# Patient Record
Sex: MALE | Race: WHITE | URBAN - METROPOLITAN AREA
[De-identification: names, ages, dates, MRNs, and addresses within clinical notes are randomized per-mention and may not be internally consistent; named-entity substitution may affect disease eponyms.]

---

## 2017-01-01 ENCOUNTER — TRANSFERRED RECORDS (OUTPATIENT)
Dept: HEALTH INFORMATION MANAGEMENT | Facility: CLINIC | Age: 47
End: 2017-01-01

## 2017-01-01 LAB
AST SERPL W P-5'-P-CCNC: 28 U/L (ref 0–45)
BASOPHILS # BLD AUTO: 0 10E9/L (ref 0–0.2)
BASOPHILS NFR BLD AUTO: 0.9 %
CK SERPL-CCNC: 55 U/L (ref 30–300)
CREAT SERPL-MCNC: 0.74 MG/DL (ref 0.66–1.25)
CRP SERPL-MCNC: 5.8 MG/L (ref 0–8)
DIFFERENTIAL METHOD BLD: ABNORMAL
EOSINOPHIL # BLD AUTO: 0.1 10E9/L (ref 0–0.7)
EOSINOPHIL NFR BLD AUTO: 4.1 %
ERYTHROCYTE [DISTWIDTH] IN BLOOD BY AUTOMATED COUNT: 15.1 % (ref 10–15)
GFR SERPL CREATININE-BSD FRML MDRD: >90 ML/MIN/1.7M2
HCT VFR BLD AUTO: 32.6 % (ref 40–53)
HGB BLD-MCNC: 10.9 G/DL (ref 13.3–17.7)
IMM GRANULOCYTES # BLD: 0 10E9/L (ref 0–0.4)
IMM GRANULOCYTES NFR BLD: 0 %
LYMPHOCYTES # BLD AUTO: 1 10E9/L (ref 0.8–5.3)
LYMPHOCYTES NFR BLD AUTO: 29.4 %
MCH RBC QN AUTO: 25.7 PG (ref 26.5–33)
MCHC RBC AUTO-ENTMCNC: 33.4 G/DL (ref 31.5–36.5)
MCV RBC AUTO: 77 FL (ref 78–100)
MONOCYTES # BLD AUTO: 0.3 10E9/L (ref 0–1.3)
MONOCYTES NFR BLD AUTO: 8.2 %
NEUTROPHILS # BLD AUTO: 2 10E9/L (ref 1.6–8.3)
NEUTROPHILS NFR BLD AUTO: 57.4 %
NRBC # BLD AUTO: 0 10*3/UL
NRBC BLD AUTO-RTO: 0 /100
PLATELET # BLD AUTO: 155 10E9/L (ref 150–450)
RBC # BLD AUTO: 4.24 10E12/L (ref 4.4–5.9)
WBC # BLD AUTO: 3.4 10E9/L (ref 4–11)

## 2017-01-01 PROCEDURE — 86140 C-REACTIVE PROTEIN: CPT | Performed by: INTERNAL MEDICINE

## 2017-01-01 PROCEDURE — 85025 COMPLETE CBC W/AUTO DIFF WBC: CPT | Performed by: INTERNAL MEDICINE

## 2017-01-01 PROCEDURE — 82565 ASSAY OF CREATININE: CPT | Performed by: INTERNAL MEDICINE

## 2017-01-01 PROCEDURE — 84450 TRANSFERASE (AST) (SGOT): CPT | Performed by: INTERNAL MEDICINE

## 2017-01-01 PROCEDURE — 82550 ASSAY OF CK (CPK): CPT | Performed by: INTERNAL MEDICINE

## 2018-01-01 ENCOUNTER — TRANSFERRED RECORDS (OUTPATIENT)
Dept: HEALTH INFORMATION MANAGEMENT | Facility: CLINIC | Age: 48
End: 2018-01-01

## 2018-01-01 ENCOUNTER — APPOINTMENT (OUTPATIENT)
Dept: INTERVENTIONAL RADIOLOGY/VASCULAR | Facility: CLINIC | Age: 48
DRG: 441 | End: 2018-01-01
Attending: NURSE PRACTITIONER
Payer: COMMERCIAL

## 2018-01-01 ENCOUNTER — APPOINTMENT (OUTPATIENT)
Dept: INTERVENTIONAL RADIOLOGY/VASCULAR | Facility: CLINIC | Age: 48
End: 2018-01-01
Attending: NURSE PRACTITIONER
Payer: COMMERCIAL

## 2018-01-01 ENCOUNTER — APPOINTMENT (OUTPATIENT)
Dept: GENERAL RADIOLOGY | Facility: CLINIC | Age: 48
DRG: 441 | End: 2018-01-01
Attending: EMERGENCY MEDICINE
Payer: COMMERCIAL

## 2018-01-01 ENCOUNTER — APPOINTMENT (OUTPATIENT)
Dept: GENERAL RADIOLOGY | Facility: CLINIC | Age: 48
DRG: 441 | End: 2018-01-01
Attending: STUDENT IN AN ORGANIZED HEALTH CARE EDUCATION/TRAINING PROGRAM
Payer: COMMERCIAL

## 2018-01-01 ENCOUNTER — APPOINTMENT (OUTPATIENT)
Dept: GENERAL RADIOLOGY | Facility: CLINIC | Age: 48
DRG: 441 | End: 2018-01-01
Payer: COMMERCIAL

## 2018-01-01 ENCOUNTER — HOME INFUSION (PRE-WILLOW HOME INFUSION) (OUTPATIENT)
Dept: PHARMACY | Facility: CLINIC | Age: 48
End: 2018-01-01

## 2018-01-01 ENCOUNTER — MEDICAL CORRESPONDENCE (OUTPATIENT)
Dept: HEALTH INFORMATION MANAGEMENT | Facility: CLINIC | Age: 48
End: 2018-01-01

## 2018-01-01 ENCOUNTER — APPOINTMENT (OUTPATIENT)
Dept: MEDSURG UNIT | Facility: CLINIC | Age: 48
End: 2018-01-01
Attending: INTERNAL MEDICINE
Payer: COMMERCIAL

## 2018-01-01 ENCOUNTER — OFFICE VISIT (OUTPATIENT)
Dept: GASTROENTEROLOGY | Facility: CLINIC | Age: 48
End: 2018-01-01
Attending: INTERNAL MEDICINE
Payer: COMMERCIAL

## 2018-01-01 ENCOUNTER — HOSPITAL ENCOUNTER (INPATIENT)
Facility: CLINIC | Age: 48
LOS: 19 days | Discharge: HOME HEALTH | DRG: 441 | End: 2018-04-02
Attending: ANESTHESIOLOGY
Payer: COMMERCIAL

## 2018-01-01 ENCOUNTER — ANESTHESIA (OUTPATIENT)
Dept: SURGERY | Facility: CLINIC | Age: 48
DRG: 441 | End: 2018-01-01
Payer: COMMERCIAL

## 2018-01-01 ENCOUNTER — APPOINTMENT (OUTPATIENT)
Dept: OCCUPATIONAL THERAPY | Facility: CLINIC | Age: 48
DRG: 441 | End: 2018-01-01
Payer: COMMERCIAL

## 2018-01-01 ENCOUNTER — ANESTHESIA EVENT (OUTPATIENT)
Dept: SURGERY | Facility: CLINIC | Age: 48
End: 2018-01-01

## 2018-01-01 ENCOUNTER — APPOINTMENT (OUTPATIENT)
Dept: ULTRASOUND IMAGING | Facility: CLINIC | Age: 48
DRG: 441 | End: 2018-01-01
Attending: ANESTHESIOLOGY
Payer: COMMERCIAL

## 2018-01-01 ENCOUNTER — ONCOLOGY VISIT (OUTPATIENT)
Dept: ONCOLOGY | Facility: CLINIC | Age: 48
End: 2018-01-01
Attending: INTERNAL MEDICINE
Payer: COMMERCIAL

## 2018-01-01 ENCOUNTER — APPOINTMENT (OUTPATIENT)
Dept: INTERVENTIONAL RADIOLOGY/VASCULAR | Facility: CLINIC | Age: 48
DRG: 441 | End: 2018-01-01
Attending: PHYSICIAN ASSISTANT
Payer: COMMERCIAL

## 2018-01-01 ENCOUNTER — ANESTHESIA (OUTPATIENT)
Dept: GASTROENTEROLOGY | Facility: CLINIC | Age: 48
DRG: 441 | End: 2018-01-01
Payer: COMMERCIAL

## 2018-01-01 ENCOUNTER — HOSPITAL ENCOUNTER (OUTPATIENT)
Facility: CLINIC | Age: 48
Discharge: HOME | End: 2018-02-27
Attending: INTERNAL MEDICINE | Admitting: INTERNAL MEDICINE
Payer: COMMERCIAL

## 2018-01-01 ENCOUNTER — APPOINTMENT (OUTPATIENT)
Dept: GENERAL RADIOLOGY | Facility: CLINIC | Age: 48
DRG: 441 | End: 2018-01-01
Attending: INTERNAL MEDICINE
Payer: COMMERCIAL

## 2018-01-01 ENCOUNTER — ANESTHESIA EVENT (OUTPATIENT)
Dept: GASTROENTEROLOGY | Facility: CLINIC | Age: 48
DRG: 441 | End: 2018-01-01
Payer: COMMERCIAL

## 2018-01-01 ENCOUNTER — APPOINTMENT (OUTPATIENT)
Dept: PHYSICAL THERAPY | Facility: CLINIC | Age: 48
DRG: 441 | End: 2018-01-01
Attending: ANESTHESIOLOGY
Payer: COMMERCIAL

## 2018-01-01 ENCOUNTER — HOSPITAL ENCOUNTER (OUTPATIENT)
Facility: CLINIC | Age: 48
End: 2018-01-01
Admitting: INTERNAL MEDICINE

## 2018-01-01 ENCOUNTER — APPOINTMENT (OUTPATIENT)
Dept: OCCUPATIONAL THERAPY | Facility: CLINIC | Age: 48
DRG: 441 | End: 2018-01-01
Attending: ANESTHESIOLOGY
Payer: COMMERCIAL

## 2018-01-01 ENCOUNTER — TELEPHONE (OUTPATIENT)
Dept: GASTROENTEROLOGY | Facility: CLINIC | Age: 48
End: 2018-01-01

## 2018-01-01 ENCOUNTER — CARE COORDINATION (OUTPATIENT)
Dept: CARE COORDINATION | Facility: CLINIC | Age: 48
End: 2018-01-01

## 2018-01-01 ENCOUNTER — HOSPITAL ENCOUNTER (INPATIENT)
Dept: GENERAL RADIOLOGY | Facility: CLINIC | Age: 48
End: 2018-03-02
Attending: INTERNAL MEDICINE

## 2018-01-01 ENCOUNTER — ANESTHESIA (OUTPATIENT)
Dept: SURGERY | Facility: CLINIC | Age: 48
End: 2018-01-01

## 2018-01-01 ENCOUNTER — APPOINTMENT (OUTPATIENT)
Dept: CT IMAGING | Facility: CLINIC | Age: 48
DRG: 441 | End: 2018-01-01
Payer: COMMERCIAL

## 2018-01-01 ENCOUNTER — APPOINTMENT (OUTPATIENT)
Dept: GENERAL RADIOLOGY | Facility: CLINIC | Age: 48
DRG: 441 | End: 2018-01-01
Attending: ANESTHESIOLOGY
Payer: COMMERCIAL

## 2018-01-01 ENCOUNTER — TELEPHONE (OUTPATIENT)
Dept: INTERVENTIONAL RADIOLOGY/VASCULAR | Facility: CLINIC | Age: 48
End: 2018-01-01

## 2018-01-01 ENCOUNTER — APPOINTMENT (OUTPATIENT)
Dept: MRI IMAGING | Facility: CLINIC | Age: 48
DRG: 441 | End: 2018-01-01
Attending: ANESTHESIOLOGY
Payer: COMMERCIAL

## 2018-01-01 ENCOUNTER — ANESTHESIA EVENT (OUTPATIENT)
Dept: SURGERY | Facility: CLINIC | Age: 48
DRG: 441 | End: 2018-01-01
Payer: COMMERCIAL

## 2018-01-01 ENCOUNTER — APPOINTMENT (OUTPATIENT)
Dept: NUCLEAR MEDICINE | Facility: CLINIC | Age: 48
DRG: 441 | End: 2018-01-01
Attending: INTERNAL MEDICINE
Payer: COMMERCIAL

## 2018-01-01 ENCOUNTER — APPOINTMENT (OUTPATIENT)
Dept: OCCUPATIONAL THERAPY | Facility: CLINIC | Age: 48
DRG: 441 | End: 2018-01-01
Attending: HOSPITALIST
Payer: COMMERCIAL

## 2018-01-01 ENCOUNTER — APPOINTMENT (OUTPATIENT)
Dept: CT IMAGING | Facility: CLINIC | Age: 48
DRG: 441 | End: 2018-01-01
Attending: ANESTHESIOLOGY
Payer: COMMERCIAL

## 2018-01-01 ENCOUNTER — HOSPITAL ENCOUNTER (INPATIENT)
Facility: CLINIC | Age: 48
LOS: 12 days | DRG: 441 | End: 2018-04-22
Attending: EMERGENCY MEDICINE | Admitting: INTERNAL MEDICINE
Payer: COMMERCIAL

## 2018-01-01 VITALS
RESPIRATION RATE: 16 BRPM | DIASTOLIC BLOOD PRESSURE: 87 MMHG | SYSTOLIC BLOOD PRESSURE: 123 MMHG | HEART RATE: 93 BPM | TEMPERATURE: 99.1 F | OXYGEN SATURATION: 99 %

## 2018-01-01 VITALS
DIASTOLIC BLOOD PRESSURE: 86 MMHG | WEIGHT: 151.19 LBS | HEIGHT: 72 IN | HEART RATE: 74 BPM | TEMPERATURE: 96.9 F | OXYGEN SATURATION: 93 % | SYSTOLIC BLOOD PRESSURE: 131 MMHG | RESPIRATION RATE: 16 BRPM | BODY MASS INDEX: 20.48 KG/M2

## 2018-01-01 VITALS
BODY MASS INDEX: 22.78 KG/M2 | HEART RATE: 92 BPM | HEIGHT: 72 IN | DIASTOLIC BLOOD PRESSURE: 78 MMHG | TEMPERATURE: 97.4 F | RESPIRATION RATE: 14 BRPM | WEIGHT: 168.21 LBS | SYSTOLIC BLOOD PRESSURE: 95 MMHG | OXYGEN SATURATION: 94 %

## 2018-01-01 VITALS
DIASTOLIC BLOOD PRESSURE: 92 MMHG | HEIGHT: 72 IN | SYSTOLIC BLOOD PRESSURE: 135 MMHG | RESPIRATION RATE: 16 BRPM | BODY MASS INDEX: 20.07 KG/M2 | TEMPERATURE: 96.8 F | OXYGEN SATURATION: 97 % | HEART RATE: 85 BPM | WEIGHT: 148.2 LBS

## 2018-01-01 VITALS
RESPIRATION RATE: 16 BRPM | HEIGHT: 72 IN | OXYGEN SATURATION: 100 % | SYSTOLIC BLOOD PRESSURE: 112 MMHG | HEART RATE: 65 BPM | TEMPERATURE: 98.1 F | WEIGHT: 144 LBS | DIASTOLIC BLOOD PRESSURE: 73 MMHG | BODY MASS INDEX: 19.5 KG/M2

## 2018-01-01 DIAGNOSIS — R78.81 BACTEREMIA: ICD-10-CM

## 2018-01-01 DIAGNOSIS — R19.7 DIARRHEA, UNSPECIFIED TYPE: ICD-10-CM

## 2018-01-01 DIAGNOSIS — R18.8 OTHER ASCITES: Primary | ICD-10-CM

## 2018-01-01 DIAGNOSIS — R18.8 CIRRHOSIS OF LIVER WITH ASCITES, UNSPECIFIED HEPATIC CIRRHOSIS TYPE (H): ICD-10-CM

## 2018-01-01 DIAGNOSIS — E87.6 HYPOKALEMIA: ICD-10-CM

## 2018-01-01 DIAGNOSIS — R18.8 CIRRHOSIS OF LIVER WITH ASCITES, UNSPECIFIED HEPATIC CIRRHOSIS TYPE (H): Primary | Chronic | ICD-10-CM

## 2018-01-01 DIAGNOSIS — E55.9 VITAMIN D DEFICIENCY: ICD-10-CM

## 2018-01-01 DIAGNOSIS — E08.8 DIABETES MELLITUS DUE TO UNDERLYING CONDITION WITH COMPLICATION, UNSPECIFIED LONG TERM INSULIN USE STATUS: ICD-10-CM

## 2018-01-01 DIAGNOSIS — K74.60 CIRRHOSIS OF LIVER WITH ASCITES, UNSPECIFIED HEPATIC CIRRHOSIS TYPE (H): ICD-10-CM

## 2018-01-01 DIAGNOSIS — R53.1 WEAKNESS: Primary | ICD-10-CM

## 2018-01-01 DIAGNOSIS — R74.01 TRANSAMINITIS: ICD-10-CM

## 2018-01-01 DIAGNOSIS — C25.9 MALIGNANT NEOPLASM OF PANCREAS, UNSPECIFIED LOCATION OF MALIGNANCY (H): Primary | ICD-10-CM

## 2018-01-01 DIAGNOSIS — B37.2 CANDIDIASIS OF SKIN: ICD-10-CM

## 2018-01-01 DIAGNOSIS — Z46.82 ENCOUNTER FOR BILIARY DRAINAGE TUBE PLACEMENT: ICD-10-CM

## 2018-01-01 DIAGNOSIS — C25.9 MALIGNANT NEOPLASM OF PANCREAS, UNSPECIFIED LOCATION OF MALIGNANCY (H): ICD-10-CM

## 2018-01-01 DIAGNOSIS — K86.89 PANCREATIC INSUFFICIENCY: ICD-10-CM

## 2018-01-01 DIAGNOSIS — B37.7 CANDIDEMIA (H): ICD-10-CM

## 2018-01-01 DIAGNOSIS — K21.9 GASTROESOPHAGEAL REFLUX DISEASE, ESOPHAGITIS PRESENCE NOT SPECIFIED: ICD-10-CM

## 2018-01-01 DIAGNOSIS — I10 BENIGN ESSENTIAL HYPERTENSION: ICD-10-CM

## 2018-01-01 DIAGNOSIS — R53.83 FATIGUE, UNSPECIFIED TYPE: ICD-10-CM

## 2018-01-01 DIAGNOSIS — J90 PLEURAL EFFUSION: ICD-10-CM

## 2018-01-01 DIAGNOSIS — E56.1 VITAMIN K DEFICIENCY: ICD-10-CM

## 2018-01-01 DIAGNOSIS — R18.8 OTHER ASCITES: ICD-10-CM

## 2018-01-01 DIAGNOSIS — C25.0 MALIGNANT NEOPLASM OF HEAD OF PANCREAS (H): ICD-10-CM

## 2018-01-01 DIAGNOSIS — D64.9 ANEMIA, UNSPECIFIED TYPE: ICD-10-CM

## 2018-01-01 DIAGNOSIS — K74.60 CIRRHOSIS OF LIVER WITH ASCITES, UNSPECIFIED HEPATIC CIRRHOSIS TYPE (H): Primary | Chronic | ICD-10-CM

## 2018-01-01 LAB
1,3 BETA GLUCAN SER-MCNC: 47 PG/ML
A1AT STL-MCNT: 0.04 MG/G (ref 0–0.5)
ABO + RH BLD: NORMAL
ALBUMIN FLD-MCNC: 0.2 G/DL
ALBUMIN FLD-MCNC: 0.4 G/DL
ALBUMIN SERPL-MCNC: 1.7 G/DL (ref 3.4–5)
ALBUMIN SERPL-MCNC: 1.7 G/DL (ref 3.4–5)
ALBUMIN SERPL-MCNC: 1.8 G/DL (ref 3.4–5)
ALBUMIN SERPL-MCNC: 1.9 G/DL (ref 3.4–5)
ALBUMIN SERPL-MCNC: 1.9 G/DL (ref 3.4–5)
ALBUMIN SERPL-MCNC: 2 G/DL (ref 3.4–5)
ALBUMIN SERPL-MCNC: 2.1 G/DL (ref 3.4–5)
ALBUMIN SERPL-MCNC: 2.2 G/DL (ref 3.4–5)
ALBUMIN SERPL-MCNC: 2.3 G/DL (ref 3.4–5)
ALBUMIN SERPL-MCNC: 2.3 G/DL (ref 3.4–5)
ALBUMIN SERPL-MCNC: 2.4 G/DL (ref 3.4–5)
ALBUMIN SERPL-MCNC: 2.6 G/DL (ref 3.4–5)
ALBUMIN SERPL-MCNC: 2.7 G/DL (ref 3.4–5)
ALBUMIN SERPL-MCNC: 2.8 G/DL (ref 3.4–5)
ALBUMIN SERPL-MCNC: 3 G/DL (ref 3.4–5)
ALBUMIN SERPL-MCNC: 3.1 G/DL (ref 3.4–5)
ALBUMIN SERPL-MCNC: 3.2 G/DL (ref 3.4–5)
ALBUMIN SERPL-MCNC: 3.3 G/DL (ref 3.4–5)
ALBUMIN SERPL-MCNC: 3.4 G/DL (ref 3.4–5)
ALBUMIN SERPL-MCNC: 3.4 G/DL (ref 3.4–5)
ALBUMIN SERPL-MCNC: 3.5 G/DL (ref 3.4–5)
ALBUMIN SERPL-MCNC: 3.6 G/DL (ref 3.4–5)
ALBUMIN UR-MCNC: 30 MG/DL
ALBUMIN UR-MCNC: NEGATIVE MG/DL
ALBUMIN UR-MCNC: NEGATIVE MG/DL
ALP BONE CFR SERPL: 120 U/L (ref 0–55)
ALP LIVER SERPL-CCNC: 511 U/L (ref 0–94)
ALP OTHER CFR SERPL: 0 U/L
ALP SERPL-CCNC: 1053 U/L (ref 40–150)
ALP SERPL-CCNC: 1351 U/L (ref 40–150)
ALP SERPL-CCNC: 1459 U/L (ref 40–150)
ALP SERPL-CCNC: 1628 U/L (ref 40–150)
ALP SERPL-CCNC: 1749 U/L (ref 40–150)
ALP SERPL-CCNC: 2011 U/L (ref 40–150)
ALP SERPL-CCNC: 2031 U/L (ref 40–150)
ALP SERPL-CCNC: 274 U/L (ref 40–150)
ALP SERPL-CCNC: 278 U/L (ref 40–150)
ALP SERPL-CCNC: 329 U/L (ref 40–150)
ALP SERPL-CCNC: 334 U/L (ref 40–150)
ALP SERPL-CCNC: 349 U/L (ref 40–150)
ALP SERPL-CCNC: 358 U/L (ref 40–150)
ALP SERPL-CCNC: 370 U/L (ref 40–150)
ALP SERPL-CCNC: 397 U/L (ref 40–150)
ALP SERPL-CCNC: 442 U/L (ref 40–150)
ALP SERPL-CCNC: 454 U/L (ref 40–150)
ALP SERPL-CCNC: 543 U/L (ref 40–150)
ALP SERPL-CCNC: 549 U/L (ref 40–150)
ALP SERPL-CCNC: 569 U/L (ref 40–150)
ALP SERPL-CCNC: 577 U/L (ref 40–150)
ALP SERPL-CCNC: 579 U/L (ref 40–150)
ALP SERPL-CCNC: 631 U/L (ref 40–120)
ALP SERPL-CCNC: 656 U/L (ref 40–150)
ALP SERPL-CCNC: 671 U/L (ref 40–150)
ALP SERPL-CCNC: 680 U/L (ref 40–150)
ALP SERPL-CCNC: 696 U/L (ref 40–150)
ALP SERPL-CCNC: 743 U/L (ref 40–150)
ALP SERPL-CCNC: 802 U/L (ref 40–150)
ALP SERPL-CCNC: >2330 U/L (ref 40–150)
ALT SERPL W P-5'-P-CCNC: 106 U/L (ref 0–70)
ALT SERPL W P-5'-P-CCNC: 120 U/L (ref 0–70)
ALT SERPL W P-5'-P-CCNC: 42 U/L (ref 0–70)
ALT SERPL W P-5'-P-CCNC: 43 U/L (ref 0–70)
ALT SERPL W P-5'-P-CCNC: 43 U/L (ref 0–70)
ALT SERPL W P-5'-P-CCNC: 44 U/L (ref 0–70)
ALT SERPL W P-5'-P-CCNC: 48 U/L (ref 0–70)
ALT SERPL W P-5'-P-CCNC: 51 U/L (ref 0–70)
ALT SERPL W P-5'-P-CCNC: 54 U/L (ref 0–70)
ALT SERPL W P-5'-P-CCNC: 64 U/L (ref 0–70)
ALT SERPL W P-5'-P-CCNC: 68 U/L (ref 0–70)
ALT SERPL W P-5'-P-CCNC: 69 U/L (ref 0–70)
ALT SERPL W P-5'-P-CCNC: 70 U/L (ref 0–70)
ALT SERPL W P-5'-P-CCNC: 70 U/L (ref 0–70)
ALT SERPL W P-5'-P-CCNC: 71 U/L (ref 0–70)
ALT SERPL W P-5'-P-CCNC: 74 U/L (ref 0–70)
ALT SERPL W P-5'-P-CCNC: 76 U/L (ref 0–70)
ALT SERPL W P-5'-P-CCNC: 79 U/L (ref 0–70)
ALT SERPL W P-5'-P-CCNC: 81 U/L (ref 0–70)
ALT SERPL W P-5'-P-CCNC: 83 U/L (ref 0–70)
ALT SERPL W P-5'-P-CCNC: 84 U/L (ref 0–70)
ALT SERPL W P-5'-P-CCNC: 91 U/L (ref 0–70)
ALT SERPL W P-5'-P-CCNC: 92 U/L (ref 0–70)
ALT SERPL W P-5'-P-CCNC: 94 U/L (ref 0–70)
AMORPH CRY #/AREA URNS HPF: ABNORMAL /HPF
ANGLE RATE OF CLOT STRENGTH: 67.5 DEGREES (ref 53–72)
ANION GAP SERPL CALCULATED.3IONS-SCNC: 10 MMOL/L (ref 3–14)
ANION GAP SERPL CALCULATED.3IONS-SCNC: 11 MMOL/L (ref 3–14)
ANION GAP SERPL CALCULATED.3IONS-SCNC: 12 MMOL/L (ref 3–14)
ANION GAP SERPL CALCULATED.3IONS-SCNC: 13 MMOL/L (ref 3–14)
ANION GAP SERPL CALCULATED.3IONS-SCNC: 5 MMOL/L (ref 3–14)
ANION GAP SERPL CALCULATED.3IONS-SCNC: 6 MMOL/L (ref 3–14)
ANION GAP SERPL CALCULATED.3IONS-SCNC: 7 MMOL/L (ref 3–14)
ANION GAP SERPL CALCULATED.3IONS-SCNC: 8 MMOL/L (ref 3–14)
ANION GAP SERPL CALCULATED.3IONS-SCNC: 9 MMOL/L (ref 3–14)
APPEARANCE FLD: CLEAR
APPEARANCE FLD: NORMAL
APPEARANCE UR: ABNORMAL
APPEARANCE UR: CLEAR
APPEARANCE UR: CLEAR
APTT PPP: 31 SEC (ref 22–37)
APTT PPP: 34 SEC (ref 22–37)
APTT PPP: 35 SEC (ref 22–37)
APTT PPP: 35 SEC (ref 22–37)
APTT PPP: NORMAL SEC (ref 22–37)
AST SERPL W P-5'-P-CCNC: 108 U/L (ref 0–45)
AST SERPL W P-5'-P-CCNC: 148 U/L (ref 0–45)
AST SERPL W P-5'-P-CCNC: 162 U/L (ref 0–45)
AST SERPL W P-5'-P-CCNC: 164 U/L (ref 0–45)
AST SERPL W P-5'-P-CCNC: 19 U/L (ref 0–45)
AST SERPL W P-5'-P-CCNC: 20 U/L (ref 0–45)
AST SERPL W P-5'-P-CCNC: 22 U/L (ref 0–45)
AST SERPL W P-5'-P-CCNC: 24 U/L (ref 0–45)
AST SERPL W P-5'-P-CCNC: 25 U/L (ref 0–45)
AST SERPL W P-5'-P-CCNC: 26 U/L (ref 0–45)
AST SERPL W P-5'-P-CCNC: 32 U/L (ref 0–45)
AST SERPL W P-5'-P-CCNC: 32 U/L (ref 0–45)
AST SERPL W P-5'-P-CCNC: 34 U/L (ref 0–45)
AST SERPL W P-5'-P-CCNC: 35 U/L (ref 0–45)
AST SERPL W P-5'-P-CCNC: 38 U/L (ref 0–45)
AST SERPL W P-5'-P-CCNC: 42 U/L (ref 0–45)
AST SERPL W P-5'-P-CCNC: 49 U/L (ref 0–45)
AST SERPL W P-5'-P-CCNC: 51 U/L (ref 0–45)
AST SERPL W P-5'-P-CCNC: 52 U/L (ref 0–45)
AST SERPL W P-5'-P-CCNC: 53 U/L (ref 0–45)
AST SERPL W P-5'-P-CCNC: 54 U/L (ref 0–45)
AST SERPL W P-5'-P-CCNC: 54 U/L (ref 0–45)
AST SERPL W P-5'-P-CCNC: 56 U/L (ref 0–45)
AST SERPL W P-5'-P-CCNC: 57 U/L (ref 0–45)
AST SERPL W P-5'-P-CCNC: 63 U/L (ref 0–45)
AST SERPL W P-5'-P-CCNC: 63 U/L (ref 0–45)
AST SERPL W P-5'-P-CCNC: 64 U/L (ref 0–45)
AST SERPL W P-5'-P-CCNC: 64 U/L (ref 0–45)
AST SERPL W P-5'-P-CCNC: 74 U/L (ref 0–45)
AST SERPL W P-5'-P-CCNC: 79 U/L (ref 0–45)
AST SERPL W P-5'-P-CCNC: 81 U/L (ref 0–45)
B-D GLUCAN INTERPRETATION (1,3): NEGATIVE
BACTERIA SPEC CULT: ABNORMAL
BACTERIA SPEC CULT: NO GROWTH
BACTERIA SPEC CULT: NORMAL
BASE EXCESS BLDA CALC-SCNC: 1.4 MMOL/L
BASE EXCESS BLDA CALC-SCNC: 1.7 MMOL/L
BASE EXCESS BLDA CALC-SCNC: 3.2 MMOL/L
BASE EXCESS BLDA CALC-SCNC: 3.4 MMOL/L
BASE EXCESS BLDA CALC-SCNC: 5.4 MMOL/L
BASOPHILS # BLD AUTO: 0 10E9/L (ref 0–0.2)
BASOPHILS NFR BLD AUTO: 0 %
BASOPHILS NFR BLD AUTO: 0.1 %
BILIRUB DIRECT SERPL-MCNC: 0.8 MG/DL (ref 0–0.2)
BILIRUB DIRECT SERPL-MCNC: 1.1 MG/DL (ref 0–0.2)
BILIRUB DIRECT SERPL-MCNC: 1.4 MG/DL (ref 0–0.2)
BILIRUB DIRECT SERPL-MCNC: 4.8 MG/DL (ref 0–0.2)
BILIRUB SERPL-MCNC: 2.2 MG/DL (ref 0.2–1.3)
BILIRUB SERPL-MCNC: 2.3 MG/DL (ref 0.2–1.3)
BILIRUB SERPL-MCNC: 2.3 MG/DL (ref 0.2–1.3)
BILIRUB SERPL-MCNC: 2.4 MG/DL (ref 0.2–1.3)
BILIRUB SERPL-MCNC: 2.5 MG/DL (ref 0.2–1.3)
BILIRUB SERPL-MCNC: 2.8 MG/DL (ref 0.2–1.3)
BILIRUB SERPL-MCNC: 2.8 MG/DL (ref 0.2–1.3)
BILIRUB SERPL-MCNC: 2.9 MG/DL (ref 0.2–1.3)
BILIRUB SERPL-MCNC: 3 MG/DL (ref 0.2–1.3)
BILIRUB SERPL-MCNC: 3 MG/DL (ref 0.2–1.3)
BILIRUB SERPL-MCNC: 3.1 MG/DL (ref 0.2–1.3)
BILIRUB SERPL-MCNC: 3.1 MG/DL (ref 0.2–1.3)
BILIRUB SERPL-MCNC: 3.2 MG/DL (ref 0.2–1.3)
BILIRUB SERPL-MCNC: 3.2 MG/DL (ref 0.2–1.3)
BILIRUB SERPL-MCNC: 3.3 MG/DL (ref 0.2–1.3)
BILIRUB SERPL-MCNC: 3.3 MG/DL (ref 0.2–1.3)
BILIRUB SERPL-MCNC: 3.4 MG/DL (ref 0.2–1.3)
BILIRUB SERPL-MCNC: 3.5 MG/DL (ref 0.2–1.3)
BILIRUB SERPL-MCNC: 3.5 MG/DL (ref 0.2–1.3)
BILIRUB SERPL-MCNC: 3.6 MG/DL (ref 0.2–1.3)
BILIRUB SERPL-MCNC: 4 MG/DL (ref 0.2–1.3)
BILIRUB SERPL-MCNC: 4.8 MG/DL (ref 0.2–1.3)
BILIRUB SERPL-MCNC: 5.1 MG/DL (ref 0.2–1.3)
BILIRUB SERPL-MCNC: 5.4 MG/DL (ref 0.2–1.3)
BILIRUB SERPL-MCNC: 6 MG/DL (ref 0.2–1.3)
BILIRUB SERPL-MCNC: 6.9 MG/DL (ref 0.2–1.3)
BILIRUB SERPL-MCNC: 8.2 MG/DL (ref 0.2–1.3)
BILIRUB SERPL-MCNC: 8.9 MG/DL (ref 0.2–1.3)
BILIRUB SERPL-MCNC: 9 MG/DL (ref 0.2–1.3)
BILIRUB UR QL STRIP: ABNORMAL
BILIRUB UR QL STRIP: NEGATIVE
BILIRUB UR QL STRIP: NEGATIVE
BLD GP AB SCN SERPL QL: NORMAL
BLD PROD TYP BPU: NORMAL
BLD UNIT ID BPU: 0
BLOOD BANK CMNT PATIENT-IMP: NORMAL
BLOOD PRODUCT CODE: NORMAL
BPU ID: NORMAL
BUN SERPL-MCNC: 26 MG/DL (ref 7–30)
BUN SERPL-MCNC: 26 MG/DL (ref 7–30)
BUN SERPL-MCNC: 27 MG/DL (ref 7–30)
BUN SERPL-MCNC: 28 MG/DL (ref 7–30)
BUN SERPL-MCNC: 29 MG/DL (ref 7–30)
BUN SERPL-MCNC: 30 MG/DL (ref 7–30)
BUN SERPL-MCNC: 31 MG/DL (ref 7–30)
BUN SERPL-MCNC: 31 MG/DL (ref 7–30)
BUN SERPL-MCNC: 33 MG/DL (ref 7–30)
BUN SERPL-MCNC: 33 MG/DL (ref 7–30)
BUN SERPL-MCNC: 34 MG/DL (ref 7–30)
BUN SERPL-MCNC: 34 MG/DL (ref 7–30)
BUN SERPL-MCNC: 36 MG/DL (ref 7–30)
BUN SERPL-MCNC: 36 MG/DL (ref 7–30)
BUN SERPL-MCNC: 37 MG/DL (ref 7–30)
BUN SERPL-MCNC: 38 MG/DL (ref 7–30)
BUN SERPL-MCNC: 39 MG/DL (ref 7–30)
BUN SERPL-MCNC: 40 MG/DL (ref 7–30)
BUN SERPL-MCNC: 40 MG/DL (ref 7–30)
BUN SERPL-MCNC: 41 MG/DL (ref 7–30)
BUN SERPL-MCNC: 42 MG/DL (ref 7–30)
BUN SERPL-MCNC: 42 MG/DL (ref 7–30)
BUN SERPL-MCNC: 43 MG/DL (ref 7–30)
BUN SERPL-MCNC: 44 MG/DL (ref 7–30)
BUN SERPL-MCNC: 44 MG/DL (ref 7–30)
BUN SERPL-MCNC: 45 MG/DL (ref 7–30)
BUN SERPL-MCNC: 48 MG/DL (ref 7–30)
BUN SERPL-MCNC: 50 MG/DL (ref 7–30)
BUN SERPL-MCNC: 55 MG/DL (ref 7–30)
BUN SERPL-MCNC: 56 MG/DL (ref 7–30)
BUN SERPL-MCNC: 58 MG/DL (ref 7–30)
BUN SERPL-MCNC: 66 MG/DL (ref 7–30)
BUN SERPL-MCNC: 75 MG/DL (ref 7–30)
BUN SERPL-MCNC: 84 MG/DL (ref 7–30)
BUN SERPL-MCNC: 88 MG/DL (ref 7–30)
C COLI+JEJUNI+LARI FUSA STL QL NAA+PROBE: NOT DETECTED
C COLI+JEJUNI+LARI FUSA STL QL NAA+PROBE: NOT DETECTED
C DIFF TOX B STL QL: NEGATIVE
C DIFF TOX B STL QL: NEGATIVE
CA-I BLD-MCNC: 3.7 MG/DL (ref 4.4–5.2)
CA-I BLD-MCNC: 3.8 MG/DL (ref 4.4–5.2)
CA-I BLD-MCNC: 3.8 MG/DL (ref 4.4–5.2)
CA-I BLD-MCNC: 3.9 MG/DL (ref 4.4–5.2)
CA-I BLD-MCNC: 4 MG/DL (ref 4.4–5.2)
CA-I BLD-MCNC: 4 MG/DL (ref 4.4–5.2)
CA-I BLD-MCNC: 4.1 MG/DL (ref 4.4–5.2)
CA-I BLD-MCNC: 4.2 MG/DL (ref 4.4–5.2)
CA-I BLD-MCNC: 4.3 MG/DL (ref 4.4–5.2)
CA-I BLD-MCNC: 4.3 MG/DL (ref 4.4–5.2)
CA-I BLD-MCNC: 4.7 MG/DL (ref 4.4–5.2)
CA-I BLD-MCNC: 4.7 MG/DL (ref 4.4–5.2)
CA-I SERPL ISE-MCNC: 3.6 MG/DL (ref 4.4–5.2)
CA-I SERPL ISE-MCNC: 3.9 MG/DL (ref 4.4–5.2)
CALCIUM SERPL-MCNC: 6.2 MG/DL (ref 8.5–10.1)
CALCIUM SERPL-MCNC: 6.4 MG/DL (ref 8.5–10.1)
CALCIUM SERPL-MCNC: 6.4 MG/DL (ref 8.5–10.1)
CALCIUM SERPL-MCNC: 6.5 MG/DL (ref 8.5–10.1)
CALCIUM SERPL-MCNC: 6.8 MG/DL (ref 8.5–10.1)
CALCIUM SERPL-MCNC: 7.1 MG/DL (ref 8.5–10.1)
CALCIUM SERPL-MCNC: 7.1 MG/DL (ref 8.5–10.1)
CALCIUM SERPL-MCNC: 7.2 MG/DL (ref 8.5–10.1)
CALCIUM SERPL-MCNC: 7.3 MG/DL (ref 8.5–10.1)
CALCIUM SERPL-MCNC: 7.4 MG/DL (ref 8.5–10.1)
CALCIUM SERPL-MCNC: 7.5 MG/DL (ref 8.5–10.1)
CALCIUM SERPL-MCNC: 7.6 MG/DL (ref 8.5–10.1)
CALCIUM SERPL-MCNC: 7.7 MG/DL (ref 8.5–10.1)
CALCIUM SERPL-MCNC: 7.8 MG/DL (ref 8.5–10.1)
CALCIUM SERPL-MCNC: 7.8 MG/DL (ref 8.5–10.1)
CALCIUM SERPL-MCNC: 7.9 MG/DL (ref 8.5–10.1)
CALCIUM SERPL-MCNC: 8 MG/DL (ref 8.5–10.1)
CALCIUM SERPL-MCNC: 8.1 MG/DL (ref 8.5–10.1)
CALCIUM SERPL-MCNC: 8.2 MG/DL (ref 8.5–10.1)
CALPROTECTIN STL-MCNT: 277.1 MG/KG (ref 0–49.9)
CANCER AG19-9 SERPL-ACNC: 1402 U/ML (ref 0–37)
CANCER AG19-9 SERPL-ACNC: 604 U/ML (ref 0–37)
CANCER AG19-9 SERPL-ACNC: 897 U/ML (ref 0–37)
CHLORIDE SERPL-SCNC: 101 MMOL/L (ref 94–109)
CHLORIDE SERPL-SCNC: 102 MMOL/L (ref 94–109)
CHLORIDE SERPL-SCNC: 103 MMOL/L (ref 94–109)
CHLORIDE SERPL-SCNC: 104 MMOL/L (ref 94–109)
CHLORIDE SERPL-SCNC: 104 MMOL/L (ref 94–109)
CHLORIDE SERPL-SCNC: 105 MMOL/L (ref 94–109)
CHLORIDE SERPL-SCNC: 106 MMOL/L (ref 94–109)
CHLORIDE SERPL-SCNC: 107 MMOL/L (ref 94–109)
CHLORIDE SERPL-SCNC: 108 MMOL/L (ref 94–109)
CHLORIDE SERPL-SCNC: 109 MMOL/L (ref 94–109)
CHLORIDE SERPL-SCNC: 110 MMOL/L (ref 94–109)
CHLORIDE SERPL-SCNC: 110 MMOL/L (ref 94–109)
CHLORIDE SERPL-SCNC: 97 MMOL/L (ref 94–109)
CHLORIDE SERPL-SCNC: 98 MMOL/L (ref 94–109)
CI HYPERCOAGULATION INDEX: 0.6 RATIO (ref 0–3)
CK SERPL-CCNC: 312 U/L (ref 30–300)
CK SERPL-CCNC: 359 U/L (ref 30–300)
CK SERPL-CCNC: 389 U/L (ref 30–300)
CO2 SERPL-SCNC: 18 MMOL/L (ref 20–32)
CO2 SERPL-SCNC: 20 MMOL/L (ref 20–32)
CO2 SERPL-SCNC: 20 MMOL/L (ref 20–32)
CO2 SERPL-SCNC: 21 MMOL/L (ref 20–32)
CO2 SERPL-SCNC: 22 MMOL/L (ref 20–32)
CO2 SERPL-SCNC: 23 MMOL/L (ref 20–32)
CO2 SERPL-SCNC: 24 MMOL/L (ref 20–32)
CO2 SERPL-SCNC: 24 MMOL/L (ref 20–32)
CO2 SERPL-SCNC: 25 MMOL/L (ref 20–32)
CO2 SERPL-SCNC: 26 MMOL/L (ref 20–32)
CO2 SERPL-SCNC: 27 MMOL/L (ref 20–32)
CO2 SERPL-SCNC: 28 MMOL/L (ref 20–32)
CO2 SERPL-SCNC: 29 MMOL/L (ref 20–32)
CO2 SERPL-SCNC: 30 MMOL/L (ref 20–32)
CO2 SERPL-SCNC: 32 MMOL/L (ref 20–32)
COLOR FLD: YELLOW
COLOR UR AUTO: YELLOW
COPATH REPORT: NORMAL
CREAT SERPL-MCNC: 0.54 MG/DL (ref 0.66–1.25)
CREAT SERPL-MCNC: 0.54 MG/DL (ref 0.66–1.25)
CREAT SERPL-MCNC: 0.56 MG/DL (ref 0.66–1.25)
CREAT SERPL-MCNC: 0.56 MG/DL (ref 0.66–1.25)
CREAT SERPL-MCNC: 0.57 MG/DL (ref 0.66–1.25)
CREAT SERPL-MCNC: 0.58 MG/DL (ref 0.66–1.25)
CREAT SERPL-MCNC: 0.58 MG/DL (ref 0.66–1.25)
CREAT SERPL-MCNC: 0.6 MG/DL (ref 0.66–1.25)
CREAT SERPL-MCNC: 0.61 MG/DL (ref 0.66–1.25)
CREAT SERPL-MCNC: 0.61 MG/DL (ref 0.66–1.25)
CREAT SERPL-MCNC: 0.62 MG/DL (ref 0.66–1.25)
CREAT SERPL-MCNC: 0.63 MG/DL (ref 0.66–1.25)
CREAT SERPL-MCNC: 0.63 MG/DL (ref 0.66–1.25)
CREAT SERPL-MCNC: 0.65 MG/DL (ref 0.66–1.25)
CREAT SERPL-MCNC: 0.66 MG/DL (ref 0.66–1.25)
CREAT SERPL-MCNC: 0.68 MG/DL (ref 0.66–1.25)
CREAT SERPL-MCNC: 0.7 MG/DL (ref 0.66–1.25)
CREAT SERPL-MCNC: 0.74 MG/DL (ref 0.66–1.25)
CREAT SERPL-MCNC: 0.75 MG/DL (ref 0.66–1.25)
CREAT SERPL-MCNC: 0.77 MG/DL (ref 0.66–1.25)
CREAT SERPL-MCNC: 0.77 MG/DL (ref 0.66–1.25)
CREAT SERPL-MCNC: 0.82 MG/DL (ref 0.66–1.25)
CREAT SERPL-MCNC: 1.03 MG/DL (ref 0.66–1.25)
CREAT SERPL-MCNC: 1.06 MG/DL (ref 0.66–1.25)
CREAT SERPL-MCNC: 1.07 MG/DL (ref 0.66–1.25)
CREAT SERPL-MCNC: 1.14 MG/DL (ref 0.66–1.25)
CREAT SERPL-MCNC: 1.18 MG/DL (ref 0.66–1.25)
CREAT SERPL-MCNC: 1.21 MG/DL (ref 0.66–1.25)
CREAT SERPL-MCNC: 1.21 MG/DL (ref 0.66–1.25)
CREAT SERPL-MCNC: 1.22 MG/DL (ref 0.66–1.25)
CREAT SERPL-MCNC: 1.22 MG/DL (ref 0.66–1.25)
CREAT SERPL-MCNC: 1.24 MG/DL (ref 0.66–1.25)
CREAT SERPL-MCNC: 1.26 MG/DL (ref 0.66–1.25)
CREAT SERPL-MCNC: 1.34 MG/DL (ref 0.66–1.25)
CREAT SERPL-MCNC: 1.35 MG/DL (ref 0.66–1.25)
CREAT SERPL-MCNC: 1.38 MG/DL (ref 0.66–1.25)
CREAT SERPL-MCNC: 1.39 MG/DL (ref 0.66–1.25)
CREAT SERPL-MCNC: 1.44 MG/DL (ref 0.66–1.25)
CREAT SERPL-MCNC: 1.45 MG/DL (ref 0.66–1.25)
CREAT UR-MCNC: 32 MG/DL
CREAT UR-MCNC: 32 MG/DL
CRP SERPL-MCNC: 160 MG/L (ref 0–8)
CRP SERPL-MCNC: 85.9 MG/L (ref 0–8)
DEPRECATED CALCIDIOL+CALCIFEROL SERPL-MC: 6 UG/L (ref 20–75)
DIFFERENTIAL METHOD BLD: ABNORMAL
EC STX1 GENE STL QL NAA+PROBE: NOT DETECTED
EC STX1 GENE STL QL NAA+PROBE: NOT DETECTED
EC STX2 GENE STL QL NAA+PROBE: NOT DETECTED
EC STX2 GENE STL QL NAA+PROBE: NOT DETECTED
ELASTASE PANC STL-MCNT: <15 UG/G
ENTERIC PATHOGEN COMMENT: NORMAL
ENTERIC PATHOGEN COMMENT: NORMAL
EOSINOPHIL # BLD AUTO: 0 10E9/L (ref 0–0.7)
EOSINOPHIL NFR BLD AUTO: 0 %
EOSINOPHIL NFR BLD AUTO: 0 %
EOSINOPHIL NFR BLD AUTO: 0.1 %
ERCP: NORMAL
ERCP: NORMAL
ERYTHROCYTE [DISTWIDTH] IN BLOOD BY AUTOMATED COUNT: 14.9 % (ref 10–15)
ERYTHROCYTE [DISTWIDTH] IN BLOOD BY AUTOMATED COUNT: 15 % (ref 10–15)
ERYTHROCYTE [DISTWIDTH] IN BLOOD BY AUTOMATED COUNT: 15.1 % (ref 10–15)
ERYTHROCYTE [DISTWIDTH] IN BLOOD BY AUTOMATED COUNT: 15.2 % (ref 10–15)
ERYTHROCYTE [DISTWIDTH] IN BLOOD BY AUTOMATED COUNT: 15.2 % (ref 10–15)
ERYTHROCYTE [DISTWIDTH] IN BLOOD BY AUTOMATED COUNT: 15.3 % (ref 10–15)
ERYTHROCYTE [DISTWIDTH] IN BLOOD BY AUTOMATED COUNT: 15.3 % (ref 10–15)
ERYTHROCYTE [DISTWIDTH] IN BLOOD BY AUTOMATED COUNT: 15.6 % (ref 10–15)
ERYTHROCYTE [DISTWIDTH] IN BLOOD BY AUTOMATED COUNT: 15.7 % (ref 10–15)
ERYTHROCYTE [DISTWIDTH] IN BLOOD BY AUTOMATED COUNT: 15.7 % (ref 10–15)
ERYTHROCYTE [DISTWIDTH] IN BLOOD BY AUTOMATED COUNT: 15.8 % (ref 10–15)
ERYTHROCYTE [DISTWIDTH] IN BLOOD BY AUTOMATED COUNT: 15.8 % (ref 10–15)
ERYTHROCYTE [DISTWIDTH] IN BLOOD BY AUTOMATED COUNT: 15.9 % (ref 10–15)
ERYTHROCYTE [DISTWIDTH] IN BLOOD BY AUTOMATED COUNT: 15.9 % (ref 10–15)
ERYTHROCYTE [DISTWIDTH] IN BLOOD BY AUTOMATED COUNT: 16 % (ref 10–15)
ERYTHROCYTE [DISTWIDTH] IN BLOOD BY AUTOMATED COUNT: 16 % (ref 10–15)
ERYTHROCYTE [DISTWIDTH] IN BLOOD BY AUTOMATED COUNT: 16.3 % (ref 10–15)
ERYTHROCYTE [DISTWIDTH] IN BLOOD BY AUTOMATED COUNT: 16.4 % (ref 10–15)
ERYTHROCYTE [DISTWIDTH] IN BLOOD BY AUTOMATED COUNT: 16.4 % (ref 10–15)
ERYTHROCYTE [DISTWIDTH] IN BLOOD BY AUTOMATED COUNT: 16.5 % (ref 10–15)
ERYTHROCYTE [DISTWIDTH] IN BLOOD BY AUTOMATED COUNT: 16.5 % (ref 10–15)
ERYTHROCYTE [DISTWIDTH] IN BLOOD BY AUTOMATED COUNT: 16.8 % (ref 10–15)
ERYTHROCYTE [DISTWIDTH] IN BLOOD BY AUTOMATED COUNT: 17.1 % (ref 10–15)
ERYTHROCYTE [DISTWIDTH] IN BLOOD BY AUTOMATED COUNT: 17.3 % (ref 10–15)
ERYTHROCYTE [DISTWIDTH] IN BLOOD BY AUTOMATED COUNT: 17.4 % (ref 10–15)
ERYTHROCYTE [DISTWIDTH] IN BLOOD BY AUTOMATED COUNT: 17.7 % (ref 10–15)
ERYTHROCYTE [DISTWIDTH] IN BLOOD BY AUTOMATED COUNT: 17.8 % (ref 10–15)
ERYTHROCYTE [DISTWIDTH] IN BLOOD BY AUTOMATED COUNT: 18 % (ref 10–15)
ERYTHROCYTE [DISTWIDTH] IN BLOOD BY AUTOMATED COUNT: 18.1 % (ref 10–15)
ERYTHROCYTE [DISTWIDTH] IN BLOOD BY AUTOMATED COUNT: 18.2 % (ref 10–15)
ERYTHROCYTE [DISTWIDTH] IN BLOOD BY AUTOMATED COUNT: 18.3 % (ref 10–15)
ERYTHROCYTE [DISTWIDTH] IN BLOOD BY AUTOMATED COUNT: 18.3 % (ref 10–15)
ERYTHROCYTE [DISTWIDTH] IN BLOOD BY AUTOMATED COUNT: 18.4 % (ref 10–15)
ERYTHROCYTE [DISTWIDTH] IN BLOOD BY AUTOMATED COUNT: 18.4 % (ref 10–15)
ERYTHROCYTE [DISTWIDTH] IN BLOOD BY AUTOMATED COUNT: 18.5 % (ref 10–15)
ERYTHROCYTE [DISTWIDTH] IN BLOOD BY AUTOMATED COUNT: 18.6 % (ref 10–15)
ERYTHROCYTE [DISTWIDTH] IN BLOOD BY AUTOMATED COUNT: 18.7 % (ref 10–15)
ERYTHROCYTE [DISTWIDTH] IN BLOOD BY AUTOMATED COUNT: 18.7 % (ref 10–15)
ERYTHROCYTE [DISTWIDTH] IN BLOOD BY AUTOMATED COUNT: 19.1 % (ref 10–15)
ERYTHROCYTE [DISTWIDTH] IN BLOOD BY AUTOMATED COUNT: NORMAL % (ref 10–15)
ERYTHROCYTE [SEDIMENTATION RATE] IN BLOOD BY WESTERGREN METHOD: 6 MM/H (ref 0–15)
FACT V ACT/NOR PPP: 72 % (ref 60–140)
FACT VII ACT/NOR PPP: 15 % (ref 50–129)
FACT VIII ACT/NOR PPP: 251 % (ref 55–200)
FACT X ACT/NOR PPP: 50 % (ref 60–140)
FAT STL QL: ABNORMAL
FERRITIN SERPL-MCNC: 323 NG/ML (ref 26–388)
FIBRINOGEN PPP-MCNC: 122 MG/DL (ref 200–420)
FIBRINOGEN PPP-MCNC: 135 MG/DL (ref 200–420)
FIBRINOGEN PPP-MCNC: 144 MG/DL (ref 200–420)
FIBRINOGEN PPP-MCNC: 155 MG/DL (ref 200–420)
FIBRINOGEN PPP-MCNC: 159 MG/DL (ref 200–420)
FIBRINOGEN PPP-MCNC: 161 MG/DL (ref 200–420)
FIBRINOGEN PPP-MCNC: 166 MG/DL (ref 200–420)
FIBRINOGEN PPP-MCNC: 169 MG/DL (ref 200–420)
FIBRINOGEN PPP-MCNC: 180 MG/DL (ref 200–420)
FIBRINOGEN PPP-MCNC: 183 MG/DL (ref 200–420)
FIBRINOGEN PPP-MCNC: 184 MG/DL (ref 200–420)
FIBRINOGEN PPP-MCNC: 186 MG/DL (ref 200–420)
FIBRINOGEN PPP-MCNC: 188 MG/DL (ref 200–420)
FIBRINOGEN PPP-MCNC: 190 MG/DL (ref 200–420)
FIBRINOGEN PPP-MCNC: 191 MG/DL (ref 200–420)
FIBRINOGEN PPP-MCNC: 196 MG/DL (ref 200–420)
FIBRINOGEN PPP-MCNC: 215 MG/DL (ref 200–420)
FIBRINOGEN PPP-MCNC: 511 MG/DL (ref 200–420)
FIBRINOGEN PPP-MCNC: NORMAL MG/DL (ref 200–420)
FOLATE SERPL-MCNC: 17.5 NG/ML
FOLATE SERPL-MCNC: 5.5 NG/ML
FRACT EXCRET NA UR+SERPL-RTO: 1.3 %
G ACTUAL CLOT STRENGTH: 5.6 KD/SC (ref 4.5–11)
G LAMBLIA AG STL QL IA: NORMAL
GFR SERPL CREATININE-BSD FRML MDRD: 52 ML/MIN/1.7M2
GFR SERPL CREATININE-BSD FRML MDRD: 52 ML/MIN/1.7M2
GFR SERPL CREATININE-BSD FRML MDRD: 54 ML/MIN/1.7M2
GFR SERPL CREATININE-BSD FRML MDRD: 55 ML/MIN/1.7M2
GFR SERPL CREATININE-BSD FRML MDRD: 56 ML/MIN/1.7M2
GFR SERPL CREATININE-BSD FRML MDRD: 57 ML/MIN/1.7M2
GFR SERPL CREATININE-BSD FRML MDRD: 61 ML/MIN/1.7M2
GFR SERPL CREATININE-BSD FRML MDRD: 62 ML/MIN/1.7M2
GFR SERPL CREATININE-BSD FRML MDRD: 63 ML/MIN/1.7M2
GFR SERPL CREATININE-BSD FRML MDRD: 63 ML/MIN/1.7M2
GFR SERPL CREATININE-BSD FRML MDRD: 64 ML/MIN/1.7M2
GFR SERPL CREATININE-BSD FRML MDRD: 64 ML/MIN/1.7M2
GFR SERPL CREATININE-BSD FRML MDRD: 66 ML/MIN/1.7M2
GFR SERPL CREATININE-BSD FRML MDRD: 68 ML/MIN/1.7M2
GFR SERPL CREATININE-BSD FRML MDRD: 74 ML/MIN/1.7M2
GFR SERPL CREATININE-BSD FRML MDRD: 74 ML/MIN/1.7M2
GFR SERPL CREATININE-BSD FRML MDRD: 77 ML/MIN/1.7M2
GFR SERPL CREATININE-BSD FRML MDRD: >90 ML/MIN/1.7M2
GGT SERPL-CCNC: 366 U/L (ref 0–75)
GLUCOSE BLD-MCNC: 154 MG/DL (ref 70–99)
GLUCOSE BLDC GLUCOMTR-MCNC: 100 MG/DL (ref 70–99)
GLUCOSE BLDC GLUCOMTR-MCNC: 100 MG/DL (ref 70–99)
GLUCOSE BLDC GLUCOMTR-MCNC: 101 MG/DL (ref 70–99)
GLUCOSE BLDC GLUCOMTR-MCNC: 107 MG/DL (ref 70–99)
GLUCOSE BLDC GLUCOMTR-MCNC: 108 MG/DL (ref 70–99)
GLUCOSE BLDC GLUCOMTR-MCNC: 110 MG/DL (ref 70–99)
GLUCOSE BLDC GLUCOMTR-MCNC: 113 MG/DL (ref 70–99)
GLUCOSE BLDC GLUCOMTR-MCNC: 113 MG/DL (ref 70–99)
GLUCOSE BLDC GLUCOMTR-MCNC: 114 MG/DL (ref 70–99)
GLUCOSE BLDC GLUCOMTR-MCNC: 114 MG/DL (ref 70–99)
GLUCOSE BLDC GLUCOMTR-MCNC: 116 MG/DL (ref 70–99)
GLUCOSE BLDC GLUCOMTR-MCNC: 118 MG/DL (ref 70–99)
GLUCOSE BLDC GLUCOMTR-MCNC: 119 MG/DL (ref 70–99)
GLUCOSE BLDC GLUCOMTR-MCNC: 120 MG/DL (ref 70–99)
GLUCOSE BLDC GLUCOMTR-MCNC: 120 MG/DL (ref 70–99)
GLUCOSE BLDC GLUCOMTR-MCNC: 122 MG/DL (ref 70–99)
GLUCOSE BLDC GLUCOMTR-MCNC: 123 MG/DL (ref 70–99)
GLUCOSE BLDC GLUCOMTR-MCNC: 123 MG/DL (ref 70–99)
GLUCOSE BLDC GLUCOMTR-MCNC: 125 MG/DL (ref 70–99)
GLUCOSE BLDC GLUCOMTR-MCNC: 126 MG/DL (ref 70–99)
GLUCOSE BLDC GLUCOMTR-MCNC: 126 MG/DL (ref 70–99)
GLUCOSE BLDC GLUCOMTR-MCNC: 128 MG/DL (ref 70–99)
GLUCOSE BLDC GLUCOMTR-MCNC: 128 MG/DL (ref 70–99)
GLUCOSE BLDC GLUCOMTR-MCNC: 129 MG/DL (ref 70–99)
GLUCOSE BLDC GLUCOMTR-MCNC: 130 MG/DL (ref 70–99)
GLUCOSE BLDC GLUCOMTR-MCNC: 130 MG/DL (ref 70–99)
GLUCOSE BLDC GLUCOMTR-MCNC: 131 MG/DL (ref 70–99)
GLUCOSE BLDC GLUCOMTR-MCNC: 132 MG/DL (ref 70–99)
GLUCOSE BLDC GLUCOMTR-MCNC: 133 MG/DL (ref 70–99)
GLUCOSE BLDC GLUCOMTR-MCNC: 135 MG/DL (ref 70–99)
GLUCOSE BLDC GLUCOMTR-MCNC: 136 MG/DL (ref 70–99)
GLUCOSE BLDC GLUCOMTR-MCNC: 137 MG/DL (ref 70–99)
GLUCOSE BLDC GLUCOMTR-MCNC: 137 MG/DL (ref 70–99)
GLUCOSE BLDC GLUCOMTR-MCNC: 139 MG/DL (ref 70–99)
GLUCOSE BLDC GLUCOMTR-MCNC: 140 MG/DL (ref 70–99)
GLUCOSE BLDC GLUCOMTR-MCNC: 140 MG/DL (ref 70–99)
GLUCOSE BLDC GLUCOMTR-MCNC: 141 MG/DL (ref 70–99)
GLUCOSE BLDC GLUCOMTR-MCNC: 144 MG/DL (ref 70–99)
GLUCOSE BLDC GLUCOMTR-MCNC: 144 MG/DL (ref 70–99)
GLUCOSE BLDC GLUCOMTR-MCNC: 145 MG/DL (ref 70–99)
GLUCOSE BLDC GLUCOMTR-MCNC: 145 MG/DL (ref 70–99)
GLUCOSE BLDC GLUCOMTR-MCNC: 146 MG/DL (ref 70–99)
GLUCOSE BLDC GLUCOMTR-MCNC: 147 MG/DL (ref 70–99)
GLUCOSE BLDC GLUCOMTR-MCNC: 149 MG/DL (ref 70–99)
GLUCOSE BLDC GLUCOMTR-MCNC: 150 MG/DL (ref 70–99)
GLUCOSE BLDC GLUCOMTR-MCNC: 151 MG/DL (ref 70–99)
GLUCOSE BLDC GLUCOMTR-MCNC: 152 MG/DL (ref 70–99)
GLUCOSE BLDC GLUCOMTR-MCNC: 153 MG/DL (ref 70–99)
GLUCOSE BLDC GLUCOMTR-MCNC: 156 MG/DL (ref 70–99)
GLUCOSE BLDC GLUCOMTR-MCNC: 156 MG/DL (ref 70–99)
GLUCOSE BLDC GLUCOMTR-MCNC: 157 MG/DL (ref 70–99)
GLUCOSE BLDC GLUCOMTR-MCNC: 158 MG/DL (ref 70–99)
GLUCOSE BLDC GLUCOMTR-MCNC: 158 MG/DL (ref 70–99)
GLUCOSE BLDC GLUCOMTR-MCNC: 160 MG/DL (ref 70–99)
GLUCOSE BLDC GLUCOMTR-MCNC: 161 MG/DL (ref 70–99)
GLUCOSE BLDC GLUCOMTR-MCNC: 161 MG/DL (ref 70–99)
GLUCOSE BLDC GLUCOMTR-MCNC: 162 MG/DL (ref 70–99)
GLUCOSE BLDC GLUCOMTR-MCNC: 163 MG/DL (ref 70–99)
GLUCOSE BLDC GLUCOMTR-MCNC: 163 MG/DL (ref 70–99)
GLUCOSE BLDC GLUCOMTR-MCNC: 164 MG/DL (ref 70–99)
GLUCOSE BLDC GLUCOMTR-MCNC: 164 MG/DL (ref 70–99)
GLUCOSE BLDC GLUCOMTR-MCNC: 165 MG/DL (ref 70–99)
GLUCOSE BLDC GLUCOMTR-MCNC: 166 MG/DL (ref 70–99)
GLUCOSE BLDC GLUCOMTR-MCNC: 166 MG/DL (ref 70–99)
GLUCOSE BLDC GLUCOMTR-MCNC: 167 MG/DL (ref 70–99)
GLUCOSE BLDC GLUCOMTR-MCNC: 167 MG/DL (ref 70–99)
GLUCOSE BLDC GLUCOMTR-MCNC: 168 MG/DL (ref 70–99)
GLUCOSE BLDC GLUCOMTR-MCNC: 168 MG/DL (ref 70–99)
GLUCOSE BLDC GLUCOMTR-MCNC: 170 MG/DL (ref 70–99)
GLUCOSE BLDC GLUCOMTR-MCNC: 170 MG/DL (ref 70–99)
GLUCOSE BLDC GLUCOMTR-MCNC: 172 MG/DL (ref 70–99)
GLUCOSE BLDC GLUCOMTR-MCNC: 172 MG/DL (ref 70–99)
GLUCOSE BLDC GLUCOMTR-MCNC: 173 MG/DL (ref 70–99)
GLUCOSE BLDC GLUCOMTR-MCNC: 173 MG/DL (ref 70–99)
GLUCOSE BLDC GLUCOMTR-MCNC: 174 MG/DL (ref 70–99)
GLUCOSE BLDC GLUCOMTR-MCNC: 176 MG/DL (ref 70–99)
GLUCOSE BLDC GLUCOMTR-MCNC: 177 MG/DL (ref 70–99)
GLUCOSE BLDC GLUCOMTR-MCNC: 178 MG/DL (ref 70–99)
GLUCOSE BLDC GLUCOMTR-MCNC: 179 MG/DL (ref 70–99)
GLUCOSE BLDC GLUCOMTR-MCNC: 180 MG/DL (ref 70–99)
GLUCOSE BLDC GLUCOMTR-MCNC: 180 MG/DL (ref 70–99)
GLUCOSE BLDC GLUCOMTR-MCNC: 187 MG/DL (ref 70–99)
GLUCOSE BLDC GLUCOMTR-MCNC: 188 MG/DL (ref 70–99)
GLUCOSE BLDC GLUCOMTR-MCNC: 189 MG/DL (ref 70–99)
GLUCOSE BLDC GLUCOMTR-MCNC: 190 MG/DL (ref 70–99)
GLUCOSE BLDC GLUCOMTR-MCNC: 191 MG/DL (ref 70–99)
GLUCOSE BLDC GLUCOMTR-MCNC: 193 MG/DL (ref 70–99)
GLUCOSE BLDC GLUCOMTR-MCNC: 196 MG/DL (ref 70–99)
GLUCOSE BLDC GLUCOMTR-MCNC: 199 MG/DL (ref 70–99)
GLUCOSE BLDC GLUCOMTR-MCNC: 200 MG/DL (ref 70–99)
GLUCOSE BLDC GLUCOMTR-MCNC: 200 MG/DL (ref 70–99)
GLUCOSE BLDC GLUCOMTR-MCNC: 204 MG/DL (ref 70–99)
GLUCOSE BLDC GLUCOMTR-MCNC: 205 MG/DL (ref 70–99)
GLUCOSE BLDC GLUCOMTR-MCNC: 207 MG/DL (ref 70–99)
GLUCOSE BLDC GLUCOMTR-MCNC: 208 MG/DL (ref 70–99)
GLUCOSE BLDC GLUCOMTR-MCNC: 209 MG/DL (ref 70–99)
GLUCOSE BLDC GLUCOMTR-MCNC: 211 MG/DL (ref 70–99)
GLUCOSE BLDC GLUCOMTR-MCNC: 218 MG/DL (ref 70–99)
GLUCOSE BLDC GLUCOMTR-MCNC: 220 MG/DL (ref 70–99)
GLUCOSE BLDC GLUCOMTR-MCNC: 223 MG/DL (ref 70–99)
GLUCOSE BLDC GLUCOMTR-MCNC: 224 MG/DL (ref 70–99)
GLUCOSE BLDC GLUCOMTR-MCNC: 226 MG/DL (ref 70–99)
GLUCOSE BLDC GLUCOMTR-MCNC: 227 MG/DL (ref 70–99)
GLUCOSE BLDC GLUCOMTR-MCNC: 232 MG/DL (ref 70–99)
GLUCOSE BLDC GLUCOMTR-MCNC: 233 MG/DL (ref 70–99)
GLUCOSE BLDC GLUCOMTR-MCNC: 234 MG/DL (ref 70–99)
GLUCOSE BLDC GLUCOMTR-MCNC: 237 MG/DL (ref 70–99)
GLUCOSE BLDC GLUCOMTR-MCNC: 246 MG/DL (ref 70–99)
GLUCOSE BLDC GLUCOMTR-MCNC: 248 MG/DL (ref 70–99)
GLUCOSE BLDC GLUCOMTR-MCNC: 249 MG/DL (ref 70–99)
GLUCOSE BLDC GLUCOMTR-MCNC: 259 MG/DL (ref 70–99)
GLUCOSE BLDC GLUCOMTR-MCNC: 264 MG/DL (ref 70–99)
GLUCOSE BLDC GLUCOMTR-MCNC: 271 MG/DL (ref 70–99)
GLUCOSE BLDC GLUCOMTR-MCNC: 282 MG/DL (ref 70–99)
GLUCOSE BLDC GLUCOMTR-MCNC: 290 MG/DL (ref 70–99)
GLUCOSE BLDC GLUCOMTR-MCNC: 294 MG/DL (ref 70–99)
GLUCOSE BLDC GLUCOMTR-MCNC: 296 MG/DL (ref 70–99)
GLUCOSE BLDC GLUCOMTR-MCNC: 299 MG/DL (ref 70–99)
GLUCOSE BLDC GLUCOMTR-MCNC: 303 MG/DL (ref 70–99)
GLUCOSE BLDC GLUCOMTR-MCNC: 307 MG/DL (ref 70–99)
GLUCOSE BLDC GLUCOMTR-MCNC: 311 MG/DL (ref 70–99)
GLUCOSE BLDC GLUCOMTR-MCNC: 340 MG/DL (ref 70–99)
GLUCOSE BLDC GLUCOMTR-MCNC: 41 MG/DL (ref 70–99)
GLUCOSE BLDC GLUCOMTR-MCNC: 54 MG/DL (ref 70–99)
GLUCOSE BLDC GLUCOMTR-MCNC: 61 MG/DL (ref 70–99)
GLUCOSE BLDC GLUCOMTR-MCNC: 63 MG/DL (ref 70–99)
GLUCOSE BLDC GLUCOMTR-MCNC: 67 MG/DL (ref 70–99)
GLUCOSE BLDC GLUCOMTR-MCNC: 71 MG/DL (ref 70–99)
GLUCOSE BLDC GLUCOMTR-MCNC: 75 MG/DL (ref 70–99)
GLUCOSE BLDC GLUCOMTR-MCNC: 77 MG/DL (ref 70–99)
GLUCOSE BLDC GLUCOMTR-MCNC: 79 MG/DL (ref 70–99)
GLUCOSE BLDC GLUCOMTR-MCNC: 82 MG/DL (ref 70–99)
GLUCOSE BLDC GLUCOMTR-MCNC: 82 MG/DL (ref 70–99)
GLUCOSE BLDC GLUCOMTR-MCNC: 85 MG/DL (ref 70–99)
GLUCOSE BLDC GLUCOMTR-MCNC: 87 MG/DL (ref 70–99)
GLUCOSE BLDC GLUCOMTR-MCNC: 88 MG/DL (ref 70–99)
GLUCOSE BLDC GLUCOMTR-MCNC: 92 MG/DL (ref 70–99)
GLUCOSE BLDC GLUCOMTR-MCNC: 94 MG/DL (ref 70–99)
GLUCOSE BLDC GLUCOMTR-MCNC: 98 MG/DL (ref 70–99)
GLUCOSE BLDC GLUCOMTR-MCNC: 99 MG/DL (ref 70–99)
GLUCOSE BLDC GLUCOMTR-MCNC: >600 MG/DL (ref 70–99)
GLUCOSE FLD-MCNC: 121 MG/DL
GLUCOSE SERPL-MCNC: 102 MG/DL (ref 70–99)
GLUCOSE SERPL-MCNC: 109 MG/DL (ref 70–99)
GLUCOSE SERPL-MCNC: 111 MG/DL (ref 70–99)
GLUCOSE SERPL-MCNC: 111 MG/DL (ref 70–99)
GLUCOSE SERPL-MCNC: 113 MG/DL (ref 70–99)
GLUCOSE SERPL-MCNC: 114 MG/DL (ref 70–99)
GLUCOSE SERPL-MCNC: 121 MG/DL (ref 70–99)
GLUCOSE SERPL-MCNC: 126 MG/DL (ref 70–99)
GLUCOSE SERPL-MCNC: 127 MG/DL (ref 70–99)
GLUCOSE SERPL-MCNC: 129 MG/DL (ref 70–99)
GLUCOSE SERPL-MCNC: 129 MG/DL (ref 70–99)
GLUCOSE SERPL-MCNC: 133 MG/DL (ref 70–99)
GLUCOSE SERPL-MCNC: 134 MG/DL (ref 70–99)
GLUCOSE SERPL-MCNC: 137 MG/DL (ref 70–99)
GLUCOSE SERPL-MCNC: 139 MG/DL (ref 70–99)
GLUCOSE SERPL-MCNC: 141 MG/DL (ref 70–99)
GLUCOSE SERPL-MCNC: 156 MG/DL (ref 70–99)
GLUCOSE SERPL-MCNC: 158 MG/DL (ref 70–99)
GLUCOSE SERPL-MCNC: 164 MG/DL (ref 70–99)
GLUCOSE SERPL-MCNC: 169 MG/DL (ref 70–99)
GLUCOSE SERPL-MCNC: 170 MG/DL (ref 70–99)
GLUCOSE SERPL-MCNC: 172 MG/DL (ref 70–99)
GLUCOSE SERPL-MCNC: 177 MG/DL (ref 70–99)
GLUCOSE SERPL-MCNC: 179 MG/DL (ref 70–99)
GLUCOSE SERPL-MCNC: 183 MG/DL (ref 70–99)
GLUCOSE SERPL-MCNC: 197 MG/DL (ref 70–99)
GLUCOSE SERPL-MCNC: 224 MG/DL (ref 70–99)
GLUCOSE SERPL-MCNC: 233 MG/DL (ref 70–99)
GLUCOSE SERPL-MCNC: 241 MG/DL (ref 70–99)
GLUCOSE SERPL-MCNC: 278 MG/DL (ref 70–99)
GLUCOSE SERPL-MCNC: 44 MG/DL (ref 70–99)
GLUCOSE SERPL-MCNC: 64 MG/DL (ref 70–99)
GLUCOSE SERPL-MCNC: 694 MG/DL (ref 70–99)
GLUCOSE SERPL-MCNC: 83 MG/DL (ref 70–99)
GLUCOSE SERPL-MCNC: 91 MG/DL (ref 70–99)
GLUCOSE SERPL-MCNC: 96 MG/DL (ref 70–99)
GLUCOSE UR STRIP-MCNC: NEGATIVE MG/DL
GRAM STN SPEC: NORMAL
HADV AG STL QL IA: NEGATIVE
HAPTOGLOB SERPL-MCNC: 13 MG/DL (ref 15–200)
HAPTOGLOB SERPL-MCNC: 52 MG/DL (ref 15–200)
HAPTOGLOB SERPL-MCNC: 9 MG/DL (ref 15–200)
HBA1C MFR BLD: 5.2 % (ref 0–6.4)
HBA1C MFR BLD: 9.7 % (ref 4.3–6)
HCO3 BLD-SCNC: 26 MMOL/L (ref 21–28)
HCO3 BLD-SCNC: 27 MMOL/L (ref 21–28)
HCO3 BLD-SCNC: 29 MMOL/L (ref 21–28)
HCT VFR BLD AUTO: 18.2 % (ref 40–53)
HCT VFR BLD AUTO: 18.6 % (ref 40–53)
HCT VFR BLD AUTO: 19.8 % (ref 40–53)
HCT VFR BLD AUTO: 19.9 % (ref 40–53)
HCT VFR BLD AUTO: 21.1 % (ref 40–53)
HCT VFR BLD AUTO: 21.3 % (ref 40–53)
HCT VFR BLD AUTO: 22 % (ref 40–53)
HCT VFR BLD AUTO: 22 % (ref 40–53)
HCT VFR BLD AUTO: 22.1 % (ref 40–53)
HCT VFR BLD AUTO: 22.2 % (ref 40–53)
HCT VFR BLD AUTO: 22.3 % (ref 40–53)
HCT VFR BLD AUTO: 22.6 % (ref 40–53)
HCT VFR BLD AUTO: 22.7 % (ref 40–53)
HCT VFR BLD AUTO: 22.8 % (ref 40–53)
HCT VFR BLD AUTO: 22.9 % (ref 40–53)
HCT VFR BLD AUTO: 22.9 % (ref 40–53)
HCT VFR BLD AUTO: 23 % (ref 40–53)
HCT VFR BLD AUTO: 23.4 % (ref 40–53)
HCT VFR BLD AUTO: 23.4 % (ref 40–53)
HCT VFR BLD AUTO: 23.6 % (ref 40–53)
HCT VFR BLD AUTO: 23.9 % (ref 40–53)
HCT VFR BLD AUTO: 24 % (ref 40–53)
HCT VFR BLD AUTO: 24.1 % (ref 40–53)
HCT VFR BLD AUTO: 24.2 % (ref 40–53)
HCT VFR BLD AUTO: 24.5 % (ref 40–53)
HCT VFR BLD AUTO: 24.8 % (ref 40–53)
HCT VFR BLD AUTO: 25.2 % (ref 40–53)
HCT VFR BLD AUTO: 25.2 % (ref 40–53)
HCT VFR BLD AUTO: 25.7 % (ref 40–53)
HCT VFR BLD AUTO: 25.7 % (ref 40–53)
HCT VFR BLD AUTO: 25.9 % (ref 40–53)
HCT VFR BLD AUTO: 25.9 % (ref 40–53)
HCT VFR BLD AUTO: 26.3 % (ref 40–53)
HCT VFR BLD AUTO: 26.4 % (ref 40–53)
HCT VFR BLD AUTO: 26.4 % (ref 40–53)
HCT VFR BLD AUTO: 26.6 % (ref 40–53)
HCT VFR BLD AUTO: 26.8 % (ref 40–53)
HCT VFR BLD AUTO: 27 % (ref 40–53)
HCT VFR BLD AUTO: 27.4 % (ref 40–53)
HCT VFR BLD AUTO: 27.5 % (ref 40–53)
HCT VFR BLD AUTO: 27.5 % (ref 40–53)
HCT VFR BLD AUTO: 28.2 % (ref 40–53)
HCT VFR BLD AUTO: 28.3 % (ref 40–53)
HCT VFR BLD AUTO: 29 % (ref 40–53)
HCT VFR BLD AUTO: 30.1 % (ref 40–53)
HCT VFR BLD AUTO: NORMAL % (ref 40–53)
HGB BLD-MCNC: 10 G/DL (ref 13.3–17.7)
HGB BLD-MCNC: 6.2 G/DL (ref 13.3–17.7)
HGB BLD-MCNC: 6.6 G/DL (ref 13.3–17.7)
HGB BLD-MCNC: 6.7 G/DL (ref 13.3–17.7)
HGB BLD-MCNC: 6.8 G/DL (ref 13.3–17.7)
HGB BLD-MCNC: 6.8 G/DL (ref 13.3–17.7)
HGB BLD-MCNC: 6.9 G/DL (ref 13.3–17.7)
HGB BLD-MCNC: 7 G/DL (ref 13.3–17.7)
HGB BLD-MCNC: 7.1 G/DL (ref 13.3–17.7)
HGB BLD-MCNC: 7.2 G/DL (ref 13.3–17.7)
HGB BLD-MCNC: 7.2 G/DL (ref 13.3–17.7)
HGB BLD-MCNC: 7.3 G/DL (ref 13.3–17.7)
HGB BLD-MCNC: 7.4 G/DL (ref 13.3–17.7)
HGB BLD-MCNC: 7.4 G/DL (ref 13.3–17.7)
HGB BLD-MCNC: 7.5 G/DL (ref 13.3–17.7)
HGB BLD-MCNC: 7.6 G/DL (ref 13.3–17.7)
HGB BLD-MCNC: 7.7 G/DL (ref 13.3–17.7)
HGB BLD-MCNC: 7.8 G/DL (ref 13.3–17.7)
HGB BLD-MCNC: 7.9 G/DL (ref 13.3–17.7)
HGB BLD-MCNC: 8 G/DL (ref 13.3–17.7)
HGB BLD-MCNC: 8 G/DL (ref 13.3–17.7)
HGB BLD-MCNC: 8.1 G/DL (ref 13.3–17.7)
HGB BLD-MCNC: 8.2 G/DL (ref 13.3–17.7)
HGB BLD-MCNC: 8.3 G/DL (ref 13.3–17.7)
HGB BLD-MCNC: 8.4 G/DL (ref 13.3–17.7)
HGB BLD-MCNC: 8.4 G/DL (ref 13.3–17.7)
HGB BLD-MCNC: 8.6 G/DL (ref 13.3–17.7)
HGB BLD-MCNC: 8.7 G/DL (ref 13.3–17.7)
HGB BLD-MCNC: 8.8 G/DL (ref 13.3–17.7)
HGB BLD-MCNC: 8.9 G/DL (ref 13.3–17.7)
HGB BLD-MCNC: 9 G/DL (ref 13.3–17.7)
HGB BLD-MCNC: 9.1 G/DL (ref 13.3–17.7)
HGB BLD-MCNC: 9.1 G/DL (ref 13.3–17.7)
HGB BLD-MCNC: 9.5 G/DL (ref 13.3–17.7)
HGB BLD-MCNC: 9.6 G/DL (ref 13.3–17.7)
HGB BLD-MCNC: 9.6 G/DL (ref 13.3–17.7)
HGB BLD-MCNC: NORMAL G/DL (ref 13.3–17.7)
HGB BLD-MCNC: NORMAL G/DL (ref 13.3–17.7)
HGB UR QL STRIP: ABNORMAL
HGB UR QL STRIP: NEGATIVE
HGB UR QL STRIP: NEGATIVE
HYALINE CASTS #/AREA URNS LPF: 1 /LPF (ref 0–2)
IMM GRANULOCYTES # BLD: 0 10E9/L (ref 0–0.4)
IMM GRANULOCYTES # BLD: 0.1 10E9/L (ref 0–0.4)
IMM GRANULOCYTES # BLD: 0.1 10E9/L (ref 0–0.4)
IMM GRANULOCYTES NFR BLD: 0.3 %
IMM GRANULOCYTES NFR BLD: 0.3 %
IMM GRANULOCYTES NFR BLD: 0.4 %
IMM GRANULOCYTES NFR BLD: 0.6 %
INR PPP: 1.44 (ref 0.86–1.14)
INR PPP: 1.47 (ref 0.86–1.14)
INR PPP: 1.48 (ref 0.86–1.14)
INR PPP: 1.51 (ref 0.86–1.14)
INR PPP: 1.53 (ref 0.86–1.14)
INR PPP: 1.53 (ref 0.86–1.14)
INR PPP: 1.55 (ref 0.86–1.14)
INR PPP: 1.58 (ref 0.86–1.14)
INR PPP: 1.59 (ref 0.86–1.14)
INR PPP: 1.6 (ref 0.86–1.14)
INR PPP: 1.64 (ref 0.86–1.14)
INR PPP: 1.87 (ref 0.86–1.14)
INR PPP: 1.88 (ref 0.86–1.14)
INR PPP: 1.9 (ref 0.86–1.14)
INR PPP: 1.91 (ref 0.86–1.14)
INR PPP: 1.94 (ref 0.86–1.14)
INR PPP: 1.95 (ref 0.86–1.14)
INR PPP: 1.98 (ref 0.86–1.14)
INR PPP: 2.02 (ref 0.86–1.14)
INR PPP: 2.05 (ref 0.86–1.14)
INR PPP: 2.06 (ref 0.86–1.14)
INR PPP: 2.09 (ref 0.86–1.14)
INR PPP: 2.1 (ref 0.86–1.14)
INR PPP: 2.1 (ref 0.86–1.14)
INR PPP: 2.15 (ref 0.86–1.14)
INR PPP: 2.15 (ref 0.86–1.14)
INR PPP: 2.16 (ref 0.86–1.14)
INR PPP: 2.22 (ref 0.86–1.14)
INR PPP: 2.34 (ref 0.86–1.14)
INR PPP: 2.39 (ref 0.86–1.14)
INR PPP: 2.43 (ref 0.86–1.14)
INR PPP: NORMAL (ref 0.86–1.14)
INTERPRETATION ECG - MUSE: NORMAL
INTERPRETATION ECG - MUSE: NORMAL
IRON SATN MFR SERPL: 8 % (ref 15–46)
IRON SERPL-MCNC: 16 UG/DL (ref 35–180)
K TIME TO SPEC CLOT STRENGTH: 1.8 MINUTE (ref 1–3)
KETONES UR STRIP-MCNC: NEGATIVE MG/DL
LACTATE BLD-SCNC: 1.1 MMOL/L (ref 0.4–1.9)
LACTATE BLD-SCNC: 1.1 MMOL/L (ref 0.4–1.9)
LDH FLD L TO P-CCNC: 122 U/L
LDH SERPL L TO P-CCNC: 124 U/L (ref 85–227)
LDH SERPL L TO P-CCNC: 149 U/L (ref 85–227)
LDH SERPL L TO P-CCNC: 305 U/L (ref 85–227)
LEUKOCYTE ESTERASE UR QL STRIP: NEGATIVE
LIPASE SERPL-CCNC: 20 U/L (ref 73–393)
LIPASE SERPL-CCNC: 28 U/L (ref 73–393)
LY30 LYSIS AT 30 MINUTES: 0 % (ref 0–8)
LY60 LYSIS AT 60 MINUTES: 0.5 % (ref 0–15)
LYMPHOCYTES # BLD AUTO: 0.5 10E9/L (ref 0.8–5.3)
LYMPHOCYTES # BLD AUTO: 0.6 10E9/L (ref 0.8–5.3)
LYMPHOCYTES # BLD AUTO: 0.7 10E9/L (ref 0.8–5.3)
LYMPHOCYTES # BLD AUTO: 0.8 10E9/L (ref 0.8–5.3)
LYMPHOCYTES # BLD AUTO: 0.9 10E9/L (ref 0.8–5.3)
LYMPHOCYTES # BLD AUTO: 1 10E9/L (ref 0.8–5.3)
LYMPHOCYTES NFR BLD AUTO: 5.9 %
LYMPHOCYTES NFR BLD AUTO: 6 %
LYMPHOCYTES NFR BLD AUTO: 6 %
LYMPHOCYTES NFR BLD AUTO: 7 %
LYMPHOCYTES NFR BLD AUTO: 8.5 %
LYMPHOCYTES NFR BLD AUTO: 9.9 %
LYMPHOCYTES NFR FLD MANUAL: 10 %
LYMPHOCYTES NFR FLD MANUAL: 19 %
LYMPHOCYTES NFR FLD MANUAL: 37 %
LYMPHOCYTES NFR FLD MANUAL: 5 %
LYMPHOCYTES NFR FLD MANUAL: 8 %
Lab: ABNORMAL
Lab: NORMAL
MA MAXIMUM CLOT STRENGTH: 52.8 MM (ref 50–70)
MAGNESIUM SERPL-MCNC: 1.6 MG/DL (ref 1.6–2.3)
MAGNESIUM SERPL-MCNC: 1.7 MG/DL (ref 1.6–2.3)
MAGNESIUM SERPL-MCNC: 1.8 MG/DL (ref 1.6–2.3)
MAGNESIUM SERPL-MCNC: 1.9 MG/DL (ref 1.6–2.3)
MAGNESIUM SERPL-MCNC: 2 MG/DL (ref 1.6–2.3)
MAGNESIUM SERPL-MCNC: 2.1 MG/DL (ref 1.6–2.3)
MAGNESIUM SERPL-MCNC: 2.2 MG/DL (ref 1.6–2.3)
MAGNESIUM SERPL-MCNC: 2.3 MG/DL (ref 1.6–2.3)
MAGNESIUM SERPL-MCNC: 2.4 MG/DL (ref 1.6–2.3)
MAGNESIUM SERPL-MCNC: 2.5 MG/DL (ref 1.6–2.3)
MCH RBC QN AUTO: 25.3 PG (ref 26.5–33)
MCH RBC QN AUTO: 25.5 PG (ref 26.5–33)
MCH RBC QN AUTO: 25.5 PG (ref 26.5–33)
MCH RBC QN AUTO: 25.6 PG (ref 26.5–33)
MCH RBC QN AUTO: 25.7 PG (ref 26.5–33)
MCH RBC QN AUTO: 25.7 PG (ref 26.5–33)
MCH RBC QN AUTO: 25.8 PG (ref 26.5–33)
MCH RBC QN AUTO: 25.9 PG (ref 26.5–33)
MCH RBC QN AUTO: 26 PG (ref 26.5–33)
MCH RBC QN AUTO: 26 PG (ref 26.5–33)
MCH RBC QN AUTO: 26.3 PG (ref 26.5–33)
MCH RBC QN AUTO: 26.3 PG (ref 26.5–33)
MCH RBC QN AUTO: 26.5 PG (ref 26.5–33)
MCH RBC QN AUTO: 26.5 PG (ref 26.5–33)
MCH RBC QN AUTO: 26.7 PG (ref 26.5–33)
MCH RBC QN AUTO: 26.9 PG (ref 26.5–33)
MCH RBC QN AUTO: 27.1 PG (ref 26.5–33)
MCH RBC QN AUTO: 27.2 PG (ref 26.5–33)
MCH RBC QN AUTO: 27.3 PG (ref 26.5–33)
MCH RBC QN AUTO: 27.4 PG (ref 26.5–33)
MCH RBC QN AUTO: 27.5 PG (ref 26.5–33)
MCH RBC QN AUTO: 27.5 PG (ref 26.5–33)
MCH RBC QN AUTO: 27.6 PG (ref 26.5–33)
MCH RBC QN AUTO: 27.9 PG (ref 26.5–33)
MCH RBC QN AUTO: 27.9 PG (ref 26.5–33)
MCH RBC QN AUTO: 28 PG (ref 26.5–33)
MCH RBC QN AUTO: 28 PG (ref 26.5–33)
MCH RBC QN AUTO: 28.1 PG (ref 26.5–33)
MCH RBC QN AUTO: 28.3 PG (ref 26.5–33)
MCH RBC QN AUTO: 29 PG (ref 26.5–33)
MCH RBC QN AUTO: 29.1 PG (ref 26.5–33)
MCH RBC QN AUTO: 29.1 PG (ref 26.5–33)
MCH RBC QN AUTO: 29.2 PG (ref 26.5–33)
MCH RBC QN AUTO: 29.2 PG (ref 26.5–33)
MCH RBC QN AUTO: 29.3 PG (ref 26.5–33)
MCH RBC QN AUTO: 29.3 PG (ref 26.5–33)
MCH RBC QN AUTO: 29.4 PG (ref 26.5–33)
MCH RBC QN AUTO: 29.4 PG (ref 26.5–33)
MCH RBC QN AUTO: 29.5 PG (ref 26.5–33)
MCH RBC QN AUTO: 29.6 PG (ref 26.5–33)
MCH RBC QN AUTO: 29.7 PG (ref 26.5–33)
MCH RBC QN AUTO: 29.7 PG (ref 26.5–33)
MCH RBC QN AUTO: 30 PG (ref 26.5–33)
MCH RBC QN AUTO: 30.1 PG (ref 26.5–33)
MCH RBC QN AUTO: NORMAL PG (ref 26.5–33)
MCHC RBC AUTO-ENTMCNC: 31.1 G/DL (ref 31.5–36.5)
MCHC RBC AUTO-ENTMCNC: 31.4 G/DL (ref 31.5–36.5)
MCHC RBC AUTO-ENTMCNC: 31.4 G/DL (ref 31.5–36.5)
MCHC RBC AUTO-ENTMCNC: 31.5 G/DL (ref 31.5–36.5)
MCHC RBC AUTO-ENTMCNC: 31.6 G/DL (ref 31.5–36.5)
MCHC RBC AUTO-ENTMCNC: 31.7 G/DL (ref 31.5–36.5)
MCHC RBC AUTO-ENTMCNC: 31.8 G/DL (ref 31.5–36.5)
MCHC RBC AUTO-ENTMCNC: 31.9 G/DL (ref 31.5–36.5)
MCHC RBC AUTO-ENTMCNC: 32.1 G/DL (ref 31.5–36.5)
MCHC RBC AUTO-ENTMCNC: 32.1 G/DL (ref 31.5–36.5)
MCHC RBC AUTO-ENTMCNC: 32.4 G/DL (ref 31.5–36.5)
MCHC RBC AUTO-ENTMCNC: 32.5 G/DL (ref 31.5–36.5)
MCHC RBC AUTO-ENTMCNC: 32.7 G/DL (ref 31.5–36.5)
MCHC RBC AUTO-ENTMCNC: 32.7 G/DL (ref 31.5–36.5)
MCHC RBC AUTO-ENTMCNC: 32.8 G/DL (ref 31.5–36.5)
MCHC RBC AUTO-ENTMCNC: 32.8 G/DL (ref 31.5–36.5)
MCHC RBC AUTO-ENTMCNC: 33.1 G/DL (ref 31.5–36.5)
MCHC RBC AUTO-ENTMCNC: 33.2 G/DL (ref 31.5–36.5)
MCHC RBC AUTO-ENTMCNC: 33.2 G/DL (ref 31.5–36.5)
MCHC RBC AUTO-ENTMCNC: 33.3 G/DL (ref 31.5–36.5)
MCHC RBC AUTO-ENTMCNC: 33.5 G/DL (ref 31.5–36.5)
MCHC RBC AUTO-ENTMCNC: 33.6 G/DL (ref 31.5–36.5)
MCHC RBC AUTO-ENTMCNC: 33.7 G/DL (ref 31.5–36.5)
MCHC RBC AUTO-ENTMCNC: 33.8 G/DL (ref 31.5–36.5)
MCHC RBC AUTO-ENTMCNC: 33.8 G/DL (ref 31.5–36.5)
MCHC RBC AUTO-ENTMCNC: 33.9 G/DL (ref 31.5–36.5)
MCHC RBC AUTO-ENTMCNC: 34 G/DL (ref 31.5–36.5)
MCHC RBC AUTO-ENTMCNC: 34.1 G/DL (ref 31.5–36.5)
MCHC RBC AUTO-ENTMCNC: 34.2 G/DL (ref 31.5–36.5)
MCHC RBC AUTO-ENTMCNC: 34.3 G/DL (ref 31.5–36.5)
MCHC RBC AUTO-ENTMCNC: 34.5 G/DL (ref 31.5–36.5)
MCHC RBC AUTO-ENTMCNC: 34.6 G/DL (ref 31.5–36.5)
MCHC RBC AUTO-ENTMCNC: 34.9 G/DL (ref 31.5–36.5)
MCHC RBC AUTO-ENTMCNC: NORMAL G/DL (ref 31.5–36.5)
MCV RBC AUTO: 79 FL (ref 78–100)
MCV RBC AUTO: 79 FL (ref 78–100)
MCV RBC AUTO: 80 FL (ref 78–100)
MCV RBC AUTO: 81 FL (ref 78–100)
MCV RBC AUTO: 82 FL (ref 78–100)
MCV RBC AUTO: 83 FL (ref 78–100)
MCV RBC AUTO: 84 FL (ref 78–100)
MCV RBC AUTO: 85 FL (ref 78–100)
MCV RBC AUTO: 86 FL (ref 78–100)
MCV RBC AUTO: 87 FL (ref 78–100)
MCV RBC AUTO: 88 FL (ref 78–100)
MCV RBC AUTO: 89 FL (ref 78–100)
MCV RBC AUTO: 89 FL (ref 78–100)
MCV RBC AUTO: NORMAL FL (ref 78–100)
MONOCYTES # BLD AUTO: 0.3 10E9/L (ref 0–1.3)
MONOCYTES # BLD AUTO: 0.4 10E9/L (ref 0–1.3)
MONOCYTES # BLD AUTO: 0.5 10E9/L (ref 0–1.3)
MONOCYTES NFR BLD AUTO: 3.1 %
MONOCYTES NFR BLD AUTO: 3.7 %
MONOCYTES NFR BLD AUTO: 3.8 %
MONOCYTES NFR BLD AUTO: 3.8 %
MONOCYTES NFR BLD AUTO: 4.2 %
MONOCYTES NFR BLD AUTO: 4.3 %
MONOS+MACROS NFR FLD MANUAL: 11 %
MONOS+MACROS NFR FLD MANUAL: 17 %
MONOS+MACROS NFR FLD MANUAL: 20 %
MONOS+MACROS NFR FLD MANUAL: 3 %
MONOS+MACROS NFR FLD MANUAL: 3 %
MRSA DNA SPEC QL NAA+PROBE: NEGATIVE
MUCOUS THREADS #/AREA URNS LPF: PRESENT /LPF
NEUTRAL FAT STL QL: ABNORMAL
NEUTROPHILS # BLD AUTO: 12.4 10E9/L (ref 1.6–8.3)
NEUTROPHILS # BLD AUTO: 7.8 10E9/L (ref 1.6–8.3)
NEUTROPHILS # BLD AUTO: 8.9 10E9/L (ref 1.6–8.3)
NEUTROPHILS # BLD AUTO: 9.2 10E9/L (ref 1.6–8.3)
NEUTROPHILS # BLD AUTO: 9.2 10E9/L (ref 1.6–8.3)
NEUTROPHILS # BLD AUTO: 9.6 10E9/L (ref 1.6–8.3)
NEUTROPHILS NFR BLD AUTO: 85.8 %
NEUTROPHILS NFR BLD AUTO: 87.6 %
NEUTROPHILS NFR BLD AUTO: 88.7 %
NEUTROPHILS NFR BLD AUTO: 89.5 %
NEUTROPHILS NFR BLD AUTO: 89.5 %
NEUTROPHILS NFR BLD AUTO: 89.6 %
NEUTS BAND NFR FLD MANUAL: 46 %
NEUTS BAND NFR FLD MANUAL: 72 %
NEUTS BAND NFR FLD MANUAL: 78 %
NEUTS BAND NFR FLD MANUAL: 79 %
NEUTS BAND NFR FLD MANUAL: 92 %
NITRATE UR QL: NEGATIVE
NOROV GI+II ORF1-ORF2 JNC STL QL NAA+PR: NOT DETECTED
NOROV GI+II ORF1-ORF2 JNC STL QL NAA+PR: NOT DETECTED
NRBC # BLD AUTO: 0 10*3/UL
NRBC BLD AUTO-RTO: 0 /100
NUM BPU REQUESTED: 1
NUM BPU REQUESTED: 2
NUM BPU REQUESTED: 2
NUM BPU REQUESTED: 3
NUM BPU REQUESTED: 5
NUM BPU REQUESTED: 6
NUM BPU REQUESTED: 7
NUM BPU REQUESTED: 9
O+P STL CONC: NORMAL
O+P STL MICRO: NORMAL
O2/TOTAL GAS SETTING VFR VENT: 100 %
O2/TOTAL GAS SETTING VFR VENT: 80 %
O2/TOTAL GAS SETTING VFR VENT: ABNORMAL %
PCO2 BLD: 36 MM HG (ref 35–45)
PCO2 BLD: 39 MM HG (ref 35–45)
PCO2 BLD: 47 MM HG (ref 35–45)
PCO2 BLD: 50 MM HG (ref 35–45)
PCO2 BLD: 52 MM HG (ref 35–45)
PF4 HEPARIN CMPLX AB SER QL: NEGATIVE
PH BLD: 7.36 PH (ref 7.35–7.45)
PH BLD: 7.37 PH (ref 7.35–7.45)
PH BLD: 7.37 PH (ref 7.35–7.45)
PH BLD: 7.43 PH (ref 7.35–7.45)
PH BLD: 7.51 PH (ref 7.35–7.45)
PH UR STRIP: 5 PH (ref 5–7)
PH UR STRIP: 5 PH (ref 5–7)
PH UR STRIP: 6 PH (ref 5–7)
PHOSPHATE SERPL-MCNC: 2.1 MG/DL (ref 2.5–4.5)
PHOSPHATE SERPL-MCNC: 2.2 MG/DL (ref 2.5–4.5)
PHOSPHATE SERPL-MCNC: 2.3 MG/DL (ref 2.5–4.5)
PHOSPHATE SERPL-MCNC: 2.5 MG/DL (ref 2.5–4.5)
PHOSPHATE SERPL-MCNC: 2.7 MG/DL (ref 2.5–4.5)
PHOSPHATE SERPL-MCNC: 2.7 MG/DL (ref 2.5–4.5)
PHOSPHATE SERPL-MCNC: 3 MG/DL (ref 2.5–4.5)
PHOSPHATE SERPL-MCNC: 3 MG/DL (ref 2.5–4.5)
PHOSPHATE SERPL-MCNC: 3.1 MG/DL (ref 2.5–4.5)
PHOSPHATE SERPL-MCNC: 3.3 MG/DL (ref 2.5–4.5)
PHOSPHATE SERPL-MCNC: 3.3 MG/DL (ref 2.5–4.5)
PHOSPHATE SERPL-MCNC: 4.7 MG/DL (ref 2.5–4.5)
PLATELET # BLD AUTO: 127 10E9/L (ref 150–450)
PLATELET # BLD AUTO: 142 10E9/L (ref 150–450)
PLATELET # BLD AUTO: 157 10E9/L (ref 150–450)
PLATELET # BLD AUTO: 166 10E9/L (ref 150–450)
PLATELET # BLD AUTO: 17 10E9/L (ref 150–450)
PLATELET # BLD AUTO: 180 10E9/L (ref 150–450)
PLATELET # BLD AUTO: 23 10E9/L (ref 150–450)
PLATELET # BLD AUTO: 260 10E9/L (ref 150–450)
PLATELET # BLD AUTO: 263 10E9/L (ref 150–450)
PLATELET # BLD AUTO: 287 10E9/L (ref 150–450)
PLATELET # BLD AUTO: 289 10E9/L (ref 150–450)
PLATELET # BLD AUTO: 32 10E9/L (ref 150–450)
PLATELET # BLD AUTO: 34 10E9/L (ref 150–450)
PLATELET # BLD AUTO: 38 10E9/L (ref 150–450)
PLATELET # BLD AUTO: 39 10E9/L (ref 150–450)
PLATELET # BLD AUTO: 40 10E9/L (ref 150–450)
PLATELET # BLD AUTO: 43 10E9/L (ref 150–450)
PLATELET # BLD AUTO: 44 10E9/L (ref 150–450)
PLATELET # BLD AUTO: 46 10E9/L (ref 150–450)
PLATELET # BLD AUTO: 47 10E9/L (ref 150–450)
PLATELET # BLD AUTO: 48 10E9/L (ref 150–450)
PLATELET # BLD AUTO: 49 10E9/L (ref 150–450)
PLATELET # BLD AUTO: 50 10E9/L (ref 150–450)
PLATELET # BLD AUTO: 50 10E9/L (ref 150–450)
PLATELET # BLD AUTO: 51 10E9/L (ref 150–450)
PLATELET # BLD AUTO: 51 10E9/L (ref 150–450)
PLATELET # BLD AUTO: 52 10E9/L (ref 150–450)
PLATELET # BLD AUTO: 53 10E9/L (ref 150–450)
PLATELET # BLD AUTO: 54 10E9/L (ref 150–450)
PLATELET # BLD AUTO: 55 10E9/L (ref 150–450)
PLATELET # BLD AUTO: 55 10E9/L (ref 150–450)
PLATELET # BLD AUTO: 57 10E9/L (ref 150–450)
PLATELET # BLD AUTO: 59 10E9/L (ref 150–450)
PLATELET # BLD AUTO: 60 10E9/L (ref 150–450)
PLATELET # BLD AUTO: 61 10E9/L (ref 150–450)
PLATELET # BLD AUTO: 61 10E9/L (ref 150–450)
PLATELET # BLD AUTO: 62 10E9/L (ref 150–450)
PLATELET # BLD AUTO: 62 10E9/L (ref 150–450)
PLATELET # BLD AUTO: 63 10E9/L (ref 150–450)
PLATELET # BLD AUTO: 63 10E9/L (ref 150–450)
PLATELET # BLD AUTO: 66 10E9/L (ref 150–450)
PLATELET # BLD AUTO: 69 10E9/L (ref 150–450)
PLATELET # BLD AUTO: 70 10E9/L (ref 150–450)
PLATELET # BLD AUTO: 70 10E9/L (ref 150–450)
PLATELET # BLD AUTO: 71 10E9/L (ref 150–450)
PLATELET # BLD AUTO: 81 10E9/L (ref 150–450)
PLATELET # BLD AUTO: 82 10E9/L (ref 150–450)
PLATELET # BLD AUTO: 88 10E9/L (ref 150–450)
PLATELET # BLD AUTO: 92 10E9/L (ref 150–450)
PLATELET # BLD AUTO: NORMAL 10E9/L (ref 150–450)
PLATELET # BLD AUTO: NORMAL 10E9/L (ref 150–450)
PLATELET # BLD EST: ABNORMAL 10*3/UL
PO2 BLD: 126 MM HG (ref 80–105)
PO2 BLD: 140 MM HG (ref 80–105)
PO2 BLD: 56 MM HG (ref 80–105)
PO2 BLD: 66 MM HG (ref 80–105)
PO2 BLD: 74 MM HG (ref 80–105)
POTASSIUM BLD-SCNC: 3.9 MMOL/L (ref 3.4–5.3)
POTASSIUM SERPL-SCNC: 3 MMOL/L (ref 3.4–5.3)
POTASSIUM SERPL-SCNC: 3.1 MMOL/L (ref 3.4–5.3)
POTASSIUM SERPL-SCNC: 3.1 MMOL/L (ref 3.4–5.3)
POTASSIUM SERPL-SCNC: 3.3 MMOL/L (ref 3.4–5.3)
POTASSIUM SERPL-SCNC: 3.4 MMOL/L (ref 3.4–5.3)
POTASSIUM SERPL-SCNC: 3.5 MMOL/L (ref 3.4–5.3)
POTASSIUM SERPL-SCNC: 3.5 MMOL/L (ref 3.4–5.3)
POTASSIUM SERPL-SCNC: 3.6 MMOL/L (ref 3.4–5.3)
POTASSIUM SERPL-SCNC: 3.8 MMOL/L (ref 3.4–5.3)
POTASSIUM SERPL-SCNC: 3.9 MMOL/L (ref 3.4–5.3)
POTASSIUM SERPL-SCNC: 4 MMOL/L (ref 3.4–5.3)
POTASSIUM SERPL-SCNC: 4.1 MMOL/L (ref 3.4–5.3)
POTASSIUM SERPL-SCNC: 4.1 MMOL/L (ref 3.4–5.3)
POTASSIUM SERPL-SCNC: 4.2 MMOL/L (ref 3.4–5.3)
POTASSIUM SERPL-SCNC: 4.2 MMOL/L (ref 3.4–5.3)
POTASSIUM SERPL-SCNC: 4.3 MMOL/L (ref 3.4–5.3)
POTASSIUM SERPL-SCNC: 4.4 MMOL/L (ref 3.4–5.3)
POTASSIUM SERPL-SCNC: 4.5 MMOL/L (ref 3.4–5.3)
POTASSIUM SERPL-SCNC: 4.7 MMOL/L (ref 3.4–5.3)
POTASSIUM SERPL-SCNC: 5 MMOL/L (ref 3.4–5.3)
POTASSIUM SERPL-SCNC: 5 MMOL/L (ref 3.4–5.3)
POTASSIUM SERPL-SCNC: 5.1 MMOL/L (ref 3.4–5.3)
PREALB SERPL IA-MCNC: 4 MG/DL (ref 15–45)
PREALB SERPL IA-MCNC: 5 MG/DL (ref 15–45)
PROT FLD-MCNC: 0.6 G/DL
PROT FLD-MCNC: 1 G/DL
PROT SERPL-MCNC: 4.1 G/DL (ref 6.8–8.8)
PROT SERPL-MCNC: 4.2 G/DL (ref 6.8–8.8)
PROT SERPL-MCNC: 4.5 G/DL (ref 6.8–8.8)
PROT SERPL-MCNC: 4.8 G/DL (ref 6.8–8.8)
PROT SERPL-MCNC: 4.8 G/DL (ref 6.8–8.8)
PROT SERPL-MCNC: 4.9 G/DL (ref 6.8–8.8)
PROT SERPL-MCNC: 5.2 G/DL (ref 6.8–8.8)
PROT SERPL-MCNC: 5.4 G/DL (ref 6.8–8.8)
PROT SERPL-MCNC: 5.5 G/DL (ref 6.8–8.8)
PROT SERPL-MCNC: 5.5 G/DL (ref 6.8–8.8)
PROT SERPL-MCNC: 5.7 G/DL (ref 6.8–8.8)
PROT SERPL-MCNC: 5.7 G/DL (ref 6.8–8.8)
PROT SERPL-MCNC: 5.8 G/DL (ref 6.8–8.8)
PROT SERPL-MCNC: 5.9 G/DL (ref 6.8–8.8)
PROT SERPL-MCNC: 6 G/DL (ref 6.8–8.8)
PROT SERPL-MCNC: 6.1 G/DL (ref 6.8–8.8)
PROT SERPL-MCNC: 6.1 G/DL (ref 6.8–8.8)
PROT SERPL-MCNC: 6.2 G/DL (ref 6.8–8.8)
PROT SERPL-MCNC: 6.2 G/DL (ref 6.8–8.8)
PROT SERPL-MCNC: 6.3 G/DL (ref 6.8–8.8)
PROT SERPL-MCNC: 6.4 G/DL (ref 6.8–8.8)
PROT SERPL-MCNC: 6.6 G/DL (ref 6.8–8.8)
PROT SERPL-MCNC: 6.7 G/DL (ref 6.8–8.8)
PROT SERPL-MCNC: 6.8 G/DL (ref 6.8–8.8)
PROTHROM ACT/NOR PPP: 38 % (ref 60–140)
R TIME UNTIL CLOT FORMS: 3.9 MINUTE (ref 5–10)
RADIOLOGIST FLAGS: ABNORMAL
RBC # BLD AUTO: 2.12 10E12/L (ref 4.4–5.9)
RBC # BLD AUTO: 2.22 10E12/L (ref 4.4–5.9)
RBC # BLD AUTO: 2.31 10E12/L (ref 4.4–5.9)
RBC # BLD AUTO: 2.37 10E12/L (ref 4.4–5.9)
RBC # BLD AUTO: 2.52 10E12/L (ref 4.4–5.9)
RBC # BLD AUTO: 2.53 10E12/L (ref 4.4–5.9)
RBC # BLD AUTO: 2.62 10E12/L (ref 4.4–5.9)
RBC # BLD AUTO: 2.64 10E12/L (ref 4.4–5.9)
RBC # BLD AUTO: 2.65 10E12/L (ref 4.4–5.9)
RBC # BLD AUTO: 2.66 10E12/L (ref 4.4–5.9)
RBC # BLD AUTO: 2.66 10E12/L (ref 4.4–5.9)
RBC # BLD AUTO: 2.68 10E12/L (ref 4.4–5.9)
RBC # BLD AUTO: 2.68 10E12/L (ref 4.4–5.9)
RBC # BLD AUTO: 2.7 10E12/L (ref 4.4–5.9)
RBC # BLD AUTO: 2.72 10E12/L (ref 4.4–5.9)
RBC # BLD AUTO: 2.73 10E12/L (ref 4.4–5.9)
RBC # BLD AUTO: 2.75 10E12/L (ref 4.4–5.9)
RBC # BLD AUTO: 2.76 10E12/L (ref 4.4–5.9)
RBC # BLD AUTO: 2.8 10E12/L (ref 4.4–5.9)
RBC # BLD AUTO: 2.85 10E12/L (ref 4.4–5.9)
RBC # BLD AUTO: 2.86 10E12/L (ref 4.4–5.9)
RBC # BLD AUTO: 2.89 10E12/L (ref 4.4–5.9)
RBC # BLD AUTO: 2.89 10E12/L (ref 4.4–5.9)
RBC # BLD AUTO: 2.92 10E12/L (ref 4.4–5.9)
RBC # BLD AUTO: 2.95 10E12/L (ref 4.4–5.9)
RBC # BLD AUTO: 2.96 10E12/L (ref 4.4–5.9)
RBC # BLD AUTO: 2.96 10E12/L (ref 4.4–5.9)
RBC # BLD AUTO: 2.97 10E12/L (ref 4.4–5.9)
RBC # BLD AUTO: 3.03 10E12/L (ref 4.4–5.9)
RBC # BLD AUTO: 3.03 10E12/L (ref 4.4–5.9)
RBC # BLD AUTO: 3.04 10E12/L (ref 4.4–5.9)
RBC # BLD AUTO: 3.05 10E12/L (ref 4.4–5.9)
RBC # BLD AUTO: 3.11 10E12/L (ref 4.4–5.9)
RBC # BLD AUTO: 3.12 10E12/L (ref 4.4–5.9)
RBC # BLD AUTO: 3.2 10E12/L (ref 4.4–5.9)
RBC # BLD AUTO: 3.21 10E12/L (ref 4.4–5.9)
RBC # BLD AUTO: 3.23 10E12/L (ref 4.4–5.9)
RBC # BLD AUTO: 3.24 10E12/L (ref 4.4–5.9)
RBC # BLD AUTO: 3.25 10E12/L (ref 4.4–5.9)
RBC # BLD AUTO: 3.29 10E12/L (ref 4.4–5.9)
RBC # BLD AUTO: 3.36 10E12/L (ref 4.4–5.9)
RBC # BLD AUTO: 3.37 10E12/L (ref 4.4–5.9)
RBC # BLD AUTO: 3.38 10E12/L (ref 4.4–5.9)
RBC # BLD AUTO: 3.42 10E12/L (ref 4.4–5.9)
RBC # BLD AUTO: 3.46 10E12/L (ref 4.4–5.9)
RBC # BLD AUTO: 3.48 10E12/L (ref 4.4–5.9)
RBC # BLD AUTO: 3.48 10E12/L (ref 4.4–5.9)
RBC # BLD AUTO: 3.69 10E12/L (ref 4.4–5.9)
RBC # BLD AUTO: NORMAL 10E12/L (ref 4.4–5.9)
RBC #/AREA URNS AUTO: 0 /HPF (ref 0–2)
RBC #/AREA URNS AUTO: 2 /HPF (ref 0–2)
RBC #/AREA URNS AUTO: <1 /HPF (ref 0–2)
RETICS # AUTO: 20.1 10E9/L (ref 25–95)
RETICS # AUTO: 22.9 10E9/L (ref 25–95)
RETICS # AUTO: 34.1 10E9/L (ref 25–95)
RETICS # AUTO: 44.6 10E9/L (ref 25–95)
RETICS/RBC NFR AUTO: 0.7 % (ref 0.5–2)
RETICS/RBC NFR AUTO: 0.9 % (ref 0.5–2)
RETICS/RBC NFR AUTO: 1 % (ref 0.5–2)
RETICS/RBC NFR AUTO: 1.7 % (ref 0.5–2)
RVA NSP5 STL QL NAA+PROBE: NOT DETECTED
RVA NSP5 STL QL NAA+PROBE: NOT DETECTED
SALMONELLA SP RPOD STL QL NAA+PROBE: NOT DETECTED
SALMONELLA SP RPOD STL QL NAA+PROBE: NOT DETECTED
SHIGELLA SP+EIEC IPAH STL QL NAA+PROBE: NOT DETECTED
SHIGELLA SP+EIEC IPAH STL QL NAA+PROBE: NOT DETECTED
SODIUM BLD-SCNC: 134 MMOL/L (ref 133–144)
SODIUM SERPL-SCNC: 132 MMOL/L (ref 133–144)
SODIUM SERPL-SCNC: 134 MMOL/L (ref 133–144)
SODIUM SERPL-SCNC: 135 MMOL/L (ref 133–144)
SODIUM SERPL-SCNC: 135 MMOL/L (ref 133–144)
SODIUM SERPL-SCNC: 136 MMOL/L (ref 133–144)
SODIUM SERPL-SCNC: 137 MMOL/L (ref 133–144)
SODIUM SERPL-SCNC: 138 MMOL/L (ref 133–144)
SODIUM SERPL-SCNC: 139 MMOL/L (ref 133–144)
SODIUM SERPL-SCNC: 140 MMOL/L (ref 133–144)
SODIUM SERPL-SCNC: 140 MMOL/L (ref 133–144)
SODIUM SERPL-SCNC: 141 MMOL/L (ref 133–144)
SODIUM SERPL-SCNC: 141 MMOL/L (ref 133–144)
SODIUM SERPL-SCNC: 142 MMOL/L (ref 133–144)
SODIUM SERPL-SCNC: 142 MMOL/L (ref 133–144)
SODIUM SERPL-SCNC: 143 MMOL/L (ref 133–144)
SODIUM SERPL-SCNC: 144 MMOL/L (ref 133–144)
SODIUM SERPL-SCNC: 145 MMOL/L (ref 133–144)
SODIUM UR-SCNC: 38 MMOL/L
SOURCE: ABNORMAL
SP GR UR STRIP: 1.01 (ref 1–1.03)
SPECIMEN EXP DATE BLD: NORMAL
SPECIMEN SOURCE FLD: NORMAL
SPECIMEN SOURCE: ABNORMAL
SPECIMEN SOURCE: NORMAL
SQUAMOUS #/AREA URNS AUTO: <1 /HPF (ref 0–1)
TIBC SERPL-MCNC: 190 UG/DL (ref 240–430)
TRANS CELLS #/AREA URNS HPF: <1 /HPF (ref 0–1)
TRANSFUSION STATUS PATIENT QL: NORMAL
TSH SERPL DL<=0.005 MIU/L-ACNC: 1.33 MU/L (ref 0.4–4)
UPPER GI ENDOSCOPY: NORMAL
UROBILINOGEN UR STRIP-MCNC: NORMAL MG/DL (ref 0–2)
UUN UR-MCNC: 532 MG/DL
UUN/CREAT 24H UR: 17 G/G CR
V CHOL+PARA RFBL+TRKH+TNAA STL QL NAA+PR: NOT DETECTED
V CHOL+PARA RFBL+TRKH+TNAA STL QL NAA+PR: NOT DETECTED
VANCOMYCIN SERPL-MCNC: 11.8 MG/L
VANCOMYCIN SERPL-MCNC: 14.8 MG/L
VANCOMYCIN SERPL-MCNC: 15 MG/L
VANCOMYCIN SERPL-MCNC: 16.2 MG/L
VANCOMYCIN SERPL-MCNC: 21.8 MG/L
VANCOMYCIN SERPL-MCNC: 23.4 MG/L
VANCOMYCIN SERPL-MCNC: 31.9 MG/L
VIT B12 SERPL-MCNC: 2756 PG/ML (ref 193–986)
VIT B12 SERPL-MCNC: 3793 PG/ML (ref 193–986)
WBC # BLD AUTO: 10 10E9/L (ref 4–11)
WBC # BLD AUTO: 10.2 10E9/L (ref 4–11)
WBC # BLD AUTO: 10.3 10E9/L (ref 4–11)
WBC # BLD AUTO: 10.3 10E9/L (ref 4–11)
WBC # BLD AUTO: 10.4 10E9/L (ref 4–11)
WBC # BLD AUTO: 10.5 10E9/L (ref 4–11)
WBC # BLD AUTO: 10.5 10E9/L (ref 4–11)
WBC # BLD AUTO: 10.7 10E9/L (ref 4–11)
WBC # BLD AUTO: 10.7 10E9/L (ref 4–11)
WBC # BLD AUTO: 10.9 10E9/L (ref 4–11)
WBC # BLD AUTO: 11 10E9/L (ref 4–11)
WBC # BLD AUTO: 11.1 10E9/L (ref 4–11)
WBC # BLD AUTO: 11.2 10E9/L (ref 4–11)
WBC # BLD AUTO: 11.2 10E9/L (ref 4–11)
WBC # BLD AUTO: 11.3 10E9/L (ref 4–11)
WBC # BLD AUTO: 11.4 10E9/L (ref 4–11)
WBC # BLD AUTO: 11.4 10E9/L (ref 4–11)
WBC # BLD AUTO: 11.9 10E9/L (ref 4–11)
WBC # BLD AUTO: 12 10E9/L (ref 4–11)
WBC # BLD AUTO: 12.1 10E9/L (ref 4–11)
WBC # BLD AUTO: 12.2 10E9/L (ref 4–11)
WBC # BLD AUTO: 12.3 10E9/L (ref 4–11)
WBC # BLD AUTO: 12.4 10E9/L (ref 4–11)
WBC # BLD AUTO: 12.5 10E9/L (ref 4–11)
WBC # BLD AUTO: 12.5 10E9/L (ref 4–11)
WBC # BLD AUTO: 12.6 10E9/L (ref 4–11)
WBC # BLD AUTO: 12.7 10E9/L (ref 4–11)
WBC # BLD AUTO: 12.8 10E9/L (ref 4–11)
WBC # BLD AUTO: 12.9 10E9/L (ref 4–11)
WBC # BLD AUTO: 13 10E9/L (ref 4–11)
WBC # BLD AUTO: 13.3 10E9/L (ref 4–11)
WBC # BLD AUTO: 13.6 10E9/L (ref 4–11)
WBC # BLD AUTO: 13.9 10E9/L (ref 4–11)
WBC # BLD AUTO: 14.1 10E9/L (ref 4–11)
WBC # BLD AUTO: 14.1 10E9/L (ref 4–11)
WBC # BLD AUTO: 14.5 10E9/L (ref 4–11)
WBC # BLD AUTO: 15.3 10E9/L (ref 4–11)
WBC # BLD AUTO: 16 10E9/L (ref 4–11)
WBC # BLD AUTO: 5 10E9/L (ref 4–11)
WBC # BLD AUTO: 5.2 10E9/L (ref 4–11)
WBC # BLD AUTO: 6.6 10E9/L (ref 4–11)
WBC # BLD AUTO: 7.6 10E9/L (ref 4–11)
WBC # BLD AUTO: 7.7 10E9/L (ref 4–11)
WBC # BLD AUTO: 7.7 10E9/L (ref 4–11)
WBC # BLD AUTO: 7.8 10E9/L (ref 4–11)
WBC # BLD AUTO: 8.6 10E9/L (ref 4–11)
WBC # BLD AUTO: 8.6 10E9/L (ref 4–11)
WBC # BLD AUTO: 8.7 10E9/L (ref 4–11)
WBC # BLD AUTO: 8.9 10E9/L (ref 4–11)
WBC # BLD AUTO: 8.9 10E9/L (ref 4–11)
WBC # BLD AUTO: 9.4 10E9/L (ref 4–11)
WBC # BLD AUTO: 9.8 10E9/L (ref 4–11)
WBC # BLD AUTO: NORMAL 10E9/L (ref 4–11)
WBC # FLD AUTO: 1093 /UL
WBC # FLD AUTO: 1228 /UL
WBC # FLD AUTO: 158 /UL
WBC # FLD AUTO: 269 /UL
WBC # FLD AUTO: 478 /UL
WBC #/AREA URNS AUTO: 0 /HPF (ref 0–5)
WBC #/AREA URNS AUTO: 1 /HPF (ref 0–5)
WBC #/AREA URNS AUTO: 1 /HPF (ref 0–5)
Y ENTERO RECN STL QL NAA+PROBE: NOT DETECTED
Y ENTERO RECN STL QL NAA+PROBE: NOT DETECTED
YEAST SPEC QL CULT: NORMAL

## 2018-01-01 PROCEDURE — 85610 PROTHROMBIN TIME: CPT | Performed by: INTERNAL MEDICINE

## 2018-01-01 PROCEDURE — 83735 ASSAY OF MAGNESIUM: CPT | Performed by: INTERNAL MEDICINE

## 2018-01-01 PROCEDURE — 36415 COLL VENOUS BLD VENIPUNCTURE: CPT | Performed by: INTERNAL MEDICINE

## 2018-01-01 PROCEDURE — 25000125 ZZHC RX 250: Performed by: MARRIAGE & FAMILY THERAPIST

## 2018-01-01 PROCEDURE — 0W9G3ZZ DRAINAGE OF PERITONEAL CAVITY, PERCUTANEOUS APPROACH: ICD-10-PCS | Performed by: INTERNAL MEDICINE

## 2018-01-01 PROCEDURE — 85610 PROTHROMBIN TIME: CPT | Performed by: STUDENT IN AN ORGANIZED HEALTH CARE EDUCATION/TRAINING PROGRAM

## 2018-01-01 PROCEDURE — 40000275 ZZH STATISTIC RCP TIME EA 10 MIN

## 2018-01-01 PROCEDURE — 25000125 ZZHC RX 250

## 2018-01-01 PROCEDURE — 85018 HEMOGLOBIN: CPT | Performed by: INTERNAL MEDICINE

## 2018-01-01 PROCEDURE — 87077 CULTURE AEROBIC IDENTIFY: CPT | Performed by: INTERNAL MEDICINE

## 2018-01-01 PROCEDURE — P9059 PLASMA, FRZ BETWEEN 8-24HOUR: HCPCS | Performed by: STUDENT IN AN ORGANIZED HEALTH CARE EDUCATION/TRAINING PROGRAM

## 2018-01-01 PROCEDURE — 25000128 H RX IP 250 OP 636: Performed by: INTERNAL MEDICINE

## 2018-01-01 PROCEDURE — 83615 LACTATE (LD) (LDH) ENZYME: CPT | Performed by: INTERNAL MEDICINE

## 2018-01-01 PROCEDURE — 87040 BLOOD CULTURE FOR BACTERIA: CPT | Performed by: INTERNAL MEDICINE

## 2018-01-01 PROCEDURE — 87070 CULTURE OTHR SPECIMN AEROBIC: CPT | Performed by: HOSPITALIST

## 2018-01-01 PROCEDURE — 87449 NOS EACH ORGANISM AG IA: CPT | Performed by: HOSPITALIST

## 2018-01-01 PROCEDURE — 82945 GLUCOSE OTHER FLUID: CPT | Performed by: STUDENT IN AN ORGANIZED HEALTH CARE EDUCATION/TRAINING PROGRAM

## 2018-01-01 PROCEDURE — 37000008 ZZH ANESTHESIA TECHNICAL FEE, 1ST 30 MIN

## 2018-01-01 PROCEDURE — 20000004 ZZH R&B ICU UMMC

## 2018-01-01 PROCEDURE — 85730 THROMBOPLASTIN TIME PARTIAL: CPT | Performed by: STUDENT IN AN ORGANIZED HEALTH CARE EDUCATION/TRAINING PROGRAM

## 2018-01-01 PROCEDURE — 84100 ASSAY OF PHOSPHORUS: CPT | Performed by: STUDENT IN AN ORGANIZED HEALTH CARE EDUCATION/TRAINING PROGRAM

## 2018-01-01 PROCEDURE — 85049 AUTOMATED PLATELET COUNT: CPT | Performed by: STUDENT IN AN ORGANIZED HEALTH CARE EDUCATION/TRAINING PROGRAM

## 2018-01-01 PROCEDURE — 25000128 H RX IP 250 OP 636: Performed by: STUDENT IN AN ORGANIZED HEALTH CARE EDUCATION/TRAINING PROGRAM

## 2018-01-01 PROCEDURE — 25000132 ZZH RX MED GY IP 250 OP 250 PS 637: Performed by: STUDENT IN AN ORGANIZED HEALTH CARE EDUCATION/TRAINING PROGRAM

## 2018-01-01 PROCEDURE — 99233 SBSQ HOSP IP/OBS HIGH 50: CPT | Mod: GC | Performed by: INTERNAL MEDICINE

## 2018-01-01 PROCEDURE — 97110 THERAPEUTIC EXERCISES: CPT | Mod: GP

## 2018-01-01 PROCEDURE — 85027 COMPLETE CBC AUTOMATED: CPT | Performed by: STUDENT IN AN ORGANIZED HEALTH CARE EDUCATION/TRAINING PROGRAM

## 2018-01-01 PROCEDURE — 82248 BILIRUBIN DIRECT: CPT | Performed by: INTERNAL MEDICINE

## 2018-01-01 PROCEDURE — 87186 SC STD MICRODIL/AGAR DIL: CPT | Performed by: INTERNAL MEDICINE

## 2018-01-01 PROCEDURE — 25000125 ZZHC RX 250: Performed by: INTERNAL MEDICINE

## 2018-01-01 PROCEDURE — 87102 FUNGUS ISOLATION CULTURE: CPT | Performed by: INTERNAL MEDICINE

## 2018-01-01 PROCEDURE — 36415 COLL VENOUS BLD VENIPUNCTURE: CPT | Performed by: STUDENT IN AN ORGANIZED HEALTH CARE EDUCATION/TRAINING PROGRAM

## 2018-01-01 PROCEDURE — 25000128 H RX IP 250 OP 636

## 2018-01-01 PROCEDURE — 40000556 ZZH STATISTIC PERIPHERAL IV START W US GUIDANCE

## 2018-01-01 PROCEDURE — A9560 TC99M LABELED RBC: HCPCS | Performed by: INTERNAL MEDICINE

## 2018-01-01 PROCEDURE — 87040 BLOOD CULTURE FOR BACTERIA: CPT | Performed by: STUDENT IN AN ORGANIZED HEALTH CARE EDUCATION/TRAINING PROGRAM

## 2018-01-01 PROCEDURE — 82746 ASSAY OF FOLIC ACID SERUM: CPT | Performed by: STUDENT IN AN ORGANIZED HEALTH CARE EDUCATION/TRAINING PROGRAM

## 2018-01-01 PROCEDURE — 85027 COMPLETE CBC AUTOMATED: CPT | Performed by: INTERNAL MEDICINE

## 2018-01-01 PROCEDURE — 82330 ASSAY OF CALCIUM: CPT | Performed by: INTERNAL MEDICINE

## 2018-01-01 PROCEDURE — 99233 SBSQ HOSP IP/OBS HIGH 50: CPT | Mod: GC | Performed by: HOSPITALIST

## 2018-01-01 PROCEDURE — 00000146 ZZHCL STATISTIC GLUCOSE BY METER IP

## 2018-01-01 PROCEDURE — 94660 CPAP INITIATION&MGMT: CPT

## 2018-01-01 PROCEDURE — 40000344 ZZHCL STATISTIC THAWING COMPONENT: Performed by: INTERNAL MEDICINE

## 2018-01-01 PROCEDURE — 25000132 ZZH RX MED GY IP 250 OP 250 PS 637: Performed by: INTERNAL MEDICINE

## 2018-01-01 PROCEDURE — 36600 WITHDRAWAL OF ARTERIAL BLOOD: CPT

## 2018-01-01 PROCEDURE — 0F9930Z DRAINAGE OF COMMON BILE DUCT WITH DRAINAGE DEVICE, PERCUTANEOUS APPROACH: ICD-10-PCS | Performed by: RADIOLOGY

## 2018-01-01 PROCEDURE — 47536 EXCHANGE BILIARY DRG CATH: CPT

## 2018-01-01 PROCEDURE — 0FJB8ZZ INSPECTION OF HEPATOBILIARY DUCT, VIA NATURAL OR ARTIFICIAL OPENING ENDOSCOPIC: ICD-10-PCS | Performed by: INTERNAL MEDICINE

## 2018-01-01 PROCEDURE — 27210845 ZZH DEVICE INFLATION CR5

## 2018-01-01 PROCEDURE — 25000125 ZZHC RX 250: Performed by: STUDENT IN AN ORGANIZED HEALTH CARE EDUCATION/TRAINING PROGRAM

## 2018-01-01 PROCEDURE — 00000346 ZZHCL STATISTIC REVIEW OUTSIDE SLIDES TC 88321: Performed by: INTERNAL MEDICINE

## 2018-01-01 PROCEDURE — 71000017 ZZH RECOVERY PHASE 1 LEVEL 3 EA ADDTL HR: Performed by: INTERNAL MEDICINE

## 2018-01-01 PROCEDURE — 85396 CLOTTING ASSAY WHOLE BLOOD: CPT | Performed by: ANESTHESIOLOGY

## 2018-01-01 PROCEDURE — C1769 GUIDE WIRE: HCPCS

## 2018-01-01 PROCEDURE — 87075 CULTR BACTERIA EXCEPT BLOOD: CPT | Performed by: HOSPITALIST

## 2018-01-01 PROCEDURE — 25000125 ZZHC RX 250: Performed by: RADIOLOGY

## 2018-01-01 PROCEDURE — 82550 ASSAY OF CK (CPK): CPT | Performed by: INTERNAL MEDICINE

## 2018-01-01 PROCEDURE — 40000611 ZZHCL STATISTIC MORPHOLOGY W/INTERP HEMEPATH TC 85060: Performed by: STUDENT IN AN ORGANIZED HEALTH CARE EDUCATION/TRAINING PROGRAM

## 2018-01-01 PROCEDURE — 85652 RBC SED RATE AUTOMATED: CPT | Performed by: STUDENT IN AN ORGANIZED HEALTH CARE EDUCATION/TRAINING PROGRAM

## 2018-01-01 PROCEDURE — 12000008 ZZH R&B INTERMEDIATE UMMC

## 2018-01-01 PROCEDURE — 12000001 ZZH R&B MED SURG/OB UMMC

## 2018-01-01 PROCEDURE — 82306 VITAMIN D 25 HYDROXY: CPT | Performed by: STUDENT IN AN ORGANIZED HEALTH CARE EDUCATION/TRAINING PROGRAM

## 2018-01-01 PROCEDURE — 25000128 H RX IP 250 OP 636: Performed by: PHYSICIAN ASSISTANT

## 2018-01-01 PROCEDURE — 87070 CULTURE OTHR SPECIMN AEROBIC: CPT | Performed by: INTERNAL MEDICINE

## 2018-01-01 PROCEDURE — 80053 COMPREHEN METABOLIC PANEL: CPT | Performed by: STUDENT IN AN ORGANIZED HEALTH CARE EDUCATION/TRAINING PROGRAM

## 2018-01-01 PROCEDURE — 25000128 H RX IP 250 OP 636: Performed by: NURSE ANESTHETIST, CERTIFIED REGISTERED

## 2018-01-01 PROCEDURE — P9012 CRYOPRECIPITATE EACH UNIT: HCPCS | Performed by: INTERNAL MEDICINE

## 2018-01-01 PROCEDURE — 47538 PERQ PLMT BILE DUCT STENT: CPT

## 2018-01-01 PROCEDURE — 85384 FIBRINOGEN ACTIVITY: CPT | Performed by: STUDENT IN AN ORGANIZED HEALTH CARE EDUCATION/TRAINING PROGRAM

## 2018-01-01 PROCEDURE — 40000133 ZZH STATISTIC OT WARD VISIT

## 2018-01-01 PROCEDURE — BF131ZZ FLUOROSCOPY OF GALLBLADDER AND BILE DUCTS USING LOW OSMOLAR CONTRAST: ICD-10-PCS | Performed by: RADIOLOGY

## 2018-01-01 PROCEDURE — 27210905 ZZH KIT CR7

## 2018-01-01 PROCEDURE — 84100 ASSAY OF PHOSPHORUS: CPT | Performed by: INTERNAL MEDICINE

## 2018-01-01 PROCEDURE — 82746 ASSAY OF FOLIC ACID SERUM: CPT | Performed by: INTERNAL MEDICINE

## 2018-01-01 PROCEDURE — C1887 CATHETER, GUIDING: HCPCS

## 2018-01-01 PROCEDURE — 40000802 ZZH SITE CHECK

## 2018-01-01 PROCEDURE — 36592 COLLECT BLOOD FROM PICC: CPT | Performed by: INTERNAL MEDICINE

## 2018-01-01 PROCEDURE — 97140 MANUAL THERAPY 1/> REGIONS: CPT | Mod: GO

## 2018-01-01 PROCEDURE — 25000132 ZZH RX MED GY IP 250 OP 250 PS 637: Performed by: HOSPITALIST

## 2018-01-01 PROCEDURE — 12000006 ZZH R&B IMCU INTERMEDIATE UMMC

## 2018-01-01 PROCEDURE — P9016 RBC LEUKOCYTES REDUCED: HCPCS | Performed by: INTERNAL MEDICINE

## 2018-01-01 PROCEDURE — 86022 PLATELET ANTIBODIES: CPT | Performed by: INTERNAL MEDICINE

## 2018-01-01 PROCEDURE — 87040 BLOOD CULTURE FOR BACTERIA: CPT | Performed by: MARRIAGE & FAMILY THERAPIST

## 2018-01-01 PROCEDURE — 36415 COLL VENOUS BLD VENIPUNCTURE: CPT

## 2018-01-01 PROCEDURE — 88305 TISSUE EXAM BY PATHOLOGIST: CPT | Performed by: INTERNAL MEDICINE

## 2018-01-01 PROCEDURE — C9399 UNCLASSIFIED DRUGS OR BIOLOG: HCPCS | Performed by: NURSE ANESTHETIST, CERTIFIED REGISTERED

## 2018-01-01 PROCEDURE — 47543 ENDOLUMINAL BX BILIARY TREE: CPT

## 2018-01-01 PROCEDURE — 25000128 H RX IP 250 OP 636: Performed by: MARRIAGE & FAMILY THERAPIST

## 2018-01-01 PROCEDURE — 87205 SMEAR GRAM STAIN: CPT | Performed by: INTERNAL MEDICINE

## 2018-01-01 PROCEDURE — 87181 SC STD AGAR DILUTION PER AGT: CPT | Performed by: INTERNAL MEDICINE

## 2018-01-01 PROCEDURE — 27210436 ZZH NUTRITION PRODUCT SEMIELEM INTERMED CAN

## 2018-01-01 PROCEDURE — 40000133 ZZH STATISTIC OT WARD VISIT: Performed by: OCCUPATIONAL THERAPIST

## 2018-01-01 PROCEDURE — 87086 URINE CULTURE/COLONY COUNT: CPT | Performed by: STUDENT IN AN ORGANIZED HEALTH CARE EDUCATION/TRAINING PROGRAM

## 2018-01-01 PROCEDURE — P9037 PLATE PHERES LEUKOREDU IRRAD: HCPCS | Performed by: STUDENT IN AN ORGANIZED HEALTH CARE EDUCATION/TRAINING PROGRAM

## 2018-01-01 PROCEDURE — 93975 VASCULAR STUDY: CPT | Mod: TC

## 2018-01-01 PROCEDURE — P9016 RBC LEUKOCYTES REDUCED: HCPCS | Performed by: ANESTHESIOLOGY

## 2018-01-01 PROCEDURE — 27211039 ZZH NEEDLE CR2

## 2018-01-01 PROCEDURE — 40000141 ZZH STATISTIC PERIPHERAL IV START W/O US GUIDANCE

## 2018-01-01 PROCEDURE — 84157 ASSAY OF PROTEIN OTHER: CPT | Performed by: HOSPITALIST

## 2018-01-01 PROCEDURE — P9047 ALBUMIN (HUMAN), 25%, 50ML: HCPCS | Performed by: STUDENT IN AN ORGANIZED HEALTH CARE EDUCATION/TRAINING PROGRAM

## 2018-01-01 PROCEDURE — 87800 DETECT AGNT MULT DNA DIREC: CPT | Performed by: STUDENT IN AN ORGANIZED HEALTH CARE EDUCATION/TRAINING PROGRAM

## 2018-01-01 PROCEDURE — 85018 HEMOGLOBIN: CPT | Performed by: STUDENT IN AN ORGANIZED HEALTH CARE EDUCATION/TRAINING PROGRAM

## 2018-01-01 PROCEDURE — 86850 RBC ANTIBODY SCREEN: CPT | Performed by: INTERNAL MEDICINE

## 2018-01-01 PROCEDURE — 82607 VITAMIN B-12: CPT | Performed by: INTERNAL MEDICINE

## 2018-01-01 PROCEDURE — 00000102 ZZHCL STATISTIC CYTO WRIGHT STAIN TC: Performed by: INTERNAL MEDICINE

## 2018-01-01 PROCEDURE — 87040 BLOOD CULTURE FOR BACTERIA: CPT | Performed by: ORTHOPAEDIC SURGERY

## 2018-01-01 PROCEDURE — 82977 ASSAY OF GGT: CPT | Performed by: STUDENT IN AN ORGANIZED HEALTH CARE EDUCATION/TRAINING PROGRAM

## 2018-01-01 PROCEDURE — 97530 THERAPEUTIC ACTIVITIES: CPT | Mod: GP

## 2018-01-01 PROCEDURE — 99153 MOD SED SAME PHYS/QHP EA: CPT

## 2018-01-01 PROCEDURE — 83615 LACTATE (LD) (LDH) ENZYME: CPT | Performed by: STUDENT IN AN ORGANIZED HEALTH CARE EDUCATION/TRAINING PROGRAM

## 2018-01-01 PROCEDURE — 40000279 XR SURGERY CARM FLUORO GREATER THAN 5 MIN W STILLS: Mod: TC

## 2018-01-01 PROCEDURE — 88112 CYTOPATH CELL ENHANCE TECH: CPT | Performed by: INTERNAL MEDICINE

## 2018-01-01 PROCEDURE — 82728 ASSAY OF FERRITIN: CPT | Performed by: STUDENT IN AN ORGANIZED HEALTH CARE EDUCATION/TRAINING PROGRAM

## 2018-01-01 PROCEDURE — 25000132 ZZH RX MED GY IP 250 OP 250 PS 637

## 2018-01-01 PROCEDURE — 37000008 ZZH ANESTHESIA TECHNICAL FEE, 1ST 30 MIN: Performed by: INTERNAL MEDICINE

## 2018-01-01 PROCEDURE — 99223 1ST HOSP IP/OBS HIGH 75: CPT | Performed by: NURSE PRACTITIONER

## 2018-01-01 PROCEDURE — 80053 COMPREHEN METABOLIC PANEL: CPT | Performed by: INTERNAL MEDICINE

## 2018-01-01 PROCEDURE — 25000131 ZZH RX MED GY IP 250 OP 636 PS 637: Performed by: STUDENT IN AN ORGANIZED HEALTH CARE EDUCATION/TRAINING PROGRAM

## 2018-01-01 PROCEDURE — 84295 ASSAY OF SERUM SODIUM: CPT | Performed by: STUDENT IN AN ORGANIZED HEALTH CARE EDUCATION/TRAINING PROGRAM

## 2018-01-01 PROCEDURE — 86900 BLOOD TYPING SEROLOGIC ABO: CPT | Performed by: INTERNAL MEDICINE

## 2018-01-01 PROCEDURE — 86301 IMMUNOASSAY TUMOR CA 19-9: CPT | Performed by: STUDENT IN AN ORGANIZED HEALTH CARE EDUCATION/TRAINING PROGRAM

## 2018-01-01 PROCEDURE — 80053 COMPREHEN METABOLIC PANEL: CPT | Performed by: EMERGENCY MEDICINE

## 2018-01-01 PROCEDURE — 27210903 ZZH KIT CR5

## 2018-01-01 PROCEDURE — 86923 COMPATIBILITY TEST ELECTRIC: CPT | Performed by: INTERNAL MEDICINE

## 2018-01-01 PROCEDURE — 83540 ASSAY OF IRON: CPT | Performed by: STUDENT IN AN ORGANIZED HEALTH CARE EDUCATION/TRAINING PROGRAM

## 2018-01-01 PROCEDURE — 87209 SMEAR COMPLEX STAIN: CPT | Performed by: STUDENT IN AN ORGANIZED HEALTH CARE EDUCATION/TRAINING PROGRAM

## 2018-01-01 PROCEDURE — P9073 PLATELETS PHERESIS PATH REDU: HCPCS | Performed by: INTERNAL MEDICINE

## 2018-01-01 PROCEDURE — 40000193 ZZH STATISTIC PT WARD VISIT

## 2018-01-01 PROCEDURE — P9047 ALBUMIN (HUMAN), 25%, 50ML: HCPCS | Performed by: INTERNAL MEDICINE

## 2018-01-01 PROCEDURE — 87075 CULTR BACTERIA EXCEPT BLOOD: CPT | Performed by: STUDENT IN AN ORGANIZED HEALTH CARE EDUCATION/TRAINING PROGRAM

## 2018-01-01 PROCEDURE — 27210995 ZZH RX 272: Performed by: HOSPITALIST

## 2018-01-01 PROCEDURE — 99223 1ST HOSP IP/OBS HIGH 75: CPT | Mod: AI | Performed by: INTERNAL MEDICINE

## 2018-01-01 PROCEDURE — 87077 CULTURE AEROBIC IDENTIFY: CPT | Performed by: ORTHOPAEDIC SURGERY

## 2018-01-01 PROCEDURE — 25500064 ZZH RX 255 OP 636: Performed by: INTERNAL MEDICINE

## 2018-01-01 PROCEDURE — 82947 ASSAY GLUCOSE BLOOD QUANT: CPT | Performed by: INTERNAL MEDICINE

## 2018-01-01 PROCEDURE — 97535 SELF CARE MNGMENT TRAINING: CPT | Mod: GO | Performed by: OCCUPATIONAL THERAPIST

## 2018-01-01 PROCEDURE — 25000131 ZZH RX MED GY IP 250 OP 636 PS 637: Performed by: INTERNAL MEDICINE

## 2018-01-01 PROCEDURE — 36415 COLL VENOUS BLD VENIPUNCTURE: CPT | Performed by: MARRIAGE & FAMILY THERAPIST

## 2018-01-01 PROCEDURE — 83993 ASSAY FOR CALPROTECTIN FECAL: CPT | Performed by: STUDENT IN AN ORGANIZED HEALTH CARE EDUCATION/TRAINING PROGRAM

## 2018-01-01 PROCEDURE — 83036 HEMOGLOBIN GLYCOSYLATED A1C: CPT | Performed by: STUDENT IN AN ORGANIZED HEALTH CARE EDUCATION/TRAINING PROGRAM

## 2018-01-01 PROCEDURE — 00000155 ZZHCL STATISTIC H-CELL BLOCK W/STAIN: Performed by: INTERNAL MEDICINE

## 2018-01-01 PROCEDURE — 83010 ASSAY OF HAPTOGLOBIN QUANT: CPT | Performed by: HOSPITALIST

## 2018-01-01 PROCEDURE — A9585 GADOBUTROL INJECTION: HCPCS

## 2018-01-01 PROCEDURE — C1729 CATH, DRAINAGE: HCPCS

## 2018-01-01 PROCEDURE — 40000809 ZZH STATISTIC NO DOCUMENTATION TO SUPPORT CHARGE

## 2018-01-01 PROCEDURE — 25000128 H RX IP 250 OP 636: Performed by: RADIOLOGY

## 2018-01-01 PROCEDURE — 32555 ASPIRATE PLEURA W/ IMAGING: CPT

## 2018-01-01 PROCEDURE — 85230 CLOT FACTOR VII PROCONVERTIN: CPT | Performed by: STUDENT IN AN ORGANIZED HEALTH CARE EDUCATION/TRAINING PROGRAM

## 2018-01-01 PROCEDURE — 85730 THROMBOPLASTIN TIME PARTIAL: CPT | Performed by: INTERNAL MEDICINE

## 2018-01-01 PROCEDURE — 71000014 ZZH RECOVERY PHASE 1 LEVEL 2 FIRST HR

## 2018-01-01 PROCEDURE — 85210 CLOT FACTOR II PROTHROM SPEC: CPT | Performed by: STUDENT IN AN ORGANIZED HEALTH CARE EDUCATION/TRAINING PROGRAM

## 2018-01-01 PROCEDURE — 25000128 H RX IP 250 OP 636: Performed by: HOSPITALIST

## 2018-01-01 PROCEDURE — 37000009 ZZH ANESTHESIA TECHNICAL FEE, EACH ADDTL 15 MIN: Performed by: INTERNAL MEDICINE

## 2018-01-01 PROCEDURE — 84134 ASSAY OF PREALBUMIN: CPT | Performed by: INTERNAL MEDICINE

## 2018-01-01 PROCEDURE — 84300 ASSAY OF URINE SODIUM: CPT | Performed by: HOSPITALIST

## 2018-01-01 PROCEDURE — 49083 ABD PARACENTESIS W/IMAGING: CPT | Performed by: HOSPITALIST

## 2018-01-01 PROCEDURE — 85025 COMPLETE CBC W/AUTO DIFF WBC: CPT | Performed by: STUDENT IN AN ORGANIZED HEALTH CARE EDUCATION/TRAINING PROGRAM

## 2018-01-01 PROCEDURE — 27210794 ZZH OR GENERAL SUPPLY STERILE: Performed by: INTERNAL MEDICINE

## 2018-01-01 PROCEDURE — 81001 URINALYSIS AUTO W/SCOPE: CPT | Performed by: STUDENT IN AN ORGANIZED HEALTH CARE EDUCATION/TRAINING PROGRAM

## 2018-01-01 PROCEDURE — 99205 OFFICE O/P NEW HI 60 MIN: CPT | Mod: GC | Performed by: INTERNAL MEDICINE

## 2018-01-01 PROCEDURE — 87075 CULTR BACTERIA EXCEPT BLOOD: CPT | Performed by: INTERNAL MEDICINE

## 2018-01-01 PROCEDURE — C1725 CATH, TRANSLUMIN NON-LASER: HCPCS

## 2018-01-01 PROCEDURE — 99223 1ST HOSP IP/OBS HIGH 75: CPT | Performed by: INTERNAL MEDICINE

## 2018-01-01 PROCEDURE — 25000128 H RX IP 250 OP 636: Performed by: ORTHOPAEDIC SURGERY

## 2018-01-01 PROCEDURE — 97116 GAIT TRAINING THERAPY: CPT | Mod: GP

## 2018-01-01 PROCEDURE — 25800025 ZZH RX 258: Performed by: STUDENT IN AN ORGANIZED HEALTH CARE EDUCATION/TRAINING PROGRAM

## 2018-01-01 PROCEDURE — 0W9G3ZX DRAINAGE OF PERITONEAL CAVITY, PERCUTANEOUS APPROACH, DIAGNOSTIC: ICD-10-PCS | Performed by: INTERNAL MEDICINE

## 2018-01-01 PROCEDURE — 25000132 ZZH RX MED GY IP 250 OP 250 PS 637: Performed by: MARRIAGE & FAMILY THERAPIST

## 2018-01-01 PROCEDURE — C1751 CATH, INF, PER/CENT/MIDLINE: HCPCS

## 2018-01-01 PROCEDURE — 36590 REMOVAL TUNNELED CV CATH: CPT

## 2018-01-01 PROCEDURE — 82803 BLOOD GASES ANY COMBINATION: CPT | Performed by: INTERNAL MEDICINE

## 2018-01-01 PROCEDURE — 84295 ASSAY OF SERUM SODIUM: CPT | Performed by: INTERNAL MEDICINE

## 2018-01-01 PROCEDURE — C1726 CATH, BAL DIL, NON-VASCULAR: HCPCS | Performed by: INTERNAL MEDICINE

## 2018-01-01 PROCEDURE — 40000611 ZZHCL STATISTIC MORPHOLOGY W/INTERP HEMEPATH TC 85060: Performed by: INTERNAL MEDICINE

## 2018-01-01 PROCEDURE — P9041 ALBUMIN (HUMAN),5%, 50ML: HCPCS | Performed by: STUDENT IN AN ORGANIZED HEALTH CARE EDUCATION/TRAINING PROGRAM

## 2018-01-01 PROCEDURE — 85384 FIBRINOGEN ACTIVITY: CPT | Performed by: INTERNAL MEDICINE

## 2018-01-01 PROCEDURE — 93005 ELECTROCARDIOGRAM TRACING: CPT

## 2018-01-01 PROCEDURE — 99207 ZZC APP CREDIT; MD BILLING SHARED VISIT: CPT | Performed by: ANESTHESIOLOGY

## 2018-01-01 PROCEDURE — 0DHA7UZ INSERTION OF FEEDING DEVICE INTO JEJUNUM, VIA NATURAL OR ARTIFICIAL OPENING: ICD-10-PCS | Performed by: INTERNAL MEDICINE

## 2018-01-01 PROCEDURE — 87106 FUNGI IDENTIFICATION YEAST: CPT | Performed by: INTERNAL MEDICINE

## 2018-01-01 PROCEDURE — 80048 BASIC METABOLIC PNL TOTAL CA: CPT | Performed by: INTERNAL MEDICINE

## 2018-01-01 PROCEDURE — 87077 CULTURE AEROBIC IDENTIFY: CPT | Performed by: STUDENT IN AN ORGANIZED HEALTH CARE EDUCATION/TRAINING PROGRAM

## 2018-01-01 PROCEDURE — 99233 SBSQ HOSP IP/OBS HIGH 50: CPT | Mod: GC | Performed by: ORTHOPAEDIC SURGERY

## 2018-01-01 PROCEDURE — 88305 TISSUE EXAM BY PATHOLOGIST: CPT | Performed by: HOSPITALIST

## 2018-01-01 PROCEDURE — 36569 INSJ PICC 5 YR+ W/O IMAGING: CPT

## 2018-01-01 PROCEDURE — 0W993ZZ DRAINAGE OF RIGHT PLEURAL CAVITY, PERCUTANEOUS APPROACH: ICD-10-PCS | Performed by: PHYSICIAN ASSISTANT

## 2018-01-01 PROCEDURE — 86900 BLOOD TYPING SEROLOGIC ABO: CPT | Performed by: ANESTHESIOLOGY

## 2018-01-01 PROCEDURE — 83735 ASSAY OF MAGNESIUM: CPT | Performed by: STUDENT IN AN ORGANIZED HEALTH CARE EDUCATION/TRAINING PROGRAM

## 2018-01-01 PROCEDURE — 71000016 ZZH RECOVERY PHASE 1 LEVEL 3 FIRST HR: Performed by: INTERNAL MEDICINE

## 2018-01-01 PROCEDURE — 99233 SBSQ HOSP IP/OBS HIGH 50: CPT | Mod: 25 | Performed by: INTERNAL MEDICINE

## 2018-01-01 PROCEDURE — P9041 ALBUMIN (HUMAN),5%, 50ML: HCPCS | Performed by: NURSE ANESTHETIST, CERTIFIED REGISTERED

## 2018-01-01 PROCEDURE — 25800025 ZZH RX 258: Performed by: ORTHOPAEDIC SURGERY

## 2018-01-01 PROCEDURE — P9041 ALBUMIN (HUMAN),5%, 50ML: HCPCS | Performed by: ORTHOPAEDIC SURGERY

## 2018-01-01 PROCEDURE — 87206 SMEAR FLUORESCENT/ACID STAI: CPT | Performed by: STUDENT IN AN ORGANIZED HEALTH CARE EDUCATION/TRAINING PROGRAM

## 2018-01-01 PROCEDURE — 84443 ASSAY THYROID STIM HORMONE: CPT | Performed by: STUDENT IN AN ORGANIZED HEALTH CARE EDUCATION/TRAINING PROGRAM

## 2018-01-01 PROCEDURE — 80202 ASSAY OF VANCOMYCIN: CPT | Performed by: INTERNAL MEDICINE

## 2018-01-01 PROCEDURE — 87070 CULTURE OTHR SPECIMN AEROBIC: CPT | Performed by: PHYSICIAN ASSISTANT

## 2018-01-01 PROCEDURE — 78299 UNLISTED GI PX DX NUC MED: CPT

## 2018-01-01 PROCEDURE — 76705 ECHO EXAM OF ABDOMEN: CPT | Mod: 26 | Performed by: HOSPITALIST

## 2018-01-01 PROCEDURE — 97530 THERAPEUTIC ACTIVITIES: CPT | Mod: GO | Performed by: OCCUPATIONAL THERAPIST

## 2018-01-01 PROCEDURE — 82042 OTHER SOURCE ALBUMIN QUAN EA: CPT | Performed by: STUDENT IN AN ORGANIZED HEALTH CARE EDUCATION/TRAINING PROGRAM

## 2018-01-01 PROCEDURE — 0DB88ZX EXCISION OF SMALL INTESTINE, VIA NATURAL OR ARTIFICIAL OPENING ENDOSCOPIC, DIAGNOSTIC: ICD-10-PCS | Performed by: INTERNAL MEDICINE

## 2018-01-01 PROCEDURE — 40000170 ZZH STATISTIC PRE-PROCEDURE ASSESSMENT II: Performed by: INTERNAL MEDICINE

## 2018-01-01 PROCEDURE — 82565 ASSAY OF CREATININE: CPT | Performed by: STUDENT IN AN ORGANIZED HEALTH CARE EDUCATION/TRAINING PROGRAM

## 2018-01-01 PROCEDURE — 94799 UNLISTED PULMONARY SVC/PX: CPT

## 2018-01-01 PROCEDURE — C1874 STENT, COATED/COV W/DEL SYS: HCPCS

## 2018-01-01 PROCEDURE — 0W9G3ZX DRAINAGE OF PERITONEAL CAVITY, PERCUTANEOUS APPROACH, DIAGNOSTIC: ICD-10-PCS | Performed by: HOSPITALIST

## 2018-01-01 PROCEDURE — 99152 MOD SED SAME PHYS/QHP 5/>YRS: CPT

## 2018-01-01 PROCEDURE — 25000132 ZZH RX MED GY IP 250 OP 250 PS 637: Performed by: ORTHOPAEDIC SURGERY

## 2018-01-01 PROCEDURE — 83690 ASSAY OF LIPASE: CPT | Performed by: STUDENT IN AN ORGANIZED HEALTH CARE EDUCATION/TRAINING PROGRAM

## 2018-01-01 PROCEDURE — 76705 ECHO EXAM OF ABDOMEN: CPT

## 2018-01-01 PROCEDURE — 27210197 ZZH KIT POWER PICC TRIPLE LUMEN

## 2018-01-01 PROCEDURE — 27210912 ZZH NEEDLE CR8

## 2018-01-01 PROCEDURE — 87506 IADNA-DNA/RNA PROBE TQ 6-11: CPT | Performed by: STUDENT IN AN ORGANIZED HEALTH CARE EDUCATION/TRAINING PROGRAM

## 2018-01-01 PROCEDURE — 80202 ASSAY OF VANCOMYCIN: CPT | Performed by: HOSPITALIST

## 2018-01-01 PROCEDURE — 81001 URINALYSIS AUTO W/SCOPE: CPT | Performed by: EMERGENCY MEDICINE

## 2018-01-01 PROCEDURE — 84157 ASSAY OF PROTEIN OTHER: CPT | Performed by: STUDENT IN AN ORGANIZED HEALTH CARE EDUCATION/TRAINING PROGRAM

## 2018-01-01 PROCEDURE — 27210732 ZZH ACCESSORY CR1

## 2018-01-01 PROCEDURE — 36000061 ZZH SURGERY LEVEL 3 W FLUORO 1ST 30 MIN - UMMC: Performed by: INTERNAL MEDICINE

## 2018-01-01 PROCEDURE — 82962 GLUCOSE BLOOD TEST: CPT

## 2018-01-01 PROCEDURE — P9037 PLATE PHERES LEUKOREDU IRRAD: HCPCS | Performed by: INTERNAL MEDICINE

## 2018-01-01 PROCEDURE — 71101 X-RAY EXAM UNILAT RIBS/CHEST: CPT | Mod: RT

## 2018-01-01 PROCEDURE — 97140 MANUAL THERAPY 1/> REGIONS: CPT | Mod: GO | Performed by: OCCUPATIONAL THERAPIST

## 2018-01-01 PROCEDURE — 82330 ASSAY OF CALCIUM: CPT | Performed by: STUDENT IN AN ORGANIZED HEALTH CARE EDUCATION/TRAINING PROGRAM

## 2018-01-01 PROCEDURE — 86140 C-REACTIVE PROTEIN: CPT | Performed by: STUDENT IN AN ORGANIZED HEALTH CARE EDUCATION/TRAINING PROGRAM

## 2018-01-01 PROCEDURE — 85004 AUTOMATED DIFF WBC COUNT: CPT | Performed by: INTERNAL MEDICINE

## 2018-01-01 PROCEDURE — 0W9G3ZZ DRAINAGE OF PERITONEAL CAVITY, PERCUTANEOUS APPROACH: ICD-10-PCS | Performed by: STUDENT IN AN ORGANIZED HEALTH CARE EDUCATION/TRAINING PROGRAM

## 2018-01-01 PROCEDURE — 82565 ASSAY OF CREATININE: CPT | Performed by: INTERNAL MEDICINE

## 2018-01-01 PROCEDURE — 83010 ASSAY OF HAPTOGLOBIN QUANT: CPT | Performed by: INTERNAL MEDICINE

## 2018-01-01 PROCEDURE — 84132 ASSAY OF SERUM POTASSIUM: CPT | Performed by: INTERNAL MEDICINE

## 2018-01-01 PROCEDURE — 83605 ASSAY OF LACTIC ACID: CPT

## 2018-01-01 PROCEDURE — 87181 SC STD AGAR DILUTION PER AGT: CPT | Performed by: STUDENT IN AN ORGANIZED HEALTH CARE EDUCATION/TRAINING PROGRAM

## 2018-01-01 PROCEDURE — 85240 CLOT FACTOR VIII AHG 1 STAGE: CPT | Performed by: STUDENT IN AN ORGANIZED HEALTH CARE EDUCATION/TRAINING PROGRAM

## 2018-01-01 PROCEDURE — 40000344 ZZHCL STATISTIC THAWING COMPONENT: Performed by: STUDENT IN AN ORGANIZED HEALTH CARE EDUCATION/TRAINING PROGRAM

## 2018-01-01 PROCEDURE — 0F793DZ DILATION OF COMMON BILE DUCT WITH INTRALUMINAL DEVICE, PERCUTANEOUS APPROACH: ICD-10-PCS | Performed by: RADIOLOGY

## 2018-01-01 PROCEDURE — 86901 BLOOD TYPING SEROLOGIC RH(D): CPT | Performed by: ANESTHESIOLOGY

## 2018-01-01 PROCEDURE — 00000328 ZZHCL STATISTIC PTT NC: Performed by: STUDENT IN AN ORGANIZED HEALTH CARE EDUCATION/TRAINING PROGRAM

## 2018-01-01 PROCEDURE — 85045 AUTOMATED RETICULOCYTE COUNT: CPT | Performed by: STUDENT IN AN ORGANIZED HEALTH CARE EDUCATION/TRAINING PROGRAM

## 2018-01-01 PROCEDURE — 80202 ASSAY OF VANCOMYCIN: CPT | Performed by: STUDENT IN AN ORGANIZED HEALTH CARE EDUCATION/TRAINING PROGRAM

## 2018-01-01 PROCEDURE — P9059 PLASMA, FRZ BETWEEN 8-24HOUR: HCPCS | Performed by: INTERNAL MEDICINE

## 2018-01-01 PROCEDURE — 99356 ZZC PROLONGED SERV,INPATIENT,1ST HR: CPT | Performed by: NURSE PRACTITIONER

## 2018-01-01 PROCEDURE — 84520 ASSAY OF UREA NITROGEN: CPT | Performed by: STUDENT IN AN ORGANIZED HEALTH CARE EDUCATION/TRAINING PROGRAM

## 2018-01-01 PROCEDURE — 00000401 ZZHCL STATISTIC THROMBIN TIME NC: Performed by: STUDENT IN AN ORGANIZED HEALTH CARE EDUCATION/TRAINING PROGRAM

## 2018-01-01 PROCEDURE — 27210805 ZZH SHEATH CR4

## 2018-01-01 PROCEDURE — 87800 DETECT AGNT MULT DNA DIREC: CPT | Performed by: INTERNAL MEDICINE

## 2018-01-01 PROCEDURE — 40000893 ZZH STATISTIC PT IP EVAL DEFER

## 2018-01-01 PROCEDURE — 86850 RBC ANTIBODY SCREEN: CPT | Performed by: ANESTHESIOLOGY

## 2018-01-01 PROCEDURE — 86901 BLOOD TYPING SEROLOGIC RH(D): CPT | Performed by: INTERNAL MEDICINE

## 2018-01-01 PROCEDURE — 71045 X-RAY EXAM CHEST 1 VIEW: CPT

## 2018-01-01 PROCEDURE — 0JPT3WZ REMOVAL OF TOTALLY IMPLANTABLE VASCULAR ACCESS DEVICE FROM TRUNK SUBCUTANEOUS TISSUE AND FASCIA, PERCUTANEOUS APPROACH: ICD-10-PCS | Performed by: PHYSICIAN ASSISTANT

## 2018-01-01 PROCEDURE — 40000047 ZZH STATISTIC CTO2 CONT OXYGEN TECH TIME EA 90 MIN

## 2018-01-01 PROCEDURE — 25000566 ZZH SEVOFLURANE, EA 15 MIN

## 2018-01-01 PROCEDURE — G0463 HOSPITAL OUTPT CLINIC VISIT: HCPCS | Mod: ZF

## 2018-01-01 PROCEDURE — 37000009 ZZH ANESTHESIA TECHNICAL FEE, EACH ADDTL 15 MIN

## 2018-01-01 PROCEDURE — 99222 1ST HOSP IP/OBS MODERATE 55: CPT | Mod: GC | Performed by: INTERNAL MEDICINE

## 2018-01-01 PROCEDURE — 85045 AUTOMATED RETICULOCYTE COUNT: CPT | Performed by: INTERNAL MEDICINE

## 2018-01-01 PROCEDURE — 25000125 ZZHC RX 250: Performed by: PHYSICIAN ASSISTANT

## 2018-01-01 PROCEDURE — 27210907 ZZH KIT CR9

## 2018-01-01 PROCEDURE — 40000166 ZZH STATISTIC PP CARE STAGE 1

## 2018-01-01 PROCEDURE — 25000125 ZZHC RX 250: Performed by: ORTHOPAEDIC SURGERY

## 2018-01-01 PROCEDURE — 93010 ELECTROCARDIOGRAM REPORT: CPT | Performed by: INTERNAL MEDICINE

## 2018-01-01 PROCEDURE — 88305 TISSUE EXAM BY PATHOLOGIST: CPT | Performed by: STUDENT IN AN ORGANIZED HEALTH CARE EDUCATION/TRAINING PROGRAM

## 2018-01-01 PROCEDURE — 49083 ABD PARACENTESIS W/IMAGING: CPT | Performed by: INTERNAL MEDICINE

## 2018-01-01 PROCEDURE — 87077 CULTURE AEROBIC IDENTIFY: CPT | Performed by: MARRIAGE & FAMILY THERAPIST

## 2018-01-01 PROCEDURE — 36415 COLL VENOUS BLD VENIPUNCTURE: CPT | Performed by: ANESTHESIOLOGY

## 2018-01-01 PROCEDURE — 27210904 ZZH KIT CR6

## 2018-01-01 PROCEDURE — 74183 MRI ABD W/O CNTR FLWD CNTR: CPT

## 2018-01-01 PROCEDURE — 36415 COLL VENOUS BLD VENIPUNCTURE: CPT | Performed by: HOSPITALIST

## 2018-01-01 PROCEDURE — 83520 IMMUNOASSAY QUANT NOS NONAB: CPT | Performed by: SURGERY

## 2018-01-01 PROCEDURE — 88307 TISSUE EXAM BY PATHOLOGIST: CPT | Performed by: INTERNAL MEDICINE

## 2018-01-01 PROCEDURE — 85027 COMPLETE CBC AUTOMATED: CPT | Performed by: ANESTHESIOLOGY

## 2018-01-01 PROCEDURE — 82103 ALPHA-1-ANTITRYPSIN TOTAL: CPT | Performed by: STUDENT IN AN ORGANIZED HEALTH CARE EDUCATION/TRAINING PROGRAM

## 2018-01-01 PROCEDURE — 0F9530Z DRAINAGE OF RIGHT HEPATIC DUCT WITH DRAINAGE DEVICE, PERCUTANEOUS APPROACH: ICD-10-PCS | Performed by: RADIOLOGY

## 2018-01-01 PROCEDURE — 36000053 ZZH SURGERY LEVEL 2 EA 15 ADDTL MIN - UMMC: Performed by: INTERNAL MEDICINE

## 2018-01-01 PROCEDURE — 86900 BLOOD TYPING SEROLOGIC ABO: CPT | Performed by: STUDENT IN AN ORGANIZED HEALTH CARE EDUCATION/TRAINING PROGRAM

## 2018-01-01 PROCEDURE — 85049 AUTOMATED PLATELET COUNT: CPT | Performed by: INTERNAL MEDICINE

## 2018-01-01 PROCEDURE — 25000566 ZZH SEVOFLURANE, EA 15 MIN: Performed by: INTERNAL MEDICINE

## 2018-01-01 PROCEDURE — C1769 GUIDE WIRE: HCPCS | Performed by: INTERNAL MEDICINE

## 2018-01-01 PROCEDURE — 82803 BLOOD GASES ANY COMBINATION: CPT | Performed by: STUDENT IN AN ORGANIZED HEALTH CARE EDUCATION/TRAINING PROGRAM

## 2018-01-01 PROCEDURE — 97602 WOUND(S) CARE NON-SELECTIVE: CPT

## 2018-01-01 PROCEDURE — 80076 HEPATIC FUNCTION PANEL: CPT | Performed by: STUDENT IN AN ORGANIZED HEALTH CARE EDUCATION/TRAINING PROGRAM

## 2018-01-01 PROCEDURE — 89051 BODY FLUID CELL COUNT: CPT | Performed by: STUDENT IN AN ORGANIZED HEALTH CARE EDUCATION/TRAINING PROGRAM

## 2018-01-01 PROCEDURE — 82570 ASSAY OF URINE CREATININE: CPT | Performed by: HOSPITALIST

## 2018-01-01 PROCEDURE — 87177 OVA AND PARASITES SMEARS: CPT | Performed by: STUDENT IN AN ORGANIZED HEALTH CARE EDUCATION/TRAINING PROGRAM

## 2018-01-01 PROCEDURE — 49083 ABD PARACENTESIS W/IMAGING: CPT | Mod: GC | Performed by: STUDENT IN AN ORGANIZED HEALTH CARE EDUCATION/TRAINING PROGRAM

## 2018-01-01 PROCEDURE — P9012 CRYOPRECIPITATE EACH UNIT: HCPCS | Performed by: STUDENT IN AN ORGANIZED HEALTH CARE EDUCATION/TRAINING PROGRAM

## 2018-01-01 PROCEDURE — 82042 OTHER SOURCE ALBUMIN QUAN EA: CPT | Performed by: HOSPITALIST

## 2018-01-01 PROCEDURE — 82607 VITAMIN B-12: CPT | Performed by: STUDENT IN AN ORGANIZED HEALTH CARE EDUCATION/TRAINING PROGRAM

## 2018-01-01 PROCEDURE — 36592 COLLECT BLOOD FROM PICC: CPT | Performed by: STUDENT IN AN ORGANIZED HEALTH CARE EDUCATION/TRAINING PROGRAM

## 2018-01-01 PROCEDURE — 83605 ASSAY OF LACTIC ACID: CPT | Performed by: INTERNAL MEDICINE

## 2018-01-01 PROCEDURE — 86301 IMMUNOASSAY TUMOR CA 19-9: CPT | Performed by: SURGERY

## 2018-01-01 PROCEDURE — 0FB93ZX EXCISION OF COMMON BILE DUCT, PERCUTANEOUS APPROACH, DIAGNOSTIC: ICD-10-PCS | Performed by: RADIOLOGY

## 2018-01-01 PROCEDURE — 87329 GIARDIA AG IA: CPT | Performed by: STUDENT IN AN ORGANIZED HEALTH CARE EDUCATION/TRAINING PROGRAM

## 2018-01-01 PROCEDURE — 71045 X-RAY EXAM CHEST 1 VIEW: CPT | Mod: 77

## 2018-01-01 PROCEDURE — 00000102 ZZHCL STATISTIC CYTO WRIGHT STAIN TC: Performed by: STUDENT IN AN ORGANIZED HEALTH CARE EDUCATION/TRAINING PROGRAM

## 2018-01-01 PROCEDURE — 27210995 ZZH RX 272: Performed by: INTERNAL MEDICINE

## 2018-01-01 PROCEDURE — 36000055 ZZH SURGERY LEVEL 2 W FLUORO 1ST 30 MIN - UMMC: Performed by: INTERNAL MEDICINE

## 2018-01-01 PROCEDURE — 87641 MR-STAPH DNA AMP PROBE: CPT | Performed by: STUDENT IN AN ORGANIZED HEALTH CARE EDUCATION/TRAINING PROGRAM

## 2018-01-01 PROCEDURE — 25000125 ZZHC RX 250: Performed by: NURSE ANESTHETIST, CERTIFIED REGISTERED

## 2018-01-01 PROCEDURE — 88313 SPECIAL STAINS GROUP 2: CPT | Performed by: INTERNAL MEDICINE

## 2018-01-01 PROCEDURE — 0F2BX0Z CHANGE DRAINAGE DEVICE IN HEPATOBILIARY DUCT, EXTERNAL APPROACH: ICD-10-PCS | Performed by: RADIOLOGY

## 2018-01-01 PROCEDURE — 99285 EMERGENCY DEPT VISIT HI MDM: CPT | Mod: 25 | Performed by: EMERGENCY MEDICINE

## 2018-01-01 PROCEDURE — 36416 COLLJ CAPILLARY BLOOD SPEC: CPT | Performed by: INTERNAL MEDICINE

## 2018-01-01 PROCEDURE — 96361 HYDRATE IV INFUSION ADD-ON: CPT | Performed by: EMERGENCY MEDICINE

## 2018-01-01 PROCEDURE — 36000059 ZZH SURGERY LEVEL 3 EA 15 ADDTL MIN UMMC: Performed by: INTERNAL MEDICINE

## 2018-01-01 PROCEDURE — 84540 ASSAY OF URINE/UREA-N: CPT | Performed by: HOSPITALIST

## 2018-01-01 PROCEDURE — 97165 OT EVAL LOW COMPLEX 30 MIN: CPT | Mod: GO | Performed by: OCCUPATIONAL THERAPIST

## 2018-01-01 PROCEDURE — 27210995 ZZH RX 272: Performed by: STUDENT IN AN ORGANIZED HEALTH CARE EDUCATION/TRAINING PROGRAM

## 2018-01-01 PROCEDURE — 81001 URINALYSIS AUTO W/SCOPE: CPT | Performed by: HOSPITALIST

## 2018-01-01 PROCEDURE — 12000026 ZZH R&B TRANSPLANT

## 2018-01-01 PROCEDURE — 87186 SC STD MICRODIL/AGAR DIL: CPT | Performed by: ORTHOPAEDIC SURGERY

## 2018-01-01 PROCEDURE — 99239 HOSP IP/OBS DSCHRG MGMT >30: CPT | Mod: GC | Performed by: INTERNAL MEDICINE

## 2018-01-01 PROCEDURE — 02PA33Z REMOVAL OF INFUSION DEVICE FROM HEART, PERCUTANEOUS APPROACH: ICD-10-PCS | Performed by: PHYSICIAN ASSISTANT

## 2018-01-01 PROCEDURE — 34300033 ZZH RX 343: Performed by: INTERNAL MEDICINE

## 2018-01-01 PROCEDURE — 82803 BLOOD GASES ANY COMBINATION: CPT | Performed by: ORTHOPAEDIC SURGERY

## 2018-01-01 PROCEDURE — 87493 C DIFF AMPLIFIED PROBE: CPT | Performed by: INTERNAL MEDICINE

## 2018-01-01 PROCEDURE — 87205 SMEAR GRAM STAIN: CPT | Performed by: HOSPITALIST

## 2018-01-01 PROCEDURE — 84134 ASSAY OF PREALBUMIN: CPT | Performed by: STUDENT IN AN ORGANIZED HEALTH CARE EDUCATION/TRAINING PROGRAM

## 2018-01-01 PROCEDURE — 86301 IMMUNOASSAY TUMOR CA 19-9: CPT | Performed by: INTERNAL MEDICINE

## 2018-01-01 PROCEDURE — 83690 ASSAY OF LIPASE: CPT | Performed by: EMERGENCY MEDICINE

## 2018-01-01 PROCEDURE — 87493 C DIFF AMPLIFIED PROBE: CPT | Performed by: STUDENT IN AN ORGANIZED HEALTH CARE EDUCATION/TRAINING PROGRAM

## 2018-01-01 PROCEDURE — 43235 EGD DIAGNOSTIC BRUSH WASH: CPT | Performed by: INTERNAL MEDICINE

## 2018-01-01 PROCEDURE — 74175 CTA ABDOMEN W/CONTRAST: CPT

## 2018-01-01 PROCEDURE — 0F773DZ DILATION OF COMMON HEPATIC DUCT WITH INTRALUMINAL DEVICE, PERCUTANEOUS APPROACH: ICD-10-PCS | Performed by: RADIOLOGY

## 2018-01-01 PROCEDURE — 71000014 ZZH RECOVERY PHASE 1 LEVEL 2 FIRST HR: Performed by: INTERNAL MEDICINE

## 2018-01-01 PROCEDURE — 87070 CULTURE OTHR SPECIMN AEROBIC: CPT | Performed by: STUDENT IN AN ORGANIZED HEALTH CARE EDUCATION/TRAINING PROGRAM

## 2018-01-01 PROCEDURE — 25000132 ZZH RX MED GY IP 250 OP 250 PS 637: Performed by: EMERGENCY MEDICINE

## 2018-01-01 PROCEDURE — 87186 SC STD MICRODIL/AGAR DIL: CPT | Performed by: STUDENT IN AN ORGANIZED HEALTH CARE EDUCATION/TRAINING PROGRAM

## 2018-01-01 PROCEDURE — 71046 X-RAY EXAM CHEST 2 VIEWS: CPT

## 2018-01-01 PROCEDURE — 0DJ08ZZ INSPECTION OF UPPER INTESTINAL TRACT, VIA NATURAL OR ARTIFICIAL OPENING ENDOSCOPIC: ICD-10-PCS | Performed by: INTERNAL MEDICINE

## 2018-01-01 PROCEDURE — 89051 BODY FLUID CELL COUNT: CPT | Performed by: INTERNAL MEDICINE

## 2018-01-01 PROCEDURE — 82705 FATS/LIPIDS FECES QUAL: CPT | Performed by: STUDENT IN AN ORGANIZED HEALTH CARE EDUCATION/TRAINING PROGRAM

## 2018-01-01 PROCEDURE — 27210908 ZZH NEEDLE CR4

## 2018-01-01 PROCEDURE — 40000170 ZZH STATISTIC PRE-PROCEDURE ASSESSMENT II

## 2018-01-01 PROCEDURE — 87102 FUNGUS ISOLATION CULTURE: CPT | Performed by: HOSPITALIST

## 2018-01-01 PROCEDURE — 97161 PT EVAL LOW COMPLEX 20 MIN: CPT | Mod: GP

## 2018-01-01 PROCEDURE — 84132 ASSAY OF SERUM POTASSIUM: CPT | Performed by: STUDENT IN AN ORGANIZED HEALTH CARE EDUCATION/TRAINING PROGRAM

## 2018-01-01 PROCEDURE — 84080 ASSAY ALKALINE PHOSPHATASES: CPT | Performed by: STUDENT IN AN ORGANIZED HEALTH CARE EDUCATION/TRAINING PROGRAM

## 2018-01-01 PROCEDURE — 88112 CYTOPATH CELL ENHANCE TECH: CPT | Performed by: STUDENT IN AN ORGANIZED HEALTH CARE EDUCATION/TRAINING PROGRAM

## 2018-01-01 PROCEDURE — 83036 HEMOGLOBIN GLYCOSYLATED A1C: CPT | Performed by: INTERNAL MEDICINE

## 2018-01-01 PROCEDURE — 36415 COLL VENOUS BLD VENIPUNCTURE: CPT | Performed by: ORTHOPAEDIC SURGERY

## 2018-01-01 PROCEDURE — 25000131 ZZH RX MED GY IP 250 OP 636 PS 637: Performed by: ANESTHESIOLOGY

## 2018-01-01 PROCEDURE — 71000015 ZZH RECOVERY PHASE 1 LEVEL 2 EA ADDTL HR: Performed by: INTERNAL MEDICINE

## 2018-01-01 PROCEDURE — 25800025 ZZH RX 258: Performed by: INTERNAL MEDICINE

## 2018-01-01 PROCEDURE — 83550 IRON BINDING TEST: CPT | Performed by: STUDENT IN AN ORGANIZED HEALTH CARE EDUCATION/TRAINING PROGRAM

## 2018-01-01 PROCEDURE — 25000125 ZZHC RX 250: Performed by: HOSPITALIST

## 2018-01-01 PROCEDURE — 82248 BILIRUBIN DIRECT: CPT | Performed by: STUDENT IN AN ORGANIZED HEALTH CARE EDUCATION/TRAINING PROGRAM

## 2018-01-01 PROCEDURE — 27210738 ZZH ACCESSORY CR2

## 2018-01-01 PROCEDURE — 00000155 ZZHCL STATISTIC H-CELL BLOCK W/STAIN: Performed by: STUDENT IN AN ORGANIZED HEALTH CARE EDUCATION/TRAINING PROGRAM

## 2018-01-01 PROCEDURE — 99233 SBSQ HOSP IP/OBS HIGH 50: CPT | Performed by: ORTHOPAEDIC SURGERY

## 2018-01-01 PROCEDURE — 00000468 ZZHCL STATISTIC CYTO QA-OR TOUCH APT TC 88333: Performed by: HOSPITALIST

## 2018-01-01 PROCEDURE — 74177 CT ABD & PELVIS W/CONTRAST: CPT

## 2018-01-01 PROCEDURE — 85025 COMPLETE CBC W/AUTO DIFF WBC: CPT | Performed by: EMERGENCY MEDICINE

## 2018-01-01 PROCEDURE — 80048 BASIC METABOLIC PNL TOTAL CA: CPT | Performed by: STUDENT IN AN ORGANIZED HEALTH CARE EDUCATION/TRAINING PROGRAM

## 2018-01-01 PROCEDURE — 25800025 ZZH RX 258

## 2018-01-01 PROCEDURE — 85220 BLOOC CLOT FACTOR V TEST: CPT | Performed by: STUDENT IN AN ORGANIZED HEALTH CARE EDUCATION/TRAINING PROGRAM

## 2018-01-01 PROCEDURE — 40000986 XR CHEST 1 VW

## 2018-01-01 PROCEDURE — 89051 BODY FLUID CELL COUNT: CPT | Performed by: HOSPITALIST

## 2018-01-01 PROCEDURE — 87640 STAPH A DNA AMP PROBE: CPT | Performed by: STUDENT IN AN ORGANIZED HEALTH CARE EDUCATION/TRAINING PROGRAM

## 2018-01-01 PROCEDURE — 99233 SBSQ HOSP IP/OBS HIGH 50: CPT | Mod: GC

## 2018-01-01 PROCEDURE — 99285 EMERGENCY DEPT VISIT HI MDM: CPT | Mod: Z6 | Performed by: EMERGENCY MEDICINE

## 2018-01-01 PROCEDURE — 86923 COMPATIBILITY TEST ELECTRIC: CPT | Performed by: ANESTHESIOLOGY

## 2018-01-01 PROCEDURE — 40000983 ZZH STATISTIC HFNC ADULT NON-CPAP

## 2018-01-01 PROCEDURE — 25000128 H RX IP 250 OP 636: Performed by: EMERGENCY MEDICINE

## 2018-01-01 PROCEDURE — 85260 CLOT FACTOR X STUART-POWER: CPT | Performed by: STUDENT IN AN ORGANIZED HEALTH CARE EDUCATION/TRAINING PROGRAM

## 2018-01-01 PROCEDURE — 87449 NOS EACH ORGANISM AG IA: CPT | Performed by: STUDENT IN AN ORGANIZED HEALTH CARE EDUCATION/TRAINING PROGRAM

## 2018-01-01 PROCEDURE — 00000167 ZZHCL STATISTIC INR NC: Performed by: STUDENT IN AN ORGANIZED HEALTH CARE EDUCATION/TRAINING PROGRAM

## 2018-01-01 PROCEDURE — 96360 HYDRATION IV INFUSION INIT: CPT | Performed by: EMERGENCY MEDICINE

## 2018-01-01 PROCEDURE — 88112 CYTOPATH CELL ENHANCE TECH: CPT | Performed by: HOSPITALIST

## 2018-01-01 PROCEDURE — 44500 INTRO GASTROINTESTINAL TUBE: CPT

## 2018-01-01 PROCEDURE — 25000128 H RX IP 250 OP 636: Performed by: ANESTHESIOLOGY

## 2018-01-01 PROCEDURE — 75970 VASCULAR BIOPSY: CPT

## 2018-01-01 PROCEDURE — 0W9G3ZZ DRAINAGE OF PERITONEAL CAVITY, PERCUTANEOUS APPROACH: ICD-10-PCS | Performed by: RADIOLOGY

## 2018-01-01 PROCEDURE — 87205 SMEAR GRAM STAIN: CPT | Performed by: STUDENT IN AN ORGANIZED HEALTH CARE EDUCATION/TRAINING PROGRAM

## 2018-01-01 PROCEDURE — 87800 DETECT AGNT MULT DNA DIREC: CPT | Performed by: MARRIAGE & FAMILY THERAPIST

## 2018-01-01 PROCEDURE — 40000986 XR ABDOMEN PORT 1 VW

## 2018-01-01 RX ORDER — POTASSIUM CHLORIDE 7.45 MG/ML
10 INJECTION INTRAVENOUS
Status: DISCONTINUED | OUTPATIENT
Start: 2018-01-01 | End: 2018-01-01

## 2018-01-01 RX ORDER — SODIUM CHLORIDE, SODIUM LACTATE, POTASSIUM CHLORIDE, CALCIUM CHLORIDE 600; 310; 30; 20 MG/100ML; MG/100ML; MG/100ML; MG/100ML
INJECTION, SOLUTION INTRAVENOUS CONTINUOUS
Status: DISCONTINUED | OUTPATIENT
Start: 2018-01-01 | End: 2018-01-01

## 2018-01-01 RX ORDER — METHADONE HYDROCHLORIDE 5 MG/1
5 TABLET ORAL 3 TIMES DAILY
Status: DISCONTINUED | OUTPATIENT
Start: 2018-01-01 | End: 2018-01-01 | Stop reason: HOSPADM

## 2018-01-01 RX ORDER — SPIRONOLACTONE 50 MG/1
50 TABLET, FILM COATED ORAL ONCE
Status: DISCONTINUED | OUTPATIENT
Start: 2018-01-01 | End: 2018-01-01

## 2018-01-01 RX ORDER — ONDANSETRON 2 MG/ML
4 INJECTION INTRAMUSCULAR; INTRAVENOUS EVERY 30 MIN PRN
Status: DISCONTINUED | OUTPATIENT
Start: 2018-01-01 | End: 2018-01-01 | Stop reason: HOSPADM

## 2018-01-01 RX ORDER — POLYETHYLENE GLYCOL 3350 17 G/17G
17 POWDER, FOR SOLUTION ORAL DAILY
Status: DISCONTINUED | OUTPATIENT
Start: 2018-01-01 | End: 2018-01-01

## 2018-01-01 RX ORDER — NALOXONE HYDROCHLORIDE 0.4 MG/ML
.1-.4 INJECTION, SOLUTION INTRAMUSCULAR; INTRAVENOUS; SUBCUTANEOUS ONCE
Qty: 1 ML | Refills: 0 | Status: SHIPPED | OUTPATIENT
Start: 2018-01-01 | End: 2018-01-01

## 2018-01-01 RX ORDER — PHYTONADIONE 5 MG/1
10 TABLET ORAL ONCE
Status: COMPLETED | OUTPATIENT
Start: 2018-01-01 | End: 2018-01-01

## 2018-01-01 RX ORDER — DEXTROSE MONOHYDRATE 50 MG/ML
INJECTION, SOLUTION INTRAVENOUS
Status: DISPENSED
Start: 2018-01-01 | End: 2018-01-01

## 2018-01-01 RX ORDER — PANTOPRAZOLE SODIUM 40 MG/1
40 TABLET, DELAYED RELEASE ORAL EVERY MORNING
Status: DISCONTINUED | OUTPATIENT
Start: 2018-01-01 | End: 2018-01-01

## 2018-01-01 RX ORDER — INDOMETHACIN 50 MG/1
100 SUPPOSITORY RECTAL
Status: DISCONTINUED | OUTPATIENT
Start: 2018-01-01 | End: 2018-01-01

## 2018-01-01 RX ORDER — POTASSIUM CHLORIDE 20MEQ/15ML
60 LIQUID (ML) ORAL ONCE
Status: COMPLETED | OUTPATIENT
Start: 2018-01-01 | End: 2018-01-01

## 2018-01-01 RX ORDER — SODIUM CHLORIDE, SODIUM LACTATE, POTASSIUM CHLORIDE, CALCIUM CHLORIDE 600; 310; 30; 20 MG/100ML; MG/100ML; MG/100ML; MG/100ML
INJECTION, SOLUTION INTRAVENOUS CONTINUOUS
Status: DISPENSED | OUTPATIENT
Start: 2018-01-01 | End: 2018-01-01

## 2018-01-01 RX ORDER — FLUCONAZOLE 2 MG/ML
400 INJECTION, SOLUTION INTRAVENOUS EVERY 24 HOURS
Status: DISCONTINUED | OUTPATIENT
Start: 2018-01-01 | End: 2018-01-01

## 2018-01-01 RX ORDER — ONDANSETRON 4 MG/1
4 TABLET, ORALLY DISINTEGRATING ORAL EVERY 30 MIN PRN
Status: DISCONTINUED | OUTPATIENT
Start: 2018-01-01 | End: 2018-01-01 | Stop reason: HOSPADM

## 2018-01-01 RX ORDER — FENTANYL CITRATE 50 UG/ML
INJECTION, SOLUTION INTRAMUSCULAR; INTRAVENOUS PRN
Status: DISCONTINUED | OUTPATIENT
Start: 2018-01-01 | End: 2018-01-01

## 2018-01-01 RX ORDER — FUROSEMIDE 10 MG/ML
40 INJECTION INTRAMUSCULAR; INTRAVENOUS ONCE
Status: COMPLETED | OUTPATIENT
Start: 2018-01-01 | End: 2018-01-01

## 2018-01-01 RX ORDER — AMLODIPINE BESYLATE 5 MG/1
5 TABLET ORAL DAILY
Qty: 30 TABLET | Refills: 0 | Status: SHIPPED | OUTPATIENT
Start: 2018-01-01

## 2018-01-01 RX ORDER — HYDROMORPHONE HCL/0.9% NACL/PF 0.2MG/0.2
0.2 SYRINGE (ML) INTRAVENOUS ONCE
Status: COMPLETED | OUTPATIENT
Start: 2018-01-01 | End: 2018-01-01

## 2018-01-01 RX ORDER — FENTANYL CITRATE 50 UG/ML
25-50 INJECTION, SOLUTION INTRAMUSCULAR; INTRAVENOUS EVERY 5 MIN PRN
Status: DISCONTINUED | OUTPATIENT
Start: 2018-01-01 | End: 2018-01-01 | Stop reason: HOSPADM

## 2018-01-01 RX ORDER — ALBUMIN (HUMAN) 12.5 G/50ML
75 SOLUTION INTRAVENOUS ONCE
Status: COMPLETED | OUTPATIENT
Start: 2018-01-01 | End: 2018-01-01

## 2018-01-01 RX ORDER — FUROSEMIDE 20 MG
20 TABLET ORAL DAILY
Status: DISCONTINUED | OUTPATIENT
Start: 2018-01-01 | End: 2018-01-01

## 2018-01-01 RX ORDER — POTASSIUM CHLORIDE 20MEQ/15ML
40 LIQUID (ML) ORAL DAILY
Status: DISCONTINUED | OUTPATIENT
Start: 2018-01-01 | End: 2018-01-01 | Stop reason: HOSPADM

## 2018-01-01 RX ORDER — SPIRONOLACTONE 100 MG/1
100 TABLET, FILM COATED ORAL DAILY
Status: ON HOLD | COMMUNITY
Start: 2018-01-01 | End: 2018-01-01

## 2018-01-01 RX ORDER — BISACODYL 5 MG/1
5 TABLET, DELAYED RELEASE ORAL DAILY PRN
COMMUNITY

## 2018-01-01 RX ORDER — LIDOCAINE 40 MG/G
CREAM TOPICAL
Status: DISCONTINUED | OUTPATIENT
Start: 2018-01-01 | End: 2018-01-01

## 2018-01-01 RX ORDER — DEXTROSE MONOHYDRATE 25 G/50ML
25-50 INJECTION, SOLUTION INTRAVENOUS
Status: DISCONTINUED | OUTPATIENT
Start: 2018-01-01 | End: 2018-01-01

## 2018-01-01 RX ORDER — NALOXONE HYDROCHLORIDE 0.4 MG/ML
.1-.4 INJECTION, SOLUTION INTRAMUSCULAR; INTRAVENOUS; SUBCUTANEOUS
Status: DISCONTINUED | OUTPATIENT
Start: 2018-01-01 | End: 2018-01-01

## 2018-01-01 RX ORDER — DEXTROSE MONOHYDRATE 50 MG/ML
INJECTION, SOLUTION INTRAVENOUS CONTINUOUS
Status: DISCONTINUED | OUTPATIENT
Start: 2018-01-01 | End: 2018-01-01

## 2018-01-01 RX ORDER — SODIUM CHLORIDE 9 MG/ML
INJECTION, SOLUTION INTRAVENOUS CONTINUOUS PRN
Status: DISCONTINUED | OUTPATIENT
Start: 2018-01-01 | End: 2018-01-01

## 2018-01-01 RX ORDER — PANTOPRAZOLE SODIUM 40 MG/1
40 TABLET, DELAYED RELEASE ORAL 2 TIMES DAILY
Qty: 30 TABLET | Refills: 11 | Status: SHIPPED | OUTPATIENT
Start: 2018-01-01

## 2018-01-01 RX ORDER — DEXAMETHASONE SODIUM PHOSPHATE 4 MG/ML
INJECTION, SOLUTION INTRA-ARTICULAR; INTRALESIONAL; INTRAMUSCULAR; INTRAVENOUS; SOFT TISSUE PRN
Status: DISCONTINUED | OUTPATIENT
Start: 2018-01-01 | End: 2018-01-01

## 2018-01-01 RX ORDER — PANTOPRAZOLE SODIUM 40 MG/1
40 TABLET, DELAYED RELEASE ORAL 2 TIMES DAILY
Status: DISCONTINUED | OUTPATIENT
Start: 2018-01-01 | End: 2018-01-01

## 2018-01-01 RX ORDER — CIPROFLOXACIN 2 MG/ML
400 INJECTION, SOLUTION INTRAVENOUS EVERY 12 HOURS
Status: DISCONTINUED | OUTPATIENT
Start: 2018-01-01 | End: 2018-01-01

## 2018-01-01 RX ORDER — ERTAPENEM 1 G/1
1 INJECTION, POWDER, LYOPHILIZED, FOR SOLUTION INTRAMUSCULAR; INTRAVENOUS EVERY 24 HOURS
Qty: 280 ML | Refills: 0 | Status: SHIPPED | OUTPATIENT
Start: 2018-01-01 | End: 2018-04-29

## 2018-01-01 RX ORDER — PROPOFOL 10 MG/ML
INJECTION, EMULSION INTRAVENOUS CONTINUOUS PRN
Status: DISCONTINUED | OUTPATIENT
Start: 2018-01-01 | End: 2018-01-01

## 2018-01-01 RX ORDER — PIPERACILLIN SODIUM, TAZOBACTAM SODIUM 3; .375 G/15ML; G/15ML
3.38 INJECTION, POWDER, LYOPHILIZED, FOR SOLUTION INTRAVENOUS EVERY 6 HOURS
Status: DISCONTINUED | OUTPATIENT
Start: 2018-01-01 | End: 2018-01-01

## 2018-01-01 RX ORDER — NICOTINE POLACRILEX 4 MG
15-30 LOZENGE BUCCAL
Status: DISCONTINUED | OUTPATIENT
Start: 2018-01-01 | End: 2018-01-01

## 2018-01-01 RX ORDER — SODIUM CHLORIDE, SODIUM LACTATE, POTASSIUM CHLORIDE, CALCIUM CHLORIDE 600; 310; 30; 20 MG/100ML; MG/100ML; MG/100ML; MG/100ML
INJECTION, SOLUTION INTRAVENOUS CONTINUOUS PRN
Status: DISCONTINUED | OUTPATIENT
Start: 2018-01-01 | End: 2018-01-01

## 2018-01-01 RX ORDER — BLOOD PRESSURE TEST KIT
4 KIT MISCELLANEOUS 4 TIMES DAILY PRN
Qty: 120 EACH | Refills: 11 | Status: SHIPPED | OUTPATIENT
Start: 2018-01-01

## 2018-01-01 RX ORDER — OLANZAPINE 5 MG/1
2.5-5 TABLET, ORALLY DISINTEGRATING ORAL EVERY 6 HOURS PRN
Status: DISCONTINUED | OUTPATIENT
Start: 2018-01-01 | End: 2018-01-01 | Stop reason: HOSPADM

## 2018-01-01 RX ORDER — HEPARIN SODIUM,PORCINE 10 UNIT/ML
2-5 VIAL (ML) INTRAVENOUS
Status: COMPLETED | OUTPATIENT
Start: 2018-01-01 | End: 2018-01-01

## 2018-01-01 RX ORDER — PHYTONADIONE 5 MG/1
10 TABLET ORAL DAILY
Status: COMPLETED | OUTPATIENT
Start: 2018-01-01 | End: 2018-01-01

## 2018-01-01 RX ORDER — POTASSIUM CHLORIDE 750 MG/1
20-40 TABLET, EXTENDED RELEASE ORAL
Status: DISCONTINUED | OUTPATIENT
Start: 2018-01-01 | End: 2018-01-01

## 2018-01-01 RX ORDER — HEPARIN SODIUM,PORCINE 10 UNIT/ML
5-10 VIAL (ML) INTRAVENOUS EVERY 24 HOURS
Status: DISCONTINUED | OUTPATIENT
Start: 2018-01-01 | End: 2018-01-01

## 2018-01-01 RX ORDER — FLUMAZENIL 0.1 MG/ML
0.2 INJECTION, SOLUTION INTRAVENOUS
Status: DISCONTINUED | OUTPATIENT
Start: 2018-01-01 | End: 2018-01-01 | Stop reason: HOSPADM

## 2018-01-01 RX ORDER — FUROSEMIDE 20 MG
40 TABLET ORAL DAILY
Status: ON HOLD | COMMUNITY
Start: 2017-01-01 | End: 2018-01-01

## 2018-01-01 RX ORDER — ASCORBIC ACID 250 MG
250 TABLET,CHEWABLE ORAL DAILY
Status: DISCONTINUED | OUTPATIENT
Start: 2018-01-01 | End: 2018-01-01

## 2018-01-01 RX ORDER — SPIRONOLACTONE 50 MG/1
100 TABLET, FILM COATED ORAL DAILY
Status: DISCONTINUED | OUTPATIENT
Start: 2018-01-01 | End: 2018-01-01

## 2018-01-01 RX ORDER — FLUCONAZOLE 200 MG/1
400 TABLET ORAL DAILY
Qty: 56 TABLET | Refills: 0 | Status: SHIPPED | OUTPATIENT
Start: 2018-01-01 | End: 2018-04-29

## 2018-01-01 RX ORDER — LIDOCAINE 40 MG/G
CREAM TOPICAL
Status: DISCONTINUED | OUTPATIENT
Start: 2018-01-01 | End: 2018-01-01 | Stop reason: HOSPADM

## 2018-01-01 RX ORDER — POTASSIUM CHLORIDE 750 MG/1
40 TABLET, EXTENDED RELEASE ORAL ONCE
Status: COMPLETED | OUTPATIENT
Start: 2018-01-01 | End: 2018-01-01

## 2018-01-01 RX ORDER — FUROSEMIDE 10 MG/ML
20 INJECTION INTRAMUSCULAR; INTRAVENOUS ONCE
Status: DISCONTINUED | OUTPATIENT
Start: 2018-01-01 | End: 2018-01-01

## 2018-01-01 RX ORDER — HYDROMORPHONE HYDROCHLORIDE 2 MG/1
2 TABLET ORAL EVERY 6 HOURS PRN
Status: DISCONTINUED | OUTPATIENT
Start: 2018-01-01 | End: 2018-01-01

## 2018-01-01 RX ORDER — ALBUMIN (HUMAN) 12.5 G/50ML
12.5 SOLUTION INTRAVENOUS 4 TIMES DAILY PRN
Status: CANCELLED
Start: 2018-01-01

## 2018-01-01 RX ORDER — NALOXONE HYDROCHLORIDE 0.4 MG/ML
.1-.4 INJECTION, SOLUTION INTRAMUSCULAR; INTRAVENOUS; SUBCUTANEOUS
Status: DISCONTINUED | OUTPATIENT
Start: 2018-01-01 | End: 2018-01-01 | Stop reason: HOSPADM

## 2018-01-01 RX ORDER — HYDROMORPHONE HYDROCHLORIDE 2 MG/1
2-4 TABLET ORAL EVERY 6 HOURS PRN
Status: DISCONTINUED | OUTPATIENT
Start: 2018-01-01 | End: 2018-01-01

## 2018-01-01 RX ORDER — FUROSEMIDE 40 MG
40 TABLET ORAL DAILY
Status: DISCONTINUED | OUTPATIENT
Start: 2018-01-01 | End: 2018-01-01

## 2018-01-01 RX ORDER — SODIUM CHLORIDE 9 MG/ML
INJECTION, SOLUTION INTRAVENOUS CONTINUOUS
Status: DISCONTINUED | OUTPATIENT
Start: 2018-01-01 | End: 2018-01-01

## 2018-01-01 RX ORDER — DEXTROSE MONOHYDRATE 25 G/50ML
INJECTION, SOLUTION INTRAVENOUS
Status: COMPLETED
Start: 2018-01-01 | End: 2018-01-01

## 2018-01-01 RX ORDER — LIDOCAINE HYDROCHLORIDE 10 MG/ML
INJECTION, SOLUTION EPIDURAL; INFILTRATION; INTRACAUDAL; PERINEURAL
Status: COMPLETED
Start: 2018-01-01 | End: 2018-01-01

## 2018-01-01 RX ORDER — LEVOFLOXACIN 5 MG/ML
750 INJECTION, SOLUTION INTRAVENOUS EVERY 24 HOURS
Status: DISCONTINUED | OUTPATIENT
Start: 2018-01-01 | End: 2018-01-01

## 2018-01-01 RX ORDER — FUROSEMIDE 10 MG/ML
40 INJECTION INTRAMUSCULAR; INTRAVENOUS ONCE
Status: DISCONTINUED | OUTPATIENT
Start: 2018-01-01 | End: 2018-01-01

## 2018-01-01 RX ORDER — AMOXICILLIN 250 MG
1 CAPSULE ORAL 2 TIMES DAILY PRN
Status: DISCONTINUED | OUTPATIENT
Start: 2018-01-01 | End: 2018-01-01

## 2018-01-01 RX ORDER — SIMETHICONE 80 MG
80 TABLET,CHEWABLE ORAL EVERY 6 HOURS PRN
Status: DISCONTINUED | OUTPATIENT
Start: 2018-01-01 | End: 2018-01-01 | Stop reason: HOSPADM

## 2018-01-01 RX ORDER — IODIXANOL 320 MG/ML
50 INJECTION, SOLUTION INTRAVASCULAR ONCE
Status: COMPLETED | OUTPATIENT
Start: 2018-01-01 | End: 2018-01-01

## 2018-01-01 RX ORDER — SPIRONOLACTONE 50 MG/1
50 TABLET, FILM COATED ORAL ONCE
Status: CANCELLED | OUTPATIENT
Start: 2018-01-01

## 2018-01-01 RX ORDER — FUROSEMIDE 10 MG/ML
40 INJECTION INTRAMUSCULAR; INTRAVENOUS 3 TIMES DAILY
Status: COMPLETED | OUTPATIENT
Start: 2018-01-01 | End: 2018-01-01

## 2018-01-01 RX ORDER — SPIRONOLACTONE 50 MG/1
50 TABLET, FILM COATED ORAL DAILY
Status: DISCONTINUED | OUTPATIENT
Start: 2018-01-01 | End: 2018-01-01

## 2018-01-01 RX ORDER — MAGNESIUM SULFATE HEPTAHYDRATE 40 MG/ML
4 INJECTION, SOLUTION INTRAVENOUS EVERY 4 HOURS PRN
Status: DISCONTINUED | OUTPATIENT
Start: 2018-01-01 | End: 2018-01-01

## 2018-01-01 RX ORDER — IODIXANOL 320 MG/ML
100 INJECTION, SOLUTION INTRAVASCULAR ONCE
Status: COMPLETED | OUTPATIENT
Start: 2018-01-01 | End: 2018-01-01

## 2018-01-01 RX ORDER — FLUMAZENIL 0.1 MG/ML
INJECTION, SOLUTION INTRAVENOUS
Status: COMPLETED
Start: 2018-01-01 | End: 2018-01-01

## 2018-01-01 RX ORDER — POTASSIUM CL/LIDO/0.9 % NACL 10MEQ/0.1L
10 INTRAVENOUS SOLUTION, PIGGYBACK (ML) INTRAVENOUS ONCE
Status: COMPLETED | OUTPATIENT
Start: 2018-01-01 | End: 2018-01-01

## 2018-01-01 RX ORDER — ALBUMIN (HUMAN) 12.5 G/50ML
100 SOLUTION INTRAVENOUS ONCE
Status: COMPLETED | OUTPATIENT
Start: 2018-01-01 | End: 2018-01-01

## 2018-01-01 RX ORDER — SODIUM CHLORIDE 9 MG/ML
INJECTION, SOLUTION INTRAVENOUS
Status: DISCONTINUED
Start: 2018-01-01 | End: 2018-01-01 | Stop reason: HOSPADM

## 2018-01-01 RX ORDER — POTASSIUM CL/LIDO/0.9 % NACL 10MEQ/0.1L
10 INTRAVENOUS SOLUTION, PIGGYBACK (ML) INTRAVENOUS
Status: DISCONTINUED | OUTPATIENT
Start: 2018-01-01 | End: 2018-01-01

## 2018-01-01 RX ORDER — KETAMINE HYDROCHLORIDE 10 MG/ML
INJECTION INTRAMUSCULAR; INTRAVENOUS PRN
Status: DISCONTINUED | OUTPATIENT
Start: 2018-01-01 | End: 2018-01-01

## 2018-01-01 RX ORDER — IOPAMIDOL 755 MG/ML
93 INJECTION, SOLUTION INTRAVASCULAR ONCE
Status: DISCONTINUED | OUTPATIENT
Start: 2018-01-01 | End: 2018-01-01

## 2018-01-01 RX ORDER — ERGOCALCIFEROL 1.25 MG/1
50000 CAPSULE, LIQUID FILLED ORAL
Status: DISCONTINUED | OUTPATIENT
Start: 2018-01-01 | End: 2018-01-01

## 2018-01-01 RX ORDER — IOPAMIDOL 755 MG/ML
93 INJECTION, SOLUTION INTRAVASCULAR ONCE
Status: COMPLETED | OUTPATIENT
Start: 2018-01-01 | End: 2018-01-01

## 2018-01-01 RX ORDER — PROPOFOL 10 MG/ML
INJECTION, EMULSION INTRAVENOUS PRN
Status: DISCONTINUED | OUTPATIENT
Start: 2018-01-01 | End: 2018-01-01

## 2018-01-01 RX ORDER — ONDANSETRON 4 MG/1
4-8 TABLET, ORALLY DISINTEGRATING ORAL EVERY 8 HOURS PRN
Status: ON HOLD | COMMUNITY
Start: 2017-01-01 | End: 2018-01-01

## 2018-01-01 RX ORDER — AMPICILLIN AND SULBACTAM 2; 1 G/1; G/1
3 INJECTION, POWDER, FOR SOLUTION INTRAMUSCULAR; INTRAVENOUS EVERY 6 HOURS
Status: DISCONTINUED | OUTPATIENT
Start: 2018-01-01 | End: 2018-01-01

## 2018-01-01 RX ORDER — HYDROMORPHONE HYDROCHLORIDE 2 MG/1
2 TABLET ORAL 3 TIMES DAILY PRN
Status: ON HOLD | COMMUNITY
Start: 2018-01-01 | End: 2018-01-01

## 2018-01-01 RX ORDER — LIDOCAINE HYDROCHLORIDE 10 MG/ML
INJECTION, SOLUTION EPIDURAL; INFILTRATION; INTRACAUDAL; PERINEURAL
Status: DISCONTINUED
Start: 2018-01-01 | End: 2018-01-01 | Stop reason: HOSPADM

## 2018-01-01 RX ORDER — ONDANSETRON 4 MG/1
4 TABLET, ORALLY DISINTEGRATING ORAL EVERY 6 HOURS PRN
Status: DISCONTINUED | OUTPATIENT
Start: 2018-01-01 | End: 2018-01-01 | Stop reason: HOSPADM

## 2018-01-01 RX ORDER — POTASSIUM CHLORIDE 20MEQ/15ML
10 LIQUID (ML) ORAL DAILY
Qty: 900 ML | Refills: 0 | Status: CANCELLED | OUTPATIENT
Start: 2018-01-01

## 2018-01-01 RX ORDER — IOPAMIDOL 510 MG/ML
INJECTION, SOLUTION INTRAVASCULAR PRN
Status: DISCONTINUED | OUTPATIENT
Start: 2018-01-01 | End: 2018-01-01 | Stop reason: HOSPADM

## 2018-01-01 RX ORDER — EPHEDRINE SULFATE 50 MG/ML
INJECTION, SOLUTION INTRAMUSCULAR; INTRAVENOUS; SUBCUTANEOUS PRN
Status: DISCONTINUED | OUTPATIENT
Start: 2018-01-01 | End: 2018-01-01

## 2018-01-01 RX ORDER — AMLODIPINE BESYLATE 5 MG/1
5 TABLET ORAL DAILY
Status: DISCONTINUED | OUTPATIENT
Start: 2018-01-01 | End: 2018-01-01 | Stop reason: HOSPADM

## 2018-01-01 RX ORDER — SPIRONOLACTONE 50 MG/1
50 TABLET, FILM COATED ORAL DAILY
Status: DISCONTINUED | OUTPATIENT
Start: 2018-01-01 | End: 2018-01-01 | Stop reason: HOSPADM

## 2018-01-01 RX ORDER — LIDOCAINE HYDROCHLORIDE 10 MG/ML
1-30 INJECTION, SOLUTION EPIDURAL; INFILTRATION; INTRACAUDAL; PERINEURAL
Status: COMPLETED | OUTPATIENT
Start: 2018-01-01 | End: 2018-01-01

## 2018-01-01 RX ORDER — AMOXICILLIN 250 MG
2 CAPSULE ORAL 2 TIMES DAILY PRN
Status: DISCONTINUED | OUTPATIENT
Start: 2018-01-01 | End: 2018-01-01

## 2018-01-01 RX ORDER — MAGNESIUM SULFATE 1 G/100ML
1 INJECTION INTRAVENOUS ONCE
Status: COMPLETED | OUTPATIENT
Start: 2018-01-01 | End: 2018-01-01

## 2018-01-01 RX ORDER — HYDROMORPHONE HYDROCHLORIDE 1 MG/ML
0.2 INJECTION, SOLUTION INTRAMUSCULAR; INTRAVENOUS; SUBCUTANEOUS
Status: DISCONTINUED | OUTPATIENT
Start: 2018-01-01 | End: 2018-01-01

## 2018-01-01 RX ORDER — MAGNESIUM HYDROXIDE/ALUMINUM HYDROXICE/SIMETHICONE 120; 1200; 1200 MG/30ML; MG/30ML; MG/30ML
15 SUSPENSION ORAL 2 TIMES DAILY PRN
Status: DISCONTINUED | OUTPATIENT
Start: 2018-01-01 | End: 2018-01-01

## 2018-01-01 RX ORDER — AMLODIPINE BESYLATE 10 MG/1
10 TABLET ORAL DAILY
Status: DISCONTINUED | OUTPATIENT
Start: 2018-01-01 | End: 2018-01-01

## 2018-01-01 RX ORDER — LIDOCAINE HYDROCHLORIDE 10 MG/ML
10 INJECTION, SOLUTION EPIDURAL; INFILTRATION; INTRACAUDAL; PERINEURAL ONCE
Status: COMPLETED | OUTPATIENT
Start: 2018-01-01 | End: 2018-01-01

## 2018-01-01 RX ORDER — POLYETHYLENE GLYCOL 3350 17 G/17G
17 POWDER, FOR SOLUTION ORAL DAILY PRN
Status: DISCONTINUED | OUTPATIENT
Start: 2018-01-01 | End: 2018-01-01

## 2018-01-01 RX ORDER — POTASSIUM CHLORIDE 20MEQ/15ML
10 LIQUID (ML) ORAL DAILY
Qty: 240 ML | Refills: 0 | Status: CANCELLED | OUTPATIENT
Start: 2018-01-01 | End: 2018-05-02

## 2018-01-01 RX ORDER — HEPARIN SODIUM,PORCINE 10 UNIT/ML
5-10 VIAL (ML) INTRAVENOUS
Status: DISCONTINUED | OUTPATIENT
Start: 2018-01-01 | End: 2018-01-01

## 2018-01-01 RX ORDER — FENTANYL CITRATE 50 UG/ML
25-50 INJECTION, SOLUTION INTRAMUSCULAR; INTRAVENOUS
Status: DISCONTINUED | OUTPATIENT
Start: 2018-01-01 | End: 2018-01-01 | Stop reason: HOSPADM

## 2018-01-01 RX ORDER — FLUCONAZOLE 2 MG/ML
400 INJECTION, SOLUTION INTRAVENOUS ONCE
Status: COMPLETED | OUTPATIENT
Start: 2018-01-01 | End: 2018-01-01

## 2018-01-01 RX ORDER — SPIRONOLACTONE 50 MG/1
100 TABLET, FILM COATED ORAL DAILY
Qty: 30 TABLET | Refills: 0 | Status: SHIPPED | OUTPATIENT
Start: 2018-01-01

## 2018-01-01 RX ORDER — METHADONE HYDROCHLORIDE 5 MG/1
5 TABLET ORAL 3 TIMES DAILY
Status: DISCONTINUED | OUTPATIENT
Start: 2018-01-01 | End: 2018-01-01

## 2018-01-01 RX ORDER — POTASSIUM CHLORIDE 29.8 MG/ML
20 INJECTION INTRAVENOUS
Status: DISCONTINUED | OUTPATIENT
Start: 2018-01-01 | End: 2018-01-01

## 2018-01-01 RX ORDER — FLUMAZENIL 0.1 MG/ML
0.5 INJECTION, SOLUTION INTRAVENOUS ONCE
Status: COMPLETED | OUTPATIENT
Start: 2018-01-01 | End: 2018-01-01

## 2018-01-01 RX ORDER — POTASSIUM CHLORIDE 1.5 G/1.58G
20-40 POWDER, FOR SOLUTION ORAL
Status: DISCONTINUED | OUTPATIENT
Start: 2018-01-01 | End: 2018-01-01

## 2018-01-01 RX ORDER — SODIUM BICARBONATE 325 MG/1
325 TABLET ORAL
Status: CANCELLED | OUTPATIENT
Start: 2018-01-01

## 2018-01-01 RX ORDER — ERTAPENEM 1 G/1
1 INJECTION, POWDER, LYOPHILIZED, FOR SOLUTION INTRAMUSCULAR; INTRAVENOUS EVERY 24 HOURS
Status: DISCONTINUED | OUTPATIENT
Start: 2018-01-01 | End: 2018-01-01

## 2018-01-01 RX ORDER — FLUCONAZOLE 200 MG/1
400 TABLET ORAL DAILY
Status: DISCONTINUED | OUTPATIENT
Start: 2018-01-01 | End: 2018-01-01 | Stop reason: HOSPADM

## 2018-01-01 RX ORDER — FUROSEMIDE 20 MG
20 TABLET ORAL DAILY
Qty: 30 TABLET | Refills: 0 | Status: SHIPPED | OUTPATIENT
Start: 2018-01-01 | End: 2018-01-01

## 2018-01-01 RX ORDER — HYDROMORPHONE HYDROCHLORIDE 2 MG/1
4 TABLET ORAL EVERY 4 HOURS PRN
Qty: 28 TABLET | Refills: 0 | Status: CANCELLED | OUTPATIENT
Start: 2018-01-01

## 2018-01-01 RX ORDER — MICONAZOLE NITRATE 20 MG/G
CREAM TOPICAL 2 TIMES DAILY
Status: DISCONTINUED | OUTPATIENT
Start: 2018-01-01 | End: 2018-01-01

## 2018-01-01 RX ORDER — FERROUS SULFATE 325(65) MG
325 TABLET ORAL DAILY
Status: DISCONTINUED | OUTPATIENT
Start: 2018-01-01 | End: 2018-01-01

## 2018-01-01 RX ORDER — INDOMETHACIN 50 MG/1
100 SUPPOSITORY RECTAL
Status: DISCONTINUED | OUTPATIENT
Start: 2018-01-01 | End: 2018-01-01 | Stop reason: HOSPADM

## 2018-01-01 RX ORDER — ASCORBIC ACID 250 MG
250 TABLET,CHEWABLE ORAL DAILY
Status: DISCONTINUED | OUTPATIENT
Start: 2018-01-01 | End: 2018-01-01 | Stop reason: HOSPADM

## 2018-01-01 RX ORDER — ATROPINE SULFATE 0.1 MG/ML
INJECTION INTRAVENOUS
Status: DISCONTINUED
Start: 2018-01-01 | End: 2018-01-01 | Stop reason: HOSPADM

## 2018-01-01 RX ORDER — DEXTROSE MONOHYDRATE 50 MG/ML
INJECTION, SOLUTION INTRAVENOUS CONTINUOUS
Status: DISPENSED | OUTPATIENT
Start: 2018-01-01 | End: 2018-01-01

## 2018-01-01 RX ORDER — NALOXONE HYDROCHLORIDE 0.4 MG/ML
.1-.4 INJECTION, SOLUTION INTRAMUSCULAR; INTRAVENOUS; SUBCUTANEOUS
Status: DISPENSED | OUTPATIENT
Start: 2018-01-01 | End: 2018-01-01

## 2018-01-01 RX ORDER — LIDOCAINE HYDROCHLORIDE 20 MG/ML
INJECTION, SOLUTION INFILTRATION; PERINEURAL PRN
Status: DISCONTINUED | OUTPATIENT
Start: 2018-01-01 | End: 2018-01-01

## 2018-01-01 RX ORDER — SODIUM CHLORIDE 9 MG/ML
INJECTION, SOLUTION INTRAVENOUS CONTINUOUS
Status: DISCONTINUED | OUTPATIENT
Start: 2018-01-01 | End: 2018-01-01 | Stop reason: HOSPADM

## 2018-01-01 RX ORDER — POTASSIUM CHLORIDE 7.45 MG/ML
10 INJECTION INTRAVENOUS ONCE
Status: DISCONTINUED | OUTPATIENT
Start: 2018-01-01 | End: 2018-01-01

## 2018-01-01 RX ORDER — ALBUMIN, HUMAN INJ 5% 5 %
SOLUTION INTRAVENOUS CONTINUOUS PRN
Status: DISCONTINUED | OUTPATIENT
Start: 2018-01-01 | End: 2018-01-01

## 2018-01-01 RX ORDER — PEDI MULTIVIT NO.128/VITAMIN K 500 MCG/ML
1 LIQUID (ML) ORAL DAILY
Qty: 30 CAPSULE | Refills: 11 | Status: SHIPPED | OUTPATIENT
Start: 2018-01-01

## 2018-01-01 RX ORDER — SODIUM CHLORIDE 9 MG/ML
INJECTION, SOLUTION INTRAVENOUS
Status: COMPLETED
Start: 2018-01-01 | End: 2018-01-01

## 2018-01-01 RX ORDER — PANTOPRAZOLE SODIUM 40 MG/1
40 TABLET, DELAYED RELEASE ORAL
Status: DISCONTINUED | OUTPATIENT
Start: 2018-01-01 | End: 2018-01-01

## 2018-01-01 RX ORDER — ONDANSETRON 2 MG/ML
4 INJECTION INTRAMUSCULAR; INTRAVENOUS EVERY 6 HOURS PRN
Status: DISCONTINUED | OUTPATIENT
Start: 2018-01-01 | End: 2018-01-01 | Stop reason: HOSPADM

## 2018-01-01 RX ORDER — SODIUM CHLORIDE, SODIUM LACTATE, POTASSIUM CHLORIDE, CALCIUM CHLORIDE 600; 310; 30; 20 MG/100ML; MG/100ML; MG/100ML; MG/100ML
INJECTION, SOLUTION INTRAVENOUS CONTINUOUS
Status: DISCONTINUED | OUTPATIENT
Start: 2018-01-01 | End: 2018-01-01 | Stop reason: HOSPADM

## 2018-01-01 RX ORDER — HYDROMORPHONE HYDROCHLORIDE 2 MG/1
4 TABLET ORAL EVERY 4 HOURS PRN
Qty: 28 TABLET | Refills: 0 | Status: SHIPPED | OUTPATIENT
Start: 2018-01-01 | End: 2018-01-01

## 2018-01-01 RX ORDER — SODIUM CHLORIDE, SODIUM LACTATE, POTASSIUM CHLORIDE, CALCIUM CHLORIDE 600; 310; 30; 20 MG/100ML; MG/100ML; MG/100ML; MG/100ML
INJECTION, SOLUTION INTRAVENOUS CONTINUOUS
Status: ACTIVE | OUTPATIENT
Start: 2018-01-01 | End: 2018-01-01

## 2018-01-01 RX ORDER — PIPERACILLIN SODIUM, TAZOBACTAM SODIUM 4; .5 G/20ML; G/20ML
4.5 INJECTION, POWDER, LYOPHILIZED, FOR SOLUTION INTRAVENOUS EVERY 6 HOURS
Status: DISCONTINUED | OUTPATIENT
Start: 2018-01-01 | End: 2018-01-01

## 2018-01-01 RX ORDER — DEXTROSE MONOHYDRATE 25 G/50ML
25-50 INJECTION, SOLUTION INTRAVENOUS
Status: DISCONTINUED | OUTPATIENT
Start: 2018-01-01 | End: 2018-01-01 | Stop reason: HOSPADM

## 2018-01-01 RX ORDER — BISACODYL 5 MG
5 TABLET, DELAYED RELEASE (ENTERIC COATED) ORAL DAILY PRN
Status: DISCONTINUED | OUTPATIENT
Start: 2018-01-01 | End: 2018-01-01

## 2018-01-01 RX ORDER — ONDANSETRON 2 MG/ML
INJECTION INTRAMUSCULAR; INTRAVENOUS PRN
Status: DISCONTINUED | OUTPATIENT
Start: 2018-01-01 | End: 2018-01-01

## 2018-01-01 RX ORDER — POTASSIUM CHLORIDE 1.5 G/1.58G
40 POWDER, FOR SOLUTION ORAL ONCE
Status: COMPLETED | OUTPATIENT
Start: 2018-01-01 | End: 2018-01-01

## 2018-01-01 RX ORDER — FUROSEMIDE 20 MG
40 TABLET ORAL DAILY
Qty: 30 TABLET | Refills: 0 | Status: SHIPPED | OUTPATIENT
Start: 2018-01-01

## 2018-01-01 RX ORDER — ERGOCALCIFEROL 1.25 MG/1
50000 CAPSULE, LIQUID FILLED ORAL
Status: DISCONTINUED | OUTPATIENT
Start: 2018-01-01 | End: 2018-01-01 | Stop reason: HOSPADM

## 2018-01-01 RX ORDER — FLUCONAZOLE 200 MG/1
400 TABLET ORAL DAILY
Status: DISCONTINUED | OUTPATIENT
Start: 2018-01-01 | End: 2018-01-01

## 2018-01-01 RX ORDER — POTASSIUM CHLORIDE 20MEQ/15ML
20 LIQUID (ML) ORAL ONCE
Status: COMPLETED | OUTPATIENT
Start: 2018-01-01 | End: 2018-01-01

## 2018-01-01 RX ORDER — GADOBUTROL 604.72 MG/ML
7.5 INJECTION INTRAVENOUS ONCE
Status: COMPLETED | OUTPATIENT
Start: 2018-01-01 | End: 2018-01-01

## 2018-01-01 RX ORDER — HEPARIN SODIUM,PORCINE 10 UNIT/ML
2-5 VIAL (ML) INTRAVENOUS
Status: DISCONTINUED | OUTPATIENT
Start: 2018-01-01 | End: 2018-01-01

## 2018-01-01 RX ORDER — SPIRONOLACTONE 50 MG/1
50 TABLET, FILM COATED ORAL DAILY
Qty: 30 TABLET | Refills: 0 | Status: SHIPPED | OUTPATIENT
Start: 2018-01-01 | End: 2018-01-01

## 2018-01-01 RX ORDER — HEPARIN SODIUM 5000 [USP'U]/.5ML
5000 INJECTION, SOLUTION INTRAVENOUS; SUBCUTANEOUS ONCE
Status: COMPLETED | OUTPATIENT
Start: 2018-01-01 | End: 2018-01-01

## 2018-01-01 RX ORDER — HYDROMORPHONE HYDROCHLORIDE 1 MG/ML
0.5 INJECTION, SOLUTION INTRAMUSCULAR; INTRAVENOUS; SUBCUTANEOUS ONCE
Status: COMPLETED | OUTPATIENT
Start: 2018-01-01 | End: 2018-01-01

## 2018-01-01 RX ORDER — PEDI MULTIVIT NO.128/VITAMIN K 500 MCG/ML
1 LIQUID (ML) ORAL DAILY
Status: DISCONTINUED | OUTPATIENT
Start: 2018-01-01 | End: 2018-01-01 | Stop reason: HOSPADM

## 2018-01-01 RX ORDER — DEXTROSE MONOHYDRATE 50 MG/ML
INJECTION, SOLUTION INTRAVENOUS
Status: COMPLETED
Start: 2018-01-01 | End: 2018-01-01

## 2018-01-01 RX ORDER — HYDROMORPHONE HYDROCHLORIDE 2 MG/1
2 TABLET ORAL ONCE
Status: COMPLETED | OUTPATIENT
Start: 2018-01-01 | End: 2018-01-01

## 2018-01-01 RX ORDER — FUROSEMIDE 20 MG
20 TABLET ORAL DAILY
Status: CANCELLED | OUTPATIENT
Start: 2018-01-01

## 2018-01-01 RX ORDER — LIDOCAINE 40 MG/G
CREAM TOPICAL
Status: CANCELLED | OUTPATIENT
Start: 2018-01-01

## 2018-01-01 RX ORDER — PEDI MULTIVIT NO.128/VITAMIN K 500 MCG/ML
1 LIQUID (ML) ORAL DAILY
Status: DISCONTINUED | OUTPATIENT
Start: 2018-01-01 | End: 2018-01-01

## 2018-01-01 RX ORDER — SODIUM BICARBONATE 325 MG/1
325 TABLET ORAL EVERY 4 HOURS
Status: DISCONTINUED | OUTPATIENT
Start: 2018-01-01 | End: 2018-01-01

## 2018-01-01 RX ORDER — ALBUMIN, HUMAN INJ 5% 5 %
500 SOLUTION INTRAVENOUS ONCE
Status: COMPLETED | OUTPATIENT
Start: 2018-01-01 | End: 2018-01-01

## 2018-01-01 RX ORDER — METHADONE HYDROCHLORIDE 5 MG/1
5 TABLET ORAL 3 TIMES DAILY
COMMUNITY
Start: 2018-01-01

## 2018-01-01 RX ORDER — ATROPINE SULFATE 10 MG/ML
1-2 SOLUTION/ DROPS OPHTHALMIC
Status: DISCONTINUED | OUTPATIENT
Start: 2018-01-01 | End: 2018-01-01 | Stop reason: HOSPADM

## 2018-01-01 RX ORDER — DEXTROSE MONOHYDRATE, SODIUM CHLORIDE, AND POTASSIUM CHLORIDE 50; 1.49; 4.5 G/1000ML; G/1000ML; G/1000ML
INJECTION, SOLUTION INTRAVENOUS CONTINUOUS
Status: DISCONTINUED | OUTPATIENT
Start: 2018-01-01 | End: 2018-01-01

## 2018-01-01 RX ORDER — DEXTROSE MONOHYDRATE 50 MG/ML
INJECTION, SOLUTION INTRAVENOUS
Status: DISCONTINUED
Start: 2018-01-01 | End: 2018-01-01 | Stop reason: HOSPADM

## 2018-01-01 RX ORDER — IOPAMIDOL 755 MG/ML
86 INJECTION, SOLUTION INTRAVASCULAR ONCE
Status: COMPLETED | OUTPATIENT
Start: 2018-01-01 | End: 2018-01-01

## 2018-01-01 RX ORDER — FUROSEMIDE 10 MG/ML
20 INJECTION INTRAMUSCULAR; INTRAVENOUS ONCE
Status: COMPLETED | OUTPATIENT
Start: 2018-01-01 | End: 2018-01-01

## 2018-01-01 RX ORDER — POLYETHYLENE GLYCOL 3350 17 G/17G
17 POWDER, FOR SOLUTION ORAL DAILY PRN
COMMUNITY
Start: 2017-01-01

## 2018-01-01 RX ORDER — HYDROMORPHONE HCL/0.9% NACL/PF 0.2MG/0.2
.2-.4 SYRINGE (ML) INTRAVENOUS
Status: DISCONTINUED | OUTPATIENT
Start: 2018-01-01 | End: 2018-01-01 | Stop reason: HOSPADM

## 2018-01-01 RX ORDER — LIDOCAINE HYDROCHLORIDE 10 MG/ML
4 INJECTION, SOLUTION EPIDURAL; INFILTRATION; INTRACAUDAL; PERINEURAL ONCE
Status: DISCONTINUED | OUTPATIENT
Start: 2018-01-01 | End: 2018-01-01 | Stop reason: HOSPADM

## 2018-01-01 RX ORDER — HYDROMORPHONE HYDROCHLORIDE 2 MG/1
4 TABLET ORAL EVERY 4 HOURS PRN
COMMUNITY

## 2018-01-01 RX ORDER — AMLODIPINE BESYLATE 10 MG/1
10 TABLET ORAL DAILY
Status: ON HOLD | COMMUNITY
Start: 2017-01-12 | End: 2018-01-01

## 2018-01-01 RX ORDER — SODIUM CHLORIDE, SODIUM LACTATE, POTASSIUM CHLORIDE, CALCIUM CHLORIDE 600; 310; 30; 20 MG/100ML; MG/100ML; MG/100ML; MG/100ML
1000 INJECTION, SOLUTION INTRAVENOUS CONTINUOUS
Status: DISCONTINUED | OUTPATIENT
Start: 2018-01-01 | End: 2018-01-01

## 2018-01-01 RX ORDER — HYDROMORPHONE HYDROCHLORIDE 4 MG/1
4 TABLET ORAL EVERY 4 HOURS PRN
Status: DISCONTINUED | OUTPATIENT
Start: 2018-01-01 | End: 2018-01-01

## 2018-01-01 RX ORDER — HYDROMORPHONE HYDROCHLORIDE 2 MG/1
2 TABLET ORAL EVERY 4 HOURS PRN
Status: DISCONTINUED | OUTPATIENT
Start: 2018-01-01 | End: 2018-01-01

## 2018-01-01 RX ORDER — INDOMETHACIN 50 MG/1
100 SUPPOSITORY RECTAL
Status: CANCELLED | OUTPATIENT
Start: 2018-01-01

## 2018-01-01 RX ORDER — HYDROMORPHONE HYDROCHLORIDE 2 MG/1
2 TABLET ORAL
Status: DISCONTINUED | OUTPATIENT
Start: 2018-01-01 | End: 2018-01-01

## 2018-01-01 RX ORDER — FUROSEMIDE 20 MG
20 TABLET ORAL DAILY
Status: DISCONTINUED | OUTPATIENT
Start: 2018-01-01 | End: 2018-01-01 | Stop reason: HOSPADM

## 2018-01-01 RX ORDER — LIDOCAINE HYDROCHLORIDE 10 MG/ML
4 INJECTION, SOLUTION EPIDURAL; INFILTRATION; INTRACAUDAL; PERINEURAL ONCE
Status: COMPLETED | OUTPATIENT
Start: 2018-01-01 | End: 2018-01-01

## 2018-01-01 RX ORDER — LIDOCAINE HYDROCHLORIDE 10 MG/ML
INJECTION, SOLUTION EPIDURAL; INFILTRATION; INTRACAUDAL; PERINEURAL
Status: DISCONTINUED
Start: 2018-01-01 | End: 2018-01-01 | Stop reason: WASHOUT

## 2018-01-01 RX ORDER — VANCOMYCIN HYDROCHLORIDE 1 G/200ML
1000 INJECTION, SOLUTION INTRAVENOUS EVERY 24 HOURS
Status: DISCONTINUED | OUTPATIENT
Start: 2018-01-01 | End: 2018-01-01

## 2018-01-01 RX ORDER — PANTOPRAZOLE SODIUM 40 MG/1
40 TABLET, DELAYED RELEASE ORAL 2 TIMES DAILY
Status: DISCONTINUED | OUTPATIENT
Start: 2018-01-01 | End: 2018-01-01 | Stop reason: HOSPADM

## 2018-01-01 RX ORDER — MAGNESIUM HYDROXIDE/ALUMINUM HYDROXICE/SIMETHICONE 120; 1200; 1200 MG/30ML; MG/30ML; MG/30ML
15 SUSPENSION ORAL 2 TIMES DAILY PRN
COMMUNITY

## 2018-01-01 RX ORDER — NICOTINE POLACRILEX 4 MG
15-30 LOZENGE BUCCAL
Status: DISCONTINUED | OUTPATIENT
Start: 2018-01-01 | End: 2018-01-01 | Stop reason: HOSPADM

## 2018-01-01 RX ORDER — DEXTROSE MONOHYDRATE 100 MG/ML
INJECTION, SOLUTION INTRAVENOUS CONTINUOUS
Status: DISCONTINUED | OUTPATIENT
Start: 2018-01-01 | End: 2018-01-01

## 2018-01-01 RX ORDER — DEXAMETHASONE SODIUM PHOSPHATE 4 MG/ML
1 INJECTION, SOLUTION INTRAMUSCULAR; INTRAVENOUS
Qty: 1 EACH | Refills: 11 | Status: SHIPPED | OUTPATIENT
Start: 2018-01-01

## 2018-01-01 RX ORDER — LORAZEPAM 2 MG/ML
.25-.5 INJECTION INTRAMUSCULAR EVERY 4 HOURS PRN
Status: DISCONTINUED | OUTPATIENT
Start: 2018-01-01 | End: 2018-01-01 | Stop reason: HOSPADM

## 2018-01-01 RX ORDER — NALOXONE HYDROCHLORIDE 0.4 MG/ML
.1-.4 INJECTION, SOLUTION INTRAMUSCULAR; INTRAVENOUS; SUBCUTANEOUS
Status: ACTIVE | OUTPATIENT
Start: 2018-01-01 | End: 2018-01-01

## 2018-01-01 RX ORDER — POTASSIUM CHLORIDE 750 MG/1
40 TABLET, EXTENDED RELEASE ORAL ONCE
Status: DISCONTINUED | OUTPATIENT
Start: 2018-01-01 | End: 2018-01-01

## 2018-01-01 RX ORDER — MEROPENEM 1 G/1
1 INJECTION, POWDER, FOR SOLUTION INTRAVENOUS EVERY 8 HOURS
Status: DISCONTINUED | OUTPATIENT
Start: 2018-01-01 | End: 2018-01-01

## 2018-01-01 RX ORDER — ERGOCALCIFEROL 1.25 MG/1
50000 CAPSULE, LIQUID FILLED ORAL
Qty: 4 CAPSULE | Refills: 0 | Status: SHIPPED | OUTPATIENT
Start: 2018-01-01 | End: 2018-04-27

## 2018-01-01 RX ORDER — LINEZOLID 2 MG/ML
600 INJECTION, SOLUTION INTRAVENOUS EVERY 12 HOURS
Status: DISCONTINUED | OUTPATIENT
Start: 2018-01-01 | End: 2018-01-01

## 2018-01-01 RX ORDER — SIMETHICONE 80 MG
80 TABLET,CHEWABLE ORAL EVERY 6 HOURS PRN
COMMUNITY

## 2018-01-01 RX ORDER — HYDROMORPHONE HYDROCHLORIDE 2 MG/1
2-4 TABLET ORAL EVERY 4 HOURS PRN
Status: DISCONTINUED | OUTPATIENT
Start: 2018-01-01 | End: 2018-01-01 | Stop reason: HOSPADM

## 2018-01-01 RX ORDER — NALOXONE HYDROCHLORIDE 0.4 MG/ML
.1-.4 INJECTION, SOLUTION INTRAMUSCULAR; INTRAVENOUS; SUBCUTANEOUS ONCE
Status: DISCONTINUED | OUTPATIENT
Start: 2018-01-01 | End: 2018-01-01

## 2018-01-01 RX ADMIN — PANCRELIPASE 48000 UNITS: 24000; 76000; 120000 CAPSULE, DELAYED RELEASE PELLETS ORAL at 09:25

## 2018-01-01 RX ADMIN — Medication 250 MG: at 08:29

## 2018-01-01 RX ADMIN — INSULIN ASPART 1 UNITS: 100 INJECTION, SOLUTION INTRAVENOUS; SUBCUTANEOUS at 14:25

## 2018-01-01 RX ADMIN — DAPTOMYCIN 400 MG: 500 INJECTION, POWDER, LYOPHILIZED, FOR SOLUTION INTRAVENOUS at 16:32

## 2018-01-01 RX ADMIN — PANTOPRAZOLE SODIUM 40 MG: 40 INJECTION, POWDER, FOR SOLUTION INTRAVENOUS at 19:58

## 2018-01-01 RX ADMIN — LIDOCAINE HYDROCHLORIDE 80 MG: 20 INJECTION, SOLUTION INFILTRATION; PERINEURAL at 15:16

## 2018-01-01 RX ADMIN — INSULIN ASPART 1 UNITS: 100 INJECTION, SOLUTION INTRAVENOUS; SUBCUTANEOUS at 08:29

## 2018-01-01 RX ADMIN — METHADONE HYDROCHLORIDE 5 MG: 5 TABLET ORAL at 20:22

## 2018-01-01 RX ADMIN — MIDAZOLAM 1 MG: 1 INJECTION INTRAMUSCULAR; INTRAVENOUS at 08:40

## 2018-01-01 RX ADMIN — Medication 250 MG: at 08:09

## 2018-01-01 RX ADMIN — SODIUM CHLORIDE 8 MG/HR: 9 INJECTION, SOLUTION INTRAVENOUS at 17:02

## 2018-01-01 RX ADMIN — PANCRELIPASE 48000 UNITS: 24000; 76000; 120000 CAPSULE, DELAYED RELEASE PELLETS ORAL at 18:40

## 2018-01-01 RX ADMIN — HYDROMORPHONE HYDROCHLORIDE 4 MG: 2 TABLET ORAL at 21:24

## 2018-01-01 RX ADMIN — PANCRELIPASE 48000 UNITS: 24000; 76000; 120000 CAPSULE, DELAYED RELEASE PELLETS ORAL at 17:18

## 2018-01-01 RX ADMIN — VANCOMYCIN HYDROCHLORIDE 1500 MG: 10 INJECTION, POWDER, LYOPHILIZED, FOR SOLUTION INTRAVENOUS at 01:05

## 2018-01-01 RX ADMIN — PANTOPRAZOLE SODIUM 40 MG: 40 INJECTION, POWDER, FOR SOLUTION INTRAVENOUS at 20:08

## 2018-01-01 RX ADMIN — WATER 1 G: 1 INJECTION INTRAMUSCULAR; INTRAVENOUS; SUBCUTANEOUS at 18:05

## 2018-01-01 RX ADMIN — METHADONE HYDROCHLORIDE 5 MG: 5 TABLET ORAL at 20:19

## 2018-01-01 RX ADMIN — PANTOPRAZOLE SODIUM 40 MG: 40 INJECTION, POWDER, FOR SOLUTION INTRAVENOUS at 13:06

## 2018-01-01 RX ADMIN — LINEZOLID 600 MG: 600 INJECTION, SOLUTION INTRAVENOUS at 00:46

## 2018-01-01 RX ADMIN — INSULIN ASPART 1 UNITS: 100 INJECTION, SOLUTION INTRAVENOUS; SUBCUTANEOUS at 20:59

## 2018-01-01 RX ADMIN — FUROSEMIDE 20 MG: 20 TABLET ORAL at 08:25

## 2018-01-01 RX ADMIN — PIPERACILLIN SODIUM AND TAZOBACTAM SODIUM 4.5 G: 4; .5 INJECTION, POWDER, LYOPHILIZED, FOR SOLUTION INTRAVENOUS at 00:54

## 2018-01-01 RX ADMIN — POTASSIUM CHLORIDE 20 MEQ: 750 TABLET, EXTENDED RELEASE ORAL at 22:21

## 2018-01-01 RX ADMIN — Medication 1 CAPSULE: at 18:26

## 2018-01-01 RX ADMIN — PHENYLEPHRINE HYDROCHLORIDE 100 MCG: 10 INJECTION, SOLUTION INTRAMUSCULAR; INTRAVENOUS; SUBCUTANEOUS at 09:22

## 2018-01-01 RX ADMIN — ALBUMIN HUMAN 75 G: 0.25 SOLUTION INTRAVENOUS at 17:28

## 2018-01-01 RX ADMIN — PANTOPRAZOLE SODIUM 40 MG: 40 INJECTION, POWDER, FOR SOLUTION INTRAVENOUS at 07:45

## 2018-01-01 RX ADMIN — SERTRALINE HYDROCHLORIDE 50 MG: 50 TABLET ORAL at 07:05

## 2018-01-01 RX ADMIN — HYDROMORPHONE HYDROCHLORIDE 4 MG: 2 TABLET ORAL at 22:11

## 2018-01-01 RX ADMIN — POTASSIUM CHLORIDE 40 MEQ: 40 SOLUTION ORAL at 08:21

## 2018-01-01 RX ADMIN — SODIUM BICARBONATE 325 MG: 325 TABLET ORAL at 00:32

## 2018-01-01 RX ADMIN — SERTRALINE HYDROCHLORIDE 50 MG: 50 TABLET ORAL at 22:22

## 2018-01-01 RX ADMIN — FUROSEMIDE 20 MG: 20 TABLET ORAL at 09:38

## 2018-01-01 RX ADMIN — SODIUM CHLORIDE, PRESERVATIVE FREE 5 ML: 5 INJECTION INTRAVENOUS at 21:43

## 2018-01-01 RX ADMIN — MEROPENEM 1 G: 1 INJECTION, POWDER, FOR SOLUTION INTRAVENOUS at 14:03

## 2018-01-01 RX ADMIN — PANTOPRAZOLE SODIUM 40 MG: 40 INJECTION, POWDER, FOR SOLUTION INTRAVENOUS at 20:29

## 2018-01-01 RX ADMIN — PHYTONADIONE 10 MG: 10 INJECTION, EMULSION INTRAMUSCULAR; INTRAVENOUS; SUBCUTANEOUS at 11:42

## 2018-01-01 RX ADMIN — PIPERACILLIN SODIUM AND TAZOBACTAM SODIUM 4.5 G: 4; .5 INJECTION, POWDER, LYOPHILIZED, FOR SOLUTION INTRAVENOUS at 13:15

## 2018-01-01 RX ADMIN — PIPERACILLIN SODIUM AND TAZOBACTAM SODIUM 4.5 G: 4; .5 INJECTION, POWDER, LYOPHILIZED, FOR SOLUTION INTRAVENOUS at 06:17

## 2018-01-01 RX ADMIN — METHADONE HYDROCHLORIDE 5 MG: 5 TABLET ORAL at 08:19

## 2018-01-01 RX ADMIN — MICONAZOLE NITRATE: 20 CREAM TOPICAL at 08:40

## 2018-01-01 RX ADMIN — HYDROMORPHONE HYDROCHLORIDE 4 MG: 2 TABLET ORAL at 09:13

## 2018-01-01 RX ADMIN — FLUCONAZOLE 400 MG: 200 TABLET ORAL at 08:16

## 2018-01-01 RX ADMIN — MULTIVITAMIN 15 ML: LIQUID ORAL at 12:22

## 2018-01-01 RX ADMIN — POTASSIUM CHLORIDE: 2 INJECTION, SOLUTION, CONCENTRATE INTRAVENOUS at 21:29

## 2018-01-01 RX ADMIN — FUROSEMIDE 20 MG: 20 TABLET ORAL at 08:24

## 2018-01-01 RX ADMIN — PHENYLEPHRINE HYDROCHLORIDE 200 MCG: 10 INJECTION, SOLUTION INTRAMUSCULAR; INTRAVENOUS; SUBCUTANEOUS at 18:04

## 2018-01-01 RX ADMIN — POTASSIUM CHLORIDE 20 MEQ: 1.5 POWDER, FOR SOLUTION ORAL at 11:21

## 2018-01-01 RX ADMIN — HYDROMORPHONE HYDROCHLORIDE 2 MG: 2 TABLET ORAL at 03:23

## 2018-01-01 RX ADMIN — PANTOPRAZOLE SODIUM 40 MG: 40 INJECTION, POWDER, FOR SOLUTION INTRAVENOUS at 07:55

## 2018-01-01 RX ADMIN — ERTAPENEM SODIUM 1 G: 1 INJECTION, POWDER, LYOPHILIZED, FOR SOLUTION INTRAMUSCULAR; INTRAVENOUS at 00:50

## 2018-01-01 RX ADMIN — FLUCONAZOLE 400 MG: 2 INJECTION INTRAVENOUS at 00:45

## 2018-01-01 RX ADMIN — FUROSEMIDE 40 MG: 40 TABLET ORAL at 08:09

## 2018-01-01 RX ADMIN — PANTOPRAZOLE SODIUM 40 MG: 40 INJECTION, POWDER, FOR SOLUTION INTRAVENOUS at 08:31

## 2018-01-01 RX ADMIN — Medication 1 CAPSULE: at 07:46

## 2018-01-01 RX ADMIN — PANCRELIPASE 48000 UNITS: 24000; 76000; 120000 CAPSULE, DELAYED RELEASE PELLETS ORAL at 17:38

## 2018-01-01 RX ADMIN — INSULIN ASPART 1 UNITS: 100 INJECTION, SOLUTION INTRAVENOUS; SUBCUTANEOUS at 18:26

## 2018-01-01 RX ADMIN — OCTREOTIDE ACETATE 50 MCG/HR: 200 INJECTION, SOLUTION INTRAVENOUS; SUBCUTANEOUS at 12:39

## 2018-01-01 RX ADMIN — DEXTROSE MONOHYDRATE: 50 INJECTION, SOLUTION INTRAVENOUS at 17:06

## 2018-01-01 RX ADMIN — LIDOCAINE HYDROCHLORIDE 2 ML: 10 INJECTION, SOLUTION INFILTRATION; PERINEURAL at 15:16

## 2018-01-01 RX ADMIN — Medication 10 MEQ: at 16:30

## 2018-01-01 RX ADMIN — OCTREOTIDE ACETATE 50 MCG/HR: 200 INJECTION, SOLUTION INTRAVENOUS; SUBCUTANEOUS at 17:09

## 2018-01-01 RX ADMIN — SODIUM BICARBONATE 325 MG: 325 TABLET ORAL at 04:29

## 2018-01-01 RX ADMIN — METHADONE HYDROCHLORIDE 5 MG: 5 TABLET ORAL at 14:56

## 2018-01-01 RX ADMIN — ALBUMIN HUMAN 75 G: 0.25 SOLUTION INTRAVENOUS at 10:39

## 2018-01-01 RX ADMIN — Medication 0.2 MG: at 00:15

## 2018-01-01 RX ADMIN — POTASSIUM PHOSPHATE, MONOBASIC AND POTASSIUM PHOSPHATE, DIBASIC 10 MMOL: 224; 236 INJECTION, SOLUTION INTRAVENOUS at 10:04

## 2018-01-01 RX ADMIN — OCTREOTIDE ACETATE 50 MCG/HR: 200 INJECTION, SOLUTION INTRAVENOUS; SUBCUTANEOUS at 20:07

## 2018-01-01 RX ADMIN — WATER 1 G: 1 INJECTION INTRAMUSCULAR; INTRAVENOUS; SUBCUTANEOUS at 17:36

## 2018-01-01 RX ADMIN — Medication 2 G: at 18:10

## 2018-01-01 RX ADMIN — PHYTONADIONE 10 MG: 5 TABLET ORAL at 09:51

## 2018-01-01 RX ADMIN — MEROPENEM 1 G: 1 INJECTION, POWDER, FOR SOLUTION INTRAVENOUS at 06:24

## 2018-01-01 RX ADMIN — SPIRONOLACTONE 50 MG: 50 TABLET ORAL at 07:45

## 2018-01-01 RX ADMIN — HYDROMORPHONE HYDROCHLORIDE 2 MG: 2 TABLET ORAL at 09:05

## 2018-01-01 RX ADMIN — SODIUM CHLORIDE, POTASSIUM CHLORIDE, SODIUM LACTATE AND CALCIUM CHLORIDE: 600; 310; 30; 20 INJECTION, SOLUTION INTRAVENOUS at 10:51

## 2018-01-01 RX ADMIN — METHADONE HYDROCHLORIDE 5 MG: 5 TABLET ORAL at 08:08

## 2018-01-01 RX ADMIN — METHADONE HYDROCHLORIDE 5 MG: 5 TABLET ORAL at 09:52

## 2018-01-01 RX ADMIN — OCTREOTIDE ACETATE 50 MCG/HR: 200 INJECTION, SOLUTION INTRAVENOUS; SUBCUTANEOUS at 19:32

## 2018-01-01 RX ADMIN — SODIUM BICARBONATE 325 MG: 325 TABLET ORAL at 12:18

## 2018-01-01 RX ADMIN — IOPAMIDOL 93 ML: 755 INJECTION, SOLUTION INTRAVENOUS at 12:06

## 2018-01-01 RX ADMIN — SODIUM CHLORIDE, POTASSIUM CHLORIDE, SODIUM LACTATE AND CALCIUM CHLORIDE: 600; 310; 30; 20 INJECTION, SOLUTION INTRAVENOUS at 18:43

## 2018-01-01 RX ADMIN — METHADONE HYDROCHLORIDE 5 MG: 5 TABLET ORAL at 21:28

## 2018-01-01 RX ADMIN — POTASSIUM CHLORIDE 20 MEQ: 750 TABLET, EXTENDED RELEASE ORAL at 06:38

## 2018-01-01 RX ADMIN — HYDROMORPHONE HYDROCHLORIDE 2 MG: 2 TABLET ORAL at 08:09

## 2018-01-01 RX ADMIN — WATER 1 G: 1 INJECTION INTRAMUSCULAR; INTRAVENOUS; SUBCUTANEOUS at 13:59

## 2018-01-01 RX ADMIN — ALBUMIN HUMAN 500 ML: 0.05 INJECTION, SOLUTION INTRAVENOUS at 19:22

## 2018-01-01 RX ADMIN — HYDROMORPHONE HYDROCHLORIDE 2 MG: 2 TABLET ORAL at 22:41

## 2018-01-01 RX ADMIN — HYDROMORPHONE HYDROCHLORIDE 2 MG: 2 TABLET ORAL at 16:35

## 2018-01-01 RX ADMIN — Medication 1 CAPSULE: at 08:43

## 2018-01-01 RX ADMIN — Medication 10 MEQ: at 00:57

## 2018-01-01 RX ADMIN — HYDROMORPHONE HYDROCHLORIDE 2 MG: 2 TABLET ORAL at 00:28

## 2018-01-01 RX ADMIN — HYDROMORPHONE HYDROCHLORIDE 0.2 MG: 1 INJECTION, SOLUTION INTRAMUSCULAR; INTRAVENOUS; SUBCUTANEOUS at 10:38

## 2018-01-01 RX ADMIN — HYDROMORPHONE HYDROCHLORIDE 2 MG: 2 TABLET ORAL at 15:29

## 2018-01-01 RX ADMIN — SERTRALINE HYDROCHLORIDE 50 MG: 50 TABLET ORAL at 08:09

## 2018-01-01 RX ADMIN — Medication 250 MG: at 08:23

## 2018-01-01 RX ADMIN — INSULIN ASPART 3 UNITS: 100 INJECTION, SOLUTION INTRAVENOUS; SUBCUTANEOUS at 12:36

## 2018-01-01 RX ADMIN — LINEZOLID 600 MG: 600 INJECTION, SOLUTION INTRAVENOUS at 10:05

## 2018-01-01 RX ADMIN — DAPTOMYCIN 400 MG: 500 INJECTION, POWDER, LYOPHILIZED, FOR SOLUTION INTRAVENOUS at 13:57

## 2018-01-01 RX ADMIN — HYDROMORPHONE HYDROCHLORIDE 0.2 MG: 1 INJECTION, SOLUTION INTRAMUSCULAR; INTRAVENOUS; SUBCUTANEOUS at 12:18

## 2018-01-01 RX ADMIN — METHADONE HYDROCHLORIDE 5 MG: 5 TABLET ORAL at 08:25

## 2018-01-01 RX ADMIN — SERTRALINE HYDROCHLORIDE 50 MG: 50 TABLET ORAL at 08:08

## 2018-01-01 RX ADMIN — HYDROMORPHONE HYDROCHLORIDE 2 MG: 2 TABLET ORAL at 17:00

## 2018-01-01 RX ADMIN — PIPERACILLIN SODIUM AND TAZOBACTAM SODIUM 4.5 G: 4; .5 INJECTION, POWDER, LYOPHILIZED, FOR SOLUTION INTRAVENOUS at 05:38

## 2018-01-01 RX ADMIN — PANTOPRAZOLE SODIUM 40 MG: 40 TABLET, DELAYED RELEASE ORAL at 20:04

## 2018-01-01 RX ADMIN — INSULIN ASPART 2 UNITS: 100 INJECTION, SOLUTION INTRAVENOUS; SUBCUTANEOUS at 15:00

## 2018-01-01 RX ADMIN — SERTRALINE HYDROCHLORIDE 50 MG: 50 TABLET ORAL at 08:34

## 2018-01-01 RX ADMIN — METHADONE HYDROCHLORIDE 5 MG: 5 TABLET ORAL at 08:17

## 2018-01-01 RX ADMIN — PANCRELIPASE 48000 UNITS: 24000; 76000; 120000 CAPSULE, DELAYED RELEASE PELLETS ORAL at 08:00

## 2018-01-01 RX ADMIN — PHENYLEPHRINE HYDROCHLORIDE 100 MCG: 10 INJECTION, SOLUTION INTRAMUSCULAR; INTRAVENOUS; SUBCUTANEOUS at 18:31

## 2018-01-01 RX ADMIN — SPIRONOLACTONE 100 MG: 50 TABLET ORAL at 08:09

## 2018-01-01 RX ADMIN — HYDROMORPHONE HYDROCHLORIDE 0.2 MG: 1 INJECTION, SOLUTION INTRAMUSCULAR; INTRAVENOUS; SUBCUTANEOUS at 02:22

## 2018-01-01 RX ADMIN — PANCRELIPASE 48000 UNITS: 24000; 76000; 120000 CAPSULE, DELAYED RELEASE PELLETS ORAL at 14:57

## 2018-01-01 RX ADMIN — OCTREOTIDE ACETATE 50 MCG/HR: 200 INJECTION, SOLUTION INTRAVENOUS; SUBCUTANEOUS at 21:30

## 2018-01-01 RX ADMIN — PANCRELIPASE 24000 UNITS: 24000; 76000; 120000 CAPSULE, DELAYED RELEASE PELLETS ORAL at 20:00

## 2018-01-01 RX ADMIN — PANCRELIPASE 48000 UNITS: 24000; 76000; 120000 CAPSULE, DELAYED RELEASE PELLETS ORAL at 08:19

## 2018-01-01 RX ADMIN — SERTRALINE HYDROCHLORIDE 50 MG: 50 TABLET ORAL at 08:10

## 2018-01-01 RX ADMIN — VANCOMYCIN HYDROCHLORIDE 1500 MG: 10 INJECTION, POWDER, LYOPHILIZED, FOR SOLUTION INTRAVENOUS at 10:47

## 2018-01-01 RX ADMIN — POTASSIUM CHLORIDE 60 MEQ: 20 SOLUTION ORAL at 11:34

## 2018-01-01 RX ADMIN — HYDROMORPHONE HYDROCHLORIDE 2 MG: 2 TABLET ORAL at 10:31

## 2018-01-01 RX ADMIN — INSULIN ASPART 1 UNITS: 100 INJECTION, SOLUTION INTRAVENOUS; SUBCUTANEOUS at 17:33

## 2018-01-01 RX ADMIN — FUROSEMIDE 20 MG: 20 TABLET ORAL at 08:16

## 2018-01-01 RX ADMIN — DAPTOMYCIN 400 MG: 500 INJECTION, POWDER, LYOPHILIZED, FOR SOLUTION INTRAVENOUS at 17:46

## 2018-01-01 RX ADMIN — HYDROMORPHONE HYDROCHLORIDE 4 MG: 2 TABLET ORAL at 03:03

## 2018-01-01 RX ADMIN — ERTAPENEM SODIUM 1 G: 1 INJECTION, POWDER, LYOPHILIZED, FOR SOLUTION INTRAMUSCULAR; INTRAVENOUS at 00:42

## 2018-01-01 RX ADMIN — SODIUM CHLORIDE 1000 ML: 9 INJECTION, SOLUTION INTRAVENOUS at 18:06

## 2018-01-01 RX ADMIN — HYDROMORPHONE HYDROCHLORIDE 4 MG: 2 TABLET ORAL at 13:43

## 2018-01-01 RX ADMIN — PHENYLEPHRINE HYDROCHLORIDE 100 MCG: 10 INJECTION, SOLUTION INTRAMUSCULAR; INTRAVENOUS; SUBCUTANEOUS at 10:00

## 2018-01-01 RX ADMIN — HYDROMORPHONE HYDROCHLORIDE 0.5 MG: 1 INJECTION, SOLUTION INTRAMUSCULAR; INTRAVENOUS; SUBCUTANEOUS at 11:34

## 2018-01-01 RX ADMIN — LIDOCAINE HYDROCHLORIDE 8 ML: 10 INJECTION, SOLUTION EPIDURAL; INFILTRATION; INTRACAUDAL; PERINEURAL at 15:03

## 2018-01-01 RX ADMIN — LIDOCAINE HYDROCHLORIDE 2 ML: 10 INJECTION, SOLUTION INFILTRATION; PERINEURAL at 18:48

## 2018-01-01 RX ADMIN — PHENYLEPHRINE HYDROCHLORIDE 100 MCG: 10 INJECTION, SOLUTION INTRAMUSCULAR; INTRAVENOUS; SUBCUTANEOUS at 17:35

## 2018-01-01 RX ADMIN — MICONAZOLE NITRATE: 20 CREAM TOPICAL at 19:41

## 2018-01-01 RX ADMIN — Medication 0.2 MG: at 17:35

## 2018-01-01 RX ADMIN — HYDROMORPHONE HYDROCHLORIDE 4 MG: 4 TABLET ORAL at 14:29

## 2018-01-01 RX ADMIN — SODIUM CHLORIDE, POTASSIUM CHLORIDE, SODIUM LACTATE AND CALCIUM CHLORIDE: 600; 310; 30; 20 INJECTION, SOLUTION INTRAVENOUS at 20:04

## 2018-01-01 RX ADMIN — PHYTONADIONE 10 MG: 5 TABLET ORAL at 08:03

## 2018-01-01 RX ADMIN — HYDROMORPHONE HYDROCHLORIDE 2 MG: 2 TABLET ORAL at 01:12

## 2018-01-01 RX ADMIN — WATER 1 G: 1 INJECTION INTRAMUSCULAR; INTRAVENOUS; SUBCUTANEOUS at 14:19

## 2018-01-01 RX ADMIN — FUROSEMIDE 20 MG: 20 TABLET ORAL at 07:56

## 2018-01-01 RX ADMIN — HYDROMORPHONE HYDROCHLORIDE 2 MG: 2 TABLET ORAL at 07:05

## 2018-01-01 RX ADMIN — INSULIN ASPART 2 UNITS: 100 INJECTION, SOLUTION INTRAVENOUS; SUBCUTANEOUS at 13:12

## 2018-01-01 RX ADMIN — ERTAPENEM SODIUM 1 G: 1 INJECTION, POWDER, LYOPHILIZED, FOR SOLUTION INTRAMUSCULAR; INTRAVENOUS at 23:22

## 2018-01-01 RX ADMIN — PANCRELIPASE 48000 UNITS: 24000; 76000; 120000 CAPSULE, DELAYED RELEASE PELLETS ORAL at 14:15

## 2018-01-01 RX ADMIN — PIPERACILLIN SODIUM AND TAZOBACTAM SODIUM 3.38 G: 3; .375 INJECTION, POWDER, LYOPHILIZED, FOR SOLUTION INTRAVENOUS at 06:02

## 2018-01-01 RX ADMIN — PANCRELIPASE 48000 UNITS: 24000; 76000; 120000 CAPSULE, DELAYED RELEASE PELLETS ORAL at 11:58

## 2018-01-01 RX ADMIN — CALCIUM GLUCONATE 1 G: 98 INJECTION, SOLUTION INTRAVENOUS at 08:35

## 2018-01-01 RX ADMIN — CALCIUM GLUCONATE 1 G: 98 INJECTION, SOLUTION INTRAVENOUS at 03:13

## 2018-01-01 RX ADMIN — LIDOCAINE HYDROCHLORIDE 6 ML: 10 INJECTION, SOLUTION INFILTRATION; PERINEURAL at 15:37

## 2018-01-01 RX ADMIN — CALCIUM GLUCONATE 2 G: 98 INJECTION, SOLUTION INTRAVENOUS at 18:08

## 2018-01-01 RX ADMIN — MEROPENEM 1 G: 1 INJECTION, POWDER, FOR SOLUTION INTRAVENOUS at 06:43

## 2018-01-01 RX ADMIN — HYDROMORPHONE HYDROCHLORIDE 2 MG: 2 TABLET ORAL at 07:26

## 2018-01-01 RX ADMIN — PANTOPRAZOLE SODIUM 40 MG: 40 TABLET, DELAYED RELEASE ORAL at 08:09

## 2018-01-01 RX ADMIN — MEROPENEM 1 G: 1 INJECTION, POWDER, FOR SOLUTION INTRAVENOUS at 15:50

## 2018-01-01 RX ADMIN — PANCRELIPASE 48000 UNITS: 24000; 76000; 120000 CAPSULE, DELAYED RELEASE PELLETS ORAL at 09:44

## 2018-01-01 RX ADMIN — PHENYLEPHRINE HYDROCHLORIDE 100 MCG: 10 INJECTION, SOLUTION INTRAMUSCULAR; INTRAVENOUS; SUBCUTANEOUS at 17:01

## 2018-01-01 RX ADMIN — PIPERACILLIN AND TAZOBACTAM 4.5 G: 4; .5 INJECTION, POWDER, LYOPHILIZED, FOR SOLUTION INTRAVENOUS; PARENTERAL at 12:00

## 2018-01-01 RX ADMIN — PHENYLEPHRINE HYDROCHLORIDE 100 MCG: 10 INJECTION, SOLUTION INTRAMUSCULAR; INTRAVENOUS; SUBCUTANEOUS at 17:50

## 2018-01-01 RX ADMIN — ERTAPENEM SODIUM 1 G: 1 INJECTION, POWDER, LYOPHILIZED, FOR SOLUTION INTRAMUSCULAR; INTRAVENOUS at 23:38

## 2018-01-01 RX ADMIN — INSULIN ASPART 2 UNITS: 100 INJECTION, SOLUTION INTRAVENOUS; SUBCUTANEOUS at 18:40

## 2018-01-01 RX ADMIN — METHADONE HYDROCHLORIDE 5 MG: 5 TABLET ORAL at 20:12

## 2018-01-01 RX ADMIN — PANCRELIPASE 48000 UNITS: 24000; 76000; 120000 CAPSULE, DELAYED RELEASE PELLETS ORAL at 16:09

## 2018-01-01 RX ADMIN — PANTOPRAZOLE SODIUM 40 MG: 40 TABLET, DELAYED RELEASE ORAL at 08:20

## 2018-01-01 RX ADMIN — INSULIN ASPART 1 UNITS: 100 INJECTION, SOLUTION INTRAVENOUS; SUBCUTANEOUS at 21:14

## 2018-01-01 RX ADMIN — FERROUS SULFATE 325 MG: 325 TABLET, FILM COATED ORAL at 18:26

## 2018-01-01 RX ADMIN — OCTREOTIDE ACETATE 50 MCG/HR: 200 INJECTION, SOLUTION INTRAVENOUS; SUBCUTANEOUS at 20:18

## 2018-01-01 RX ADMIN — PANCRELIPASE 48000 UNITS: 24000; 76000; 120000 CAPSULE, DELAYED RELEASE PELLETS ORAL at 17:56

## 2018-01-01 RX ADMIN — DEXTROSE MONOHYDRATE 25 ML: 25 INJECTION, SOLUTION INTRAVENOUS at 09:20

## 2018-01-01 RX ADMIN — METHADONE HYDROCHLORIDE 5 MG: 5 TABLET ORAL at 08:22

## 2018-01-01 RX ADMIN — HYDROMORPHONE HYDROCHLORIDE 2 MG: 2 TABLET ORAL at 22:51

## 2018-01-01 RX ADMIN — FLUCONAZOLE 400 MG: 200 TABLET ORAL at 08:42

## 2018-01-01 RX ADMIN — POTASSIUM CHLORIDE 40 MEQ: 40 SOLUTION ORAL at 20:18

## 2018-01-01 RX ADMIN — PHYTONADIONE 10 MG: 10 INJECTION, EMULSION INTRAMUSCULAR; INTRAVENOUS; SUBCUTANEOUS at 18:17

## 2018-01-01 RX ADMIN — METHADONE HYDROCHLORIDE 5 MG: 5 TABLET ORAL at 20:29

## 2018-01-01 RX ADMIN — LIDOCAINE HYDROCHLORIDE 10 ML: 10 INJECTION, SOLUTION EPIDURAL; INFILTRATION; INTRACAUDAL; PERINEURAL at 15:08

## 2018-01-01 RX ADMIN — PANCRELIPASE 48000 UNITS: 24000; 76000; 120000 CAPSULE, DELAYED RELEASE PELLETS ORAL at 11:54

## 2018-01-01 RX ADMIN — IODIXANOL 25 ML: 320 INJECTION, SOLUTION INTRAVASCULAR at 17:09

## 2018-01-01 RX ADMIN — VANCOMYCIN HYDROCHLORIDE 1500 MG: 10 INJECTION, POWDER, LYOPHILIZED, FOR SOLUTION INTRAVENOUS at 11:21

## 2018-01-01 RX ADMIN — HYDROMORPHONE HYDROCHLORIDE 4 MG: 2 TABLET ORAL at 10:32

## 2018-01-01 RX ADMIN — PANTOPRAZOLE SODIUM 40 MG: 40 TABLET, DELAYED RELEASE ORAL at 20:29

## 2018-01-01 RX ADMIN — POTASSIUM CHLORIDE, DEXTROSE MONOHYDRATE AND SODIUM CHLORIDE: 150; 5; 450 INJECTION, SOLUTION INTRAVENOUS at 01:37

## 2018-01-01 RX ADMIN — VANCOMYCIN HYDROCHLORIDE 1500 MG: 10 INJECTION, POWDER, LYOPHILIZED, FOR SOLUTION INTRAVENOUS at 22:27

## 2018-01-01 RX ADMIN — PANCRELIPASE 48000 UNITS: 24000; 76000; 120000 CAPSULE, DELAYED RELEASE PELLETS ORAL at 06:11

## 2018-01-01 RX ADMIN — SERTRALINE HYDROCHLORIDE 50 MG: 50 TABLET ORAL at 13:35

## 2018-01-01 RX ADMIN — PANTOPRAZOLE SODIUM 40 MG: 40 INJECTION, POWDER, FOR SOLUTION INTRAVENOUS at 21:05

## 2018-01-01 RX ADMIN — INSULIN ASPART 3 UNITS: 100 INJECTION, SOLUTION INTRAVENOUS; SUBCUTANEOUS at 18:45

## 2018-01-01 RX ADMIN — Medication 1 CAPSULE: at 07:53

## 2018-01-01 RX ADMIN — PANTOPRAZOLE SODIUM 40 MG: 40 INJECTION, POWDER, FOR SOLUTION INTRAVENOUS at 07:53

## 2018-01-01 RX ADMIN — SPIRONOLACTONE 100 MG: 50 TABLET ORAL at 09:26

## 2018-01-01 RX ADMIN — Medication 250 MG: at 08:34

## 2018-01-01 RX ADMIN — SPIRONOLACTONE 50 MG: 50 TABLET ORAL at 08:53

## 2018-01-01 RX ADMIN — AMLODIPINE BESYLATE 5 MG: 5 TABLET ORAL at 08:03

## 2018-01-01 RX ADMIN — PANCRELIPASE 48000 UNITS: 24000; 76000; 120000 CAPSULE, DELAYED RELEASE PELLETS ORAL at 15:00

## 2018-01-01 RX ADMIN — INSULIN ASPART 1 UNITS: 100 INJECTION, SOLUTION INTRAVENOUS; SUBCUTANEOUS at 01:24

## 2018-01-01 RX ADMIN — FUROSEMIDE 20 MG: 20 TABLET ORAL at 07:45

## 2018-01-01 RX ADMIN — METHADONE HYDROCHLORIDE 5 MG: 5 TABLET ORAL at 14:18

## 2018-01-01 RX ADMIN — SERTRALINE HYDROCHLORIDE 50 MG: 50 TABLET ORAL at 07:47

## 2018-01-01 RX ADMIN — PANCRELIPASE 48000 UNITS: 24000; 76000; 120000 CAPSULE, DELAYED RELEASE PELLETS ORAL at 18:26

## 2018-01-01 RX ADMIN — MIDAZOLAM 2 MG: 1 INJECTION INTRAMUSCULAR; INTRAVENOUS at 15:03

## 2018-01-01 RX ADMIN — HYDROMORPHONE HYDROCHLORIDE 4 MG: 2 TABLET ORAL at 08:22

## 2018-01-01 RX ADMIN — HYDROMORPHONE HYDROCHLORIDE 4 MG: 2 TABLET ORAL at 07:46

## 2018-01-01 RX ADMIN — HYDROMORPHONE HYDROCHLORIDE 2 MG: 2 TABLET ORAL at 17:48

## 2018-01-01 RX ADMIN — HYDROMORPHONE HYDROCHLORIDE 2 MG: 2 TABLET ORAL at 08:17

## 2018-01-01 RX ADMIN — Medication 250 MG: at 08:40

## 2018-01-01 RX ADMIN — PIPERACILLIN SODIUM AND TAZOBACTAM SODIUM 4.5 G: 4; .5 INJECTION, POWDER, LYOPHILIZED, FOR SOLUTION INTRAVENOUS at 06:31

## 2018-01-01 RX ADMIN — CALCIUM GLUCONATE 2 G: 98 INJECTION, SOLUTION INTRAVENOUS at 08:26

## 2018-01-01 RX ADMIN — HYDROMORPHONE HYDROCHLORIDE 2 MG: 2 TABLET ORAL at 19:25

## 2018-01-01 RX ADMIN — Medication 1 CAPSULE: at 09:38

## 2018-01-01 RX ADMIN — METHADONE HYDROCHLORIDE 5 MG: 5 TABLET ORAL at 08:03

## 2018-01-01 RX ADMIN — PHENYLEPHRINE HYDROCHLORIDE 100 MCG: 10 INJECTION, SOLUTION INTRAMUSCULAR; INTRAVENOUS; SUBCUTANEOUS at 09:39

## 2018-01-01 RX ADMIN — Medication 1 CAPSULE: at 08:08

## 2018-01-01 RX ADMIN — PANCRELIPASE 48000 UNITS: 24000; 76000; 120000 CAPSULE, DELAYED RELEASE PELLETS ORAL at 16:35

## 2018-01-01 RX ADMIN — HYDROMORPHONE HYDROCHLORIDE 2 MG: 2 TABLET ORAL at 22:33

## 2018-01-01 RX ADMIN — PANCRELIPASE 48000 UNITS: 24000; 76000; 120000 CAPSULE, DELAYED RELEASE PELLETS ORAL at 16:33

## 2018-01-01 RX ADMIN — Medication 250 MG: at 09:43

## 2018-01-01 RX ADMIN — HYDROMORPHONE HYDROCHLORIDE 4 MG: 2 TABLET ORAL at 04:46

## 2018-01-01 RX ADMIN — PANTOPRAZOLE SODIUM 40 MG: 40 INJECTION, POWDER, FOR SOLUTION INTRAVENOUS at 21:28

## 2018-01-01 RX ADMIN — MICAFUNGIN SODIUM 100 MG: 10 INJECTION, POWDER, LYOPHILIZED, FOR SOLUTION INTRAVENOUS at 22:16

## 2018-01-01 RX ADMIN — DEXTROSE AND SODIUM CHLORIDE: 5; 900 INJECTION, SOLUTION INTRAVENOUS at 13:00

## 2018-01-01 RX ADMIN — HYDROMORPHONE HYDROCHLORIDE 2 MG: 2 TABLET ORAL at 13:28

## 2018-01-01 RX ADMIN — METHADONE HYDROCHLORIDE 5 MG: 5 TABLET ORAL at 19:41

## 2018-01-01 RX ADMIN — Medication 1 MG: at 00:46

## 2018-01-01 RX ADMIN — HYDROMORPHONE HYDROCHLORIDE 2 MG: 2 TABLET ORAL at 03:05

## 2018-01-01 RX ADMIN — INSULIN ASPART 4 UNITS: 100 INJECTION, SOLUTION INTRAVENOUS; SUBCUTANEOUS at 13:18

## 2018-01-01 RX ADMIN — GADOBUTROL 7.5 ML: 604.72 INJECTION INTRAVENOUS at 17:37

## 2018-01-01 RX ADMIN — PHYTONADIONE 10 MG: 5 TABLET ORAL at 07:52

## 2018-01-01 RX ADMIN — DEXTROSE MONOHYDRATE: 50 INJECTION, SOLUTION INTRAVENOUS at 00:42

## 2018-01-01 RX ADMIN — LEVOFLOXACIN 750 MG: 5 INJECTION, SOLUTION INTRAVENOUS at 19:09

## 2018-01-01 RX ADMIN — PIPERACILLIN SODIUM AND TAZOBACTAM SODIUM 4.5 G: 4; .5 INJECTION, POWDER, LYOPHILIZED, FOR SOLUTION INTRAVENOUS at 00:02

## 2018-01-01 RX ADMIN — SODIUM CHLORIDE, POTASSIUM CHLORIDE, SODIUM LACTATE AND CALCIUM CHLORIDE 1000 ML: 600; 310; 30; 20 INJECTION, SOLUTION INTRAVENOUS at 17:33

## 2018-01-01 RX ADMIN — PHENYLEPHRINE HYDROCHLORIDE 0.2 MCG/KG/MIN: 10 INJECTION, SOLUTION INTRAMUSCULAR; INTRAVENOUS; SUBCUTANEOUS at 10:35

## 2018-01-01 RX ADMIN — DEXTROSE MONOHYDRATE: 50 INJECTION, SOLUTION INTRAVENOUS at 09:36

## 2018-01-01 RX ADMIN — PANTOPRAZOLE SODIUM 40 MG: 40 TABLET, DELAYED RELEASE ORAL at 09:38

## 2018-01-01 RX ADMIN — HYDROMORPHONE HYDROCHLORIDE 4 MG: 2 TABLET ORAL at 05:52

## 2018-01-01 RX ADMIN — SODIUM CHLORIDE, POTASSIUM CHLORIDE, SODIUM LACTATE AND CALCIUM CHLORIDE: 600; 310; 30; 20 INJECTION, SOLUTION INTRAVENOUS at 07:30

## 2018-01-01 RX ADMIN — PANCRELIPASE 48000 UNITS: 24000; 76000; 120000 CAPSULE, DELAYED RELEASE PELLETS ORAL at 08:09

## 2018-01-01 RX ADMIN — HYDROMORPHONE HYDROCHLORIDE 4 MG: 2 TABLET ORAL at 16:05

## 2018-01-01 RX ADMIN — ONDANSETRON 4 MG: 2 INJECTION INTRAMUSCULAR; INTRAVENOUS at 17:54

## 2018-01-01 RX ADMIN — PANCRELIPASE 48000 UNITS: 24000; 76000; 120000 CAPSULE, DELAYED RELEASE PELLETS ORAL at 08:39

## 2018-01-01 RX ADMIN — VANCOMYCIN HYDROCHLORIDE 1750 MG: 500 INJECTION, POWDER, LYOPHILIZED, FOR SOLUTION INTRAVENOUS at 08:00

## 2018-01-01 RX ADMIN — CALCIUM GLUCONATE 2 G: 98 INJECTION, SOLUTION INTRAVENOUS at 01:22

## 2018-01-01 RX ADMIN — HYDROMORPHONE HYDROCHLORIDE 2 MG: 2 TABLET ORAL at 22:43

## 2018-01-01 RX ADMIN — SODIUM CHLORIDE: 9 INJECTION, SOLUTION INTRAVENOUS at 12:15

## 2018-01-01 RX ADMIN — MICAFUNGIN SODIUM 100 MG: 10 INJECTION, POWDER, LYOPHILIZED, FOR SOLUTION INTRAVENOUS at 22:45

## 2018-01-01 RX ADMIN — PIPERACILLIN SODIUM AND TAZOBACTAM SODIUM 4.5 G: 4; .5 INJECTION, POWDER, LYOPHILIZED, FOR SOLUTION INTRAVENOUS at 18:44

## 2018-01-01 RX ADMIN — Medication 1 CAPSULE: at 08:24

## 2018-01-01 RX ADMIN — METHADONE HYDROCHLORIDE 5 MG: 5 TABLET ORAL at 20:56

## 2018-01-01 RX ADMIN — FLUCONAZOLE 400 MG: 2 INJECTION INTRAVENOUS at 18:52

## 2018-01-01 RX ADMIN — METHADONE HYDROCHLORIDE 5 MG: 5 TABLET ORAL at 07:58

## 2018-01-01 RX ADMIN — PANTOPRAZOLE SODIUM 40 MG: 40 INJECTION, POWDER, FOR SOLUTION INTRAVENOUS at 19:40

## 2018-01-01 RX ADMIN — DAPTOMYCIN 400 MG: 500 INJECTION, POWDER, LYOPHILIZED, FOR SOLUTION INTRAVENOUS at 18:26

## 2018-01-01 RX ADMIN — PANCRELIPASE 48000 UNITS: 24000; 76000; 120000 CAPSULE, DELAYED RELEASE PELLETS ORAL at 20:06

## 2018-01-01 RX ADMIN — PANCRELIPASE 48000 UNITS: 24000; 76000; 120000 CAPSULE, DELAYED RELEASE PELLETS ORAL at 18:34

## 2018-01-01 RX ADMIN — PANTOPRAZOLE SODIUM 40 MG: 40 INJECTION, POWDER, FOR SOLUTION INTRAVENOUS at 09:52

## 2018-01-01 RX ADMIN — PANCRELIPASE 48000 UNITS: 24000; 76000; 120000 CAPSULE, DELAYED RELEASE PELLETS ORAL at 19:31

## 2018-01-01 RX ADMIN — HYDROMORPHONE HYDROCHLORIDE 2 MG: 2 TABLET ORAL at 11:33

## 2018-01-01 RX ADMIN — HYDROMORPHONE HYDROCHLORIDE 2 MG: 2 TABLET ORAL at 17:56

## 2018-01-01 RX ADMIN — SERTRALINE HYDROCHLORIDE 50 MG: 50 TABLET ORAL at 09:38

## 2018-01-01 RX ADMIN — PANTOPRAZOLE SODIUM 40 MG: 40 TABLET, DELAYED RELEASE ORAL at 08:52

## 2018-01-01 RX ADMIN — PIPERACILLIN SODIUM AND TAZOBACTAM SODIUM 4.5 G: 4; .5 INJECTION, POWDER, LYOPHILIZED, FOR SOLUTION INTRAVENOUS at 03:24

## 2018-01-01 RX ADMIN — WATER 1 G: 1 INJECTION INTRAMUSCULAR; INTRAVENOUS; SUBCUTANEOUS at 17:40

## 2018-01-01 RX ADMIN — DEXTROSE MONOHYDRATE: 50 INJECTION, SOLUTION INTRAVENOUS at 16:36

## 2018-01-01 RX ADMIN — POTASSIUM CHLORIDE, DEXTROSE MONOHYDRATE AND SODIUM CHLORIDE: 150; 5; 450 INJECTION, SOLUTION INTRAVENOUS at 15:28

## 2018-01-01 RX ADMIN — FUROSEMIDE 20 MG: 20 TABLET ORAL at 08:07

## 2018-01-01 RX ADMIN — METHADONE HYDROCHLORIDE 5 MG: 5 TABLET ORAL at 22:17

## 2018-01-01 RX ADMIN — Medication 1 CAPSULE: at 08:05

## 2018-01-01 RX ADMIN — ALBUMIN HUMAN 100 G: 0.25 SOLUTION INTRAVENOUS at 15:08

## 2018-01-01 RX ADMIN — VANCOMYCIN HYDROCHLORIDE 1250 MG: 10 INJECTION, POWDER, LYOPHILIZED, FOR SOLUTION INTRAVENOUS at 11:34

## 2018-01-01 RX ADMIN — MEROPENEM 1 G: 1 INJECTION, POWDER, FOR SOLUTION INTRAVENOUS at 15:24

## 2018-01-01 RX ADMIN — METHADONE HYDROCHLORIDE 5 MG: 5 TABLET ORAL at 21:05

## 2018-01-01 RX ADMIN — FLUCONAZOLE 400 MG: 200 TABLET ORAL at 07:59

## 2018-01-01 RX ADMIN — INSULIN ASPART 2 UNITS: 100 INJECTION, SOLUTION INTRAVENOUS; SUBCUTANEOUS at 21:13

## 2018-01-01 RX ADMIN — INSULIN ASPART 1 UNITS: 100 INJECTION, SOLUTION INTRAVENOUS; SUBCUTANEOUS at 12:16

## 2018-01-01 RX ADMIN — PROPOFOL 125 MG: 10 INJECTION, EMULSION INTRAVENOUS at 08:59

## 2018-01-01 RX ADMIN — HYDROMORPHONE HYDROCHLORIDE 4 MG: 2 TABLET ORAL at 15:00

## 2018-01-01 RX ADMIN — PHENYLEPHRINE HYDROCHLORIDE 100 MCG: 10 INJECTION, SOLUTION INTRAMUSCULAR; INTRAVENOUS; SUBCUTANEOUS at 13:00

## 2018-01-01 RX ADMIN — FENTANYL CITRATE 25 MCG: 50 INJECTION, SOLUTION INTRAMUSCULAR; INTRAVENOUS at 15:44

## 2018-01-01 RX ADMIN — WATER 1 G: 1 INJECTION INTRAMUSCULAR; INTRAVENOUS; SUBCUTANEOUS at 17:27

## 2018-01-01 RX ADMIN — SERTRALINE HYDROCHLORIDE 50 MG: 50 TABLET ORAL at 08:24

## 2018-01-01 RX ADMIN — INSULIN ASPART 1 UNITS: 100 INJECTION, SOLUTION INTRAVENOUS; SUBCUTANEOUS at 18:27

## 2018-01-01 RX ADMIN — METHADONE HYDROCHLORIDE 5 MG: 5 TABLET ORAL at 14:59

## 2018-01-01 RX ADMIN — FENTANYL CITRATE 50 MCG: 50 INJECTION INTRAMUSCULAR; INTRAVENOUS at 15:51

## 2018-01-01 RX ADMIN — PHENYLEPHRINE HYDROCHLORIDE 100 MCG: 10 INJECTION, SOLUTION INTRAMUSCULAR; INTRAVENOUS; SUBCUTANEOUS at 18:41

## 2018-01-01 RX ADMIN — SODIUM CHLORIDE 8 MG/HR: 9 INJECTION, SOLUTION INTRAVENOUS at 04:45

## 2018-01-01 RX ADMIN — INSULIN ASPART 4 UNITS: 100 INJECTION, SOLUTION INTRAVENOUS; SUBCUTANEOUS at 22:35

## 2018-01-01 RX ADMIN — PANTOPRAZOLE SODIUM 40 MG: 40 INJECTION, POWDER, FOR SOLUTION INTRAVENOUS at 10:48

## 2018-01-01 RX ADMIN — DEXTROSE MONOHYDRATE 25 ML: 500 INJECTION PARENTERAL at 09:06

## 2018-01-01 RX ADMIN — PANCRELIPASE 48000 UNITS: 24000; 76000; 120000 CAPSULE, DELAYED RELEASE PELLETS ORAL at 16:25

## 2018-01-01 RX ADMIN — INSULIN ASPART 1 UNITS: 100 INJECTION, SOLUTION INTRAVENOUS; SUBCUTANEOUS at 12:42

## 2018-01-01 RX ADMIN — POTASSIUM CHLORIDE 20 MEQ: 29.8 INJECTION, SOLUTION INTRAVENOUS at 05:14

## 2018-01-01 RX ADMIN — INSULIN ASPART 1 UNITS: 100 INJECTION, SOLUTION INTRAVENOUS; SUBCUTANEOUS at 07:19

## 2018-01-01 RX ADMIN — METHADONE HYDROCHLORIDE 5 MG: 5 TABLET ORAL at 13:28

## 2018-01-01 RX ADMIN — Medication 0.2 MG: at 13:18

## 2018-01-01 RX ADMIN — Medication 1 CAPSULE: at 08:16

## 2018-01-01 RX ADMIN — FUROSEMIDE 40 MG: 10 INJECTION, SOLUTION INTRAVENOUS at 23:02

## 2018-01-01 RX ADMIN — PANTOPRAZOLE SODIUM 40 MG: 40 TABLET, DELAYED RELEASE ORAL at 22:40

## 2018-01-01 RX ADMIN — FUROSEMIDE 40 MG: 10 INJECTION, SOLUTION INTRAVENOUS at 19:16

## 2018-01-01 RX ADMIN — METHADONE HYDROCHLORIDE 5 MG: 5 TABLET ORAL at 14:19

## 2018-01-01 RX ADMIN — MEROPENEM 1 G: 1 INJECTION, POWDER, FOR SOLUTION INTRAVENOUS at 06:27

## 2018-01-01 RX ADMIN — POTASSIUM CHLORIDE 20 MEQ: 750 TABLET, EXTENDED RELEASE ORAL at 08:43

## 2018-01-01 RX ADMIN — PIPERACILLIN SODIUM AND TAZOBACTAM SODIUM 4.5 G: 4; .5 INJECTION, POWDER, LYOPHILIZED, FOR SOLUTION INTRAVENOUS at 12:47

## 2018-01-01 RX ADMIN — SODIUM CHLORIDE 1000 ML: 9 INJECTION, SOLUTION INTRAVENOUS at 02:04

## 2018-01-01 RX ADMIN — Medication 250 MG: at 09:27

## 2018-01-01 RX ADMIN — Medication 10 MEQ: at 00:01

## 2018-01-01 RX ADMIN — METHADONE HYDROCHLORIDE 5 MG: 5 TABLET ORAL at 21:13

## 2018-01-01 RX ADMIN — LEVOFLOXACIN 750 MG: 5 INJECTION, SOLUTION INTRAVENOUS at 18:53

## 2018-01-01 RX ADMIN — FLUMAZENIL 0.5 MG: 0.1 INJECTION, SOLUTION INTRAVENOUS at 19:51

## 2018-01-01 RX ADMIN — MICONAZOLE NITRATE: 20 CREAM TOPICAL at 21:07

## 2018-01-01 RX ADMIN — PANCRELIPASE 48000 UNITS: 24000; 76000; 120000 CAPSULE, DELAYED RELEASE PELLETS ORAL at 17:36

## 2018-01-01 RX ADMIN — HYDROMORPHONE HYDROCHLORIDE 2 MG: 2 TABLET ORAL at 22:24

## 2018-01-01 RX ADMIN — PIPERACILLIN SODIUM AND TAZOBACTAM SODIUM 4.5 G: 4; .5 INJECTION, POWDER, LYOPHILIZED, FOR SOLUTION INTRAVENOUS at 00:55

## 2018-01-01 RX ADMIN — Medication 5 MG: at 17:52

## 2018-01-01 RX ADMIN — Medication 250 MG: at 18:26

## 2018-01-01 RX ADMIN — INSULIN ASPART 2 UNITS: 100 INJECTION, SOLUTION INTRAVENOUS; SUBCUTANEOUS at 21:04

## 2018-01-01 RX ADMIN — PANCRELIPASE 48000 UNITS: 24000; 76000; 120000 CAPSULE, DELAYED RELEASE PELLETS ORAL at 00:16

## 2018-01-01 RX ADMIN — DEXTROSE MONOHYDRATE 25 ML: 500 INJECTION PARENTERAL at 11:56

## 2018-01-01 RX ADMIN — PHENYLEPHRINE HYDROCHLORIDE 100 MCG: 10 INJECTION, SOLUTION INTRAMUSCULAR; INTRAVENOUS; SUBCUTANEOUS at 12:47

## 2018-01-01 RX ADMIN — PHENYLEPHRINE HYDROCHLORIDE 200 MCG: 10 INJECTION, SOLUTION INTRAMUSCULAR; INTRAVENOUS; SUBCUTANEOUS at 12:37

## 2018-01-01 RX ADMIN — DEXAMETHASONE SODIUM PHOSPHATE 4 MG: 4 INJECTION, SOLUTION INTRA-ARTICULAR; INTRALESIONAL; INTRAMUSCULAR; INTRAVENOUS; SOFT TISSUE at 09:10

## 2018-01-01 RX ADMIN — METHADONE HYDROCHLORIDE 5 MG: 5 TABLET ORAL at 13:47

## 2018-01-01 RX ADMIN — PROPOFOL 120 MG: 10 INJECTION, EMULSION INTRAVENOUS at 15:16

## 2018-01-01 RX ADMIN — PANCRELIPASE 48000 UNITS: 24000; 76000; 120000 CAPSULE, DELAYED RELEASE PELLETS ORAL at 23:02

## 2018-01-01 RX ADMIN — CALCIUM GLUCONATE 2 G: 98 INJECTION, SOLUTION INTRAVENOUS at 07:44

## 2018-01-01 RX ADMIN — FLUCONAZOLE 400 MG: 2 INJECTION INTRAVENOUS at 21:22

## 2018-01-01 RX ADMIN — PANTOPRAZOLE SODIUM 40 MG: 40 INJECTION, POWDER, FOR SOLUTION INTRAVENOUS at 20:18

## 2018-01-01 RX ADMIN — INSULIN ASPART 3 UNITS: 100 INJECTION, SOLUTION INTRAVENOUS; SUBCUTANEOUS at 10:40

## 2018-01-01 RX ADMIN — VANCOMYCIN HYDROCHLORIDE 1250 MG: 10 INJECTION, POWDER, LYOPHILIZED, FOR SOLUTION INTRAVENOUS at 11:39

## 2018-01-01 RX ADMIN — FLUCONAZOLE 400 MG: 200 TABLET ORAL at 08:19

## 2018-01-01 RX ADMIN — VANCOMYCIN HYDROCHLORIDE 1000 MG: 1 INJECTION, SOLUTION INTRAVENOUS at 17:25

## 2018-01-01 RX ADMIN — HYDROMORPHONE HYDROCHLORIDE 0.2 MG: 1 INJECTION, SOLUTION INTRAMUSCULAR; INTRAVENOUS; SUBCUTANEOUS at 09:44

## 2018-01-01 RX ADMIN — INSULIN ASPART 1 UNITS: 100 INJECTION, SOLUTION INTRAVENOUS; SUBCUTANEOUS at 19:31

## 2018-01-01 RX ADMIN — PANTOPRAZOLE SODIUM 40 MG: 40 TABLET, DELAYED RELEASE ORAL at 20:56

## 2018-01-01 RX ADMIN — Medication 10 MEQ: at 05:50

## 2018-01-01 RX ADMIN — INSULIN ASPART 1 UNITS: 100 INJECTION, SOLUTION INTRAVENOUS; SUBCUTANEOUS at 08:08

## 2018-01-01 RX ADMIN — METHADONE HYDROCHLORIDE 5 MG: 5 TABLET ORAL at 13:45

## 2018-01-01 RX ADMIN — METHADONE HYDROCHLORIDE 5 MG: 5 TABLET ORAL at 02:39

## 2018-01-01 RX ADMIN — SPIRONOLACTONE 50 MG: 50 TABLET ORAL at 08:04

## 2018-01-01 RX ADMIN — HYDROMORPHONE HYDROCHLORIDE 4 MG: 2 TABLET ORAL at 08:39

## 2018-01-01 RX ADMIN — PANTOPRAZOLE SODIUM 40 MG: 40 INJECTION, POWDER, FOR SOLUTION INTRAVENOUS at 08:09

## 2018-01-01 RX ADMIN — SODIUM CHLORIDE 8 MG/HR: 9 INJECTION, SOLUTION INTRAVENOUS at 12:12

## 2018-01-01 RX ADMIN — POTASSIUM CHLORIDE 40 MEQ: 750 TABLET, EXTENDED RELEASE ORAL at 09:38

## 2018-01-01 RX ADMIN — DEXTROSE MONOHYDRATE 1000 ML: 50 INJECTION, SOLUTION INTRAVENOUS at 09:41

## 2018-01-01 RX ADMIN — METHADONE HYDROCHLORIDE 5 MG: 5 TABLET ORAL at 08:34

## 2018-01-01 RX ADMIN — ONDANSETRON 4 MG: 4 TABLET, ORALLY DISINTEGRATING ORAL at 03:45

## 2018-01-01 RX ADMIN — POTASSIUM CHLORIDE 40 MEQ: 1.5 POWDER, FOR SOLUTION ORAL at 08:38

## 2018-01-01 RX ADMIN — HYDROMORPHONE HYDROCHLORIDE 4 MG: 2 TABLET ORAL at 13:40

## 2018-01-01 RX ADMIN — HYDROMORPHONE HYDROCHLORIDE 2 MG: 2 TABLET ORAL at 21:14

## 2018-01-01 RX ADMIN — SODIUM CHLORIDE: 9 INJECTION, SOLUTION INTRAVENOUS at 17:04

## 2018-01-01 RX ADMIN — Medication 1 CAPSULE: at 08:40

## 2018-01-01 RX ADMIN — PHYTONADIONE 10 MG: 5 TABLET ORAL at 09:44

## 2018-01-01 RX ADMIN — FENTANYL CITRATE 25 MCG: 50 INJECTION, SOLUTION INTRAMUSCULAR; INTRAVENOUS at 17:38

## 2018-01-01 RX ADMIN — SODIUM BICARBONATE 325 MG: 325 TABLET ORAL at 00:14

## 2018-01-01 RX ADMIN — HYDROMORPHONE HYDROCHLORIDE 4 MG: 4 TABLET ORAL at 22:29

## 2018-01-01 RX ADMIN — OCTREOTIDE ACETATE 50 MCG/HR: 200 INJECTION, SOLUTION INTRAVENOUS; SUBCUTANEOUS at 21:15

## 2018-01-01 RX ADMIN — PANCRELIPASE 48000 UNITS: 24000; 76000; 120000 CAPSULE, DELAYED RELEASE PELLETS ORAL at 14:25

## 2018-01-01 RX ADMIN — METHADONE HYDROCHLORIDE 5 MG: 5 TABLET ORAL at 14:15

## 2018-01-01 RX ADMIN — ONDANSETRON 4 MG: 2 INJECTION INTRAMUSCULAR; INTRAVENOUS at 11:05

## 2018-01-01 RX ADMIN — PANCRELIPASE 48000 UNITS: 24000; 76000; 120000 CAPSULE, DELAYED RELEASE PELLETS ORAL at 18:45

## 2018-01-01 RX ADMIN — PANCRELIPASE 48000 UNITS: 24000; 76000; 120000 CAPSULE, DELAYED RELEASE PELLETS ORAL at 08:40

## 2018-01-01 RX ADMIN — VANCOMYCIN HYDROCHLORIDE 1000 MG: 1 INJECTION, SOLUTION INTRAVENOUS at 17:35

## 2018-01-01 RX ADMIN — Medication 2 G: at 06:39

## 2018-01-01 RX ADMIN — INSULIN ASPART 1 UNITS: 100 INJECTION, SOLUTION INTRAVENOUS; SUBCUTANEOUS at 01:03

## 2018-01-01 RX ADMIN — METHADONE HYDROCHLORIDE 5 MG: 5 TABLET ORAL at 08:52

## 2018-01-01 RX ADMIN — SPIRONOLACTONE 50 MG: 50 TABLET ORAL at 07:56

## 2018-01-01 RX ADMIN — METHADONE HYDROCHLORIDE 5 MG: 5 TABLET ORAL at 20:14

## 2018-01-01 RX ADMIN — SODIUM CHLORIDE, PRESERVATIVE FREE 5 ML: 5 INJECTION INTRAVENOUS at 07:04

## 2018-01-01 RX ADMIN — PANTOPRAZOLE SODIUM 40 MG: 40 TABLET, DELAYED RELEASE ORAL at 19:25

## 2018-01-01 RX ADMIN — FLUCONAZOLE 400 MG: 2 INJECTION INTRAVENOUS at 16:43

## 2018-01-01 RX ADMIN — PANCRELIPASE 48000 UNITS: 24000; 76000; 120000 CAPSULE, DELAYED RELEASE PELLETS ORAL at 07:59

## 2018-01-01 RX ADMIN — HYDROMORPHONE HYDROCHLORIDE 4 MG: 2 TABLET ORAL at 01:08

## 2018-01-01 RX ADMIN — SERTRALINE HYDROCHLORIDE 50 MG: 50 TABLET ORAL at 08:53

## 2018-01-01 RX ADMIN — PANCRELIPASE 48000 UNITS: 24000; 76000; 120000 CAPSULE, DELAYED RELEASE PELLETS ORAL at 11:49

## 2018-01-01 RX ADMIN — FLUCONAZOLE 400 MG: 2 INJECTION INTRAVENOUS at 02:35

## 2018-01-01 RX ADMIN — INSULIN ASPART 1 UNITS: 100 INJECTION, SOLUTION INTRAVENOUS; SUBCUTANEOUS at 03:07

## 2018-01-01 RX ADMIN — PANCRELIPASE 48000 UNITS: 24000; 76000; 120000 CAPSULE, DELAYED RELEASE PELLETS ORAL at 18:27

## 2018-01-01 RX ADMIN — METHADONE HYDROCHLORIDE 5 MG: 5 TABLET ORAL at 14:25

## 2018-01-01 RX ADMIN — CALCIUM GLUCONATE 2 G: 98 INJECTION, SOLUTION INTRAVENOUS at 11:11

## 2018-01-01 RX ADMIN — MICAFUNGIN SODIUM 100 MG: 10 INJECTION, POWDER, LYOPHILIZED, FOR SOLUTION INTRAVENOUS at 20:36

## 2018-01-01 RX ADMIN — PHENYLEPHRINE HYDROCHLORIDE 100 MCG: 10 INJECTION, SOLUTION INTRAMUSCULAR; INTRAVENOUS; SUBCUTANEOUS at 12:34

## 2018-01-01 RX ADMIN — PANTOPRAZOLE SODIUM 40 MG: 40 INJECTION, POWDER, FOR SOLUTION INTRAVENOUS at 08:25

## 2018-01-01 RX ADMIN — ALBUMIN HUMAN 75 G: 0.25 SOLUTION INTRAVENOUS at 11:38

## 2018-01-01 RX ADMIN — PIPERACILLIN SODIUM AND TAZOBACTAM SODIUM 4.5 G: 4; .5 INJECTION, POWDER, LYOPHILIZED, FOR SOLUTION INTRAVENOUS at 21:28

## 2018-01-01 RX ADMIN — POTASSIUM CHLORIDE 40 MEQ: 1.5 POWDER, FOR SOLUTION ORAL at 18:59

## 2018-01-01 RX ADMIN — PIPERACILLIN SODIUM AND TAZOBACTAM SODIUM 4.5 G: 4; .5 INJECTION, POWDER, LYOPHILIZED, FOR SOLUTION INTRAVENOUS at 00:07

## 2018-01-01 RX ADMIN — DEXTROSE MONOHYDRATE 25 ML: 25 INJECTION, SOLUTION INTRAVENOUS at 08:12

## 2018-01-01 RX ADMIN — DEXTROSE MONOHYDRATE 50 ML: 500 INJECTION PARENTERAL at 06:27

## 2018-01-01 RX ADMIN — Medication 250 MG: at 08:03

## 2018-01-01 RX ADMIN — METHADONE HYDROCHLORIDE 5 MG: 5 TABLET ORAL at 13:06

## 2018-01-01 RX ADMIN — ONDANSETRON 4 MG: 2 INJECTION INTRAMUSCULAR; INTRAVENOUS at 09:40

## 2018-01-01 RX ADMIN — PHENYLEPHRINE HYDROCHLORIDE 100 MCG: 10 INJECTION, SOLUTION INTRAMUSCULAR; INTRAVENOUS; SUBCUTANEOUS at 12:54

## 2018-01-01 RX ADMIN — VANCOMYCIN HYDROCHLORIDE 1500 MG: 10 INJECTION, POWDER, LYOPHILIZED, FOR SOLUTION INTRAVENOUS at 09:15

## 2018-01-01 RX ADMIN — INSULIN ASPART 1 UNITS: 100 INJECTION, SOLUTION INTRAVENOUS; SUBCUTANEOUS at 01:06

## 2018-01-01 RX ADMIN — AMLODIPINE BESYLATE 5 MG: 5 TABLET ORAL at 11:54

## 2018-01-01 RX ADMIN — PANTOPRAZOLE SODIUM 40 MG: 40 INJECTION, POWDER, FOR SOLUTION INTRAVENOUS at 07:48

## 2018-01-01 RX ADMIN — SODIUM CHLORIDE, POTASSIUM CHLORIDE, SODIUM LACTATE AND CALCIUM CHLORIDE: 600; 310; 30; 20 INJECTION, SOLUTION INTRAVENOUS at 17:22

## 2018-01-01 RX ADMIN — MICAFUNGIN SODIUM 100 MG: 10 INJECTION, POWDER, LYOPHILIZED, FOR SOLUTION INTRAVENOUS at 20:56

## 2018-01-01 RX ADMIN — PANCRELIPASE 48000 UNITS: 24000; 76000; 120000 CAPSULE, DELAYED RELEASE PELLETS ORAL at 19:19

## 2018-01-01 RX ADMIN — PHENYLEPHRINE HYDROCHLORIDE 100 MCG: 10 INJECTION, SOLUTION INTRAMUSCULAR; INTRAVENOUS; SUBCUTANEOUS at 16:32

## 2018-01-01 RX ADMIN — MICONAZOLE NITRATE: 20 CREAM TOPICAL at 07:55

## 2018-01-01 RX ADMIN — VANCOMYCIN HYDROCHLORIDE 1500 MG: 10 INJECTION, POWDER, LYOPHILIZED, FOR SOLUTION INTRAVENOUS at 22:24

## 2018-01-01 RX ADMIN — Medication 10 MEQ: at 06:54

## 2018-01-01 RX ADMIN — PANCRELIPASE 48000 UNITS: 24000; 76000; 120000 CAPSULE, DELAYED RELEASE PELLETS ORAL at 11:27

## 2018-01-01 RX ADMIN — OCTREOTIDE ACETATE 50 MCG/HR: 200 INJECTION, SOLUTION INTRAVENOUS; SUBCUTANEOUS at 17:58

## 2018-01-01 RX ADMIN — INSULIN ASPART 1 UNITS: 100 INJECTION, SOLUTION INTRAVENOUS; SUBCUTANEOUS at 22:16

## 2018-01-01 RX ADMIN — DEXTROSE MONOHYDRATE: 50 INJECTION, SOLUTION INTRAVENOUS at 06:39

## 2018-01-01 RX ADMIN — DEXTROSE AND SODIUM CHLORIDE: 5; 900 INJECTION, SOLUTION INTRAVENOUS at 04:48

## 2018-01-01 RX ADMIN — PANTOPRAZOLE SODIUM 40 MG: 40 TABLET, DELAYED RELEASE ORAL at 08:08

## 2018-01-01 RX ADMIN — METHADONE HYDROCHLORIDE 5 MG: 5 TABLET ORAL at 20:09

## 2018-01-01 RX ADMIN — VANCOMYCIN HYDROCHLORIDE 1000 MG: 1 INJECTION, SOLUTION INTRAVENOUS at 16:48

## 2018-01-01 RX ADMIN — HYDROMORPHONE HYDROCHLORIDE 2 MG: 2 TABLET ORAL at 13:59

## 2018-01-01 RX ADMIN — SODIUM BICARBONATE 325 MG: 325 TABLET ORAL at 17:15

## 2018-01-01 RX ADMIN — HYDROMORPHONE HYDROCHLORIDE 2 MG: 2 TABLET ORAL at 08:14

## 2018-01-01 RX ADMIN — NALOXONE HYDROCHLORIDE 0.4 MG: 0.4 INJECTION, SOLUTION INTRAMUSCULAR; INTRAVENOUS; SUBCUTANEOUS at 19:47

## 2018-01-01 RX ADMIN — SERTRALINE HYDROCHLORIDE 50 MG: 50 TABLET ORAL at 08:29

## 2018-01-01 RX ADMIN — PANCRELIPASE 24000 UNITS: 24000; 76000; 120000 CAPSULE, DELAYED RELEASE PELLETS ORAL at 00:33

## 2018-01-01 RX ADMIN — INSULIN ASPART 1 UNITS: 100 INJECTION, SOLUTION INTRAVENOUS; SUBCUTANEOUS at 01:08

## 2018-01-01 RX ADMIN — ALBUMIN HUMAN 75 G: 0.25 SOLUTION INTRAVENOUS at 13:51

## 2018-01-01 RX ADMIN — SPIRONOLACTONE 50 MG: 50 TABLET ORAL at 09:39

## 2018-01-01 RX ADMIN — PIPERACILLIN SODIUM AND TAZOBACTAM SODIUM 3.38 G: 3; .375 INJECTION, POWDER, LYOPHILIZED, FOR SOLUTION INTRAVENOUS at 20:22

## 2018-01-01 RX ADMIN — HYDROMORPHONE HYDROCHLORIDE 0.2 MG: 1 INJECTION, SOLUTION INTRAMUSCULAR; INTRAVENOUS; SUBCUTANEOUS at 11:29

## 2018-01-01 RX ADMIN — PANCRELIPASE 48000 UNITS: 24000; 76000; 120000 CAPSULE, DELAYED RELEASE PELLETS ORAL at 17:39

## 2018-01-01 RX ADMIN — VANCOMYCIN HYDROCHLORIDE 1250 MG: 10 INJECTION, POWDER, LYOPHILIZED, FOR SOLUTION INTRAVENOUS at 12:40

## 2018-01-01 RX ADMIN — INSULIN ASPART 1 UNITS: 100 INJECTION, SOLUTION INTRAVENOUS; SUBCUTANEOUS at 05:21

## 2018-01-01 RX ADMIN — MEROPENEM 1 G: 1 INJECTION, POWDER, FOR SOLUTION INTRAVENOUS at 05:56

## 2018-01-01 RX ADMIN — PANCRELIPASE 48000 UNITS: 24000; 76000; 120000 CAPSULE, DELAYED RELEASE PELLETS ORAL at 08:23

## 2018-01-01 RX ADMIN — MICAFUNGIN SODIUM 100 MG: 10 INJECTION, POWDER, LYOPHILIZED, FOR SOLUTION INTRAVENOUS at 22:05

## 2018-01-01 RX ADMIN — PANTOPRAZOLE SODIUM 40 MG: 40 INJECTION, POWDER, FOR SOLUTION INTRAVENOUS at 09:32

## 2018-01-01 RX ADMIN — PANTOPRAZOLE SODIUM 40 MG: 40 TABLET, DELAYED RELEASE ORAL at 21:13

## 2018-01-01 RX ADMIN — HYDROMORPHONE HYDROCHLORIDE 2 MG: 2 TABLET ORAL at 10:12

## 2018-01-01 RX ADMIN — AMLODIPINE BESYLATE 5 MG: 5 TABLET ORAL at 08:08

## 2018-01-01 RX ADMIN — PHENYLEPHRINE HYDROCHLORIDE 200 MCG: 10 INJECTION, SOLUTION INTRAMUSCULAR; INTRAVENOUS; SUBCUTANEOUS at 15:28

## 2018-01-01 RX ADMIN — Medication 1 CAPSULE: at 08:29

## 2018-01-01 RX ADMIN — SODIUM CHLORIDE, PRESERVATIVE FREE 5 ML: 5 INJECTION INTRAVENOUS at 11:00

## 2018-01-01 RX ADMIN — HYDROMORPHONE HYDROCHLORIDE 0.2 MG: 1 INJECTION, SOLUTION INTRAMUSCULAR; INTRAVENOUS; SUBCUTANEOUS at 17:30

## 2018-01-01 RX ADMIN — IODIXANOL 50 ML: 320 INJECTION, SOLUTION INTRAVASCULAR at 11:11

## 2018-01-01 RX ADMIN — PANCRELIPASE 48000 UNITS: 24000; 76000; 120000 CAPSULE, DELAYED RELEASE PELLETS ORAL at 12:18

## 2018-01-01 RX ADMIN — FENTANYL CITRATE 50 MCG: 50 INJECTION, SOLUTION INTRAMUSCULAR; INTRAVENOUS at 15:16

## 2018-01-01 RX ADMIN — Medication 1 CAPSULE: at 07:58

## 2018-01-01 RX ADMIN — FLUCONAZOLE 400 MG: 2 INJECTION INTRAVENOUS at 00:15

## 2018-01-01 RX ADMIN — INSULIN ASPART 2 UNITS: 100 INJECTION, SOLUTION INTRAVENOUS; SUBCUTANEOUS at 09:52

## 2018-01-01 RX ADMIN — SODIUM BICARBONATE 325 MG: 325 TABLET ORAL at 16:00

## 2018-01-01 RX ADMIN — OCTREOTIDE ACETATE 50 MCG/HR: 200 INJECTION, SOLUTION INTRAVENOUS; SUBCUTANEOUS at 05:58

## 2018-01-01 RX ADMIN — FERROUS SULFATE 325 MG: 325 TABLET, FILM COATED ORAL at 07:47

## 2018-01-01 RX ADMIN — IODIXANOL 25 ML: 320 INJECTION, SOLUTION INTRAVASCULAR at 14:02

## 2018-01-01 RX ADMIN — PIPERACILLIN SODIUM AND TAZOBACTAM SODIUM 4.5 G: 4; .5 INJECTION, POWDER, LYOPHILIZED, FOR SOLUTION INTRAVENOUS at 18:19

## 2018-01-01 RX ADMIN — VANCOMYCIN HYDROCHLORIDE 1500 MG: 10 INJECTION, POWDER, LYOPHILIZED, FOR SOLUTION INTRAVENOUS at 22:12

## 2018-01-01 RX ADMIN — PANCRELIPASE 48000 UNITS: 24000; 76000; 120000 CAPSULE, DELAYED RELEASE PELLETS ORAL at 16:47

## 2018-01-01 RX ADMIN — PHENYLEPHRINE HYDROCHLORIDE 200 MCG: 10 INJECTION, SOLUTION INTRAMUSCULAR; INTRAVENOUS; SUBCUTANEOUS at 17:57

## 2018-01-01 RX ADMIN — METHADONE HYDROCHLORIDE 5 MG: 5 TABLET ORAL at 08:09

## 2018-01-01 RX ADMIN — FENTANYL CITRATE 50 MCG: 50 INJECTION, SOLUTION INTRAMUSCULAR; INTRAVENOUS at 08:58

## 2018-01-01 RX ADMIN — DEXTROSE MONOHYDRATE 25 ML: 500 INJECTION PARENTERAL at 09:20

## 2018-01-01 RX ADMIN — METHADONE HYDROCHLORIDE 5 MG: 5 TABLET ORAL at 19:25

## 2018-01-01 RX ADMIN — MEROPENEM 1 G: 1 INJECTION, POWDER, FOR SOLUTION INTRAVENOUS at 22:59

## 2018-01-01 RX ADMIN — LIDOCAINE HYDROCHLORIDE 6 ML: 10 INJECTION, SOLUTION EPIDURAL; INFILTRATION; INTRACAUDAL; PERINEURAL at 16:15

## 2018-01-01 RX ADMIN — IODIXANOL 10 ML: 320 INJECTION, SOLUTION INTRAVASCULAR at 10:31

## 2018-01-01 RX ADMIN — VANCOMYCIN HYDROCHLORIDE 1250 MG: 10 INJECTION, POWDER, LYOPHILIZED, FOR SOLUTION INTRAVENOUS at 13:37

## 2018-01-01 RX ADMIN — HYDROMORPHONE HYDROCHLORIDE 4 MG: 2 TABLET ORAL at 08:03

## 2018-01-01 RX ADMIN — ATROPINE SULFATE 2 DROP: 10 SOLUTION/ DROPS OPHTHALMIC at 22:29

## 2018-01-01 RX ADMIN — PANTOPRAZOLE SODIUM 40 MG: 40 INJECTION, POWDER, FOR SOLUTION INTRAVENOUS at 20:55

## 2018-01-01 RX ADMIN — ERTAPENEM SODIUM 1 G: 1 INJECTION, POWDER, LYOPHILIZED, FOR SOLUTION INTRAMUSCULAR; INTRAVENOUS at 23:06

## 2018-01-01 RX ADMIN — PANCRELIPASE 48000 UNITS: 24000; 76000; 120000 CAPSULE, DELAYED RELEASE PELLETS ORAL at 09:41

## 2018-01-01 RX ADMIN — HYDROMORPHONE HYDROCHLORIDE 4 MG: 4 TABLET ORAL at 08:43

## 2018-01-01 RX ADMIN — POTASSIUM PHOSPHATE, MONOBASIC AND POTASSIUM PHOSPHATE, DIBASIC 15 MMOL: 224; 236 INJECTION, SOLUTION INTRAVENOUS at 23:08

## 2018-01-01 RX ADMIN — PHENYLEPHRINE HYDROCHLORIDE 100 MCG: 10 INJECTION, SOLUTION INTRAMUSCULAR; INTRAVENOUS; SUBCUTANEOUS at 16:44

## 2018-01-01 RX ADMIN — POTASSIUM CHLORIDE 40 MEQ: 750 TABLET, EXTENDED RELEASE ORAL at 08:34

## 2018-01-01 RX ADMIN — PANTOPRAZOLE SODIUM 40 MG: 40 INJECTION, POWDER, FOR SOLUTION INTRAVENOUS at 08:34

## 2018-01-01 RX ADMIN — PHENYLEPHRINE HYDROCHLORIDE 100 MCG: 10 INJECTION, SOLUTION INTRAMUSCULAR; INTRAVENOUS; SUBCUTANEOUS at 12:41

## 2018-01-01 RX ADMIN — FENTANYL CITRATE 100 MCG: 50 INJECTION, SOLUTION INTRAMUSCULAR; INTRAVENOUS at 10:32

## 2018-01-01 RX ADMIN — POTASSIUM CHLORIDE 40 MEQ: 40 SOLUTION ORAL at 11:49

## 2018-01-01 RX ADMIN — INSULIN ASPART 1 UNITS: 100 INJECTION, SOLUTION INTRAVENOUS; SUBCUTANEOUS at 06:42

## 2018-01-01 RX ADMIN — HYDROMORPHONE HYDROCHLORIDE 1 MG: 2 TABLET ORAL at 00:58

## 2018-01-01 RX ADMIN — LIDOCAINE HYDROCHLORIDE 20 MG: 10 INJECTION, SOLUTION EPIDURAL; INFILTRATION; INTRACAUDAL; PERINEURAL at 18:05

## 2018-01-01 RX ADMIN — Medication 2 G: at 08:45

## 2018-01-01 RX ADMIN — Medication 250 MG: at 08:43

## 2018-01-01 RX ADMIN — PHENYLEPHRINE HYDROCHLORIDE 100 MCG: 10 INJECTION, SOLUTION INTRAMUSCULAR; INTRAVENOUS; SUBCUTANEOUS at 16:14

## 2018-01-01 RX ADMIN — PHENYLEPHRINE HYDROCHLORIDE 200 MCG: 10 INJECTION, SOLUTION INTRAMUSCULAR; INTRAVENOUS; SUBCUTANEOUS at 17:32

## 2018-01-01 RX ADMIN — POTASSIUM CHLORIDE 40 MEQ: 40 SOLUTION ORAL at 07:45

## 2018-01-01 RX ADMIN — SODIUM CHLORIDE, POTASSIUM CHLORIDE, SODIUM LACTATE AND CALCIUM CHLORIDE: 600; 310; 30; 20 INJECTION, SOLUTION INTRAVENOUS at 15:00

## 2018-01-01 RX ADMIN — OCTREOTIDE ACETATE 50 MCG/HR: 200 INJECTION, SOLUTION INTRAVENOUS; SUBCUTANEOUS at 23:02

## 2018-01-01 RX ADMIN — FUROSEMIDE 20 MG: 10 INJECTION, SOLUTION INTRAVENOUS at 09:26

## 2018-01-01 RX ADMIN — Medication 250 MG: at 07:46

## 2018-01-01 RX ADMIN — POTASSIUM CHLORIDE 20 MEQ: 20 SOLUTION ORAL at 03:04

## 2018-01-01 RX ADMIN — METHADONE HYDROCHLORIDE 5 MG: 5 TABLET ORAL at 08:42

## 2018-01-01 RX ADMIN — SODIUM CHLORIDE, POTASSIUM CHLORIDE, SODIUM LACTATE AND CALCIUM CHLORIDE: 600; 310; 30; 20 INJECTION, SOLUTION INTRAVENOUS at 09:06

## 2018-01-01 RX ADMIN — INSULIN ASPART 2 UNITS: 100 INJECTION, SOLUTION INTRAVENOUS; SUBCUTANEOUS at 21:45

## 2018-01-01 RX ADMIN — HYDROMORPHONE HYDROCHLORIDE 2 MG: 2 TABLET ORAL at 11:57

## 2018-01-01 RX ADMIN — PIPERACILLIN SODIUM AND TAZOBACTAM SODIUM 3.38 G: 3; .375 INJECTION, POWDER, LYOPHILIZED, FOR SOLUTION INTRAVENOUS at 01:44

## 2018-01-01 RX ADMIN — PANTOPRAZOLE SODIUM 40 MG: 40 INJECTION, POWDER, FOR SOLUTION INTRAVENOUS at 19:44

## 2018-01-01 RX ADMIN — SODIUM CHLORIDE, PRESERVATIVE FREE 73 ML: 5 INJECTION INTRAVENOUS at 12:06

## 2018-01-01 RX ADMIN — Medication 1 CAPSULE: at 08:34

## 2018-01-01 RX ADMIN — POTASSIUM CHLORIDE 40 MEQ: 1.5 POWDER, FOR SOLUTION ORAL at 10:09

## 2018-01-01 RX ADMIN — PANTOPRAZOLE SODIUM 40 MG: 40 TABLET, DELAYED RELEASE ORAL at 07:56

## 2018-01-01 RX ADMIN — AMLODIPINE BESYLATE 5 MG: 5 TABLET ORAL at 08:20

## 2018-01-01 RX ADMIN — Medication 27.5 MILLICURIE: at 11:45

## 2018-01-01 RX ADMIN — PIPERACILLIN SODIUM AND TAZOBACTAM SODIUM 4.5 G: 4; .5 INJECTION, POWDER, LYOPHILIZED, FOR SOLUTION INTRAVENOUS at 00:03

## 2018-01-01 RX ADMIN — HYDROMORPHONE HYDROCHLORIDE 2 MG: 2 TABLET ORAL at 02:36

## 2018-01-01 RX ADMIN — MEROPENEM 1 G: 1 INJECTION, POWDER, FOR SOLUTION INTRAVENOUS at 22:47

## 2018-01-01 RX ADMIN — SUGAMMADEX 200 MG: 100 INJECTION, SOLUTION INTRAVENOUS at 17:34

## 2018-01-01 RX ADMIN — PIPERACILLIN SODIUM AND TAZOBACTAM SODIUM 3.38 G: 3; .375 INJECTION, POWDER, LYOPHILIZED, FOR SOLUTION INTRAVENOUS at 17:33

## 2018-01-01 RX ADMIN — HYDROMORPHONE HYDROCHLORIDE 2 MG: 2 TABLET ORAL at 22:49

## 2018-01-01 RX ADMIN — PANCRELIPASE 48000 UNITS: 24000; 76000; 120000 CAPSULE, DELAYED RELEASE PELLETS ORAL at 08:10

## 2018-01-01 RX ADMIN — PANTOPRAZOLE SODIUM 40 MG: 40 INJECTION, POWDER, FOR SOLUTION INTRAVENOUS at 20:11

## 2018-01-01 RX ADMIN — HYDROMORPHONE HYDROCHLORIDE 4 MG: 2 TABLET ORAL at 02:51

## 2018-01-01 RX ADMIN — MICAFUNGIN SODIUM 100 MG: 10 INJECTION, POWDER, LYOPHILIZED, FOR SOLUTION INTRAVENOUS at 22:09

## 2018-01-01 RX ADMIN — Medication 1 CAPSULE: at 08:23

## 2018-01-01 RX ADMIN — VANCOMYCIN HYDROCHLORIDE 1500 MG: 10 INJECTION, POWDER, LYOPHILIZED, FOR SOLUTION INTRAVENOUS at 20:34

## 2018-01-01 RX ADMIN — MULTIVITAMIN 15 ML: LIQUID ORAL at 08:40

## 2018-01-01 RX ADMIN — Medication 1 CAPSULE: at 08:12

## 2018-01-01 RX ADMIN — Medication 250 MG: at 07:56

## 2018-01-01 RX ADMIN — FLUCONAZOLE 400 MG: 200 TABLET ORAL at 08:07

## 2018-01-01 RX ADMIN — INSULIN ASPART 1 UNITS: 100 INJECTION, SOLUTION INTRAVENOUS; SUBCUTANEOUS at 07:21

## 2018-01-01 RX ADMIN — DIATRIZOATE MEGLUMINE AND DIATRIZOATE SODIUM 20 ML: 660; 100 SOLUTION ORAL; RECTAL at 10:52

## 2018-01-01 RX ADMIN — PANTOPRAZOLE SODIUM 40 MG: 40 TABLET, DELAYED RELEASE ORAL at 07:59

## 2018-01-01 RX ADMIN — HYDROMORPHONE HYDROCHLORIDE 0.2 MG: 1 INJECTION, SOLUTION INTRAMUSCULAR; INTRAVENOUS; SUBCUTANEOUS at 16:33

## 2018-01-01 RX ADMIN — HYDROMORPHONE HYDROCHLORIDE 4 MG: 2 TABLET ORAL at 14:57

## 2018-01-01 RX ADMIN — PIPERACILLIN SODIUM AND TAZOBACTAM SODIUM 4.5 G: 4; .5 INJECTION, POWDER, LYOPHILIZED, FOR SOLUTION INTRAVENOUS at 09:53

## 2018-01-01 RX ADMIN — CALCIUM GLUCONATE 1 G: 98 INJECTION, SOLUTION INTRAVENOUS at 05:13

## 2018-01-01 RX ADMIN — INSULIN ASPART 3 UNITS: 100 INJECTION, SOLUTION INTRAVENOUS; SUBCUTANEOUS at 21:43

## 2018-01-01 RX ADMIN — PANTOPRAZOLE SODIUM 40 MG: 40 INJECTION, POWDER, FOR SOLUTION INTRAVENOUS at 20:07

## 2018-01-01 RX ADMIN — Medication 2 G: at 01:33

## 2018-01-01 RX ADMIN — SERTRALINE HYDROCHLORIDE 50 MG: 50 TABLET ORAL at 09:52

## 2018-01-01 RX ADMIN — WATER 1 G: 1 INJECTION INTRAMUSCULAR; INTRAVENOUS; SUBCUTANEOUS at 17:25

## 2018-01-01 RX ADMIN — HYDROMORPHONE HYDROCHLORIDE 2 MG: 2 TABLET ORAL at 18:28

## 2018-01-01 RX ADMIN — PIPERACILLIN SODIUM AND TAZOBACTAM SODIUM 4.5 G: 4; .5 INJECTION, POWDER, LYOPHILIZED, FOR SOLUTION INTRAVENOUS at 18:40

## 2018-01-01 RX ADMIN — PANTOPRAZOLE SODIUM 40 MG: 40 INJECTION, POWDER, FOR SOLUTION INTRAVENOUS at 20:14

## 2018-01-01 RX ADMIN — METHADONE HYDROCHLORIDE 5 MG: 5 TABLET ORAL at 13:36

## 2018-01-01 RX ADMIN — HYDROMORPHONE HYDROCHLORIDE 2 MG: 2 TABLET ORAL at 14:18

## 2018-01-01 RX ADMIN — SERTRALINE HYDROCHLORIDE 50 MG: 50 TABLET ORAL at 09:27

## 2018-01-01 RX ADMIN — Medication 1 CAPSULE: at 07:59

## 2018-01-01 RX ADMIN — HYDROMORPHONE HYDROCHLORIDE 2 MG: 2 TABLET ORAL at 13:51

## 2018-01-01 RX ADMIN — CALCIUM GLUCONATE 4 G: 98 INJECTION, SOLUTION INTRAVENOUS at 02:04

## 2018-01-01 RX ADMIN — ROCURONIUM BROMIDE 50 MG: 10 INJECTION INTRAVENOUS at 17:31

## 2018-01-01 RX ADMIN — HYDROMORPHONE HYDROCHLORIDE 0.2 MG: 1 INJECTION, SOLUTION INTRAMUSCULAR; INTRAVENOUS; SUBCUTANEOUS at 01:53

## 2018-01-01 RX ADMIN — Medication 2 G: at 02:04

## 2018-01-01 RX ADMIN — PANTOPRAZOLE SODIUM 40 MG: 40 TABLET, DELAYED RELEASE ORAL at 08:25

## 2018-01-01 RX ADMIN — PHENYLEPHRINE HYDROCHLORIDE 200 MCG: 10 INJECTION, SOLUTION INTRAMUSCULAR; INTRAVENOUS; SUBCUTANEOUS at 16:54

## 2018-01-01 RX ADMIN — MAGNESIUM SULFATE IN DEXTROSE 1 G: 10 INJECTION, SOLUTION INTRAVENOUS at 11:16

## 2018-01-01 RX ADMIN — SODIUM BICARBONATE 325 MG: 325 TABLET ORAL at 08:39

## 2018-01-01 RX ADMIN — METHADONE HYDROCHLORIDE 5 MG: 5 TABLET ORAL at 08:24

## 2018-01-01 RX ADMIN — KETAMINE HCL-NACL SOLN PREF SY 50 MG/5ML-0.9% (10MG/ML) 40 MG: 10 SOLUTION PREFILLED SYRINGE at 09:07

## 2018-01-01 RX ADMIN — MEROPENEM 1 G: 1 INJECTION, POWDER, FOR SOLUTION INTRAVENOUS at 15:00

## 2018-01-01 RX ADMIN — INSULIN ASPART 4 UNITS: 100 INJECTION, SOLUTION INTRAVENOUS; SUBCUTANEOUS at 17:12

## 2018-01-01 RX ADMIN — DEXTROSE MONOHYDRATE 25 ML: 25 INJECTION, SOLUTION INTRAVENOUS at 06:49

## 2018-01-01 RX ADMIN — Medication 50000 UNITS: at 12:49

## 2018-01-01 RX ADMIN — SPIRONOLACTONE 50 MG: 50 TABLET ORAL at 08:16

## 2018-01-01 RX ADMIN — FENTANYL CITRATE 25 MCG: 50 INJECTION, SOLUTION INTRAMUSCULAR; INTRAVENOUS at 15:52

## 2018-01-01 RX ADMIN — SERTRALINE HYDROCHLORIDE 50 MG: 50 TABLET ORAL at 08:16

## 2018-01-01 RX ADMIN — METHADONE HYDROCHLORIDE 5 MG: 5 TABLET ORAL at 07:46

## 2018-01-01 RX ADMIN — ERTAPENEM SODIUM 1 G: 1 INJECTION, POWDER, LYOPHILIZED, FOR SOLUTION INTRAMUSCULAR; INTRAVENOUS at 01:35

## 2018-01-01 RX ADMIN — ERTAPENEM SODIUM 1 G: 1 INJECTION, POWDER, LYOPHILIZED, FOR SOLUTION INTRAMUSCULAR; INTRAVENOUS at 22:31

## 2018-01-01 RX ADMIN — LIDOCAINE HYDROCHLORIDE 10 ML: 10 INJECTION, SOLUTION INFILTRATION; PERINEURAL at 12:51

## 2018-01-01 RX ADMIN — LIDOCAINE HYDROCHLORIDE 4 ML: 10 INJECTION, SOLUTION EPIDURAL; INFILTRATION; INTRACAUDAL; PERINEURAL at 11:33

## 2018-01-01 RX ADMIN — Medication 0.4 MG: at 00:15

## 2018-01-01 RX ADMIN — Medication 2 G: at 07:03

## 2018-01-01 RX ADMIN — HYDROMORPHONE HYDROCHLORIDE 2 MG: 2 TABLET ORAL at 20:34

## 2018-01-01 RX ADMIN — WATER 1 G: 1 INJECTION INTRAMUSCULAR; INTRAVENOUS; SUBCUTANEOUS at 16:39

## 2018-01-01 RX ADMIN — Medication 250 MG: at 08:19

## 2018-01-01 RX ADMIN — LEVOFLOXACIN 750 MG: 5 INJECTION, SOLUTION INTRAVENOUS at 18:13

## 2018-01-01 RX ADMIN — HYDROMORPHONE HYDROCHLORIDE 4 MG: 2 TABLET ORAL at 11:27

## 2018-01-01 RX ADMIN — PANTOPRAZOLE SODIUM 40 MG: 40 TABLET, DELAYED RELEASE ORAL at 20:09

## 2018-01-01 RX ADMIN — PANTOPRAZOLE SODIUM 40 MG: 40 INJECTION, POWDER, FOR SOLUTION INTRAVENOUS at 10:42

## 2018-01-01 RX ADMIN — SODIUM CHLORIDE, POTASSIUM CHLORIDE, SODIUM LACTATE AND CALCIUM CHLORIDE: 600; 310; 30; 20 INJECTION, SOLUTION INTRAVENOUS at 12:20

## 2018-01-01 RX ADMIN — HYDROMORPHONE HYDROCHLORIDE 2 MG: 2 TABLET ORAL at 21:13

## 2018-01-01 RX ADMIN — MICONAZOLE NITRATE: 20 CREAM TOPICAL at 10:29

## 2018-01-01 RX ADMIN — INSULIN ASPART 1 UNITS: 100 INJECTION, SOLUTION INTRAVENOUS; SUBCUTANEOUS at 02:16

## 2018-01-01 RX ADMIN — SODIUM CHLORIDE, POTASSIUM CHLORIDE, SODIUM LACTATE AND CALCIUM CHLORIDE: 600; 310; 30; 20 INJECTION, SOLUTION INTRAVENOUS at 02:05

## 2018-01-01 RX ADMIN — PHENYLEPHRINE HYDROCHLORIDE 200 MCG: 10 INJECTION, SOLUTION INTRAMUSCULAR; INTRAVENOUS; SUBCUTANEOUS at 18:15

## 2018-01-01 RX ADMIN — ERGOCALCIFEROL 50000 UNITS: 1.25 CAPSULE, LIQUID FILLED ORAL at 20:29

## 2018-01-01 RX ADMIN — FUROSEMIDE 20 MG: 20 TABLET ORAL at 08:20

## 2018-01-01 RX ADMIN — SODIUM CHLORIDE, POTASSIUM CHLORIDE, SODIUM LACTATE AND CALCIUM CHLORIDE 1000 ML: 600; 310; 30; 20 INJECTION, SOLUTION INTRAVENOUS at 19:47

## 2018-01-01 RX ADMIN — Medication 0.2 MG: at 15:22

## 2018-01-01 RX ADMIN — PANTOPRAZOLE SODIUM 40 MG: 40 INJECTION, POWDER, FOR SOLUTION INTRAVENOUS at 20:00

## 2018-01-01 RX ADMIN — SERTRALINE HYDROCHLORIDE 50 MG: 50 TABLET ORAL at 07:55

## 2018-01-01 RX ADMIN — Medication 250 MG: at 08:24

## 2018-01-01 RX ADMIN — INSULIN ASPART 1 UNITS: 100 INJECTION, SOLUTION INTRAVENOUS; SUBCUTANEOUS at 16:25

## 2018-01-01 RX ADMIN — PANTOPRAZOLE SODIUM 40 MG: 40 TABLET, DELAYED RELEASE ORAL at 20:18

## 2018-01-01 RX ADMIN — HYDROMORPHONE HYDROCHLORIDE 2 MG: 2 TABLET ORAL at 06:31

## 2018-01-01 RX ADMIN — MIDAZOLAM 0.5 MG: 1 INJECTION INTRAMUSCULAR; INTRAVENOUS at 17:39

## 2018-01-01 RX ADMIN — SODIUM CHLORIDE 8 MG/HR: 9 INJECTION, SOLUTION INTRAVENOUS at 06:57

## 2018-01-01 RX ADMIN — FUROSEMIDE 40 MG: 10 INJECTION, SOLUTION INTRAVENOUS at 10:38

## 2018-01-01 RX ADMIN — METHADONE HYDROCHLORIDE 5 MG: 5 TABLET ORAL at 07:08

## 2018-01-01 RX ADMIN — INSULIN ASPART 1 UNITS: 100 INJECTION, SOLUTION INTRAVENOUS; SUBCUTANEOUS at 21:13

## 2018-01-01 RX ADMIN — HYDROMORPHONE HYDROCHLORIDE 2 MG: 2 TABLET ORAL at 06:18

## 2018-01-01 RX ADMIN — FUROSEMIDE 40 MG: 10 INJECTION, SOLUTION INTRAVENOUS at 20:55

## 2018-01-01 RX ADMIN — INSULIN ASPART 1 UNITS: 100 INJECTION, SOLUTION INTRAVENOUS; SUBCUTANEOUS at 18:38

## 2018-01-01 RX ADMIN — KETAMINE HCL-NACL SOLN PREF SY 50 MG/5ML-0.9% (10MG/ML) 10 MG: 10 SOLUTION PREFILLED SYRINGE at 10:31

## 2018-01-01 RX ADMIN — HYDROMORPHONE HYDROCHLORIDE 4 MG: 2 TABLET ORAL at 14:24

## 2018-01-01 RX ADMIN — INSULIN ASPART 1 UNITS: 100 INJECTION, SOLUTION INTRAVENOUS; SUBCUTANEOUS at 00:02

## 2018-01-01 RX ADMIN — DAPTOMYCIN 400 MG: 500 INJECTION, POWDER, LYOPHILIZED, FOR SOLUTION INTRAVENOUS at 18:27

## 2018-01-01 RX ADMIN — ALBUMIN HUMAN 75 G: 0.25 SOLUTION INTRAVENOUS at 20:33

## 2018-01-01 RX ADMIN — INSULIN ASPART 1 UNITS: 100 INJECTION, SOLUTION INTRAVENOUS; SUBCUTANEOUS at 07:48

## 2018-01-01 RX ADMIN — MICAFUNGIN SODIUM 100 MG: 10 INJECTION, POWDER, LYOPHILIZED, FOR SOLUTION INTRAVENOUS at 20:06

## 2018-01-01 RX ADMIN — INSULIN ASPART 3 UNITS: 100 INJECTION, SOLUTION INTRAVENOUS; SUBCUTANEOUS at 22:06

## 2018-01-01 RX ADMIN — DEXTROSE MONOHYDRATE 500 ML: 50 INJECTION, SOLUTION INTRAVENOUS at 06:59

## 2018-01-01 RX ADMIN — INSULIN ASPART 1 UNITS: 100 INJECTION, SOLUTION INTRAVENOUS; SUBCUTANEOUS at 04:59

## 2018-01-01 RX ADMIN — SODIUM CHLORIDE: 9 INJECTION, SOLUTION INTRAVENOUS at 08:53

## 2018-01-01 RX ADMIN — HYDROMORPHONE HYDROCHLORIDE 2 MG: 2 TABLET ORAL at 22:36

## 2018-01-01 RX ADMIN — SPIRONOLACTONE 50 MG: 50 TABLET ORAL at 08:19

## 2018-01-01 RX ADMIN — Medication 5 MG: at 18:02

## 2018-01-01 RX ADMIN — POTASSIUM CHLORIDE: 2 INJECTION, SOLUTION, CONCENTRATE INTRAVENOUS at 12:39

## 2018-01-01 RX ADMIN — HYDROMORPHONE HYDROCHLORIDE 1 MG: 2 TABLET ORAL at 11:17

## 2018-01-01 RX ADMIN — MICONAZOLE NITRATE: 20 CREAM TOPICAL at 09:00

## 2018-01-01 RX ADMIN — MICONAZOLE NITRATE: 20 CREAM TOPICAL at 08:39

## 2018-01-01 RX ADMIN — AMLODIPINE BESYLATE 5 MG: 5 TABLET ORAL at 07:46

## 2018-01-01 RX ADMIN — METHADONE HYDROCHLORIDE 5 MG: 5 TABLET ORAL at 08:29

## 2018-01-01 RX ADMIN — INSULIN ASPART 1 UNITS: 100 INJECTION, SOLUTION INTRAVENOUS; SUBCUTANEOUS at 11:55

## 2018-01-01 RX ADMIN — PIPERACILLIN SODIUM AND TAZOBACTAM SODIUM 3.38 G: 3; .375 INJECTION, POWDER, LYOPHILIZED, FOR SOLUTION INTRAVENOUS at 08:11

## 2018-01-01 RX ADMIN — SODIUM CHLORIDE, POTASSIUM CHLORIDE, SODIUM LACTATE AND CALCIUM CHLORIDE: 600; 310; 30; 20 INJECTION, SOLUTION INTRAVENOUS at 11:39

## 2018-01-01 RX ADMIN — INSULIN ASPART 1 UNITS: 100 INJECTION, SOLUTION INTRAVENOUS; SUBCUTANEOUS at 02:24

## 2018-01-01 RX ADMIN — MIDAZOLAM 1 MG: 1 INJECTION INTRAMUSCULAR; INTRAVENOUS at 15:52

## 2018-01-01 RX ADMIN — VANCOMYCIN HYDROCHLORIDE 1500 MG: 10 INJECTION, POWDER, LYOPHILIZED, FOR SOLUTION INTRAVENOUS at 23:57

## 2018-01-01 RX ADMIN — Medication 250 MG: at 07:59

## 2018-01-01 RX ADMIN — INSULIN ASPART 2 UNITS: 100 INJECTION, SOLUTION INTRAVENOUS; SUBCUTANEOUS at 21:38

## 2018-01-01 RX ADMIN — LIDOCAINE HYDROCHLORIDE 2 ML: 10 INJECTION, SOLUTION EPIDURAL; INFILTRATION; INTRACAUDAL; PERINEURAL at 14:39

## 2018-01-01 RX ADMIN — MICONAZOLE NITRATE: 20 CREAM TOPICAL at 20:19

## 2018-01-01 RX ADMIN — ERGOCALCIFEROL 50000 UNITS: 1.25 CAPSULE, LIQUID FILLED ORAL at 20:17

## 2018-01-01 RX ADMIN — PIPERACILLIN SODIUM AND TAZOBACTAM SODIUM 3.38 G: 3; .375 INJECTION, POWDER, LYOPHILIZED, FOR SOLUTION INTRAVENOUS at 00:19

## 2018-01-01 RX ADMIN — Medication 250 MG: at 09:38

## 2018-01-01 RX ADMIN — LORAZEPAM 0.5 MG: 2 INJECTION INTRAMUSCULAR; INTRAVENOUS at 00:15

## 2018-01-01 RX ADMIN — HYDROMORPHONE HYDROCHLORIDE 2 MG: 2 TABLET ORAL at 02:15

## 2018-01-01 RX ADMIN — PIPERACILLIN SODIUM AND TAZOBACTAM SODIUM 4.5 G: 4; .5 INJECTION, POWDER, LYOPHILIZED, FOR SOLUTION INTRAVENOUS at 06:51

## 2018-01-01 RX ADMIN — HYDROMORPHONE HYDROCHLORIDE 4 MG: 2 TABLET ORAL at 04:56

## 2018-01-01 RX ADMIN — OCTREOTIDE ACETATE 50 MCG/HR: 200 INJECTION, SOLUTION INTRAVENOUS; SUBCUTANEOUS at 21:59

## 2018-01-01 RX ADMIN — Medication 2 G: at 06:55

## 2018-01-01 RX ADMIN — INSULIN ASPART 1 UNITS: 100 INJECTION, SOLUTION INTRAVENOUS; SUBCUTANEOUS at 13:08

## 2018-01-01 RX ADMIN — MICONAZOLE NITRATE: 20 CREAM TOPICAL at 08:04

## 2018-01-01 RX ADMIN — LIDOCAINE HYDROCHLORIDE 5 ML: 10 INJECTION, SOLUTION EPIDURAL; INFILTRATION; INTRACAUDAL; PERINEURAL at 10:33

## 2018-01-01 RX ADMIN — PANTOPRAZOLE SODIUM 40 MG: 40 TABLET, DELAYED RELEASE ORAL at 20:12

## 2018-01-01 RX ADMIN — ALBUMIN HUMAN: 0.05 INJECTION, SOLUTION INTRAVENOUS at 16:45

## 2018-01-01 RX ADMIN — ONDANSETRON 4 MG: 2 INJECTION INTRAMUSCULAR; INTRAVENOUS at 15:36

## 2018-01-01 RX ADMIN — FUROSEMIDE 40 MG: 10 INJECTION, SOLUTION INTRAVENOUS at 05:44

## 2018-01-01 RX ADMIN — HYDROMORPHONE HYDROCHLORIDE 0.2 MG: 1 INJECTION, SOLUTION INTRAMUSCULAR; INTRAVENOUS; SUBCUTANEOUS at 15:16

## 2018-01-01 RX ADMIN — PHENYLEPHRINE HYDROCHLORIDE 100 MCG: 10 INJECTION, SOLUTION INTRAMUSCULAR; INTRAVENOUS; SUBCUTANEOUS at 15:40

## 2018-01-01 RX ADMIN — SERTRALINE HYDROCHLORIDE 50 MG: 50 TABLET ORAL at 08:23

## 2018-01-01 RX ADMIN — HYDROMORPHONE HYDROCHLORIDE 4 MG: 2 TABLET ORAL at 20:28

## 2018-01-01 RX ADMIN — SERTRALINE HYDROCHLORIDE 50 MG: 50 TABLET ORAL at 08:20

## 2018-01-01 RX ADMIN — PANTOPRAZOLE SODIUM 40 MG: 40 TABLET, DELAYED RELEASE ORAL at 22:16

## 2018-01-01 RX ADMIN — Medication 10 MEQ: at 15:31

## 2018-01-01 RX ADMIN — Medication 1 CAPSULE: at 08:04

## 2018-01-01 RX ADMIN — FUROSEMIDE 20 MG: 20 TABLET ORAL at 08:52

## 2018-01-01 RX ADMIN — HYDROMORPHONE HYDROCHLORIDE 2 MG: 2 TABLET ORAL at 05:32

## 2018-01-01 RX ADMIN — SODIUM BICARBONATE 325 MG: 325 TABLET ORAL at 20:11

## 2018-01-01 RX ADMIN — Medication 2 G: at 02:10

## 2018-01-01 RX ADMIN — POTASSIUM CHLORIDE 10 MEQ: 750 TABLET, EXTENDED RELEASE ORAL at 06:40

## 2018-01-01 RX ADMIN — LIDOCAINE HYDROCHLORIDE 100 MG: 20 INJECTION, SOLUTION INFILTRATION; PERINEURAL at 17:31

## 2018-01-01 RX ADMIN — PANTOPRAZOLE SODIUM 40 MG: 40 INJECTION, POWDER, FOR SOLUTION INTRAVENOUS at 07:47

## 2018-01-01 RX ADMIN — METHADONE HYDROCHLORIDE 5 MG: 5 TABLET ORAL at 14:46

## 2018-01-01 RX ADMIN — SODIUM CHLORIDE, POTASSIUM CHLORIDE, SODIUM LACTATE AND CALCIUM CHLORIDE: 600; 310; 30; 20 INJECTION, SOLUTION INTRAVENOUS at 04:58

## 2018-01-01 RX ADMIN — HYDROMORPHONE HYDROCHLORIDE 2 MG: 2 TABLET ORAL at 21:28

## 2018-01-01 RX ADMIN — Medication 2 G: at 17:40

## 2018-01-01 RX ADMIN — HYDROMORPHONE HYDROCHLORIDE 2 MG: 2 TABLET ORAL at 06:33

## 2018-01-01 RX ADMIN — HYDROMORPHONE HYDROCHLORIDE 2 MG: 2 TABLET ORAL at 00:37

## 2018-01-01 RX ADMIN — HYDROMORPHONE HYDROCHLORIDE 4 MG: 4 TABLET ORAL at 23:13

## 2018-01-01 RX ADMIN — FUROSEMIDE 40 MG: 10 INJECTION, SOLUTION INTRAVENOUS at 15:23

## 2018-01-01 RX ADMIN — HUMAN INSULIN 6 UNITS/HR: 100 INJECTION, SOLUTION SUBCUTANEOUS at 19:38

## 2018-01-01 RX ADMIN — Medication 250 MG: at 08:16

## 2018-01-01 RX ADMIN — LIDOCAINE HYDROCHLORIDE 15 ML: 20 SOLUTION ORAL; TOPICAL at 09:59

## 2018-01-01 RX ADMIN — HYDROMORPHONE HYDROCHLORIDE 2 MG: 2 TABLET ORAL at 00:07

## 2018-01-01 RX ADMIN — PANTOPRAZOLE SODIUM 40 MG: 40 TABLET, DELAYED RELEASE ORAL at 20:19

## 2018-01-01 RX ADMIN — CALCIUM GLUCONATE 1 G: 98 INJECTION, SOLUTION INTRAVENOUS at 04:12

## 2018-01-01 RX ADMIN — HYDROMORPHONE HYDROCHLORIDE 4 MG: 2 TABLET ORAL at 00:59

## 2018-01-01 RX ADMIN — METHADONE HYDROCHLORIDE 5 MG: 5 TABLET ORAL at 14:34

## 2018-01-01 RX ADMIN — METHADONE HYDROCHLORIDE 5 MG: 5 TABLET ORAL at 09:39

## 2018-01-01 RX ADMIN — DAPTOMYCIN 400 MG: 500 INJECTION, POWDER, LYOPHILIZED, FOR SOLUTION INTRAVENOUS at 00:51

## 2018-01-01 RX ADMIN — POTASSIUM CHLORIDE, DEXTROSE MONOHYDRATE AND SODIUM CHLORIDE: 150; 5; 450 INJECTION, SOLUTION INTRAVENOUS at 23:34

## 2018-01-01 RX ADMIN — PROPOFOL 50 MCG/KG/MIN: 10 INJECTION, EMULSION INTRAVENOUS at 12:24

## 2018-01-01 RX ADMIN — HYDROMORPHONE HYDROCHLORIDE 0.2 MG: 1 INJECTION, SOLUTION INTRAMUSCULAR; INTRAVENOUS; SUBCUTANEOUS at 16:39

## 2018-01-01 RX ADMIN — PANCRELIPASE 48000 UNITS: 24000; 76000; 120000 CAPSULE, DELAYED RELEASE PELLETS ORAL at 20:11

## 2018-01-01 RX ADMIN — SPIRONOLACTONE 50 MG: 50 TABLET ORAL at 08:24

## 2018-01-01 RX ADMIN — VANCOMYCIN HYDROCHLORIDE 1250 MG: 10 INJECTION, POWDER, LYOPHILIZED, FOR SOLUTION INTRAVENOUS at 20:21

## 2018-01-01 RX ADMIN — LIDOCAINE HYDROCHLORIDE 10 ML: 10 INJECTION, SOLUTION EPIDURAL; INFILTRATION; INTRACAUDAL; PERINEURAL at 12:03

## 2018-01-01 RX ADMIN — POTASSIUM PHOSPHATE, MONOBASIC AND POTASSIUM PHOSPHATE, DIBASIC 10 MMOL: 224; 236 INJECTION, SOLUTION INTRAVENOUS at 18:00

## 2018-01-01 RX ADMIN — INSULIN ASPART 1 UNITS: 100 INJECTION, SOLUTION INTRAVENOUS; SUBCUTANEOUS at 01:44

## 2018-01-01 RX ADMIN — HYDROMORPHONE HYDROCHLORIDE 2 MG: 2 TABLET ORAL at 18:04

## 2018-01-01 RX ADMIN — PIPERACILLIN SODIUM AND TAZOBACTAM SODIUM 4.5 G: 4; .5 INJECTION, POWDER, LYOPHILIZED, FOR SOLUTION INTRAVENOUS at 18:39

## 2018-01-01 RX ADMIN — HYDROMORPHONE HYDROCHLORIDE 2 MG: 2 TABLET ORAL at 00:10

## 2018-01-01 RX ADMIN — MEROPENEM 1 G: 1 INJECTION, POWDER, FOR SOLUTION INTRAVENOUS at 22:45

## 2018-01-01 RX ADMIN — PANCRELIPASE 48000 UNITS: 24000; 76000; 120000 CAPSULE, DELAYED RELEASE PELLETS ORAL at 14:56

## 2018-01-01 RX ADMIN — INSULIN ASPART 1 UNITS: 100 INJECTION, SOLUTION INTRAVENOUS; SUBCUTANEOUS at 04:50

## 2018-01-01 RX ADMIN — Medication 0.2 MG: at 14:19

## 2018-01-01 RX ADMIN — PANCRELIPASE 48000 UNITS: 24000; 76000; 120000 CAPSULE, DELAYED RELEASE PELLETS ORAL at 20:16

## 2018-01-01 RX ADMIN — PANTOPRAZOLE SODIUM 40 MG: 40 TABLET, DELAYED RELEASE ORAL at 08:16

## 2018-01-01 RX ADMIN — METHADONE HYDROCHLORIDE 5 MG: 5 TABLET ORAL at 13:35

## 2018-01-01 RX ADMIN — FLUCONAZOLE 400 MG: 2 INJECTION INTRAVENOUS at 16:38

## 2018-01-01 RX ADMIN — ALBUMIN HUMAN 75 G: 0.25 SOLUTION INTRAVENOUS at 11:58

## 2018-01-01 RX ADMIN — PANTOPRAZOLE SODIUM 40 MG: 40 INJECTION, POWDER, FOR SOLUTION INTRAVENOUS at 08:04

## 2018-01-01 RX ADMIN — SODIUM CHLORIDE, POTASSIUM CHLORIDE, SODIUM LACTATE AND CALCIUM CHLORIDE 1000 ML: 600; 310; 30; 20 INJECTION, SOLUTION INTRAVENOUS at 22:39

## 2018-01-01 RX ADMIN — METHADONE HYDROCHLORIDE 5 MG: 5 TABLET ORAL at 14:49

## 2018-01-01 RX ADMIN — PANTOPRAZOLE SODIUM 40 MG: 40 INJECTION, POWDER, FOR SOLUTION INTRAVENOUS at 08:29

## 2018-01-01 RX ADMIN — METHADONE HYDROCHLORIDE 5 MG: 5 TABLET ORAL at 20:06

## 2018-01-01 RX ADMIN — INSULIN ASPART 2 UNITS: 100 INJECTION, SOLUTION INTRAVENOUS; SUBCUTANEOUS at 10:40

## 2018-01-01 RX ADMIN — METHADONE HYDROCHLORIDE 5 MG: 5 TABLET ORAL at 13:59

## 2018-01-01 RX ADMIN — IOPAMIDOL 86 ML: 755 INJECTION, SOLUTION INTRAVENOUS at 17:29

## 2018-01-01 RX ADMIN — HYDROMORPHONE HYDROCHLORIDE 2 MG: 2 TABLET ORAL at 02:37

## 2018-01-01 RX ADMIN — POTASSIUM CHLORIDE 20 MEQ: 750 TABLET, EXTENDED RELEASE ORAL at 12:11

## 2018-01-01 RX ADMIN — Medication 1 CAPSULE: at 09:28

## 2018-01-01 RX ADMIN — HYDROMORPHONE HYDROCHLORIDE 0.2 MG: 1 INJECTION, SOLUTION INTRAMUSCULAR; INTRAVENOUS; SUBCUTANEOUS at 13:38

## 2018-01-01 RX ADMIN — HEPARIN SODIUM 5000 UNITS: 5000 INJECTION, SOLUTION INTRAVENOUS; SUBCUTANEOUS at 14:49

## 2018-01-01 RX ADMIN — IODIXANOL 50 ML: 320 INJECTION, SOLUTION INTRAVASCULAR at 15:41

## 2018-01-01 RX ADMIN — SIMETHICONE CHEW TAB 80 MG 80 MG: 80 TABLET ORAL at 03:09

## 2018-01-01 RX ADMIN — CALCIUM GLUCONATE 1 G: 98 INJECTION, SOLUTION INTRAVENOUS at 01:59

## 2018-01-01 RX ADMIN — PANTOPRAZOLE SODIUM 40 MG: 40 INJECTION, POWDER, FOR SOLUTION INTRAVENOUS at 20:22

## 2018-01-01 RX ADMIN — VANCOMYCIN HYDROCHLORIDE 1000 MG: 1 INJECTION, SOLUTION INTRAVENOUS at 14:46

## 2018-01-01 RX ADMIN — PANCRELIPASE 48000 UNITS: 24000; 76000; 120000 CAPSULE, DELAYED RELEASE PELLETS ORAL at 12:48

## 2018-01-01 RX ADMIN — CALCIUM GLUCONATE 2 G: 98 INJECTION, SOLUTION INTRAVENOUS at 08:03

## 2018-01-01 RX ADMIN — HYDROMORPHONE HYDROCHLORIDE 0.2 MG: 1 INJECTION, SOLUTION INTRAMUSCULAR; INTRAVENOUS; SUBCUTANEOUS at 10:06

## 2018-01-01 RX ADMIN — METHADONE HYDROCHLORIDE 5 MG: 5 TABLET ORAL at 14:04

## 2018-01-01 RX ADMIN — INSULIN ASPART 2 UNITS: 100 INJECTION, SOLUTION INTRAVENOUS; SUBCUTANEOUS at 18:13

## 2018-01-01 RX ADMIN — PANTOPRAZOLE SODIUM 40 MG: 40 TABLET, DELAYED RELEASE ORAL at 07:46

## 2018-01-01 RX ADMIN — VANCOMYCIN HYDROCHLORIDE 1500 MG: 10 INJECTION, POWDER, LYOPHILIZED, FOR SOLUTION INTRAVENOUS at 11:54

## 2018-01-01 RX ADMIN — INSULIN ASPART 1 UNITS: 100 INJECTION, SOLUTION INTRAVENOUS; SUBCUTANEOUS at 21:31

## 2018-01-01 RX ADMIN — AMLODIPINE BESYLATE 10 MG: 10 TABLET ORAL at 08:52

## 2018-01-01 RX ADMIN — PROPOFOL 140 MG: 10 INJECTION, EMULSION INTRAVENOUS at 17:31

## 2018-01-01 RX ADMIN — OCTREOTIDE ACETATE 50 MCG/HR: 200 INJECTION, SOLUTION INTRAVENOUS; SUBCUTANEOUS at 20:55

## 2018-01-01 RX ADMIN — HYDROMORPHONE HYDROCHLORIDE 2 MG: 2 TABLET ORAL at 14:21

## 2018-01-01 RX ADMIN — DAPTOMYCIN 400 MG: 500 INJECTION, POWDER, LYOPHILIZED, FOR SOLUTION INTRAVENOUS at 20:54

## 2018-01-01 RX ADMIN — FUROSEMIDE 40 MG: 10 INJECTION, SOLUTION INTRAVENOUS at 08:30

## 2018-01-01 RX ADMIN — FLUCONAZOLE 400 MG: 200 TABLET ORAL at 08:03

## 2018-01-01 RX ADMIN — METHADONE HYDROCHLORIDE 5 MG: 5 TABLET ORAL at 20:04

## 2018-01-01 RX ADMIN — AMLODIPINE BESYLATE 5 MG: 5 TABLET ORAL at 08:16

## 2018-01-01 RX ADMIN — PANTOPRAZOLE SODIUM 40 MG: 40 TABLET, DELAYED RELEASE ORAL at 08:03

## 2018-01-01 RX ADMIN — Medication 5 MG: at 17:38

## 2018-01-01 RX ADMIN — SODIUM CHLORIDE 8 MG/HR: 9 INJECTION, SOLUTION INTRAVENOUS at 22:31

## 2018-01-01 RX ADMIN — SODIUM BICARBONATE 325 MG: 325 TABLET ORAL at 20:00

## 2018-01-01 RX ADMIN — HYDROMORPHONE HYDROCHLORIDE 4 MG: 2 TABLET ORAL at 21:01

## 2018-01-01 RX ADMIN — DEXTROSE MONOHYDRATE: 50 INJECTION, SOLUTION INTRAVENOUS at 02:25

## 2018-01-01 RX ADMIN — PHENYLEPHRINE HYDROCHLORIDE 100 MCG: 10 INJECTION, SOLUTION INTRAMUSCULAR; INTRAVENOUS; SUBCUTANEOUS at 09:48

## 2018-01-01 RX ADMIN — BENZOCAINE 1 EACH: 220 SPRAY, METERED PERIODONTAL at 12:22

## 2018-01-01 RX ADMIN — POTASSIUM PHOSPHATE, MONOBASIC AND POTASSIUM PHOSPHATE, DIBASIC 15 MMOL: 224; 236 INJECTION, SOLUTION INTRAVENOUS at 10:07

## 2018-01-01 RX ADMIN — PANCRELIPASE 24000 UNITS: 24000; 76000; 120000 CAPSULE, DELAYED RELEASE PELLETS ORAL at 04:30

## 2018-01-01 RX ADMIN — PIPERACILLIN SODIUM AND TAZOBACTAM SODIUM 4.5 G: 4; .5 INJECTION, POWDER, LYOPHILIZED, FOR SOLUTION INTRAVENOUS at 12:35

## 2018-01-01 RX ADMIN — CALCIUM GLUCONATE 2 G: 98 INJECTION, SOLUTION INTRAVENOUS at 02:29

## 2018-01-01 RX ADMIN — PIPERACILLIN SODIUM AND TAZOBACTAM SODIUM 4.5 G: 4; .5 INJECTION, POWDER, LYOPHILIZED, FOR SOLUTION INTRAVENOUS at 06:21

## 2018-01-01 RX ADMIN — FUROSEMIDE 20 MG: 20 TABLET ORAL at 08:03

## 2018-01-01 RX ADMIN — Medication 1 CAPSULE: at 09:43

## 2018-01-01 RX ADMIN — I.V. FAT EMULSION 250 ML: 20 EMULSION INTRAVENOUS at 21:29

## 2018-01-01 RX ADMIN — PHYTONADIONE 10 MG: 10 INJECTION, EMULSION INTRAMUSCULAR; INTRAVENOUS; SUBCUTANEOUS at 23:23

## 2018-01-01 RX ADMIN — Medication 250 MG: at 08:52

## 2018-01-01 RX ADMIN — HYDROMORPHONE HYDROCHLORIDE 2 MG: 2 TABLET ORAL at 10:37

## 2018-01-01 RX ADMIN — Medication 1 CAPSULE: at 08:19

## 2018-01-01 RX ADMIN — FLUCONAZOLE 400 MG: 200 TABLET ORAL at 07:46

## 2018-01-01 RX ADMIN — METHADONE HYDROCHLORIDE 5 MG: 5 TABLET ORAL at 22:40

## 2018-01-01 RX ADMIN — INSULIN ASPART 2 UNITS: 100 INJECTION, SOLUTION INTRAVENOUS; SUBCUTANEOUS at 15:20

## 2018-01-01 RX ADMIN — INSULIN ASPART 1 UNITS: 100 INJECTION, SOLUTION INTRAVENOUS; SUBCUTANEOUS at 00:19

## 2018-01-01 RX ADMIN — MIDAZOLAM 1 MG: 1 INJECTION INTRAMUSCULAR; INTRAVENOUS at 12:18

## 2018-01-01 RX ADMIN — INSULIN ASPART 2 UNITS: 100 INJECTION, SOLUTION INTRAVENOUS; SUBCUTANEOUS at 00:56

## 2018-01-01 RX ADMIN — METHADONE HYDROCHLORIDE 5 MG: 5 TABLET ORAL at 13:56

## 2018-01-01 RX ADMIN — FLUMAZENIL 0.5 MG: 0.1 INJECTION, SOLUTION INTRAVENOUS at 19:06

## 2018-01-01 RX ADMIN — PANCRELIPASE 48000 UNITS: 24000; 76000; 120000 CAPSULE, DELAYED RELEASE PELLETS ORAL at 13:57

## 2018-01-01 RX ADMIN — SODIUM CHLORIDE: 9 INJECTION, SOLUTION INTRAVENOUS at 12:39

## 2018-01-01 RX ADMIN — Medication 0.2 MG: at 16:20

## 2018-01-01 RX ADMIN — FLUCONAZOLE 400 MG: 2 INJECTION INTRAVENOUS at 13:20

## 2018-01-01 RX ADMIN — HYDROMORPHONE HYDROCHLORIDE 4 MG: 2 TABLET ORAL at 09:51

## 2018-01-01 RX ADMIN — HYDROMORPHONE HYDROCHLORIDE 2 MG: 2 TABLET ORAL at 01:51

## 2018-01-01 RX ADMIN — HYDROMORPHONE HYDROCHLORIDE 4 MG: 2 TABLET ORAL at 23:46

## 2018-01-01 RX ADMIN — PANTOPRAZOLE SODIUM 40 MG: 40 INJECTION, POWDER, FOR SOLUTION INTRAVENOUS at 08:27

## 2018-01-01 RX ADMIN — Medication 1 CAPSULE: at 08:10

## 2018-01-01 RX ADMIN — SUGAMMADEX 160 MG: 100 INJECTION, SOLUTION INTRAVENOUS at 18:38

## 2018-01-01 RX ADMIN — INSULIN ASPART 2 UNITS: 100 INJECTION, SOLUTION INTRAVENOUS; SUBCUTANEOUS at 14:01

## 2018-01-01 RX ADMIN — SODIUM CHLORIDE, PRESERVATIVE FREE 79 ML: 5 INJECTION INTRAVENOUS at 17:30

## 2018-01-01 RX ADMIN — CALCIUM GLUCONATE 4 G: 98 INJECTION, SOLUTION INTRAVENOUS at 08:30

## 2018-01-01 RX ADMIN — DAPTOMYCIN 400 MG: 500 INJECTION, POWDER, LYOPHILIZED, FOR SOLUTION INTRAVENOUS at 21:59

## 2018-01-01 RX ADMIN — Medication 250 MG: at 07:47

## 2018-01-01 RX ADMIN — MIDAZOLAM 2 MG: 1 INJECTION INTRAMUSCULAR; INTRAVENOUS at 10:33

## 2018-01-01 RX ADMIN — DEXTROSE AND SODIUM CHLORIDE: 5; 900 INJECTION, SOLUTION INTRAVENOUS at 20:32

## 2018-01-01 RX ADMIN — ROCURONIUM BROMIDE 40 MG: 10 INJECTION INTRAVENOUS at 15:16

## 2018-01-01 RX ADMIN — VANCOMYCIN HYDROCHLORIDE 1500 MG: 10 INJECTION, POWDER, LYOPHILIZED, FOR SOLUTION INTRAVENOUS at 22:03

## 2018-01-01 RX ADMIN — METHADONE HYDROCHLORIDE 5 MG: 5 TABLET ORAL at 19:40

## 2018-01-01 RX ADMIN — CALCIUM GLUCONATE 2 G: 98 INJECTION, SOLUTION INTRAVENOUS at 08:04

## 2018-01-01 RX ADMIN — PIPERACILLIN SODIUM AND TAZOBACTAM SODIUM 4.5 G: 4; .5 INJECTION, POWDER, LYOPHILIZED, FOR SOLUTION INTRAVENOUS at 13:08

## 2018-01-01 RX ADMIN — METHADONE HYDROCHLORIDE 5 MG: 5 TABLET ORAL at 07:55

## 2018-01-01 RX ADMIN — PHENYLEPHRINE HYDROCHLORIDE 200 MCG: 10 INJECTION, SOLUTION INTRAMUSCULAR; INTRAVENOUS; SUBCUTANEOUS at 17:39

## 2018-01-01 RX ADMIN — ERGOCALCIFEROL 50000 UNITS: 1.25 CAPSULE, LIQUID FILLED ORAL at 16:46

## 2018-01-01 RX ADMIN — LIDOCAINE HYDROCHLORIDE 3.5 ML: 10 INJECTION, SOLUTION EPIDURAL; INFILTRATION; INTRACAUDAL; PERINEURAL at 16:55

## 2018-01-01 RX ADMIN — PIPERACILLIN SODIUM AND TAZOBACTAM SODIUM 3.38 G: 3; .375 INJECTION, POWDER, LYOPHILIZED, FOR SOLUTION INTRAVENOUS at 13:47

## 2018-01-01 RX ADMIN — INSULIN ASPART 1 UNITS: 100 INJECTION, SOLUTION INTRAVENOUS; SUBCUTANEOUS at 16:47

## 2018-01-01 RX ADMIN — POTASSIUM CHLORIDE 40 MEQ: 40 SOLUTION ORAL at 08:02

## 2018-01-01 RX ADMIN — CALCIUM GLUCONATE 4 G: 98 INJECTION, SOLUTION INTRAVENOUS at 02:38

## 2018-01-01 RX ADMIN — WATER 1 G: 1 INJECTION INTRAMUSCULAR; INTRAVENOUS; SUBCUTANEOUS at 16:57

## 2018-01-01 RX ADMIN — SPIRONOLACTONE 50 MG: 50 TABLET ORAL at 08:07

## 2018-01-01 RX ADMIN — PANCRELIPASE 48000 UNITS: 24000; 76000; 120000 CAPSULE, DELAYED RELEASE PELLETS ORAL at 07:46

## 2018-01-01 RX ADMIN — VANCOMYCIN HYDROCHLORIDE 1250 MG: 10 INJECTION, POWDER, LYOPHILIZED, FOR SOLUTION INTRAVENOUS at 12:23

## 2018-01-01 RX ADMIN — FUROSEMIDE 40 MG: 10 INJECTION, SOLUTION INTRAVENOUS at 21:09

## 2018-01-01 RX ADMIN — Medication 100 MG: at 08:59

## 2018-01-01 RX ADMIN — INSULIN ASPART 1 UNITS: 100 INJECTION, SOLUTION INTRAVENOUS; SUBCUTANEOUS at 06:18

## 2018-01-01 RX ADMIN — METHADONE HYDROCHLORIDE 5 MG: 5 TABLET ORAL at 07:45

## 2018-01-01 RX ADMIN — Medication 1 CAPSULE: at 08:52

## 2018-01-01 RX ADMIN — INSULIN ASPART 1 UNITS: 100 INJECTION, SOLUTION INTRAVENOUS; SUBCUTANEOUS at 01:28

## 2018-01-01 RX ADMIN — ONDANSETRON 4 MG: 4 TABLET, ORALLY DISINTEGRATING ORAL at 10:32

## 2018-01-01 RX ADMIN — PHENYLEPHRINE HYDROCHLORIDE 100 MCG: 10 INJECTION, SOLUTION INTRAMUSCULAR; INTRAVENOUS; SUBCUTANEOUS at 10:06

## 2018-01-01 RX ADMIN — ALBUMIN HUMAN: 0.05 INJECTION, SOLUTION INTRAVENOUS at 10:19

## 2018-01-01 RX ADMIN — PANCRELIPASE 48000 UNITS: 24000; 76000; 120000 CAPSULE, DELAYED RELEASE PELLETS ORAL at 17:07

## 2018-01-01 RX ADMIN — METHADONE HYDROCHLORIDE 5 MG: 5 TABLET ORAL at 20:32

## 2018-01-01 RX ADMIN — WATER 1 G: 1 INJECTION INTRAMUSCULAR; INTRAVENOUS; SUBCUTANEOUS at 16:29

## 2018-01-01 RX ADMIN — AMLODIPINE BESYLATE 5 MG: 5 TABLET ORAL at 09:39

## 2018-01-01 RX ADMIN — INSULIN ASPART 1 UNITS: 100 INJECTION, SOLUTION INTRAVENOUS; SUBCUTANEOUS at 21:28

## 2018-01-01 RX ADMIN — WATER 1 G: 1 INJECTION INTRAMUSCULAR; INTRAVENOUS; SUBCUTANEOUS at 20:31

## 2018-01-01 RX ADMIN — POTASSIUM CHLORIDE 40 MEQ: 750 TABLET, EXTENDED RELEASE ORAL at 01:34

## 2018-01-01 RX ADMIN — Medication 250 MG: at 07:53

## 2018-01-01 RX ADMIN — SODIUM CHLORIDE 1000 ML: 9 INJECTION, SOLUTION INTRAVENOUS at 06:39

## 2018-01-01 RX ADMIN — Medication 250 MG: at 08:07

## 2018-01-01 RX ADMIN — SODIUM CHLORIDE, POTASSIUM CHLORIDE, SODIUM LACTATE AND CALCIUM CHLORIDE: 600; 310; 30; 20 INJECTION, SOLUTION INTRAVENOUS at 08:39

## 2018-01-01 RX ADMIN — MICONAZOLE NITRATE: 20 CREAM TOPICAL at 08:00

## 2018-01-01 RX ADMIN — METHADONE HYDROCHLORIDE 5 MG: 5 TABLET ORAL at 19:58

## 2018-01-01 RX ADMIN — DEXTROSE MONOHYDRATE: 100 INJECTION, SOLUTION INTRAVENOUS at 04:44

## 2018-01-01 ASSESSMENT — PAIN DESCRIPTION - DESCRIPTORS
DESCRIPTORS: ACHING;DISCOMFORT
DESCRIPTORS: ACHING;SORE
DESCRIPTORS: ACHING;DISCOMFORT
DESCRIPTORS: ACHING
DESCRIPTORS: DISCOMFORT
DESCRIPTORS: ACHING
DESCRIPTORS: ACHING;CONSTANT
DESCRIPTORS: ACHING;CONSTANT
DESCRIPTORS: ACHING;SORE
DESCRIPTORS: ACHING;SORE
DESCRIPTORS: CRAMPING;DISCOMFORT
DESCRIPTORS: ACHING;CONSTANT
DESCRIPTORS: ACHING;SORE
DESCRIPTORS: ACHING;CONSTANT;DISCOMFORT
DESCRIPTORS: ACHING;DISCOMFORT
DESCRIPTORS: ACHING;SORE
DESCRIPTORS: ACHING
DESCRIPTORS: HEADACHE
DESCRIPTORS: SHARP
DESCRIPTORS: ACHING;SORE
DESCRIPTORS: SORE
DESCRIPTORS: SHARP
DESCRIPTORS: SORE
DESCRIPTORS: ACHING;SORE
DESCRIPTORS: ACHING;DISCOMFORT
DESCRIPTORS: ACHING;CONSTANT
DESCRIPTORS: ACHING;DISCOMFORT;SORE
DESCRIPTORS: ACHING;SORE
DESCRIPTORS: ACHING;BURNING
DESCRIPTORS: ACHING;SORE

## 2018-01-01 ASSESSMENT — ACTIVITIES OF DAILY LIVING (ADL)
ADLS_ACUITY_SCORE: 24
ADLS_ACUITY_SCORE: 27
ADLS_ACUITY_SCORE: 28
ADLS_ACUITY_SCORE: 27
ADLS_ACUITY_SCORE: 26
ADLS_ACUITY_SCORE: 28
ADLS_ACUITY_SCORE: 27
ADLS_ACUITY_SCORE: 24
ADLS_ACUITY_SCORE: 27
IADL_COMMENTS: WIFE ASSISTS WITH IADLS
ADLS_ACUITY_SCORE: 27
ADLS_ACUITY_SCORE: 28
ADLS_ACUITY_SCORE: 24
ADLS_ACUITY_SCORE: 27

## 2018-01-01 ASSESSMENT — ENCOUNTER SYMPTOMS
AGITATION: 0
COUGH: 0
DIFFICULTY URINATING: 0
SHORTNESS OF BREATH: 0
NAUSEA: 0
NECK PAIN: 0
POLYDIPSIA: 0
DYSURIA: 0
COLOR CHANGE: 0
ADENOPATHY: 0
NECK STIFFNESS: 0
BRUISES/BLEEDS EASILY: 0
ABDOMINAL PAIN: 0
FATIGUE: 1
VOMITING: 0
FEVER: 0
LIGHT-HEADEDNESS: 0
BACK PAIN: 0
DIAPHORESIS: 0
DIARRHEA: 1

## 2018-01-01 ASSESSMENT — PAIN SCALES - GENERAL
PAINLEVEL: MODERATE PAIN (5)
PAINLEVEL: NO PAIN (0)

## 2018-01-01 ASSESSMENT — LIFESTYLE VARIABLES
TOBACCO_USE: 0

## 2018-01-01 ASSESSMENT — COPD QUESTIONNAIRES
COPD: 0

## 2018-01-19 NOTE — TELEPHONE ENCOUNTER
----- Message from Sada Rogel LPN sent at 1/17/2018 10:36 AM CST -----  Regarding: FW: Dr. Murali Alvares patient  Contact: [phone]      ----- Message -----     From: Josue Chu     Sent: 1/17/2018   9:01 AM       To: Hepatology Nurses-  Subject: Dr. Murali Alvares patient                            Good morning team,    Patient's spouse Yumi called in today in regards to his appt scheduled for today 01/17/2018 at 9:00AM. She stated he would be able to make it because he wasn't feeling good that morning, but was wondering what the first appt was about and how it was going to go? Patient has been rescheduled to first available 04/09/2018. Please follow up with Yumi to discuss first appt.    Thank you,  Josue Chu

## 2018-01-19 NOTE — TELEPHONE ENCOUNTER
Spoke with patient's wife Yumi regarding questions about patient's appointment with Dr. Carrion.  Had questions about what first appointment with Dr. Carrion would be like.  Told Yumi Dr. Carrion would be an introduction, health history and how it relates to his liver disease, etc., and order any labs or imaging he felt patient needed.  Wife also stated that she would like to get patient in sooner than 4/9/18 because she felt patient was extremely ill. Patient has been having nausea and vomiting for the last two weeks, and has been experiencing extreme pain in abdomen.  Has ascites that has been drained 3 or 4 times a week. Patient also developed an infection in the liver from Whipple procedure. Explained to patient that Dr. Carrion was booked and did not have anything sooner but will see what Dr. Carrion says about getting him in sooner.

## 2018-02-21 NOTE — LETTER
2/21/2018       RE: Naresh Poole  [address] [place] [address]     Dear Colleague,    Thank you for referring your patient, Naresh Poole, to the [facility] HEPATOLOGY at [facility]. Please see a copy of my visit note below.     Romano- [phone]    [facility]    Hepatology New Patient Visit    Referring provider:  Soto Romano      47 year old male    Chief complaint:  Ascites     HPI:  The patient comes to clinic this morning with his wife for evaluation of new onset ascites.  The patient denies any prior history of chronic liver disease or abnormal liver function tests.      He first developed ascites and lower extremity edema in 09/2017.  He was admitted to the hospital for this and consequently underwent EGD with dilation of the pyloric channel; EUS with celiac plexus ablation and a diagnostic laparoscopy.  The patient has been on low dose diuretics since this time.  He has undergone 2 paracenteses since 09/2017.  His first paracentesis was in early 12/2017 with removal of 6L fluid.  The second paracentesis was just before [place] with removal of 4L fluid.  Of note, ascitic fluid cytology was negative for malignancy, SAAG > 1.1 and total protein= 0.4.      The patient was diagnosed with pancreatic cancer in 01/2017 after presenting with abdominal discomfort, malaise and obstructive jaundice symptoms.  He underwent EUS and ERCP with stent placement in 01/2017.  The patient then underwent a 2 month course of FOLFIRINOX (5-FU, oxaliplatin, irinotecan and leucovorin) from 01/2017-03/2017.  CT scan noted possible metastatic disease in the liver which resulted in a liver lesion biopsy in late 03/2017.  Liver lesion biopsy showed no evidence of cancer but the presence of abscess/necrosis.  The patient ultimately underwent a Whipple procedure in 05/2017.  The liver biopsy performed at that time showed necrotic areas  and inflammation.  The patient last received chemotherapy (gemcitabine & capecitabine) between 10/2017 and 11/2017.  He received capecitabine and Xeloda.  The patient's course has been further complicated with ?liver abscesses complicated with enterococcus bacteremia.  Antibiotics were completed in 11/2017.      Patient is feeling well today.  His wife reports that he has been feeling well this week.  He returned to work for 2 days this week.      The patient denies any abdominal distention or lower extremity edema.  He is currently taking spironolactone 100 mg once daily.  He has not been taking any furosemide since last fall.      The patient denies any history of jaundice, lethargy, confusion, melena, hematemesis, hematochezia.      No recent history of fevers, sweats or chills.      The patient has lost 10 pounds over the last 6 weeks.  His appetite is good.  He is eating and drinking appropriately.      The patient has not drunk alcohol in 18 months.  Prior to this, he was drinking once to twice per week.  He would drink 1-5 drinks on these occasions.  He denies any history of alcohol abuse or DUIs.      The patient is a nonsmoker.  He denies any recreational drug use including marijuana, IN or IV drugs.       Medical hx Surgical hx   Past Medical History:   Diagnosis Date     Hypertension      Pancreatic cancer (H) 01/2017      Past Surgical History:   Procedure Laterality Date     LAPAROSCOPY DIAGNOSTIC (GENERAL)  09/2017     WHIPPLE PROCEDURE  05/2017          Medications  Prior to Admission medications    Medication Sig Start Date End Date Taking? Authorizing Provider   amLODIPine (NORVASC) 10 MG tablet Take 10 mg by mouth daily 1/12/17  Yes Reported, Patient   HYDROmorphone (DILAUDID) 2 MG tablet Take 2 mg by mouth every 3 hours as needed for pain 2/15/18  Yes Reported, Patient   amylase-lipase-protease (CREON 24) 02018-14768 UNITS CPEP per EC capsule Take 2 caps with meals and  cap with snacks 1/3/18  Yes  Reported, Patient   methadone (DOLOPHINE) 5 MG tablet Take 5 mg by mouth 3 times daily 2/15/18  Yes Reported, Patient   ondansetron (ZOFRAN-ODT) 4 MG ODT tab Take 4-8 mg by mouth every 8 hours as needed for nausea 10/10/17  Yes Reported, Patient   polyethylene glycol (MIRALAX/GLYCOLAX) Packet Take 17 g by mouth daily as needed for constipation 9/23/17  Yes Reported, Patient   sertraline (ZOLOFT) 50 MG tablet Take 50 mg by mouth daily 11/27/17  Yes Reported, Patient   spironolactone (ALDACTONE) 100 MG tablet Take 100 mg by mouth daily 2/7/18  Yes Reported, Patient   simethicone (GAS-X) 80 MG chewable tablet Take 80 mg by mouth every 6 hours as needed for flatulence or cramping   Yes Reported, Patient   bisacodyl (DULCOLAX) 5 MG EC tablet Take 5 mg by mouth daily as needed for constipation   Yes Reported, Patient   furosemide (LASIX) 20 MG tablet Take 40 mg by mouth daily 11/6/17   Reported, Patient       Allergies  No Known Allergies    Family hx Social hx   Family History   Problem Relation Age of Onset     Coronary Artery Disease Early Onset Maternal Grandmother 36     Liver Disease No family hx of      Colon Cancer No family hx of       Social History   Substance Use Topics     Smoking status: Never Smoker     Smokeless tobacco: Never Used     Alcohol use No     Lives in [place] with wife and 3 daughters (aged 4, 6 & 11).  Works as assistant .  No siblings.       Review of systems  A 10-point review of systems was negative.    Examination  /73 (BP Location: Right arm, Patient Position: Sitting, Cuff Size: Adult Regular)  Pulse 65  Temp 98.1  F (36.7  C) (Oral)  Resp 16  Ht 1.829 m (6')  Wt 65.3 kg (144 lb)  SpO2 100%  BMI 19.53 kg/m2  Body mass index is 19.53 kg/(m^2).    Gen- well, NAD, A+Ox3, normal color, fronto-temporal muscle wasting  Eye- EOMI  ENT- MMM, normal oropharynx  Lym- no palpable lymphadenopathy  CVS- S1, S2 normal, no added sounds, RRR  RS- CTA  Abd- laparotomy scar, soft,  "non-tender, small umbilical hernia, no ascites or organomegaly on palpation or percussion, BS+  Extr- pulses good, no DAVID  MS- hands normal- no clubbing  Neuro- A+Ox3, no asterixis  Skin- no rash or jaundice  Psych- normal mood    Laboratory  2/12/18  INR 1.3    WCC 4.1, hgb 9.9, 154    TB 0.6, , AST 82, AlkPh 560    Na 136, K 4.7, Cl 102, TCO3 25, BUN 25, Cr 1.01    Radiology  CT A/P with IV contrast 2/12/18 reviewed  MRI liver/MRCP 4/5/17 reviewed    EGD 9/21/17 reviewed- \"gd III esophageal varices\"  EGD 9/19/17 reviewed- \"esophagus endoscopically normal\"  EGD/EUS 1/10/17 reviewed- \"exam up to second portion of duodenum endoscopically normal\"    Assessment  47-year-old male with history of Whipple procedure for pancreatic cancer diagnosed in early 2017 who presents with new onset ascites now controlled on low-dose diuretics.  No evidence of hepatic encephalopathy.  Liver function tests abnormal but stable.  SAAG in September 2017 consistent with portal hypertension.  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  Will obtain transjugular liver biopsy with portal pressures as sinusoidal obstruction syndrome is treatable with defibrotide.  However, it is not clear if the patient would respond defibrotide due to long duration of symptoms.  If the patient has cirrhosis or pseudocirrhosis, would manage patient expectantly including EGD to screen for esophageal varices.     Plan  1.  Obtain recent CT imaging for second read  2.  Transjugular liver biopsy with portal pressures to evaluate for SOS  3.  Low Na diet  4.  Continue spironolactone 100mg PO Q24  5.  Follow-up in 2 months    Robe Carrion MD  Hepatology  [facility]    I spent a total of 60 minutes face-to-face with Naresh Poole during today's office visit.  Over 50% of this time was spent counseling the patient and/or " coordinating care regarding portal hypertension, pseudocirrhosis, etiology and management of sinusoidal obstruction syndrome.  See note for details.    Sincerely,    Robe Carrion MD

## 2018-02-21 NOTE — MR AVS SNAPSHOT
After Visit Summary   2/21/2018    Naresh Poole    MRN: [id number]           Patient Information     Date Of Birth          1970        Visit Information        Provider Department      2/21/2018 9:00 AM Robe Trujillo MD [facility] Hepatology        Today's Diagnoses     Other ascites    -  1       Follow-ups after your visit        Follow-up notes from your care team     Return in about 2 months (around 4/21/2018).      Your next 10 appointments already scheduled     Feb 27, 2018  7:30 AM CST   Procedure 4.5 hour with U2A ROOM 6   [address] [facility] ([facility])    [address] [address]               Feb 27, 2018  8:00 AM CST   (Arrive by 7:45 AM)   IR TRANSCATHETER BIOPSY with UUIR1   [facility] [place] Interventional Radiology ([facility])    [address] [address] [phone]           1. Laboratory test are to be obtained by your doctor prior to the exam (CBCP, INR and PTT) 2. Someone will need to drive you to and from the hospital. 3. If you are or may be pregnant, contact your doctor or a Radiology nurse prior to the day of the exam. 4. If you have diabetes, check with your doctor or a Radiology nurse to see if your insulin needs to be adjusted for the exam. 5. If you are taking Coumadin (to thin you blood) please contact your doctor or a Radiology nurse at least 3 days before the exam for special instructions. 6. The day before your exam you may eat your regular diet and are encouraged to drink at least 2 quarts of clear liquids. Drink no alcoholic beverages for 24 hours prior to the exam. 7. Do not eat any solid food or milk products for 6 hours prior to the exam. You may drink clear liquids until 2 hours prior to the exam. Clear liquids include the following: water, Jell-O, clear broth, apple juice or any noncarbonated drink that you  can see through (no pop!) 8. The morning of the exam you may brush your teeth and take medications as directed with a sip of water. 9. Tell the Radiology nurse if you have any allergies. 10. You will be asked to empty your bladder before the exam begins. 11. You may resume your normal activities the day after the exam. Do not do any strenuous exercises or lifting for 48 hours. 12. If a drainage tube is to remain in place, your doctor s office will need to make arrangements for you to learn how to care for this tube. Ask them about this before the date of the exam. You may need to obtain supplies from your local pharmacy. Please make an appointment before leaving your current appointment. You should understand the plan for your care prior to leaving this appointment            Apr 23, 2018  9:30 AM CDT   Lab with  LAB   [facility] ([facility])    [address]  1st Floor  [facility] [address] [phone]            Apr 23, 2018 10:30 AM CDT   (Arrive by 10:15 AM)   Return General Liver with Robe Bernal MD   [facility] Hepatology ([facility])    [address] [place] [address]  [address]  [facility] [address] [phone]              Future tests that were ordered for you today     Open Future Orders        Priority Expected Expires Ordered    IR Transcatheter Biopsy Routine  2/21/2019 2/21/2018    IR Portogram w Hemodynamics Routine  2/21/2019 2/21/2018            Who to contact     If you have questions or need follow up information about today's clinic visit or your schedule please contact [facility] HEPATOLOGY directly at [phone].  Normal or non-critical lab and imaging results will be communicated to you by MyChart, letter or phone within 4 business days after the clinic has received the results. If you do not hear from us within 7 days, please contact the clinic through MyChart or phone. If you have a critical or abnormal lab result,  we will notify you by phone as soon as possible.  Submit refill requests through [employer] or call your pharmacy and they will forward the refill request to us. Please allow 3 business days for your refill to be completed.          Additional Information About Your Visit        [employer] Information     [employer] gives you secure access to your electronic health record. If you see a primary care provider, you can also send messages to your care team and make appointments. If you have questions, please call your primary care clinic.  If you do not have a primary care provider, please call [phone] and they will assist you.        Care EveryWhere ID     This is your Care EveryWhere ID. This could be used by other organizations to access your [place] medical records  [id number]        Your Vitals Were     Pulse Temperature Respirations Height Pulse Oximetry BMI (Body Mass Index)    65 98.1  F (36.7  C) (Oral) 16 1.829 m (6') 100% 19.53 kg/m2       Blood Pressure from Last 3 Encounters:   02/21/18 112/73    Weight from Last 3 Encounters:   02/21/18 65.3 kg (144 lb)              We Performed the Following     LIVER BIOPSY        Primary Care Provider Office Phone # Fax #    Maci Chiunitin, ALESSANDRA [phone]       [facility] [address] [place] [address]        Equal Access to Services     REYMUNDO GLEASON : Hadii denita ku hadasho Soomaali, waaxda luqadaha, qaybta kaalmada adeegyada, waxay renein haycy obrien. So [facility] [phone].    ATENCIÓN: Si habla español, tiene a dotson disposición servicios gratuitos de asistencia lingüística. ame al [phone].    We comply with applicable federal civil rights laws and [place] laws. We do not discriminate on the basis of race, color, national origin, age, disability, sex, sexual orientation, or gender identity.            Thank you!     Thank you for choosing [facility] HEPATOLOGY  for your care. Our goal is always to provide you with  excellent care. Hearing back from our patients is one way we can continue to improve our services. Please take a few minutes to complete the written survey that you may receive in the mail after your visit with us. Thank you!             Your Updated Medication List - Protect others around you: Learn how to safely use, store and throw away your medicines at www.disposemymeds.org.          This list is accurate as of 2/21/18  6:44 PM.  Always use your most recent med list.                   Brand Name Dispense Instructions for use Diagnosis    amLODIPine 10 MG tablet    NORVASC     Take 10 mg by mouth daily        amylase-lipase-protease [id number] UNITS Cpep per EC capsule    CREON 24     Take 2 caps with meals and  cap with snacks        DULCOLAX 5 MG EC tablet   Generic drug:  bisacodyl      Take 5 mg by mouth daily as needed for constipation        furosemide 20 MG tablet    LASIX     Take 40 mg by mouth daily        GAS-X 80 MG chewable tablet   Generic drug:  simethicone      Take 80 mg by mouth every 6 hours as needed for flatulence or cramping        HYDROmorphone 2 MG tablet    DILAUDID     Take 2 mg by mouth every 3 hours as needed for pain        methadone 5 MG tablet    DOLOPHINE     Take 5 mg by mouth 3 times daily        ondansetron 4 MG ODT tab    ZOFRAN-ODT     Take 4-8 mg by mouth every 8 hours as needed for nausea        polyethylene glycol Packet    MIRALAX/GLYCOLAX     Take 17 g by mouth daily as needed for constipation        sertraline 50 MG tablet    ZOLOFT     Take 50 mg by mouth daily        spironolactone 100 MG tablet    ALDACTONE     Take 100 mg by mouth daily

## 2018-02-21 NOTE — PROGRESS NOTES
Dr Romano- [phone]    [facility]    Hepatology New Patient Visit    Referring provider:  Soto Romano      47 year old male    Chief complaint:  Ascites     HPI:  The patient comes to clinic this morning with his wife for evaluation of new onset ascites.  The patient denies any prior history of chronic liver disease or abnormal liver function tests.      He first developed ascites and lower extremity edema in 09/2017.  He was admitted to the hospital for this and consequently underwent EGD with dilation of the pyloric channel; EUS with celiac plexus ablation and a diagnostic laparoscopy.  The patient has been on low dose diuretics since this time.  He has undergone 2 paracenteses since 09/2017.  His first paracentesis was in early 12/2017 with removal of 6L fluid.  The second paracentesis was just before Christmas with removal of 4L fluid.  Of note, ascitic fluid cytology was negative for malignancy, SAAG > 1.1 and total protein= 0.4.      The patient was diagnosed with pancreatic cancer in 01/2017 after presenting with abdominal discomfort, malaise and obstructive jaundice symptoms.  He underwent EUS and ERCP with stent placement in 01/2017.  The patient then underwent a 2 month course of FOLFIRINOX (5-FU, oxaliplatin, irinotecan and leucovorin) from 01/2017-03/2017.  CT scan noted possible metastatic disease in the liver which resulted in a liver lesion biopsy in late 03/2017.  Liver lesion biopsy showed no evidence of cancer but the presence of abscess/necrosis.  The patient ultimately underwent a Whipple procedure in 05/2017.  The liver biopsy performed at that time showed necrotic areas and inflammation.  The patient last received chemotherapy (gemcitabine & capecitabine) between 10/2017 and 11/2017.  He received capecitabine and Xeloda.  The patient's course has been further complicated with ?liver abscesses complicated with enterococcus bacteremia.  Antibiotics were  completed in 11/2017.      Patient is feeling well today.  His wife reports that he has been feeling well this week.  He returned to work for 2 days this week.      The patient denies any abdominal distention or lower extremity edema.  He is currently taking spironolactone 100 mg once daily.  He has not been taking any furosemide since last fall.      The patient denies any history of jaundice, lethargy, confusion, melena, hematemesis, hematochezia.      No recent history of fevers, sweats or chills.      The patient has lost 10 pounds over the last 6 weeks.  His appetite is good.  He is eating and drinking appropriately.      The patient has not drunk alcohol in 18 months.  Prior to this, he was drinking once to twice per week.  He would drink 1-5 drinks on these occasions.  He denies any history of alcohol abuse or DUIs.      The patient is a nonsmoker.  He denies any recreational drug use including marijuana, IN or IV drugs.       Medical hx Surgical hx   Past Medical History:   Diagnosis Date     Hypertension      Pancreatic cancer (H) 01/2017      Past Surgical History:   Procedure Laterality Date     LAPAROSCOPY DIAGNOSTIC (GENERAL)  09/2017     WHIPPLE PROCEDURE  05/2017          Medications  Prior to Admission medications    Medication Sig Start Date End Date Taking? Authorizing Provider   amLODIPine (NORVASC) 10 MG tablet Take 10 mg by mouth daily 1/12/17  Yes Reported, Patient   HYDROmorphone (DILAUDID) 2 MG tablet Take 2 mg by mouth every 3 hours as needed for pain 2/15/18  Yes Reported, Patient   amylase-lipase-protease (CREON 24) [id number] UNITS CPEP per EC capsule Take 2 caps with meals and  cap with snacks 1/3/18  Yes Reported, Patient   methadone (DOLOPHINE) 5 MG tablet Take 5 mg by mouth 3 times daily 2/15/18  Yes Reported, Patient   ondansetron (ZOFRAN-ODT) 4 MG ODT tab Take 4-8 mg by mouth every 8 hours as needed for nausea 10/10/17  Yes Reported, Patient   polyethylene glycol (MIRALAX/GLYCOLAX)  Packet Take 17 g by mouth daily as needed for constipation 9/23/17  Yes Reported, Patient   sertraline (ZOLOFT) 50 MG tablet Take 50 mg by mouth daily 11/27/17  Yes Reported, Patient   spironolactone (ALDACTONE) 100 MG tablet Take 100 mg by mouth daily 2/7/18  Yes Reported, Patient   simethicone (GAS-X) 80 MG chewable tablet Take 80 mg by mouth every 6 hours as needed for flatulence or cramping   Yes Reported, Patient   bisacodyl (DULCOLAX) 5 MG EC tablet Take 5 mg by mouth daily as needed for constipation   Yes Reported, Patient   furosemide (LASIX) 20 MG tablet Take 40 mg by mouth daily 11/6/17   Reported, Patient       Allergies  No Known Allergies    Family hx Social hx   Family History   Problem Relation Age of Onset     Coronary Artery Disease Early Onset Maternal Grandmother 36     Liver Disease No family hx of      Colon Cancer No family hx of       Social History   Substance Use Topics     Smoking status: Never Smoker     Smokeless tobacco: Never Used     Alcohol use No     Lives in [place] with wife and 3 daughters (aged 4, 6 & 11).  Works as assistant .  No siblings.       Review of systems  A 10-point review of systems was negative.    Examination  /73 (BP Location: Right arm, Patient Position: Sitting, Cuff Size: Adult Regular)  Pulse 65  Temp 98.1  F (36.7  C) (Oral)  Resp 16  Ht 1.829 m (6')  Wt 65.3 kg (144 lb)  SpO2 100%  BMI 19.53 kg/m2  Body mass index is 19.53 kg/(m^2).    Gen- well, NAD, A+Ox3, normal color, fronto-temporal muscle wasting  Eye- EOMI  ENT- MMM, normal oropharynx  Lym- no palpable lymphadenopathy  CVS- S1, S2 normal, no added sounds, RRR  RS- CTA  Abd- laparotomy scar, soft, non-tender, small umbilical hernia, no ascites or organomegaly on palpation or percussion, BS+  Extr- pulses good, no DAVID  MS- hands normal- no clubbing  Neuro- A+Ox3, no asterixis  Skin- no rash or jaundice  Psych- normal mood    Laboratory  2/12/18  INR 1.3    WCC 4.1, hgb 9.9,  "154    TB 0.6, , AST 82, AlkPh 560    Na 136, K 4.7, Cl 102, TCO3 25, BUN 25, Cr 1.01    Radiology  CT A/P with IV contrast 2/12/18 reviewed  MRI liver/MRCP 4/5/17 reviewed    EGD 9/21/17 reviewed- \"gd III esophageal varices\"  EGD 9/19/17 reviewed- \"esophagus endoscopically normal\"  EGD/EUS 1/10/17 reviewed- \"exam up to second portion of duodenum endoscopically normal\"    Assessment  47-year-old male with history of Whipple procedure for pancreatic cancer diagnosed in early 2017 who presents with new onset ascites now controlled on low-dose diuretics.  No evidence of hepatic encephalopathy.  Liver function tests abnormal but stable.  SAAG in September 2017 consistent with portal hypertension.  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  Will obtain transjugular liver biopsy with portal pressures as sinusoidal obstruction syndrome is treatable with defibrotide.  However, it is not clear if the patient would respond defibrotide due to long duration of symptoms.  If the patient has cirrhosis or pseudocirrhosis, would manage patient expectantly including EGD to screen for esophageal varices.     Plan  1.  Obtain recent CT imaging for second read  2.  Transjugular liver biopsy with portal pressures to evaluate for SOS  3.  Low Na diet  4.  Continue spironolactone 100mg PO Q24  5.  Follow-up in 2 months    Robe Carrion MD  Hepatology  [facility]    I spent a total of 60 minutes face-to-face with Naresh Poole during today's office visit.  Over 50% of this time was spent counseling the patient and/or coordinating care regarding portal hypertension, pseudocirrhosis, etiology and management of sinusoidal obstruction syndrome.  See note for details.    "

## 2018-02-21 NOTE — NURSING NOTE
Chief Complaint   Patient presents with     Consult     Abnormal liver imaging, elevated LFT's. Post whipple surgery, pancreatic cancer       Initial /73 (BP Location: Right arm, Patient Position: Sitting, Cuff Size: Adult Regular)  Pulse 65  Temp 98.1  F (36.7  C) (Oral)  Resp 16  Ht 1.829 m (6')  Wt 65.3 kg (144 lb)  SpO2 100%  BMI 19.53 kg/m2 Estimated body mass index is 19.53 kg/(m^2) as calculated from the following:    Height as of this encounter: 1.829 m (6').    Weight as of this encounter: 65.3 kg (144 lb).  Medication Reconciliation: complete     Francisca Ladd [facility]  2/21/2018 9:07 AM

## 2018-02-27 NOTE — IP AVS SNAPSHOT
MRN:[id number]                      After Visit Summary   2/27/2018    Naresh Poole    MRN: [id number]           Visit Information        Department      2/27/2018  8:11 AM [address] [facility]          Review of your medicines      UNREVIEWED medicines. Ask your doctor about these medicines        Dose / Directions    amLODIPine 10 MG tablet   Commonly known as:  NORVASC        Dose:  10 mg   Take 10 mg by mouth daily   Refills:  0       amylase-lipase-protease [id number] UNITS Cpep per EC capsule   Commonly known as:  CREON 24        Take 2 caps with meals and  cap with snacks   Refills:  0       DULCOLAX 5 MG EC tablet   Generic drug:  bisacodyl        Dose:  5 mg   Take 5 mg by mouth daily as needed for constipation   Refills:  0       furosemide 20 MG tablet   Commonly known as:  LASIX        Dose:  40 mg   Take 40 mg by mouth daily   Refills:  0       GAS-X 80 MG chewable tablet   Generic drug:  simethicone        Dose:  80 mg   Take 80 mg by mouth every 6 hours as needed for flatulence or cramping   Refills:  0       HYDROmorphone 2 MG tablet   Commonly known as:  DILAUDID        Dose:  2 mg   Take 2 mg by mouth every 3 hours as needed for pain   Refills:  0       methadone 5 MG tablet   Commonly known as:  DOLOPHINE        Dose:  5 mg   Take 5 mg by mouth 3 times daily   Refills:  0       ondansetron 4 MG ODT tab   Commonly known as:  ZOFRAN-ODT        Dose:  4-8 mg   Take 4-8 mg by mouth every 8 hours as needed for nausea   Refills:  0       polyethylene glycol Packet   Commonly known as:  MIRALAX/GLYCOLAX        Dose:  17 g   Take 17 g by mouth daily as needed for constipation   Refills:  0       sertraline 50 MG tablet   Commonly known as:  ZOLOFT        Dose:  50 mg   Take 50 mg by mouth daily   Refills:  0       spironolactone 100 MG tablet   Commonly known as:  ALDACTONE        Dose:  100 mg   Take 100 mg by mouth daily   Refills:  0                Protect others around you:  Learn how to safely use, store and throw away your medicines at www.disposemymeds.org.         Follow-ups after your visit        Your next 10 appointments already scheduled     Apr 23, 2018  9:30 AM CDT   Lab with [facility]   [facility])    [address]  1st Floor  [place] [id number] [phone]            Apr 23, 2018 10:30 AM CDT   (Arrive by 10:15 AM)   Return General Liver with Robe Bernal MD   [facility] ([facility])    [address]  [address]  [place] [id number] [phone]               Care Instructions        Further instructions from your care team       [facility]    Interventional Radiology  Patient Instructions Following Liver Biopsy    AFTER YOU GO HOME  ? If you were given sedation DO NOT drive or operate machinery at home or at work for at least 24 hours  ? DO relax and take it easy for 48 hours, no strenuous activity for 24 hours  ? DO drink plenty of fluids  ? DO resume your regular diet, unless otherwise instructed by your Primary Physician  ? Keep the dressing dry and in place for 24 hours.  ? DO NOT SMOKE FOR AT LEAST 24 HOURS, if you have been given any medications that were to help you relax or sedate you during your procedure  ? DO NOT drink alcoholic beverages the day of your procedure  ? DO NOT do any strenuous exercise or lifting (> 10 lbs) for at least 3 days following your procedure  ? DO NOT take a bath or shower for at least 12 hours following your procedure  ? Remove dressing after shower the next day. Replace with Band aid for 2 days.  Never leave a wet dressing in place.  ? DO NOT make any important or legal decisions for 24 hours following your procedure  ? There should be minimum drainage from the biopsy site    CALL THE PHYSICIAN IF:  ? You start bleeding from the procedure site.  If you do start to bleed from that site, lie down flat and hold  pressure on the site for a minimum of 10 minutes.  Your physician will tell you if you need to return to the hospital  ? You develop nausea or vomiting  ? You have excessive swelling, redness, or tenderness at the site  ? You have drainage that looks like it is infected.  ? You experience severe pain  ? You develop hives or a rash or unexplained itching  ? You develop a temperature of 101 degrees F or greater    ADDITIONAL INSTRUCTIONS: None    [facility] INTERVENTIONAL RADIOLOGY DEPARTMENT  Procedure Physician:         Dr. Maria/Dr. Mari  Date of procedure: February 27, 2018  Telephone Numbers: [phone] Monday-Friday 8:00 am to 4:30 pm  [phone] After 4:30 pm Monday-Friday, Weekends & Holidays.   Ask for the Interventional Radiologist on call.  Someone is on call 24 hrs/day  [facility] toll free number: [phone] Monday-Friday 8:00 am to 4:30 pm  [facility] Emergency Dept: [phone]           Additional Information About Your Visit        [employer] Information     [employer] gives you secure access to your electronic health record. If you see a primary care provider, you can also send messages to your care team and make appointments. If you have questions, please call your primary care clinic.  If you do not have a primary care provider, please call [phone] and they will assist you.        Care EveryWhere ID     This is your Care EveryWhere ID. This could be used by other organizations to access your [facility] medical records  [id number]        Your Vitals Were     Blood Pressure Pulse Temperature Respirations Pulse Oximetry       135/90 92 99.1  F (37.3  C) (Oral) 12 97%        Primary Care Provider Office Phone # Fax #    Maci ALESSANDRA Iglesias [phone]      Equal Access to Services     [facility] : Hadii denita choi Soduglas, waaxda luqadaha, qaybta kaalmavashti fragoso . So [facility] [phone].    ATENCIÓN: Si habla español, tiene a dotson disposición  servicios gratuitos de asistencia lingüística. Tomas blackman [phone].    We comply with applicable federal civil rights laws and [place] laws. We do not discriminate on the basis of race, color, national origin, age, disability, sex, sexual orientation, or gender identity.            Thank you!     Thank you for choosing [facility] for your care. Our goal is always to provide you with excellent care. Hearing back from our patients is one way we can continue to improve our services. Please take a few minutes to complete the written survey that you may receive in the mail after you visit with us. Thank you!             Medication List: This is a list of all your medications and when to take them. Check marks below indicate your daily home schedule. Keep this list as a reference.      Medications           Morning Afternoon Evening Bedtime As Needed    amLODIPine 10 MG tablet   Commonly known as:  NORVASC   Take 10 mg by mouth daily                                amylase-lipase-protease [id number] UNITS Cpep per EC capsule   Commonly known as:  CREON 24   Take 2 caps with meals and  cap with snacks                                DULCOLAX 5 MG EC tablet   Take 5 mg by mouth daily as needed for constipation   Generic drug:  bisacodyl                                furosemide 20 MG tablet   Commonly known as:  LASIX   Take 40 mg by mouth daily                                GAS-X 80 MG chewable tablet   Take 80 mg by mouth every 6 hours as needed for flatulence or cramping   Generic drug:  simethicone                                HYDROmorphone 2 MG tablet   Commonly known as:  DILAUDID   Take 2 mg by mouth every 3 hours as needed for pain                                methadone 5 MG tablet   Commonly known as:  DOLOPHINE   Take 5 mg by mouth 3 times daily                                ondansetron 4 MG ODT tab   Commonly known as:  ZOFRAN-ODT   Take 4-8 mg by mouth every 8 hours as needed for nausea                                 polyethylene glycol Packet   Commonly known as:  MIRALAX/GLYCOLAX   Take 17 g by mouth daily as needed for constipation                                sertraline 50 MG tablet   Commonly known as:  ZOLOFT   Take 50 mg by mouth daily                                spironolactone 100 MG tablet   Commonly known as:  ALDACTONE   Take 100 mg by mouth daily

## 2018-02-27 NOTE — PROCEDURES
Interventional Radiology Brief Post Procedure Note    Procedure:     Proceduralist: Tonny Mari MD    Assistant: IR Fellow Physician, Ethan Maria MD    Time Out: Prior to the start of the procedure and with procedural staff participation, I verbally confirmed the patient s identity using two indicators, relevant allergies, that the procedure was appropriate and matched the consent or emergent situation, and that the correct equipment/implants were available. Immediately prior to starting the procedure I conducted the Time Out with the procedural staff and re-confirmed the patient s name, procedure, and site/side. (The Joint Commission universal protocol was followed.)  Yes        Sedation: None. Local Anesthestic used    Findings: 3 core needle biopsies of the liver, transjugular    Estimated Blood Loss: Minimal    Fluoroscopy Time:  minute(s)    SPECIMENS: Core needle biopsy specimens sent for pathological analysis    Complications: 1. None     Condition: Stable    Plan: Bedrest for 1 hour, head of bed elevated    Comments: See dictated procedure note for full details.    Elvin Thompson MD

## 2018-02-27 NOTE — PROGRESS NOTES
Interventional Radiology Pre-Procedure Sedation Assessment   Time of Assessment: 8:35 AM    Expected Level: Moderate Sedation    Indication: Sedation is required for the following type of Procedure: Biopsy    Sedation and procedural consent: Risks, benefits and alternatives were discussed with Patient    PO Intake: Appropriately NPO for procedure    ASA Class: Class 2 - MILD SYSTEMIC DISEASE, NO ACUTE PROBLEMS, NO FUNCTIONAL LIMITATIONS.    Mallampati: Grade 2:  Soft palate, base of uvula, tonsillar pillars, and portion of posterior pharyngeal wall visible    Lungs: Lungs Clear with good breath sounds bilaterally    Heart: Normal heart sounds and rate    History and physical reviewed and no updates needed. I have reviewed the lab findings, diagnostic data, medications, and the plan for sedation. I have determined this patient to be an appropriate candidate for the planned sedation and procedure and have reassessed the patient IMMEDIATELY PRIOR to sedation and procedure.    Jairo Maria MD

## 2018-02-27 NOTE — PROGRESS NOTES
1045 Pt arrived on 2a post liver biopsy. VSS RA. Dressing c/d/i. No pain. Family at BS. 1050 Pt eating and drinking. 1140 Pt lan po intake. Dc instructions reviewed, copy given to pt. Pt lan ambulation. Voided. PIV dc'd.1200 Pt dc'd home acc by wife.

## 2018-02-27 NOTE — IP AVS SNAPSHOT
[address] [address]                                       After Visit Summary   2/27/2018    Naresh Poole    MRN: [id number]           After Visit Summary Signature Page     I have received my discharge instructions, and my questions have been answered. I have discussed any challenges I see with this plan with the nurse or doctor.    ..........................................................................................................................................  Patient/Patient Representative Signature      ..........................................................................................................................................  Patient Representative Print Name and Relationship to Patient    ..................................................               ................................................  Date                                            Time    ..........................................................................................................................................  Reviewed by Signature/Title    ...................................................              ..............................................  Date                                                            Time

## 2018-02-27 NOTE — DISCHARGE INSTRUCTIONS
[facility]    Interventional Radiology  Patient Instructions Following Liver Biopsy    AFTER YOU GO HOME  ? If you were given sedation DO NOT drive or operate machinery at home or at work for at least 24 hours  ? DO relax and take it easy for 48 hours, no strenuous activity for 24 hours  ? DO drink plenty of fluids  ? DO resume your regular diet, unless otherwise instructed by your Primary Physician  ? Keep the dressing dry and in place for 24 hours.  ? DO NOT SMOKE FOR AT LEAST 24 HOURS, if you have been given any medications that were to help you relax or sedate you during your procedure  ? DO NOT drink alcoholic beverages the day of your procedure  ? DO NOT do any strenuous exercise or lifting (> 10 lbs) for at least 3 days following your procedure  ? DO NOT take a bath or shower for at least 12 hours following your procedure  ? Remove dressing after shower the next day. Replace with Band aid for 2 days.  Never leave a wet dressing in place.  ? DO NOT make any important or legal decisions for 24 hours following your procedure  ? There should be minimum drainage from the biopsy site    CALL THE PHYSICIAN IF:  ? You start bleeding from the procedure site.  If you do start to bleed from that site, lie down flat and hold pressure on the site for a minimum of 10 minutes.  Your physician will tell you if you need to return to the hospital  ? You develop nausea or vomiting  ? You have excessive swelling, redness, or tenderness at the site  ? You have drainage that looks like it is infected.  ? You experience severe pain  ? You develop hives or a rash or unexplained itching  ? You develop a temperature of 101 degrees F or greater    ADDITIONAL INSTRUCTIONS: None    [facility] INTERVENTIONAL RADIOLOGY DEPARTMENT  Procedure Physician:         Dr. Maria/Dr. Mari  Date of procedure: February 27, 2018  Telephone Numbers: [phone] Monday-Friday 8:00 am to 4:30 pm  [phone] After 4:30 pm Monday-Friday,  Weekends & Holidays.   Ask for the Interventional Radiologist on call.  Someone is on call 24 hrs/day  [facility] toll free number: [phone] Monday-Friday 8:00 am to 4:30 pm  [facility] Emergency Dept: [phone]

## 2018-02-27 NOTE — PROGRESS NOTES
Patient Name: Naresh Poole  Medical Record Number: [id number]  Today's Date: 2/27/2018    Procedure:  Transjugular  Liver biopsy  Proceduralist: Pamela Mari and Candace    Sedation start time: 0930  Sedation end time: 1025  Sedation medications administered: versed 2 mg., fentanyl 100 mcg.  Total sedation time: 55 minutes    Procedure start time: 0940  Puncture time: 0941  Procedure end time: 1020    Report given to: Dodie Garcia Notes: Pt arrived to IR room  3  from  . Pt denies any questions or concerns regarding procedure. Pt positioned supine  and monitored per protocol. Pt tolerated procedure without any noted complications. Pt transferred back to .

## 2018-02-27 NOTE — PROGRESS NOTES
0850 Pt on 2a prepped and ready for liver biopsy. PIV placed. Labs done prior to 2a arrival. H&P current. Family at . PT consented.

## 2018-03-14 PROBLEM — R53.1 WEAKNESS: Status: ACTIVE | Noted: 2018-01-01

## 2018-03-14 NOTE — PLAN OF CARE
Problem: Patient Care Overview  Goal: Plan of Care/Patient Progress Review  4C - Per chart review, discussion with RN and patient; Pt up moving IND-SBA holding onto IV pole and without IV Pole, no balance deficits noted with transfers in room, has not had any falls in the last 6 months, Reports he was IND with walking, ADLs, and transfers at home, lives with wife who is able to help if needed. Feels like he hasn't had any significant change in function, just really tired and needs rest, Educated to walk in hallways while hospitalized to prevent losing strength, verbalized understanding. No skilled inpatient PT needs identified at this time, will complete Consult and defer evaluation. Please reconsult as appropriate if patient experience decline in functional mobility requiring further inpatient skilled PT assessment.

## 2018-03-14 NOTE — PLAN OF CARE
Problem: Patient Care Overview  Goal: Plan of Care/Patient Progress Review  OT 4C: eval orders received.  Per chart review and discussion with multidisciplinary team. Pt. is indep. with BADLs, no cognitive concerns and amb. Indep. without AD. Pt. has no acute IP OT needs at this time. OT orders completed.

## 2018-03-14 NOTE — PROGRESS NOTES
Nutrition Note: positive risk screen for weight loss >10 lbs over the past 2 months.    Chart reviewed. Pt had reported losing 10 lbs over 6 weeks back in early February. Recent weight trend is stable. No nutrition risk factors identified at this time. Please re-consult if indicated.      Wt Readings from Last 10 Encounters:   03/14/18 67.5 kg (148 lb 13 oz)   02/21/18 65.3 kg (144 lb)   01/29/18 67.1 kg (148 lb)  10/30/17 68.5 kg (151 lb)  09/29/17 82.1 kg (181 lb)  08/02/17 80.9 kg (178 lb)  06/29/17 84.8 kg (187 lb)  04/28/17 92.5 kg (204 lb)- premorbid weight      Mandie Lama, RD, LD  ([facility] dietitian, pgr- [phone])

## 2018-03-14 NOTE — PROVIDER NOTIFICATION
Paged MD an FYI that blood was noted in the stool that was sent for sample at 1700. Patient stating that this has been common. Awaiting response.    Addendum: Pt to remain NPO, hgb ordered.

## 2018-03-14 NOTE — PHARMACY-VANCOMYCIN DOSING SERVICE
Pharmacy Emp[facility] Dose Change Per Policy    Original Dose Ordered: 1500mg IV vancomycin q12h  Dose Changed To: 1250mg IV vancomycin q18h (but will give first dose 12 hours post-loading dose).     This dose change was based on the pharmacist's assessment of: current KAT and patient age.     Creatinine Clearance=     Estimated Creatinine Clearance: 62.5 mL/min (based on Cr of 1.38).       Will continue to follow and modify dosage according to levels, organ function and clinical condition    Rhina Ventura, PharmD

## 2018-03-14 NOTE — CONSULTS
"[facility]    Hepatology Consult    Requesting Provider: Isael Marin    Reason for Consult: concern for sinusoidal obstructive syndrome    HPI:  Naresh Poole is a 47 year old male with a past medical history notable for pancreatic cancer (T3N1) s/p whipple on 5/2017,  FOLFIRINOX (1/2017 - 3/2017)and gemcitabine/capecitabine (10/2017 - 11/2017) that was complicated by enterococcal liver abscess and non-malignant ascites that presents as a transfer from [facility] for a variety of vague symptoms.  Patient is very slow to answer questions and is somewhat inconsistent with his answers.      He reports worsening fatigue and generalized weakness.  Patient notes a once week history of bloody diarrhea.  Occurs about 3 times daily and has been progressively getting worse.  He developed abdominal pain on Monday.  He initially describes the pain in the epigastric and right upper quadrant region, but then reports that it is in his bilateral lower quadrants.  He is unable to describe anything that makes the pain worse.  When asked if defecating improves the pain, he states that \"that sounds right\".  He states that he has been having fever/chills, but is unable to elaborate further about this.  No worsening abdominal distension.      At [facility] patient was noted to have hyponatremia, hyperkalemia, KAT and worsening anemia/thrombocytopenia.  Hgb was down to 8.6 -> 7.7 (previously 9.9 on 2/12).  Platelets were 66 (previously 154).  Bilirubin elevated at 4.2 (previously 0.6 on 2/12).  Alk phos at 762 (previously 560 on 2/12).  Ammonia < 10.  Stool guaiac was positive.  He was given 3L of NS and 1 unit of pRBC's.  CT head was negative and RUQ ultrasound findings are noted below.      Pancreatic cancer was initially diagnosed in 1/2017.  He underwent EUS/ERCP with stent placement at that time.  He the went through chemotherapy with FOLFIRINOX from 1/2017 to 3/2017.  He was noted to have a " liver lesion on imaging in 3/2017 and underwent biopsy of the lesion that demonstrated no evidence of cancer, but presence of abscess/necrosis.  He underwent whipple in 5/2017 and repeat liver biopsy intraoperatively demonstrated necrotic areas and inflammation.  He then had chemotherapy with gemcitabine and capecitabine between 10/2017 and 11/2017.  He also developed liver abscess that was complicated by enterococcus bacteremia and completed antibiotics in 11/2017.      Patient was referred to Gastroenterology on 2/21/2018 for evaluation of new onset ascites that was being managed with low dose diuretics.  He first developed the ascites with associated lower extremity edema in 9/2017.  He had undergoing two paracentesis since 9/2017 with both being 12/2017 with removal of 6L and 4L of fluid respectively.  Ascitic fluid cytology was negative for malignancy, SAAG > 1.1 and total protein of 0.4.  There was concern that his ascites and stable elevation in LFT's was related to pseudocirrhosis 2/2 hepatic necrosis in the setting of chemotherapy vs sinusoidal obstruction syndrome secondary to oxaliplatin.  He underwent liver biopsy on 2/27/2018.  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  No evidence of significant fibrosis or sinusoidal obstructive syndrome was seen.     Medical hx Surgical hx   Past Medical History:   Diagnosis Date     Hypertension      Pancreatic cancer (H) 01/2017      Past Surgical History:   Procedure Laterality Date     LAPAROSCOPY DIAGNOSTIC (GENERAL)  09/2017     WHIPPLE PROCEDURE  05/2017          Medications:  Current Facility-Administered Medications   Medication Dose Route Frequency     sertraline  50 mg Oral Daily     insulin aspart  1-4 Units Subcutaneous Q4H     amylase-lipase-protease  2 capsule Oral TID w/meals     methadone  5 mg Oral TID     vancomycin (VANCOCIN) IV  1,750 mg Intravenous Once      vancomycin (VANCOCIN) IV  1,250 mg Intravenous Q18H       Allergies:  No Known Allergies    Family hx Social hx   Family History   Problem Relation Age of Onset     Coronary Artery Disease Early Onset Maternal Grandmother 36     Liver Disease No family hx of      Colon Cancer No family hx of       Social History   Substance Use Topics     Smoking status: Never Smoker     Smokeless tobacco: Never Used     Alcohol use No          Review of Systems:  A 10-point review of systems was negative.    Examination:  /80  Temp 98.9  F (37.2  C) (Oral)  Resp 17  Ht 1.829 m (6')  Wt 67.5 kg (148 lb 13 oz)  SpO2 96%  BMI 20.18 kg/m2    Intake/Output Summary (Last 24 hours) at 03/14/18 0842  Last data filed at 03/14/18 0800   Gross per 24 hour   Intake             1180 ml   Output              450 ml   Net              730 ml     General - laying in bed, no acute distress, very slow to respond to questions   Eye - EOMI, mild scleral icterus   ENT - MMM  CV - RRR, no murmur appreciated   Respiratory - CTAB, normal respiratory effort on room air  Abdomen - soft, diffuse tenderness more prominent in the bilateral lower quadrant  Rectal - patient declined stating he already had this  Extremity - no DAVID  Neuro - no asterixis, very slow to answer question, oriented  Skin - no rash    Laboratory  Lab Results   Component Value Date     03/14/2018    POTASSIUM 5.0 03/14/2018    CHLORIDE 103 03/14/2018    CO2 22 03/14/2018    BUN 88 03/14/2018    CR 1.35 03/14/2018       Lab Results   Component Value Date    BILITOTAL 6.9 03/14/2018    ALT 84 03/14/2018    AST 53 03/14/2018    ALKPHOS 656 03/14/2018       Lab Results   Component Value Date    ALBUMIN 2.2 03/14/2018    PROTTOTAL 5.9 03/14/2018        Lab Results   Component Value Date    WBC 13.9 03/14/2018    HGB 9.1 03/14/2018    MCV 79 03/14/2018    PLT 48 03/14/2018       Lab Results   Component Value Date    INR 1.55 03/14/2018       Radiology:  US abdomen (3/13)-    IMPRESSION:   1.  Prior Whipple procedure with absent gallbladder. No biliary ductal dilatation.  2.  Splenomegaly.  3.  Right pelviectasis.  4.  Diffuse fatty infiltration of the liver with 1.8 x 1.6 x 1.3 cm right hepatic cyst.    US abdomen w/ doppler (2/12) -   IMPRESSION:   1.  Technically difficult and limited examination. However, the superior mesenteric vein and portal vein appear patent with normal direction of flow. Suspect that the appearance of the SMV and portal vein on the earlier CT scan was related to timing of the examination relative to the IV contrast material bolus. Ascites and splenomegaly noted.    Assessment/Recommendations:   Naresh Poole is a 47 year old male with a past medical history notable for pancreatic cancer (T3N1) s/p whipple on 5/2017,  FOLFIRINOX (1/2017 - 3/2017)and gemcitabine/capecitabine (10/2017 - 11/2017) that was complicated by enterococcal liver abscess and non-malignant ascites that presents as a transfer from [facility] with worsening LFT elevations, direct hyperbilirubinemia and hematochezia. Unlikely that this is related to sinusoidal obstructive syndrome given negative biopsy.  Feel that current picture is more concerning for vascular process (thrombosis within the hepatic system with possible component of ischemic colitis) vs infectious picture.    - PPI BID  - continue infectious workup  - trend CRP  - ECHO evaluate for endocarditis  - abdominal US with dopplers (pending result could consider CTA of the abdomen, but will assess this tomorrow based on US results and kidney function)  - fecal calprotectin (non-specific, but can be elevated in ischemic colitis)    Patient was discussed with Dr. Moncada.     Queta Limon MD  Internal Medicine, PGY-2  Hepatology Consult Service    Physician Attestation  I, Anastasia Moncada, saw and examined this patient, and in agreement with Dr. Limon and their findings as well as plan of care as documented in the above note.     I personally reviewed vital signs, medications, labs and imaging.    Anastasia Moncada MD  Hepatology staff  Patient seen on: 3/14/2018

## 2018-03-14 NOTE — IP AVS SNAPSHOT
"    [address] [facility]: [phone]                                              INTERAGENCY TRANSFER FORM - PHYSICIAN ORDERS   3/14/2018                    Hospital Admission Date: 3/14/2018  ALMAS MERAZ   : 1970  Sex: Male        Attending Provider: Macrina Urbina MD     Allergies:  No Known Allergies    Infection:  None   Service:  INTERNAL MED    Ht:  1.829 m (6' 0.01\")   Wt:  67.2 kg (148 lb 3.2 oz)   Admission Wt:  67.5 kg (148 lb 13 oz)    BMI:  20.1 kg/m 2   BSA:  1.85 m 2            Patient PCP Information     Provider PCP Type    Maci Iglesias NP General      ED Clinical Impression     Diagnosis Description Comment Added By Time Added    Weakness [R53.1] Weakness [R53.1]  Didier Burch MD 3/21/2018 11:18 AM    Malignant neoplasm of pancreas, unspecified location of malignancy (H) [C25.9] Malignant neoplasm of pancreas, unspecified location of malignancy (H) [C25.9]  Jeanna Vinson MD 3/31/2018  9:20 PM    Candidemia (H) [B37.7] Candidemia (H) [B37.7]  Jeanna Vinson MD 3/31/2018  9:23 PM    Cirrhosis of liver with ascites, unspecified hepatic cirrhosis type (H) [K74.60] Cirrhosis of liver with ascites, unspecified hepatic cirrhosis type (H) [K74.60]  Jeanna Vinson MD 3/31/2018  9:25 PM    Encounter for biliary drainage tube placement [Z46.82] Encounter for biliary drainage tube placement [Z46.82]  Jeanna Vinson MD 3/31/2018  9:28 PM    Pancreatic insufficiency [K86.89] Pancreatic insufficiency [K86.89]  Jeanna Vinson MD 3/31/2018  9:31 PM    Vitamin K deficiency [E56.1] Vitamin K deficiency [E56.1]  Jeanna Vinson MD 3/31/2018  9:33 PM    Vitamin D deficiency [E55.9] Vitamin D deficiency [E55.9]  Jeanna Vinson MD 3/31/2018  9:34 PM    Gastroesophageal reflux disease, esophagitis presence not specified [K21.9] Gastroesophageal reflux disease, esophagitis presence not specified [K21.9]  Jeanna Vinson " MD Fe 3/31/2018  9:36 PM    Bacteremia [R78.81] Bacteremia [R78.81]  Jeanna Vinson MD 3/31/2018  9:38 PM    Anemia, unspecified type [D64.9] Anemia, unspecified type [D64.9]  Jeanna Vinson MD 3/31/2018 10:00 PM    Benign essential hypertension [I10] Benign essential hypertension [I10]  Jeanna Vinson MD 4/1/2018  9:55 AM    Hypokalemia [E87.6] Hypokalemia [E87.6]  Jeanna Vinson MD 4/1/2018  9:56 AM      Hospital Problems as of 4/2/2018              Priority Class Noted POA    Weakness Medium  3/14/2018 Yes    Bacteremia Medium  3/31/2018 Yes    Cirrhosis of liver with ascites, unspecified hepatic cirrhosis type (H) Low Chronic 4/1/2018 Yes      Non-Hospital Problems as of 4/2/2018     None      Code Status History     Date Active Date Inactive Code Status Order ID Comments User Context    4/1/2018  1:40 PM  DNR/DNI [id number]  Jeanna Vinson MD Outpatient    3/14/2018  1:16 PM 4/1/2018  1:40 PM DNR/DNI [id number]  Jeanna Vinson MD Inpatient    3/14/2018  1:59 AM 3/14/2018  1:16 PM Full Code [id number]  Isael Marin MD Inpatient         Medication Review      START taking        Dose / Directions Comments    Ascorbic Acid 250 MG Chew   Used for:  Vitamin K deficiency        Dose:  250 mg   Take 250 mg by mouth daily   Quantity:  90 tablet   Refills:  0        DRISDOL 47994 UNITS Caps   Used for:  Vitamin D deficiency        Dose:  50842 Units   Start taking on:  4/5/2018   Take 50,000 Units by mouth every 7 days for 4 doses   Quantity:  4 capsule   Refills:  0        ertapenem 1 GM vial   Commonly known as:  INVanz        Dose:  1 g   Inject 1 g into the vein every 24 hours for 28 days   Quantity:  280 mL   Refills:  0        fluconazole 200 MG tablet   Commonly known as:  DIFLUCAN   Indication:  Candida Fungus Infection in the Blood   Used for:  Candidemia (H)        Dose:  400 mg   Take 2 tablets (400 mg) by mouth daily for 28  days   Quantity:  56 tablet   Refills:  0        * insulin aspart 100 UNIT/ML injection   Commonly known as:  NovoLOG PEN   Used for:  Pancreatic insufficiency        Dose:  1-7 Units   Inject 1-7 Units Subcutaneous 4 times daily (with meals and nightly)   Quantity:  9 mL   Refills:  0        * insulin aspart 100 UNIT/ML injection   Commonly known as:  NovoLOG PEN   Used for:  Pancreatic insufficiency        Dose:  1-5 Units   Inject 1-5 Units Subcutaneous At Bedtime   Quantity:  9 mL   Refills:  0        multivitamin CF formula Caps per capsule   Used for:  Pancreatic insufficiency        Dose:  1 capsule   Take 1 capsule by mouth daily   Quantity:  30 capsule   Refills:  11        naloxone 0.4 MG/ML injection   Commonly known as:  NARCAN   Used for:  Malignant neoplasm of pancreas, unspecified location of malignancy (H)        Dose:  0.1-0.4 mg   Inject 0.25-1 mLs (0.1-0.4 mg) into the vein once for 1 dose   Quantity:  1 mL   Refills:  0        pantoprazole 40 MG EC tablet   Commonly known as:  PROTONIX   Used for:  Gastroesophageal reflux disease, esophagitis presence not specified        Dose:  40 mg   Take 1 tablet (40 mg) by mouth 2 times daily   Quantity:  30 tablet   Refills:  11        sodium chloride (PF) 0.9% PF flush   Used for:  Encounter for biliary drainage tube placement        Dose:  10 mL   Irrigate with 10 mLs as directed every 24 hours   Quantity:  300 mL   Refills:  1        vancomycin 1,250 mg   Indication:  Bacteria in the Blood, BMT Prophylaxis        Dose:  1250 mg   Inject 1,250 mg into the vein every 24 hours for 28 days   Quantity:  62409 mg   Refills:  0        * Notice:  This list has 2 medication(s) that are the same as other medications prescribed for you. Read the directions carefully, and ask your doctor or other care provider to review them with you.      CONTINUE these medications which may have CHANGED, or have new prescriptions. If we are uncertain of the size of  tablets/capsules you have at home, strength may be listed as something that might have changed.        Dose / Directions Comments    amLODIPine 5 MG tablet   Commonly known as:  NORVASC   This may have changed:    - medication strength  - how much to take   Used for:  Benign essential hypertension        Dose:  5 mg   Take 1 tablet (5 mg) by mouth daily   Quantity:  30 tablet   Refills:  0        HYDROmorphone 2 MG tablet   Commonly known as:  DILAUDID   This may have changed:    - how much to take  - when to take this  - reasons to take this  - additional instructions   Used for:  Malignant neoplasm of pancreas, unspecified location of malignancy (H)        Dose:  4 mg   Take 2 tablets (4 mg) by mouth every 4 hours as needed for moderate to severe pain (take 2-4 mg as needed for pain every 4 hours)   Quantity:  28 tablet   Refills:  0        spironolactone 50 MG tablet   Commonly known as:  ALDACTONE   This may have changed:  medication strength        Dose:  100 mg   Take 2 tablets (100 mg) by mouth daily   Quantity:  30 tablet   Refills:  0          CONTINUE these medications which have NOT CHANGED        Dose / Directions Comments    amylase-lipase-protease 64762-15304 UNITS Cpep per EC capsule   Commonly known as:  CREON 24        Take 2 caps with meals and 1 cap with snacks.   Refills:  0        DULCOLAX 5 MG EC tablet   Generic drug:  bisacodyl        Dose:  5 mg   Take 5 mg by mouth daily as needed for constipation   Refills:  0        furosemide 20 MG tablet   Commonly known as:  LASIX        Dose:  40 mg   Take 2 tablets (40 mg) by mouth daily   Quantity:  30 tablet   Refills:  0        GAS-X 80 MG chewable tablet   Generic drug:  simethicone        Dose:  80 mg   Take 80 mg by mouth every 6 hours as needed for flatulence or cramping   Refills:  0        methadone 5 MG tablet   Commonly known as:  DOLOPHINE        Dose:  5 mg   Take 5 mg by mouth 3 times daily   Refills:  0        MYLANTA 200-200-20 MG/5ML  Susp suspension   Generic drug:  alum & mag hydroxide-simethicone        Dose:  15 mL   Take 15 mLs by mouth 2 times daily as needed for indigestion   Refills:  0        ONDANSETRON PO        Dose:  4-8 mg   Take 4-8 mg by mouth every 8 hours as needed for nausea   Refills:  0        polyethylene glycol Packet   Commonly known as:  MIRALAX/GLYCOLAX        Dose:  17 g   Take 17 g by mouth daily as needed for constipation   Refills:  0        sertraline 50 MG tablet   Commonly known as:  ZOLOFT        Dose:  50 mg   Take 50 mg by mouth At Bedtime   Refills:  0                  Further instructions from your care team         1. No heavy lifting for 2 days (no greater than 10 pounds)    2. Resume usual diet    3. Can shower tomorrow    4. Dressing can come off at time of next shower    5. Pain killers as needed    6. Interventional Radiology will call you and arrange your follow up    Summary of Visit     Reason for your hospital stay       You had a bacterial and yeast infection in the blood and also infection (abscess) in the liver.             After Care     Activity       Your activity upon discharge: activity as tolerated       Diet       Follow this diet upon discharge: Orders Placed This Encounter      Room Service      Snacks/Supplements Adult: Ensure Plus (Adult); Between Meals      Calorie Counts      Room Service      High Kcal/High Protein Diet, ADULT             Referrals     Home Care OT Referral for Hospital Discharge       [facility] [phone]  Fax [phone]    OK for start of care for 4/4 or 4/5  OT to eval and treat    Your provider has ordered home care - occupational therapy. If you have not been contacted within 2 days of your discharge please call the department phone number listed on the top of this document.       Home Care PT Referral for Hospital Discharge       [facility] [phone]  Fax [phone]    OK to start care on 4/4 or 4/5  PT to eval and treat    Your  provider has ordered home care - physical therapy. If you have not been contacted within 2 days of your discharge please call the department phone number listed on the top of this document.       Home care nursing referral       [facility] [phone]  Fax [phone]    RN skilled nursing visit. RN to assess vital signs and weight, respiratory and cardiac status, pain level and activity tolerance, hydration, nutrition and bowel status and home safety.  RN to teach medication management.  PICC line dressing changes    Your provider has ordered home care nursing services. If you have not been contacted within 2 days of your discharge please call the inpatient department phone number at [phone] .       Home infusion referral       Your provider has referred you to: [facility]: [facility] [place] [phone]   [email or web address]    Local Address (if different from home address): N/A    Anticipated Length of Therapy: per md order    Home Infusion Pharmacist to adjust therapy based on labs and clinical assessments: Yes    Labs:  May draw labs from Venous Catheter: Yes  Home Infusion Pharmacist to order labs based on therapy type and clinical assessments: Yes  Call/Fax Lab Results to: ID  [phone]  Agency Staff to assess nursing needs for Infusion Therapy.    Access Device Management:  IV Access Type: PICC  Flush with Heparin and Normal Saline IVP PRN and routine site care (per agency protocol) to maintain access device? Yes       Infectious Disease Referral       F/U in 3-4 weeks with Dr. Reddy.       Physical Therapy Referral       *This therapy referral will be filtered to a centralized scheduling office at [facility] and the patient will receive a call to schedule an appointment at a [place] location most convenient for them. *     [facility] provides Physical Therapy evaluation and treatment and many  "specialty services across the [facility] system.  If requesting a specialty program, please choose from the list below.    If you have not heard from the scheduling office within 2 business days, please call [phone] for all locations, with the exception of [place], please call [phone] and [place], please call [phone]  Treatment: Evaluation & Treatment  Special Instructions/Modalities: Therapy for generalized weakness.   Special Programs: none    Please be aware that coverage of these services is subject to the terms and limitations of your health insurance plan.  Call member services at your health plan with any benefit or coverage questions.      **Note to Provider:  If you are referring outside of [facility] for the therapy appointment, please list the name of the location in the \"special instructions\" above, print the referral and give to the patient to schedule the appointment.             Lab Orders     CBC with platelets       Last Lab Result: Hemoglobin (g/dL)       Date                     Value                 03/31/2018               8.7 (L)          ----------       Comprehensive metabolic panel           Hepatic panel       LFT check for pt on fluconazole. Obtain weekly.             Radiology & Cardiology Orders     Future Labs/Procedures Complete By Expires    IR Biliary Tube Change  6/21/2018 (Approximate) 3/21/2019      Radiology & Cardiology Orders     IR Biliary Tube Change                 Your next 10 appointments already scheduled     Apr 02, 2018 10:45 AM CDT   (Arrive by 10:30 AM)   New Patient Visit with Sujit Cuevas MD   [facility] ([facility])    [address]  [address]  [place] [address] [phone]            Apr 23, 2018  9:30 AM CDT   Lab with  LAB   [facility])    [address]  1st Floor  [place] [address] [phone]            Apr 23, 2018 10:30 AM CDT "   (Arrive by 10:15 AM)   Return General Liver with Robe Bernal MD   [facility] ([facility])    [address]  [address]  [facility] [address] [phone]              Follow-Up Appointment Instructions     Future Labs/Procedures    Adult [facility] Follow-up and recommended labs and tests     Comments:    Follow up with primary care provider, Maci Iglesias, within 7 days to evaluate medication change and to evaluate treatment change.  The following labs/tests are recommended: CBC and CMP.      Followup with Oncology on April 2nd.     Please followup with Dr. Patino in 3 weeks with infectious diseases. They will contact you to make an appointment.     Please followup with gastroenterology. You have an appointment with them on April 23.    Please followup with Interventional Radiology. They will contact you for an appointment. You need to see them in 3 weeks to have your drain replaced with a new drain.    We will refer you for a paracentesis weekly as needed.    Appointments on [place] and/or [facility] (with [facility] or [facility] provider or service). Call [phone] if you haven't heard regarding these appointments within 7 days of discharge.      Follow-Up Appointment Instructions     Adult [facility] Follow-up and recommended labs and tests       Follow up with primary care provider, Maci Iglesias, within 7 days to evaluate medication change and to evaluate treatment change.  The following labs/tests are recommended: CBC and CMP.      Followup with Oncology on April 2nd.     Please followup with Dr. Patino in 3 weeks with infectious diseases. They will contact you to make an appointment.     Please followup with gastroenterology. You have an appointment with them on April 23.    Please followup with Interventional Radiology. They will contact you for an appointment. You need to see them in 3 weeks to have your drain replaced with a new drain.    We will  refer you for a paracentesis weekly as needed.    Appointments on [place] and/or [facility] (with [facility] or [facility] provider or service). Call [phone] if you haven't heard regarding these appointments within 7 days of discharge.             Statement of Approval     Ordered          04/02/18 0839  I have reviewed and agree with all the recommendations and orders detailed in this document.  EFFECTIVE NOW     Approved and electronically signed by:  Jeanna Vinson MD

## 2018-03-14 NOTE — LETTER
Patient:  Almas Meraz  :   1970  MRN:     [id number]        Mr.Charles URVASHI Meraz  [address] [address] [address]        2018    Dear ,    We are writing to inform you of your test results. In short, as our fellow discussed with you during your admission the tissue collected from your edematous biliary limb return benign. While not definitive, this is reassuring.     I have included the formal documtentation of the results below. It continues to be a pleasure participating in your care.  Please feel free to contact our clinic with any further questions.      Sincerely,    Jared Levin MD PhD  Director of Endoscopy   of Medicine, Surgery and Pediatrics  Interventional and Therapeutic Endoscopy    [facility]  Division of Gastroenterology and Hepatology  [facility] [address] [address]    New Consultations  [phone]  Procedure Scheduling [phone]  Clinical Nurse Coordinator [phone]  Clinical Fax   [phone]  Administrative   [phone]  Administrative Fax  [phone]    Resulted Orders   Surgical pathology exam   Result Value Ref Range    Copath Report       Patient Name: ALMAS MERAZ  MR#: [id number]  Specimen #: [id number]  Collected: 3/19/2018  Received: 3/19/2018  Reported: 3/21/2018 10:59  Ordering Phy(s): JARED LEVIN    For improved result formatting, select 'View Enhanced Report Format' under   Linked Documents section.    SPECIMEN(S):  Pancreatojejunostomy biopsy    FINAL DIAGNOSIS:  SMALL INTESTINE, PANCREATOJEJUNOSTOMY, BIOPSY:  - Small intestinal mucosa with no significant histologic abnormality  - Negative for dysplasia and malignancy    I have personally reviewed all specimens and/or slides, including the   listed special stains, and used them  with my medical judgement to determine or confirm the final diagnosis.    Electronically signed  "out by:    Johnathan Arndt M.D., [facility]    CLINICAL HISTORY:  History of adenocarcinoma of the pancreas.  GROSS:  The specimen is received in formalin with proper patient identification,   labeled \"biopsy of  pancreaticojejunostomy\".  The specimen consists of four tan-w brittanie soft   tissues ranging from 0.3-0.5 cm in  greatest dimension that are entirely submitted in cassette A1. (Dictated   by: Nat Armenta 3/20/2018 08:27  AM)    MICROSCOPIC:  Microscopic examination was performed.    CPT Codes:  A: 74245-GZ5    TESTING LAB LOCATION:  [facility], [address]   [address]  [phone]    COLLECTION SITE:  Client: [facility]  Location: [place] ([place])    Resident  [id number]           "

## 2018-03-14 NOTE — H&P
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     NEURO/PSYCH  #Somnolence- mentioned by [facility] ED provider on signout, resolved upon arrival here, CT head negative. Likely due to hyponatremia. Currently no indication that patient has altered mentation. He has no evidence of infection, ammonia was negative.     #Chronic Cancer Pain- continue home methadone 5 tid, hold prn dilaudid 2mg q3 for now.     #MDD- cont home sertraline    CARDIOVASCULAR  #Hx of HTN- hold home amlodipine 10 given likely dehydration currently and normal BP's.    PULMONARY  #No acute issues    HEME/ONC  #Microcytic Anemia- pt has had a slow decline from 10-11 throughout 2017 to now 7.7 and 8.6 at [facility] and 9 here. There was concern for GIB at [facility] ED due to a positive stool guaiac which has a high rate of false positives and patient reports no bleeding symptoms- as such this is unlikely. Also it does not appear that the Hgb drop is acute, more subacute looking at the full trend. His microcytosis is likely due to iron deficiency from reduced po intake which he does note. His RI is 0.3 suggesting underproduction rather than hemolysis or consumption; possibly again from iron deficiency. He does have new thrombocytopenia raising concern for hemolysis but bilirubin is mostly conjugated arguing against this and will discuss below more likely thrombocytopenia causes. Fibrinogen is not low to suggest DIC. On DDx is also capecitabine and gemcitabine side effects- it's unclear to me if he's still on these (wife no  answering phone, patient somewhat unreliable historian).  -Iron panel, ferritin pending  -LDH pending  -is s/p 1U pRBC at [facility] ED  -AM discussion with GI if his Hgb, Plts, liver panel abnormalities fit picture of sinusoidal obstructive syndrome or if alternate workup is necessary    #Thrombocytopenia- broad differential. Patient possibly has sinusoidal obstructive syndrome which can cause low Plts along with the conjugated hyperbilirubinemia which the patient also has. 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 Possibly also due to capecitabine and gemcitabine as noted above. No EtOH use, no other med culprits, no reason to believe he has a new viral (HIV, EBV, etc) infection. No DIC given high fibrinogen. No hemolysis given mostly conjugated bilirubin but awaiting LDH.  -plan as above    #Pancreatic cancer (T3N1) s/p whipple 9/2017 along with FOLFIRINOX and Gemzar/Xeloda c/b enterococcal liver abscess (treated) along with non-malignant ascites.  -will need to discuss with wife and possibly Dr. Les Romano if patient is still on capecitabine and gemcitabine and intent of treatment at this point  -cont home creon and gas-x, hold bisacodyl given diarrhea    RENAL  #Pre-Renal KAT- improving with IVFs. Peaked at 2.3 at [facility] ED. S/p 3L at OSH, will use LR @ 150/hr for one day and allow po intake. Repeat labs at 0700.    #Hyperkalemia- due to KAT, resolving without shifting as Cr improves.     #Acute to Subacute Hypovolemic Hyponatremia- resolved with IVFs.    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 Transhepatic gradient was 27, suggestive of SOS. Has required paracenteses in the past few months, no ascites currently.   -GI consult, idalmis recs  -hold home spironolactone 100, lasix 20 due to hypovolemia     #Up Trending Alk Phos- possibly due to SOS? [facility] ED US showed hepatopedal portal vein flow and diffuse fatty  liver infiltrates with a known right hepatic cyst (stable per 3/2/18 CT scan from 12/1/17 imaging).  -GGT  -APhos Isoenzyme    #Diarrhea- acuity is unclear, will need to ask wife. No evidence of infection at this point and has not had a loose BM while here, possibly resolved? He is on a bisacodyl, unclear if he's been taking this recently, hold for now.    #Nutrition: regular diet, ensures    ENDOCRINE  #HyperGlycemia- SSI    INFECTIOUS DISEASE  #No acute concerns    SKIN/MSK  #Right Chest Reproducible Tenderness- R XR ribs to assess for mets.    Prophylaxis:     - DVT: SCDs for now given slow Hgb drop, AM team to consider pharmacologic ppx give high risk   - GI: none   - Aspiration: alert    Family: attempted to contact  LDA: right chest post not working, PIVs  Disposition: pending clinical improvement, PT/OT ordered  Code Status: full    The patient was examined with Dr. Lawrence who agrees with the plan.  Isael Marin PGY3      =========================================================================  Chief Complaint: Weakness    HPI:   Hx limited as patient somewhat unclear about timeline.    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 Recently has been worked up by 2/2017 liver biopsy and hepatic gradient measurements (27) for possible sinusoidal obstructive syndrome after developing ascites.     It's quite unclear to me where he is in terms of treatment. No oncology note seen since October last year, has been seeing palliative care since then. Last oncology note seems to indicate continued curative intent. Is on Gemzar/Xeloda per [facility] but patient says he's not taking those. Will need to discuss with patient and wife when both present and patient more reliable in the AM hopefully or discuss with [facility] oncologist, Dr. Les Romano. Wife did not answer phone  overnight.    Patient says he presented to ED due to weakness. Has lost 10lb the past week. Says he no longer has an appetite to eat or drink. Has felt subjective warmth but no sweats. He has had RUQ pain but then points to his right upper chest as the location. Has had diarrhea, up to 4 loose brown BM's per day, but unsure how many weeks this has been going on. Did vomit yesterday, non bloody emesis. ROS is negative for rigors, chest pain, dyspnea, rashes.     In the [facility] ED, was found to have acute hyponatermia, hyperkalemia, KAT, and Hgb drop to 7.7-8.6 from baseline around 9-11 throughout 2011. Also Plt drop from 150s to 66. Stool guaiac was positive so there was though he had a GIB. He was given 3L NS, 1U pRBC, PPI 1x. APhos at [facility] was 762 from 560 and INR was 1.5. AST was 82 from 82 and ALT was 107 from 130. Bilirubin was 4.2, 3.1 conjugated - up from total bilirubin of 0.6. Ammonia was less than 10. CT head there was negative. A RUQ US was done showing normal (hepatopedal) flow of the portal vein, splenomegaly, diffuse fatty liver infiltrates with a right hepatic cyst (known).      ROS: negative except as above in HPI    Past Medical History:   Diagnosis Date     Hypertension      Pancreatic cancer (H) 01/2017     Past Surgical History:   Procedure Laterality Date     LAPAROSCOPY DIAGNOSTIC (GENERAL)  09/2017     WHIPPLE PROCEDURE  05/2017     Social History     Social History     Marital status:      Spouse name: N/A     Number of children: N/A     Years of education: N/A     Occupational History     Not on file.     Social History Main Topics     Smoking status: Never Smoker     Smokeless tobacco: Never Used     Alcohol use No     Drug use: No     Sexual activity: Not on file     Other Topics Concern     Not on file     Social History Narrative     Current Facility-Administered Medications   Medication     naloxone (NARCAN) injection 0.1-0.4 mg     methadone (DOLOPHINE) tablet 5 mg      sertraline (ZOLOFT) tablet 50 mg   No Known Allergies    OBJECTIVE:  Blood pressure 125/88, temperature 98.7  F (37.1  C), temperature source Oral, resp. rate 18, height 1.829 m (6'), SpO2 97 %.     gen- middle aged, appears fatigued and cachectic   neuro- aox3, grossly non focal, slow thinking process when answering some questions but alert and answers appropriately   cv- rrr, no m/r/g, patient has reproducible chest tenderness with palpation over the right anterior ribs  pulm- cta, non labored, on RA  abdmn- soft, nt, nd, negative loredo's, no fluid wave  extrm- warm, no edema     DIAGNOSTIC STUDIES    Labs reviewed. Notable for   Na 134 K 5 Cr 1.38 Bili 6 APhos 696 ALT 92 AST 57  WBC normal Hgb 9 MCV 80 Plts 55 Fibrinogen 511 INR 1.55    2/27 Liver Biopsy  Liver, needle biopsy   - Aberrant shunt vessels and hepatocellular atrophy, suggestive of   possible hepatoportal sclerosis   - No evidence of significant fibrosis, or sinusoidal obstruction syndrome     EKG, none new    Imaging reviewed.   NM CXR negative    NM CT Head negative    NM RUQ US  Liver: Diffusely echogenic suggestive of fatty infiltration. 1.8 x 1.6 x 1.3 cm right hepatic cyst.  Gallbladder: Surgically absent. Negative sonographic Loredo's sign.  Bile Ducts: No intrahepatic biliary ductal dilatation. Common bile duct measures 5 mm in diameter.  Pancreas: Prior Whipple surgery, therefore limiting visualization of the pancreas.  Spleen: No discrete mass.. 19.2 cm long axis.  Kidneys: No renal mass, calculus, or hydronephrosis. The right and left kidneys measure 12.9 and 13.8 cm long axis. Prominent right renal pelvis.    Aorta: Normal caliber measuring 2.2 cm proximally, 1.8 cm mid, 1.4 cm distally.  IVC: Patent.  Portal Vein: Hepatopedal.  Ascites: None visible.  IMPRESSION:   1.  Prior Whipple procedure with absent gallbladder. No biliary ductal dilatation.  2.  Splenomegaly.  3.  Right pelviectasis.  4.  Diffuse fatty infiltration of the liver  with 1.8 x 1.6 x 1.3 cm right hepatic cyst.      Addendum to H&P 3/14/18:  This morning, patient complained of continued RUQ pain. No other complaints. The patient did state that he had some bleeding in the toilet bowl for 2 days prior to presentation to OSH ED. Feels less weak this morning. Additionally, the patient noted that he has not taken any chemotherapeutic agents since approximately summer in 2017. Denies fever, chills, chest pain, shortness of breath, other abdominal pain. Per RN, the patient's port-a-cath had some yellow discharge. On exam, the patient was pleasant and in NAD. HEENT exam was unremarkable, as were his cardiovascular and respiratory exams. Abdominal exam with RUQ and mild RLQ tenderness, no midline or left-sided tenderness, normal bowel sounds. Unable to assess for Loredo's sign due to discomfort. No peripheral edema. Updates to plan in original H&P:    - Discontinued vancomycin  - Increased SSI from low dose to medium dose  - Cultured port-a-cath discharge  - Gave home PO dilaudid  - Made NPO for potential procedure by GI    The patient was transferred to [place] [id number] in stable condition.     Ho Martinez MD PhD  [facility]  Internal Medicine PGY-1  Pager [phone]

## 2018-03-14 NOTE — SUMMARY OF CARE
Naresh arrived on 5B with the following belongings:    -black fleece coat, wearing black hat, pair of pants & a t-shirt

## 2018-03-14 NOTE — PHARMACY-VANCOMYCIN DOSING SERVICE
Pharmacy Vancomycin Initial Note  Date of Service 2018  Patient's  1970  47 year old, male    Indication: Gram positive bacteremia    Current estimated CrCl = Estimated Creatinine Clearance: 62.5 mL/min (based on Cr of 1.38).    Creatinine for last 3 days  3/14/2018:  2:28 AM Creatinine 1.38 mg/dL    Recent Vancomycin Level(s) for last 3 days  No results found for requested labs within last 72 hours.      Vancomycin IV Administrations (past 72 hours)      No vancomycin orders with administrations in past 72 hours.                Nephrotoxins and other renal medications     None          Contrast Orders - past 72 hours     None                Plan:  1.  Start vancomycin  1750 mg IV once, followed by 1500 mg IV q12h.   2.  Goal Trough Level: 15-20 mg/L   3.  Pharmacy will check trough levels as appropriate in 1-3 Days.    4. Serum creatinine levels will be ordered daily for the first week of therapy and at least twice weekly for subsequent weeks.    5. Anabel method utilized to dose vancomycin therapy: Method 2    Gus Flannery

## 2018-03-14 NOTE — IP AVS SNAPSHOT
[address] [address]    Phone:  [phone]                                       After Visit Summary   3/14/2018    Naresh Poole    MRN: [id number]           After Visit Summary Signature Page     I have received my discharge instructions, and my questions have been answered. I have discussed any challenges I see with this plan with the nurse or doctor.    ..........................................................................................................................................  Patient/Patient Representative Signature      ..........................................................................................................................................  Patient Representative Print Name and Relationship to Patient    ..................................................               ................................................  Date                                            Time    ..........................................................................................................................................  Reviewed by Signature/Title    ...................................................              ..............................................  Date                                                            Time

## 2018-03-14 NOTE — PLAN OF CARE
Problem: Patient Care Overview  Goal: Plan of Care/Patient Progress Review  Outcome: No Change  Patient admitted @ ~ 0115 from outside hospital     Neuro: A&Ox4. Lethargic, falls asleep between cares and during conversations. Pupils equal and reactive.   Cardiac: SR-ST, HR 's. VSS.   Respiratory: Sating mid-high 90's on RA.  GI/: Voiding spontaneously. BM X1.   Diet/appetite: Regular diet. Poor PO intake.  Activity:  Independently positioning in bed. Up with SBA.   Pain: C/O right sided abdominal pain. Pain at acceptable level with rest and relaxation.   Skin: Intact, no new deficits noted.    Plan: Continue with POC. Notify primary team with changes.

## 2018-03-14 NOTE — PLAN OF CARE
Problem: Patient Care Overview  Goal: Plan of Care/Patient Progress Review  Outcome: No Change  Arrived to unit approx 1100. VSS. NPO. LR @ 150 ml/hr. BG check 173.  Lethargic; bed alarm for safety. On scheduled methadone.  BM x1. Voiding per urinal. Wife present at bedside & attentive to patient. GI consult ordered. Will report to oncoming staff.

## 2018-03-14 NOTE — PLAN OF CARE
Problem: Patient Care Overview  Goal: Plan of Care/Patient Progress Review  Outcome: Improving  D:  Admitted this AM for severe dehydration, now  transferring out of the Intensive Care Unit, ICU services no longer indicated report given to Jaja LOOon 5 B.  Pt aware of transfer belongings sent with patient.  I/A:  On RA with clear to diminished lung sounds, hemodynamically stable on scheduled methadone for pain and prn dilaudid.  NPO awaiting GI consult.  LR infusing at 15 ml/hr and right port a cath has not been accessed 2/2 malpositioned.  P:  CT abdomen/ pelvis to be done today and  continue to monitor, assess and w/POC.

## 2018-03-14 NOTE — LETTER
[address] [facility]  [address] [address]  [phone]          April 2, 2018    RE:  Naresh Poole                                                                                                                                                       [id number] [address] [address]            To whom it may concern:        Naresh Poole is under my professional care. He has been hospitalized at [facility] between 3/14-4/2/2018.         Sincerely,      Kalin Macias MD  Internal Medicine Resident

## 2018-03-14 NOTE — PHARMACY-ADMISSION MEDICATION HISTORY
Admission medication history interview status for the 3/14/2018 admission is complete. See Epic admission navigator for allergy information, pharmacy, prior to admission medications and immunization status.     Medication history interview sources:  Patient's wife and pharmacy ([facility] pharmacy at [place]: [phone])  Patient's wife was a reliable historian and able to provide specific information regarding his PTA meds.     Changes made to PTA medication list (reason)  Added: Mylanta soln (per patient wife, patient started on this med couple weeks ago due to gas)  Deleted: none  Changed: Dilaudid 2mg three times daily as needed (changed from every 3 hours as needed)    Additional medication history information (including reliability of information, actions taken by pharmacist):  - Per pharmacy, pt last filled dilaudid 2mg on 2/26/18 for #90 with a direction of 1 tab three times daily as needed. Of note patient's wife stated that patient has been taking up to 6 tablets daily instead of what it was directed due to increased pain. She stated that patient took 4 tabs on 3/13/18 before admission to the hospital.   - Per pharmacy, pt last filled methadone 5mg on 2/26/18 for #90.   - Patient's wife stated that patient has not been using bisacodyl, Miralax due to symptoms of diarrhea. She also mentioned that patient has not been using ondansetron at home for long time.  -  Per [facility], pt last received flu vaccine on 5/10/17.     Prior to Admission medications    Medication Sig Last Dose Taking? Auth Provider   alum & mag hydroxide-simethicone (MYLANTA) 200-200-20 MG/5ML SUSP suspension Take 15 mLs by mouth 2 times daily as needed for indigestion 3/13/2018 at AM Yes Unknown, Entered By History   amLODIPine (NORVASC) 10 MG tablet Take 10 mg by mouth daily 3/13/2018 at AM Yes Reported, Patient   furosemide (LASIX) 20 MG tablet Take 40 mg by mouth daily 3/13/2018 at AM Yes Reported, Patient   HYDROmorphone (DILAUDID) 2 MG tablet  Take 2 mg by mouth 3 times daily as needed for pain Patient taking differently: 1 tablets by mouth every 4 hours as needed (up to 6 tablets per day). 3/13/2018 at noon Yes Reported, Patient   amylase-lipase-protease (CREON 24) [address] UNITS CPEP per EC capsule Take 2 caps with meals and 1 cap with snacks. 3/13/2018 at PM Yes Reported, Patient   methadone (DOLOPHINE) 5 MG tablet Take 5 mg by mouth 3 times daily 3/13/2018 at noon Yes Reported, Patient   sertraline (ZOLOFT) 50 MG tablet Take 50 mg by mouth At Bedtime  3/12/2018 at HS Yes Reported, Patient   spironolactone (ALDACTONE) 100 MG tablet Take 100 mg by mouth daily 3/13/2018 at AM Yes Reported, Patient   simethicone (GAS-X) 80 MG chewable tablet Take 80 mg by mouth every 6 hours as needed for flatulence or cramping 3/13/2018 at AM Yes Reported, Patient   ONDANSETRON PO Take 4-8 mg by mouth every 8 hours as needed for nausea NOT TAKING  Unknown, Entered By History   polyethylene glycol (MIRALAX/GLYCOLAX) Packet Take 17 g by mouth daily as needed for constipation NOT TAKING  Reported, Patient   bisacodyl (DULCOLAX) 5 MG EC tablet Take 5 mg by mouth daily as needed for constipation NOT TAKING  Reported, Patient         Medication history completed by: Pedro Lockett, PD4 Pharm Student

## 2018-03-14 NOTE — IP AVS SNAPSHOT
MRN:[id number]                      After Visit Summary   3/14/2018    Naresh Poole    MRN: [id number]           Thank you!     Thank you for choosing [facility] for your care. Our goal is always to provide you with excellent care. Hearing back from our patients is one way we can continue to improve our services. Please take a few minutes to complete the written survey that you may receive in the mail after you visit with us. Thank you!        Patient Information     Date Of Birth          1970        Designated Caregiver       Most Recent Value    Caregiver    Will someone help with your care after discharge? yes    Name of designated caregiver Yumi Poole    Phone number of caregiver [phone]    Caregiver address [place], [place]      About your hospital stay     You were admitted on:  March 14, 2018 You last received care in the:  [address] [facility]    You were discharged on:  April 2, 2018        Reason for your hospital stay       You had a bacterial and yeast infection in the blood and also infection (abscess) in the liver.                  Who to Call     For medical emergencies, please call 911.  For non-urgent questions about your medical care, please call your primary care provider or clinic, [phone]  For questions related to your surgery, please call your surgery clinic        Attending Provider     Provider Specialty    Vicente Lawrence MD Critical Care Medicine    Martín Chapman MD Internal Medicine    Manohar Arizmendi MD Internal Medicine    Macrina Urbina MD Internal Medicine       Primary Care Provider Office Phone # Fax #    Maci ChiuALESSANDRA taveras [phone]      After Care Instructions     Activity       Your activity upon discharge: activity as tolerated            Diet       Follow this diet upon discharge: Orders Placed This Encounter      Room Service      Snacks/Supplements Adult: Ensure Plus (Adult); Between Meals      Calorie  Counts      Room Service      High Kcal/High Protein Diet, ADULT                  Follow-up Appointments     Adult [facility] Follow-up and recommended labs and tests       Follow up with primary care provider, Maci Iglesias, within 7 days to evaluate medication change and to evaluate treatment change.  The following labs/tests are recommended: CBC and CMP.      Followup with Oncology on April 2nd.     Please followup with Dr. Patino in 3 weeks with infectious diseases. They will contact you to make an appointment.     Please followup with gastroenterology. You have an appointment with them on April 23.    Please followup with Interventional Radiology. They will contact you for an appointment. You need to see them in 3 weeks to have your drain replaced with a new drain.    We will refer you for a paracentesis weekly as needed.    Appointments on [place] and/or [facility] (with [facility] or [facility] provider or service). Call [phone] if you haven't heard regarding these appointments within 7 days of discharge.                  Your next 10 appointments already scheduled     Apr 02, 2018 10:45 AM CDT   (Arrive by 10:30 AM)   New Patient Visit with Sujit Cuevas MD   [facility] ([facility] [place])    [address]  [address]  [facility] [id number] [phone]            Apr 23, 2018  9:30 AM CDT   Lab with  LAB   [facility] ([facility])    [address]  1st Floor  [facility] [id number] [phone]            Apr 23, 2018 10:30 AM CDT   (Arrive by 10:15 AM)   Return General Liver with Robe Bernal MD   [facility] ([facility])    [address] [place] [address]  [address]  [facility] [id number] [phone]              Additional Services     Home Care OT Referral for Hospital Discharge       [facility] [phone]  Fax [phone]    OK for start of care for 4/4 or 4/5  OT  to eval and treat    Your provider has ordered home care - occupational therapy. If you have not been contacted within 2 days of your discharge please call the department phone number listed on the top of this document.            Home Care PT Referral for Hospital Discharge       [facility] [phone]  Fax [phone]    OK to start care on 4/4 or 4/5  PT to eval and treat    Your provider has ordered home care - physical therapy. If you have not been contacted within 2 days of your discharge please call the department phone number listed on the top of this document.            Home care nursing referral       [facility] [phone]  Fax [phone]    RN skilled nursing visit. RN to assess vital signs and weight, respiratory and cardiac status, pain level and activity tolerance, hydration, nutrition and bowel status and home safety.  RN to teach medication management.  PICC line dressing changes    Your provider has ordered home care nursing services. If you have not been contacted within 2 days of your discharge please call the inpatient department phone number at [phone] .            Home infusion referral       Your provider has referred you to: [facility]: [facility] [phone]   [email or web address]    Local Address (if different from home address): N/A    Anticipated Length of Therapy: per md order    Home Infusion Pharmacist to adjust therapy based on labs and clinical assessments: Yes    Labs:  May draw labs from Venous Catheter: Yes  Home Infusion Pharmacist to order labs based on therapy type and clinical assessments: Yes  Call/Fax Lab Results to: ID  [id number]  Agency Staff to assess nursing needs for Infusion Therapy.    Access Device Management:  IV Access Type: PICC  Flush with Heparin and Normal Saline IVP PRN and routine site care (per agency protocol) to maintain access device? Yes            Infectious  "Disease Referral       F/U in 3-4 weeks with Dr. Reddy.            Physical Therapy Referral       *This therapy referral will be filtered to a centralized scheduling office at [facility] and the patient will receive a call to schedule an appointment at a [facility] location most convenient for them. *     [facility] provides Physical Therapy evaluation and treatment and many specialty services across the [place] system.  If requesting a specialty program, please choose from the list below.    If you have not heard from the scheduling office within 2 business days, please call [phone] for all locations, with the exception of [place], please call [phone] and [facility], please call [phone]  Treatment: Evaluation & Treatment  Special Instructions/Modalities: Therapy for generalized weakness.   Special Programs: none    Please be aware that coverage of these services is subject to the terms and limitations of your health insurance plan.  Call member services at your health plan with any benefit or coverage questions.      **Note to Provider:  If you are referring outside of [facility] for the therapy appointment, please list the name of the location in the \"special instructions\" above, print the referral and give to the patient to schedule the appointment.                  Future tests that were ordered for you     CBC with platelets       Last Lab Result: Hemoglobin (g/dL)       Date                     Value                 03/31/2018               8.7 (L)          ----------            Comprehensive metabolic panel           Hepatic panel       LFT check for pt on fluconazole. Obtain weekly.            IR Biliary Tube Change                 Further instructions from your care team         1. No heavy lifting for 2 days (no greater than 10 pounds)    2. Resume usual diet    3. Can shower tomorrow    4. Dressing can come off at time of next shower    5. Pain " "killers as needed    6. Interventional Radiology will call you and arrange your follow up    Pending Results     Date and Time Order Name Status Description    4/1/2018 1535 POC US Guide for Paracentesis In process     4/1/2018 0858 EKG 12-lead, tracing only Preliminary     3/29/2018 1552 FISH With Professional Interpretation In process     3/29/2018 1131 POC US Guide for Paracentesis In process     3/29/2018 0935 Fungus Culture, non-blood Preliminary     3/28/2018 0000 Blood culture Preliminary     3/28/2018 0000 Blood culture Preliminary     3/26/2018 1329 POC US Guide for Paracentesis In process     3/26/2018 0903 Anaerobic bacterial culture Preliminary     3/26/2018 0903 Fungus Culture, non-blood Preliminary     3/26/2018 0903 fluid culture - Aerobic Bacterial Preliminary     3/16/2018 1046 POC US Guide for Paracentesis In process             Statement of Approval     Ordered          04/02/18 0839  I have reviewed and agree with all the recommendations and orders detailed in this document.  EFFECTIVE NOW     Approved and electronically signed by:  Jeanna Vinson MD             Admission Information     Date & Time Provider Department Dept. Phone    3/14/2018 Macrina Urbina MD [address] [facility] [place] [phone]      Your Vitals Were     Blood Pressure Pulse Temperature Respirations Height Weight    135/92 (BP Location: Right arm) 85 96.8  F (36  C) (Oral) 16 1.829 m (6' 0.01\") 67.2 kg (148 lb 3.2 oz)    Pulse Oximetry BMI (Body Mass Index)                97% 20.1 kg/m2          MyChart Information     [facility] gives you secure access to your electronic health record. If you see a primary care provider, you can also send messages to your care team and make appointments. If you have questions, please call your primary care clinic.  If you do not have a primary care provider, please call [phone] and they will assist you.        Care EveryWhere ID     This is your Care EveryWhere ID. This could be used " by other organizations to access your [facility] medical records  [id number]        Equal Access to Services     REYMUNDO GLEASON : Hadii denita Austin, nataliya guerra, michael espinosa, vashti obrien. So [facility] [phone].    ATENCIÓN: Si habla español, tiene a dotson disposición servicios gratuitos de asistencia lingüística. Llame al [phone].    We comply with applicable federal civil rights laws and [place] laws. We do not discriminate on the basis of race, color, national origin, age, disability, sex, sexual orientation, or gender identity.               Review of your medicines      START taking        Dose / Directions    Alcohol Swabs Pads   Used for:  Diabetes mellitus due to underlying condition with complication, unspecified long term insulin use status (H)        Dose:  4 each   4 each 4 times daily as needed   Quantity:  120 each   Refills:  11       Ascorbic Acid 250 MG Chew   Used for:  Vitamin K deficiency        Dose:  250 mg   Take 250 mg by mouth daily   Quantity:  90 tablet   Refills:  0       BD SHARPS CONTAINER HOME Misc   Used for:  Diabetes mellitus due to underlying condition with complication, unspecified long term insulin use status (H)        Dose:  1 each   1 each every 30 days   Quantity:  1 each   Refills:  11       blood glucose monitoring lancets   Used for:  Diabetes mellitus due to underlying condition with complication, unspecified long term insulin use status (H)        Use to test blood sugars 4 times daily or as directed.   Quantity:  100 each   Refills:  11       blood glucose monitoring test strip   Commonly known as:  no brand specified   Used for:  Diabetes mellitus due to underlying condition with complication, unspecified long term insulin use status (H)        Use to test blood sugars 4 times daily or as directed.   Quantity:  100 strip   Refills:  11       DRISDOL 04134 UNITS Caps   Used for:  Vitamin D deficiency         "Dose:  19511 Units   Start taking on:  4/5/2018   Take 50,000 Units by mouth every 7 days for 4 doses   Quantity:  4 capsule   Refills:  0       ertapenem 1 GM vial   Commonly known as:  INVanz        Dose:  1 g   Inject 1 g into the vein every 24 hours for 28 days   Quantity:  280 mL   Refills:  0       fluconazole 200 MG tablet   Commonly known as:  DIFLUCAN   Indication:  Candida Fungus Infection in the Blood   Used for:  Candidemia (H)        Dose:  400 mg   Take 2 tablets (400 mg) by mouth daily for 28 days   Quantity:  56 tablet   Refills:  0       gauze pads & dressings 4\"X4\" Pads   Used for:  Candidiasis of skin        Dose:  10 pad   10 pads 4 times daily as needed   Quantity:  200 each   Refills:  11       * insulin aspart 100 UNIT/ML injection   Commonly known as:  NovoLOG PEN   Used for:  Pancreatic insufficiency        Dose:  1-7 Units   Inject 1-7 Units Subcutaneous 4 times daily (with meals and nightly)   Quantity:  9 mL   Refills:  0       * insulin aspart 100 UNIT/ML injection   Commonly known as:  NovoLOG PEN   Used for:  Pancreatic insufficiency        Dose:  1-5 Units   Inject 1-5 Units Subcutaneous At Bedtime   Quantity:  9 mL   Refills:  0       insulin pen needle 32G X 4 MM   Used for:  Diabetes mellitus due to underlying condition with complication, unspecified long term insulin use status (H)        Use insulin pen needles daily or as directed.   Quantity:  100 each   Refills:  11       Miconazole Nitrate 2 % ointment   Commonly known as:  CRITIC-AID CLEAR AF   Used for:  Candidiasis of skin        Apply topically 2 times daily   Quantity:  71 g   Refills:  11       multivitamin CF formula Caps per capsule   Used for:  Pancreatic insufficiency        Dose:  1 capsule   Take 1 capsule by mouth daily   Quantity:  30 capsule   Refills:  11       naloxone 0.4 MG/ML injection   Commonly known as:  NARCAN   Used for:  Malignant neoplasm of pancreas, unspecified location of malignancy (H)        " Dose:  0.1-0.4 mg   Inject 0.25-1 mLs (0.1-0.4 mg) into the vein once for 1 dose   Quantity:  1 mL   Refills:  0       pantoprazole 40 MG EC tablet   Commonly known as:  PROTONIX   Used for:  Gastroesophageal reflux disease, esophagitis presence not specified        Dose:  40 mg   Take 1 tablet (40 mg) by mouth 2 times daily   Quantity:  30 tablet   Refills:  11       sodium chloride (PF) 0.9% PF flush   Used for:  Encounter for biliary drainage tube placement        Dose:  10 mL   Irrigate with 10 mLs as directed every 24 hours   Quantity:  300 mL   Refills:  1       vancomycin 1,250 mg   Indication:  Bacteria in the Blood, BMT Prophylaxis        Dose:  1250 mg   Inject 1,250 mg into the vein every 24 hours for 28 days   Quantity:  89618 mg   Refills:  0       * Notice:  This list has 2 medication(s) that are the same as other medications prescribed for you. Read the directions carefully, and ask your doctor or other care provider to review them with you.      CONTINUE these medicines which may have CHANGED, or have new prescriptions. If we are uncertain of the size of tablets/capsules you have at home, strength may be listed as something that might have changed.        Dose / Directions    amLODIPine 5 MG tablet   Commonly known as:  NORVASC   This may have changed:    - medication strength  - how much to take   Used for:  Benign essential hypertension        Dose:  5 mg   Take 1 tablet (5 mg) by mouth daily   Quantity:  30 tablet   Refills:  0       HYDROmorphone 2 MG tablet   Commonly known as:  DILAUDID   This may have changed:    - how much to take  - when to take this  - reasons to take this  - additional instructions   Used for:  Malignant neoplasm of pancreas, unspecified location of malignancy (H)        Dose:  4 mg   Take 2 tablets (4 mg) by mouth every 4 hours as needed for moderate to severe pain (take 2-4 mg as needed for pain every 4 hours)   Quantity:  28 tablet   Refills:  0       spironolactone 50  MG tablet   Commonly known as:  ALDACTONE   This may have changed:  medication strength        Dose:  100 mg   Take 2 tablets (100 mg) by mouth daily   Quantity:  30 tablet   Refills:  0         CONTINUE these medicines which have NOT CHANGED        Dose / Directions    amylase-lipase-protease 69376-08654 UNITS Cpep per EC capsule   Commonly known as:  CREON 24        Take 2 caps with meals and 1 cap with snacks.   Refills:  0       DULCOLAX 5 MG EC tablet   Generic drug:  bisacodyl        Dose:  5 mg   Take 5 mg by mouth daily as needed for constipation   Refills:  0       furosemide 20 MG tablet   Commonly known as:  LASIX        Dose:  40 mg   Take 2 tablets (40 mg) by mouth daily   Quantity:  30 tablet   Refills:  0       GAS-X 80 MG chewable tablet   Generic drug:  simethicone        Dose:  80 mg   Take 80 mg by mouth every 6 hours as needed for flatulence or cramping   Refills:  0       methadone 5 MG tablet   Commonly known as:  DOLOPHINE        Dose:  5 mg   Take 5 mg by mouth 3 times daily   Refills:  0       MYLANTA 200-200-20 MG/5ML Susp suspension   Generic drug:  alum & mag hydroxide-simethicone        Dose:  15 mL   Take 15 mLs by mouth 2 times daily as needed for indigestion   Refills:  0       ONDANSETRON PO        Dose:  4-8 mg   Take 4-8 mg by mouth every 8 hours as needed for nausea   Refills:  0       polyethylene glycol Packet   Commonly known as:  MIRALAX/GLYCOLAX        Dose:  17 g   Take 17 g by mouth daily as needed for constipation   Refills:  0       sertraline 50 MG tablet   Commonly known as:  ZOLOFT        Dose:  50 mg   Take 50 mg by mouth At Bedtime   Refills:  0            Where to get your medicines      These medications were sent to [facility] [address] [address] [facility]  [address] [place] [address]     Phone:  [phone]     Alcohol Swabs Pads    amLODIPine 5 MG tablet    Ascorbic Acid 250 MG Chew    BD SHARPS CONTAINER HOME Misc    blood  "glucose monitoring lancets    blood glucose monitoring test strip    DRISDOL 83563 UNITS Caps    fluconazole 200 MG tablet    furosemide 20 MG tablet    gauze pads & dressings 4\"X4\" Pads    insulin aspart 100 UNIT/ML injection    insulin aspart 100 UNIT/ML injection    insulin pen needle 32G X 4 MM    Miconazole Nitrate 2 % ointment    multivitamin CF formula Caps per capsule    naloxone 0.4 MG/ML injection    pantoprazole 40 MG EC tablet    sodium chloride (PF) 0.9% PF flush    spironolactone 50 MG tablet         Some of these will need a paper prescription and others can be bought over the counter. Ask your nurse if you have questions.     Bring a paper prescription for each of these medications     ertapenem 1 GM vial    HYDROmorphone 2 MG tablet    vancomycin 1,250 mg                Protect others around you: Learn how to safely use, store and throw away your medicines at www.disposemymeds.org.        ANTIBIOTIC INSTRUCTION     You've Been Prescribed an Antibiotic - Now What?  Your healthcare team thinks that you or your loved one might have an infection. Some infections can be treated with antibiotics, which are powerful, life-saving drugs. Like all medications, antibiotics have side effects and should only be used when necessary. There are some important things you should know about your antibiotic treatment.      Your healthcare team may run tests before you start taking an antibiotic.    Your team may take samples (e.g., from your blood, urine or other areas) to run tests to look for bacteria. These test can be important to determine if you need an antibiotic at all and, if you do, which antibiotic will work best.      Within a few days, your healthcare team might change or even stop your antibiotic.    Your team may start you on an antibiotic while they are working to find out what is making you sick.    Your team might change your antibiotic because test results show that a different antibiotic would be " better to treat your infection.    In some cases, once your team has more information, they learn that you do not need an antibiotic at all. They may find out that you don't have an infection, or that the antibiotic you're taking won't work against your infection. For example, an infection caused by a virus can't be treated with antibiotics. Staying on an antibiotic when you don't need it is more likely to be harmful than helpful.      You may experience side effects from your antibiotic.    Like all medications, antibiotics have side effects. Some of these can be serious.    Let you healthcare team know if you have any known allergies when you are admitted to the hospital.    One significant side effect of nearly all antibiotics is the risk of severe and sometimes deadly diarrhea caused by Clostridium difficile (C. Difficile). This occurs when a person takes antibiotics because some good germs are destroyed. Antibiotic use allows C. diificile to take over, putting patients at high risk for this serious infection.    As a patient or caregiver, it is important to understand your or your loved one's antibiotic treatment. It is especially important for caregivers to speak up when patients can't speak for themselves. Here are some important questions to ask your healthcare team.    What infection is this antibiotic treating and how do you know I have that infection?    What side effects might occur from this antibiotic?    How long will I need to take this antibiotic?    Is it safe to take this antibiotic with other medications or supplements (e.g., vitamins) that I am taking?     Are there any special directions I need to know about taking this antibiotic? For example, should I take it with food?    How will I be monitored to know whether my infection is responding to the antibiotic?    What tests may help to make sure the right antibiotic is prescribed for me?      Information provided by:  www.cdc.gov/getsmart  U.S.  Department of Health and Human Services  Centers for disease Control and Prevention  National Center for Emerging and Zoonotic Infectious Diseases  Division of Healthcare Quality Promotion        Information about OPIOIDS     PRESCRIPTION OPIOIDS: WHAT YOU NEED TO KNOW    Prescription opioids can be used to help relieve moderate to severe pain and are often prescribed following a surgery or injury, or for certain health conditions. These medications can be an important part of treatment but also come with serious risks. It is important to work with your health care provider to make sure you are getting the safest, most effective care.    WHAT ARE THE RISKS AND SIDE EFFECTS OF OPIOID USE?  Prescription opioids carry serious risks of addiction and overdose, especially with prolonged use. An opioid overdose, often marked by slowed breathing can cause sudden death. The use of prescription opioids can have a number of side effects as well, even when taken as directed:      Tolerance - meaning you might need to take more of a medication for the same pain relief    Physical dependence - meaning you have symptoms of withdrawal when a medication is stopped    Increased sensitivity to pain    Constipation    Nausea, vomiting, and dry mouth    Sleepiness and dizziness    Confusion    Depression    Low levels of testosterone that can result in lower sex drive, energy, and strength    Itching and sweating    RISKS ARE GREATER WITH:    History of drug misuse, substance use disorder, or overdose    Mental health conditions (such as depression or anxiety)    Sleep apnea    Older age (65 years or older)    Pregnancy    Avoid alcohol while taking prescription opioids.   Also, unless specifically advised by your health care provider, medications to avoid include:    Benzodiazepines (such as Xanax or Valium)    Muscle relaxants (such as Soma or Flexeril)    Hypnotics (such as Ambien or Lunesta)    Other prescription opioids    KNOW  YOUR OPTIONS:  Talk to your health care provider about ways to manage your pain that do not involve prescription opioids. Some of these options may actually work better and have fewer risks and side effects:    Pain relievers such as acetaminophen, ibuprofen, and naproxen    Some medications that are also used for depression or seizures    Physical therapy and exercise    Cognitive behavioral therapy, a psychological, goal-directed approach, in which patients learn how to modify physical, behavioral, and emotional triggers of pain and stress    IF YOU ARE PRESCRIBED OPIOIDS FOR PAIN:    Never take opioids in greater amounts or more often than prescribed    Follow up with your primary health care provider and work together to create a plan on how to manage your pain.    Talk about ways to help manage your pain that do not involve prescription opioids    Talk about all concerns and side effects    Help prevent misuse and abuse    Never sell or share prescription opioids    Never use another person's prescription opioids    Store prescription opioids in a secure place and out of reach of others (this may include visitors, children, friends, and family)    Visit www.cdc.gov/drugoverdose to learn about risks of opioid abuse and overdose    If you believe you may be struggling with addiction, tell your health care provider and ask for guidance or call [facility]'s National Helpline at 0-375-566-HELP    LEARN MORE / www.cdc.gov/drugoverdose/prescribing/guideline.html    Safely dispose of unused prescription opioids: Find your local drug take-back programs and more information about the importance of safe disposal at www.doseofreality.mn.gov             Medication List: This is a list of all your medications and when to take them. Check marks below indicate your daily home schedule. Keep this list as a reference.      Medications           Morning Afternoon Evening Bedtime As Needed    Alcohol Swabs Pads   4 each 4 times daily  as needed                                amLODIPine 5 MG tablet   Commonly known as:  NORVASC   Take 1 tablet (5 mg) by mouth daily   Last time this was given:  5 mg on 4/2/2018  8:03 AM                                amylase-lipase-protease 30523-52176 UNITS Cpep per EC capsule   Commonly known as:  CREON 24   Take 2 caps with meals and 1 cap with snacks.   Last time this was given:  48,000 Units on 4/2/2018  6:11 AM                                Ascorbic Acid 250 MG Chew   Take 250 mg by mouth daily   Last time this was given:  250 mg on 4/2/2018  8:03 AM                                BD SHARPS CONTAINER HOME Misc   1 each every 30 days                                blood glucose monitoring lancets   Use to test blood sugars 4 times daily or as directed.                                blood glucose monitoring test strip   Commonly known as:  no brand specified   Use to test blood sugars 4 times daily or as directed.                                DRISDOL 16875 UNITS Caps   Take 50,000 Units by mouth every 7 days for 4 doses   Start taking on:  4/5/2018   Last time this was given:  50,000 Units on 3/29/2018  8:17 PM                                DULCOLAX 5 MG EC tablet   Take 5 mg by mouth daily as needed for constipation   Generic drug:  bisacodyl                                ertapenem 1 GM vial   Commonly known as:  INVanz   Inject 1 g into the vein every 24 hours for 28 days   Last time this was given:  1 g on 4/1/2018  2:19 PM                                fluconazole 200 MG tablet   Commonly known as:  DIFLUCAN   Take 2 tablets (400 mg) by mouth daily for 28 days   Last time this was given:  400 mg on 4/2/2018  8:03 AM                                furosemide 20 MG tablet   Commonly known as:  LASIX   Take 2 tablets (40 mg) by mouth daily   Last time this was given:  20 mg on 4/2/2018  8:03 AM                                GAS-X 80 MG chewable tablet   Take 80 mg by mouth every 6 hours as needed for  "flatulence or cramping   Last time this was given:  80 mg on 3/26/2018  3:09 AM   Generic drug:  simethicone                                gauze pads & dressings 4\"X4\" Pads   10 pads 4 times daily as needed                                HYDROmorphone 2 MG tablet   Commonly known as:  DILAUDID   Take 2 tablets (4 mg) by mouth every 4 hours as needed for moderate to severe pain (take 2-4 mg as needed for pain every 4 hours)   Last time this was given:  4 mg on 4/2/2018  8:03 AM                                * insulin aspart 100 UNIT/ML injection   Commonly known as:  NovoLOG PEN   Inject 1-7 Units Subcutaneous 4 times daily (with meals and nightly)   Last time this was given:  1 Units on 4/1/2018 11:54 AM                                * insulin aspart 100 UNIT/ML injection   Commonly known as:  NovoLOG PEN   Inject 1-5 Units Subcutaneous At Bedtime   Last time this was given:  1 Units on 4/1/2018 11:54 AM                                insulin pen needle 32G X 4 MM   Use insulin pen needles daily or as directed.                                methadone 5 MG tablet   Commonly known as:  DOLOPHINE   Take 5 mg by mouth 3 times daily   Last time this was given:  5 mg on 4/2/2018  8:03 AM                                Miconazole Nitrate 2 % ointment   Commonly known as:  CRITIC-AID CLEAR AF   Apply topically 2 times daily                                multivitamin CF formula Caps per capsule   Take 1 capsule by mouth daily   Last time this was given:  1 capsule on 4/2/2018  8:04 AM                                MYLANTA 200-200-20 MG/5ML Susp suspension   Take 15 mLs by mouth 2 times daily as needed for indigestion   Generic drug:  alum & mag hydroxide-simethicone                                naloxone 0.4 MG/ML injection   Commonly known as:  NARCAN   Inject 0.25-1 mLs (0.1-0.4 mg) into the vein once for 1 dose                                ONDANSETRON PO   Take 4-8 mg by mouth every 8 hours as needed for nausea "   Last time this was given:  4 mg on 3/31/2018 10:32 AM                                pantoprazole 40 MG EC tablet   Commonly known as:  PROTONIX   Take 1 tablet (40 mg) by mouth 2 times daily   Last time this was given:  40 mg on 4/2/2018  8:03 AM                                polyethylene glycol Packet   Commonly known as:  MIRALAX/GLYCOLAX   Take 17 g by mouth daily as needed for constipation                                sertraline 50 MG tablet   Commonly known as:  ZOLOFT   Take 50 mg by mouth At Bedtime   Last time this was given:  50 mg on 3/31/2018  8:20 AM                                sodium chloride (PF) 0.9% PF flush   Irrigate with 10 mLs as directed every 24 hours   Last time this was given:  20 mLs on 4/2/2018  6:30 AM                                spironolactone 50 MG tablet   Commonly known as:  ALDACTONE   Take 2 tablets (100 mg) by mouth daily   Last time this was given:  50 mg on 4/2/2018  8:04 AM                                vancomycin 1,250 mg   Inject 1,250 mg into the vein every 24 hours for 28 days   Last time this was given:  1,250 mg on 4/1/2018 12:40 PM                                * Notice:  This list has 2 medication(s) that are the same as other medications prescribed for you. Read the directions carefully, and ask your doctor or other care provider to review them with you.

## 2018-03-15 NOTE — PROGRESS NOTES
"[facility]  HEPATOLOGY PROGRESS NOTE  Naresh Poole [id number]       CC: abdominal pain, BRBPR  SUBJECTIVE:  Up to shower today and feels more alert but continues to be fatigued. Denies fevers, abdominal pain or unsteadiness. He has an appetite. He had a stool that was loose without melena or hematochezia.     ROS:  A 10-point review of systems was negative.    OBJECTIVE:  VS: /87 (BP Location: Right arm)  Pulse 92  Temp 98.7  F (37.1  C) (Oral)  Resp 16  Ht 1.829 m (6' 0.01\")  Wt 68.6 kg (151 lb 4.8 oz)  SpO2 96%  BMI 20.52 kg/m2   General: In no acute distress, no facial muscle wasting  Neuro: AOx3, No asterixis  Lymph: No cervical lymphadenopathy  HEENT:  Mild scleral icterus, Nooral lesions  CV: Skin warm and dry  Lungs:  Respirations even and nonlabored on room air  Abd: Mild abdominal distention  Extrem: Noperipehral edema  Skin: mild jaundice  Psych: pleasant    MEDICATIONS:  Current Facility-Administered Medications   Medication     lactated ringers infusion     vancomycin (VANCOCIN) 1,500 mg in sodium chloride 0.9 % 250 mL intermittent infusion     naloxone (NARCAN) injection 0.1-0.4 mg     sertraline (ZOLOFT) tablet 50 mg     glucose 40 % gel 15-30 g    Or     dextrose 50 % injection 25-50 mL    Or     glucagon injection 1 mg     amylase-lipase-protease (CREON 24) 57472-77698 UNITS per EC capsule 48,000 Units     simethicone (MYLICON) chewable tablet 80 mg     methadone (DOLOPHINE) tablet 5 mg     insulin aspart (NovoLOG) inj (RAPID ACTING)     HYDROmorphone (DILAUDID) tablet 2 mg     pantoprazole (PROTONIX) 40 mg IV push injection     piperacillin-tazobactam (ZOSYN) 4.5 g vial to attach to  mL bag       REVIEW OF LABORATORY, PATHOLOGY AND IMAGING RESULTS:  BMP  Recent Labs  Lab 03/15/18  0658 03/14/18  0642 03/14/18  0228    134 134   POTASSIUM 4.7 5.0 5.0   CHLORIDE 105 103 101   CHIDI 7.7* 7.9* 8.0*   CO2 21 22 22   BUN 75* 88* 84*   CR 1.24 1.35* 1.38*   * 233* " 241*     CBC  Recent Labs  Lab 03/15/18  0658  03/14/18  0642 03/14/18 0228   WBC 10.4  --  13.9* 8.9   RBC 3.37*  --  3.46* 3.42*   HGB 8.7*  < > 9.1* 9.0*   HCT 26.8*  --  27.4* 27.4*   MCV 80  --  79 80   MCH 25.8*  --  26.3* 26.3*   MCHC 32.5  --  33.2 32.8   RDW 15.3*  --  15.1* 15.1*   PLT 34*  --  48* 55*   < > = values in this interval not displayed.  INR  Recent Labs  Lab 03/14/18 0228   INR 1.55*     LFTs  Recent Labs  Lab 03/15/18  0658 03/14/18  0642 03/14/18 0228   ALKPHOS 579* 656* 696*   AST 54* 53* 57*   ALT 74* 84* 92*   BILITOTAL 5.1* 6.9* 6.0*   PROTTOTAL 5.4* 5.9* 5.8*   ALBUMIN 1.8* 2.2* 2.1*      PANC  Recent Labs  Lab 03/14/18  0228   LIPASE 20*        Imaging Results:  US abd 3/15/18  Impression:   1.  Scattered hypoechoic lesions in the liver, largest measuring 2.3 x  2.2 x 2.5 cm in the subcapsular region of the right lobe of the liver  findings are compatible with provided history of abscess; no  convincing correlate identified on CT 2/12/2018. Liver MR could be  considered for additional evaluation. Heterogeneous echotexture of the  liver, limiting evaluation.  2.  Retrograde flow in the left portal vein. Splenomegaly. Findings  may relate to high-grade narrowing/apparent occlusion of the  splenoportal confluence seen on outside CT.  3.  Small to moderate volume ascites.  4.  Post surgical changes of Whipple's procedure.    IMPRESSION:  Naresh Poole is a 47 year old male with a history of pancreatic cancer status post Whipple 5/2017, FOLFIRINOX (1/2017 - 3/2017)and gemcitabine/capecitabine (10/2017 - 11/2017) that was complicated by enterococcal liver abscess who was transferred from outside hospital with complaints of fatigue, weakness, bloody diarrhea.  He recently underwent a liver biopsy on 2/21 which did not have evidence of fibrosis or sinusoidal obstruction, but did have some evidence of hepatoportal sclerosis with hepatocellular atrophy.    RECOMMENDATIONS:  1.   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  E. coli bacteremia  -E. coli bacteremia concerning for biliary source of infection.  Ultrasound this morning did have evidence of hepatic abscesses.  Please obtain MRCP to evaluate further.  Will review with pancreas/biliary team to to determine if ERCP is needed.   - remains on Zosyn and vancomycin..    3.  Bright red blood per rectum  -Hemoglobin stable.  Will hold on endoscopy at this time.    4.  Pancreatic insufficiency  -Related to recent Whipple.  Continue Creon with food.    Patient was discussed with attending physician, Dr. Chou       Thank you for the opportunity to be involved with  Naresh Poole [facility]. Please call with any questions or concerns.    Ana Rosa French, KASHIF, CNP  [phone]

## 2018-03-15 NOTE — PROVIDER NOTIFICATION
MD paged regarding sepsis alert.     Also reported that blood culture from 0528 in L hand is growing E.Coli. Blood culture from 0524 on R hand is positive for growing gram negative rods.

## 2018-03-15 NOTE — PHARMACY-VANCOMYCIN DOSING SERVICE
Pharmacy Empiric Dose Change Per Policy  Original Dose Ordered: 1250mg IV Vanco Q18H  Dose Changed To: 1500mg IV vanco Q12H- given stabilization and improvement in CrCl   This dose change was based on the pharmacist's assessment of this patient's age, weight, concurrent drug therapy, treatment goals, whether patient's creatinine clearance adequately indicates renal function (factoring in age, muscle mass, fluid and clinical status), and, if applicable, prior pharmacokinetic data.    Creatinine Clearance=     Estimated Creatinine Clearance: 70.7 mL/min (based on Cr of 1.24).  Will continue to follow and modify dosage according to levels, organ function and clinical condition    Sg Salmeron PharmD, [facility]    [phone]  Pager [phone]

## 2018-03-15 NOTE — PLAN OF CARE
Problem: Patient Care Overview  Goal: Plan of Care/Patient Progress Review  Outcome: No Change  Pt A&O x4, flat affect. VS stable on RA. Up independently. Voiding spontaneously. BM x1, blood noted. Hgb was 10.0. Continue to monitor stool. One stool sample sent, needing another. Urine sample sent. Needing sputum sample. IVMF LR infusing @150mL/hr. Blood cultures from this AM growing E.Coli in L hand. IV Zosyn started. 2 PIVs, one saline locked, other infusing. No other skin issues. Gave dilaudid PO x2 for abdominal pain. Scheduled methadone given.  and 180, covered per order, fair appetite. Now is NPO. Abdominal US tomorrow. Sepsis alert triggered at 2200 vitals check, lactic acid ordered. Continue to monitor and update MD with changes.

## 2018-03-15 NOTE — PROVIDER NOTIFICATION
MD paged regarding pt request for a pain med. Not due until 2200 d/t frequency being switched from q3h to q6h. Awaiting response.  - MD responded, ok with giving pain medication early.    Also paged regarding positive blood culture result, from L hand, gram negative rods growing, lab will do rapid result, will hear back in 2-3 hours.

## 2018-03-15 NOTE — PHARMACY-VANCOMYCIN DOSING SERVICE
Pharmacy Vancomycin Initial Note  Date of Service March 15, 2018  Patient's  1970  47 year old, male    Indication: Bacteremia    Current estimated CrCl = Estimated Creatinine Clearance: 64.9 mL/min (based on Cr of 1.35).    Creatinine for last 3 days  3/14/2018:  2:28 AM Creatinine 1.38 mg/dL;  6:42 AM Creatinine 1.35 mg/dL    Recent Vancomycin Level(s) for last 3 days  No results found for requested labs within last 72 hours.      Vancomycin IV Administrations (past 72 hours)                   vancomycin (VANCOCIN) 1,750 mg in sodium chloride 0.9 % 500 mL intermittent infusion (mg) 1,750 mg New Bag 18 0800                Nephrotoxins and other renal medications (Future)    Start     Dose/Rate Route Frequency Ordered Stop    03/15/18 0730  vancomycin (VANCOCIN) 1,250 mg in sodium chloride 0.9 % 250 mL intermittent infusion      1,250 mg  over 90 Minutes Intravenous EVERY 18 HOURS 03/15/18 0724      18 2100  piperacillin-tazobactam (ZOSYN) 4.5 g vial to attach to  mL bag      4.5 g  over 30 Minutes Intravenous EVERY 6 HOURS 18 2048            Contrast Orders - past 72 hours     None                Plan:  1.  Start vancomycin  1250 mg IV q18h.   2.  Goal Trough Level: 15-20 mg/L   3.  Pharmacy will check trough levels as appropriate in 3-5 Days.    4. Serum creatinine levels will be ordered daily for the first week of therapy and at least twice weekly for subsequent weeks.    5. Ashton method utilized to dose vancomycin therapy: Re-started previous vancomycin dosing from 3/14    Prashanth Martinez, PharmD

## 2018-03-15 NOTE — PROGRESS NOTES
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     He recently underwent liver biopsy on 2/21 which did not show fibrosis or sinusoidal obstruction. It did show evidence of hepatoportal sclerosis with hepatocellular atrophy.    Pt has blood cultures positive for E coli and enterococcus that grew out within 24 hours. His vitals have been stable and he has been on IV vanc and zosyn. There is concern for liver abscess vs ascending cholangitis. GI has been consulted and is recommending further imaging and possible ERCP. Pt has also been having hematochezia and melenic stools. There is some concern for ischemia vs slow GI bleed.     ##E coli bacteremia  ##Enterococcus bacteremia  ##Possible biliary source  ##Possible liver abscess  ##Hepatoportal sclerosis  ##Uptrending Alk Phos  E coli bacteremia is concerning for a biliary source. Pt has been afebrile and hemodynamically stable and afebrile. Lactate 1.1, BCx from OSH also positive for E coli and gram + cocci. Cultures grew out within 24 hours. Sensitivities are pending. Source likely GI, 2/2 to either abscess (pt has past hx of this) vs translocation vs other anatomic cause 2/2 past Whipple procedure. We are also concerned for ascending  cholangitis. U/S showed possible abscess, with retrograde flow in the L portal vein, w/ c/f narrowing or occlusion of the splenoportal confluence.    --cont vanc and zosyn   --f/u on sensitivities   --culture if sensitive    --MRCP and MRI abdomen with and without contrast    --reg diet following MRCP   --NPO at midnight for possible ERCP in AM    #Pre-Renal AKT- improving with IVFs.   Cr peaked at 2.3 at [facility]. S/p 3L at OSH.   --continue LR infusion continuous    ##Hematochezia  ##Melena  Pt has been having tarry stools, melenic alternating with hematochezia. However, Hgb has been stable. GI is aware. Considering possible ishcemia vs other GI cause of bleed.    --not pursue endoscopy at this time   --await further GI recs   --transfuse for Hgb< 7    ##Pancreatic Insufficiency  ##Chronic Diarrhea  ##Hypovitamin D  ##Pancreatic cancer  (T3N1) s/p whipple 9/2017 along with FOLFIRINOX and Gemzar/Xeloda c/b enterococcal liver abscess (treated) along with non-malignant ascites. Pt last took Gemzar and xeloda in Nov 2017.  Pt has had chronic diarrhea that occurs immediately after eating. Likely 2/2 malabsorption. Pt has been continuing CREON at home. Likely 2/2 pancreatic cancer and Whipple.    --cont CREON with food   --GI aware, awaiting further recs   --nutrition consult placed    --start Vit D2 50K units once weekly for 6-8 weeks    --MV ADEK supplementation     ##Chronic cancer pain   --cont dilaudid 2 mg q6hrs prn   --cont methadone 5 mg TID     ##Encephalopathy, improving  Likely suspect hepatic encephalopathy vs AMS 2/2 dilaudid use at home, vs infection (gram neg bacteremia). Pt has some asterixis on exam. Mental status improved within 24 hours of IV abx and also with decreasing frequency of pain meds. Ammonia at OSH <10, TSH WNL, B12 and folate WNL.   --cont to monitor    ##Normocytic Anemia  Pt has had a slow decline from hgb 10-11 throughout 2017 to now 7.7 and 8.6 at OSH and 9 here. It does not appear that  the Hgb drop is acute, more subacute looking at the full trend. His microcytosis is likely due to iron deficiency from reduced po intake which he does note. His RI is 0.3 suggesting underproduction rather than hemolysis or consumption; possibly again from iron deficiency. He does have new thrombocytopenia raising concern for hemolysis but bilirubin is mostly conjugated arguing against this and will discuss below more likely thrombocytopenia causes. Fibrinogen is not low to suggest DIC. On DDx is also capecitabine and gemcitabine side effects- but he has not been taking these since November.   Iron panel shows low iron, low iron binding capacity, and low iron saturation index. Peripheral smear is normal, and  WNL.  Plan:   --start iron supplementation with vitamin C daily    ##Thrombocytopenia  Patient possibly has sinusoidal obstructive syndrome which can cause low Plts along with the conjugated hyperbilirubinemia which the patient also has. 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 Possibly also due to capecitabine and gemcitabine as noted above, although pt has not taken this since Nov 2017. No EtOH use, no other med culprits, no reason to believe he has a new viral (HIV, EBV, etc) infection. No DIC given high fibrinogen. No hemolysis given mostly conjugated bilirubin and normal LDH. Peripheral smear is normal   --CTM    ##Leukocytosis of 13.9, now resolve  Likely 2/2 bacteremia.    --CTM    ##Hyperglycemia  Pt does not have a hx of diabetes.   --cont SSI   --hypoglycemia protocol    ##Rib pain  Xray did not show metastasis but is not sensitive. Will consider MRI imaging in future.    ##Prophylaxis:    ##DVT: SCDs for now given slow Hgb drop, AM team to consider pharmacologic ppx give high risk  ##GI: PPI IV BID  ##Diet: Reg, NPO at [facility] for ERCP    Family updated     ##DNR/DNI    Pt was discussed and seen with Dr. Urbina.    Jeanna Vinson MD PhD   Internal Medicine, PGY 1  p [phone]  [phone]      Interval History   -Nursing Notes Reviewed: Pt afebrile O/N but triggered sepsis protocol for elevated HR and leukocytosis, lactate was WNL, fluid were given O/N and pt was started on zosyn for positive blood cultures.    Pt does not feel fevers, chills, chest pain, n/v, does not have SOB. Pain is decently controlled.     Review of Systems   ROS:  10pt  point ROS including Respiratory, CV, GI and , other than that noted above    Physical Exam   Vital Signs with Ranges  Temp:  [98.4  F (36.9  C)-99.2  F (37.3  C)] 98.7  F (37.1  C)  Pulse:  [] 92  Heart Rate:  [] 92  Resp:  [16-18] 16  BP: (121-139)/(84-87) 133/87  SpO2:  [95 %-96 %] 96 %  I/O last 3 completed shifts:  In: 1595 [P.O.:120; I.V.:1475]  Out: 3350 [Urine:3350]  Wt Readings from Last 1 Encounters:   03/14/18 68.6 kg (151 lb 4.8 oz)    Body mass index is 20.52 kg/(m^2). Resp: 16    General: Alert, oriented, cooperative, no apparent distress, appears nontoxic.  Eyes: Scleral icterus, pupils are reactive.  HEENT: Oropharynx is clear and moist.   Lymph/Hematologic: No anterior or posterior cervical, or supraclavicular lymphadenopathy is appreciated.  Cardiovascular: Regular rate and rhythm, normal S1 and S2, and no murmur noted. JVP is normal. Good peripheral pulses in wrists bilaterally. +1 pitting edema in lower extremities, bilaterally.  Respiratory: Clear to auscultation bilaterally.   GI: Soft, tender to palpation, has umbilical hernia that is reducible, normal bowel sounds, surgical scars present.  Genitourinary: no hoff  Musculoskeletal: Normal muscle bulk and tone.  Skin: Warm and dry, no rashes.   Neurologic: Neck supple. No focal deficits.        Intake/Output Summary (Last 24 hours) at 03/15/18 1657  Last data filed at 03/15/18 1500   Gross per 24 hour   Intake             1835 ml   Output             3200 ml   Net            -1365 ml     Data   Lines/Tubes:   Peripheral IV 03/14/18 Left Upper arm (Active)   Site Assessment  [facility] 3/15/2018 10:00 AM   Line Status Saline locked 3/15/2018 10:00 AM   Phlebitis Scale 0-->no symptoms 3/15/2018 10:00 AM   Infiltration Scale 0 3/15/2018 10:00 AM   Extravasation? No 3/15/2018  7:00 AM   Number of days:1       Peripheral IV 03/14/18 Right Lower forearm (Active)   Site Assessment [facility] 3/15/2018 10:00 AM   Line Status Infusing 3/15/2018 10:00 AM   Phlebitis Scale 0-->no symptoms 3/15/2018 10:00 AM   Infiltration Scale 0 3/15/2018 10:00 AM   Extravasation? No 3/15/2018  7:00 AM   Number of days:1       Port A Cath Single 03/14/18 Right Chest wall (Active)   Dressing Intervention No dressing 3/14/2018  4:00 PM   Number of days:1     Labs & Studies of Note: I personally reviewed the following studies:    Micro:  3/14  2 positive for E coli, sens pending  C diff neg  O & P neg  Ent pathogen panel neg  U Cx NGTD  UA noninfectious  Cryptosporidium and Giardia pending  Adenovirus PCR stool pending    ROUTINE LABS (Last four results)  CMP  Recent Labs  Lab 03/15/18  0658 03/14/18  0642 03/14/18  0228    134 134   POTASSIUM 4.7 5.0 5.0   CHLORIDE 105 103 101   CO2 21 22 22   ANIONGAP 10 10 11   * 233* 241*   BUN 75* 88* 84*   CR 1.24 1.35* 1.38*   GFRESTIMATED 62 56* 55*   GFRESTBLACK 75 68 66   CHIDI 7.7* 7.9* 8.0*   MAG 2.4*  --  2.4*   PHOS  --   --  3.1   PROTTOTAL 5.4* 5.9* 5.8*   ALBUMIN 1.8* 2.2* 2.1*   BILITOTAL 5.1* 6.9* 6.0*   ALKPHOS 579* 656* 696*   AST 54* 53* 57*   ALT 74* 84* 92*     CBC  Recent Labs  Lab 03/15/18  0658 03/14/18  1938 03/14/18  0642 03/14/18  0228   WBC 10.4  --  13.9* 8.9   RBC 3.37*  --  3.46* 3.42*   HGB 8.7* 10.0* 9.1* 9.0*   HCT 26.8*  --  27.4* 27.4*   MCV 80  --  79 80   MCH 25.8*  --  26.3* 26.3*   MCHC 32.5  --  33.2 32.8   RDW 15.3*  --  15.1* 15.1*   PLT 34*  --  48* 55*     INR  Recent Labs  Lab 03/14/18  0228   INR 1.55*     Inflammatory Markers    Recent Labs   Lab Test  03/14/18   1742  09/13/17   1810   CRP  160.0*  5.8     Immune Globulin Studies  No  lab results found.    Microbiology:  Significant culture results:  Culture Micro   Date Value Ref Range Status   03/15/2018 No growth after 6 hours  Preliminary   03/15/2018 No growth after 6 hours  Preliminary   03/14/2018 Culture negative < 24 hours, reincubate  Preliminary   03/14/2018 (A)  Preliminary    Cultured on the 1st day of incubation:  Gram negative rods     03/14/2018   Preliminary    Critical Value/Significant Value, preliminary result only, called to and read back by  Trini Garcia RN 0702 3/15/18. MS     03/14/2018 (A)  Preliminary    Cultured on the 1st day of incubation:  Gram negative rods     03/14/2018   Preliminary    Critical Value/Significant Value, preliminary result only, called to and read back by  Socorro Edouard RN 0600 3/15/18. MS     03/14/2018 (A)  Preliminary    Cultured on the 1st day of incubation:  Escherichia coli  Susceptibility testing done on previous specimen     03/14/2018 (A)  Preliminary    Cultured on the 1st day of incubation:  Enterococcus faecalis  Susceptibility testing in progress     03/14/2018   Preliminary    Critical Value/Significant Value, preliminary result only, called to and read back by  Allison Vallejo RN 0139 3/15/18. MS     03/14/2018   Preliminary    (Note)  NEGATIVE for the following: Staphylococcus spp., Staph aureus, Staph  epidermidis, Staph lugdunensis, Streptococcus spp., Strep pneumoniae,  Strep pyogenes, Strep agalactiae, Strep anginosus group, Enterococcus  faecalis, Enterococcus faecium, and Listeria spp. by [employer]  multiplex nucleic acid test. Final identification and antimicrobial  susceptibility testing will be verified by standard methods.    Critical Value/Significant Value called to and read back by Trini Garcia RN 0417 3/15/18. MS     03/14/2018 (A)  Preliminary    Cultured on the 1st day of incubation:  Escherichia coli     03/14/2018   Preliminary    Critical Value/Significant Value, preliminary result only, called to and read back  by  Radha Sims RN, @2031 03/14/18..     03/14/2018 Culture in progress  Preliminary   03/14/2018   Preliminary    (Note)  POSITIVE for E.COLI by [employer] multiplex nucleic acid test. Final  identification and antimicrobial susceptibility testing will be  verified by standard methods. [employer] test will not distinguish  E.coli from Shigella species including S.dysenteriae, S.flexneri,  S.boydii, and S.sonnei. Specimens containing Shigella species or  E.coli will be reported as Positive for E.coli.    Specimen tested with [employer] multiplex, gram-negative blood culture  nucleic acid test for the following targets: Acinetobacter sp.,  Citrobacter sp., Enterobacter sp., Proteus sp., E. coli, K.  pneumoniae/oxytoca, P. aeruginosa, and the following resistance  markers: CTXM, KPC, NDM, VIM, IMP and OXA.    Critical Value/Significant Value called to and read back by Radha Sims RN, @6553 03/14/18.[facility]     03/14/2018 (A)  Preliminary    Cultured on the 1st day of incubation:  Escherichia coli  Susceptibility testing done on previous specimen     03/14/2018   Preliminary    Critical Value/Significant Value, preliminary result only, called to and read back by  Radha Sims RN, @ 4903 03/14/18..     03/14/2018 Culture in progress  Preliminary     Recent Labs   Lab Test  03/15/18   0704  03/15/18   0658  03/14/18   2144   CULT  No growth after 6 hours  No growth after 6 hours  Culture negative < 24 hours, reincubate   SDES  Blood Left Hand  Blood Right Hand  Midstream Urine       Urine Studies:    Recent Labs   Lab Test  03/14/18   2144   LEUKEST  Negative   NITRITE  Negative   WBCU  1   RBCU  2       Imaging:   Recent Results (from the past 24 hour(s))   US Abd Cmpl Abd/Pelvis Duplex Cmpl    Narrative    EXAMINATION: US ABDOMEN COMPLETE WITH DOPPLER, 3/15/2018 9:36 AM     COMPARISON: CT 2/12/2018.    HISTORY: Pancreatic cancer, status post Whipple's procedure September 2017, complicated by liver abscesses. Chronic  abdominal pain and  diarrhea.    TECHNIQUE: The abdomen was scanned in standard fashion with  specialized ultrasound transducer(s) using both gray-scale, color  Doppler, and spectral flow techniques.    Findings:    Liver: The left lobe of the liver appears diminutive. The hepatic  parenchyma is heterogeneous. There are multiple hypoechoic lesions  throughout the hepatic parenchyma, largest measuring 2.3 x 2.2 x 3.5  cm in the right subcapsular region. A second smaller mixed  echogenicity lesion with an apparent fluid-fluid level/fluid debris  level can be seen in the right hepatic lobe, measuring 1.8 x 2.0 x 2.3  cm    Portal vein measures 1 cm in diameter.    Extrahepatic portal vein flow is antegrade, measuring 24 cm/sec.  Right portal vein flow is antegrade, measuring 17 cm/sec.  Retrograde flow of the left portal vein, with velocity measuring 25  cm/s.    Flow in the hepatic artery is towards the liver and:  38 cm/sec peak systolic  0.48 resistive index.     Hepatic artery branches are patent with antegrade flow.    The splenic vein is patent and flow is towards the liver.  The left,  middle, and right hepatic veins are patent with flow towards the IVC.  The IVC is patent with flow towards the heart.   The visualized aorta  is not dilated.    Gallbladder: Cholecystectomy.    Bile Ducts: No intrahepatic biliary ductal dilatation.  The common  bile duct is not visualized.    Pancreas: Limit evaluation; postsurgical changes of Whipple's  procedure.    Kidneys: Both kidneys are of normal echotexture, without mass or  hydronephrosis.   The craniocaudal dimensions are: right- 13.1 cm,  left- 14.2 cm.    Spleen: The spleen measures 19.2 cm in sagittal dimension.    Fluid: Small to moderate ascites present.      Impression    Impression:   1.  Scattered hypoechoic lesions in the liver, largest measuring 2.3 x  2.2 x 2.5 cm in the subcapsular region of the right lobe of the liver  findings are compatible with provided  history of abscess; no  convincing correlate identified on CT 2/12/2018. Liver MR could be  considered for additional evaluation. Heterogeneous echotexture of the  liver, limiting evaluation.  2.  Retrograde flow in the left portal vein. Splenomegaly. Findings  may relate to high-grade narrowing/apparent occlusion of the  splenoportal confluence seen on outside CT.  3.  Small to moderate volume ascites.  4.  Post surgical changes of Whipple's procedure.    I have personally reviewed the examination and initial interpretation  and I agree with the findings.    ROWAN RUSS MD       Medications   Medications list for Reference (delete if desired)  Current Facility-Administered Medications   Medication     lactated ringers infusion     vancomycin (VANCOCIN) 1,500 mg in sodium chloride 0.9 % 250 mL intermittent infusion     naloxone (NARCAN) injection 0.1-0.4 mg     sertraline (ZOLOFT) tablet 50 mg     glucose 40 % gel 15-30 g    Or     dextrose 50 % injection 25-50 mL    Or     glucagon injection 1 mg     amylase-lipase-protease (CREON 24) 70813-17910 UNITS per EC capsule 48,000 Units     simethicone (MYLICON) chewable tablet 80 mg     methadone (DOLOPHINE) tablet 5 mg     insulin aspart (NovoLOG) inj (RAPID ACTING)     HYDROmorphone (DILAUDID) tablet 2 mg     pantoprazole (PROTONIX) 40 mg IV push injection     piperacillin-tazobactam (ZOSYN) 4.5 g vial to attach to  mL bag       # Pain Assessment:   Current Pain Score 3/13/2018 3/13/2018 3/12/2018   Patient currently in pain? yes sleeping: patient not able to self report denies   Pain location Back - -   Pain descriptors Sore - -   - Naresh is experiencing pain due to past pancreatic cancer. Pain management was discussed with Naresh and his spouse and the plan was created in a collaborative fashion.  Naresh's response to the current recommendations: compliant  - Please see the plan for pain management as documented above

## 2018-03-15 NOTE — PLAN OF CARE
Problem: Patient Care Overview  Goal: Plan of Care/Patient Progress Review  Outcome: No Change  VSS, A&O x4. Abdominal US this AM to check for liver abscess, pending results. Up independently to bathroom, saving stool and using urinal, some blood in stool noted last night with none today. Stool sample sent this shift with all results negative. OSH blood culture report faxed to unit, in chart. LE edema and abdominal edema worsening today, IV fluids decreased. R PIV infusing LR at 100 ml/hr, L PIV saline locked. Dilaudid given x1 this shift with good pain relief, next able to have at 1600. Sleeping intermittently this shift after return from US. Continues NPO for MR abdomen and MRCP to be completed this PM, regular diet may be ordered after tests completed per telephone conversation with M1 intern Jeanna. Noon insulin held to avoid hypoglycemia while NPO. Will continue to monitor.

## 2018-03-15 NOTE — PLAN OF CARE
Problem: Patient Care Overview  Goal: Plan of Care/Patient Progress Review  Outcome: No Change  Pt A&O x4, VSS, on RA. Up independently. Voiding spontaneously, abdominal pain managed with scheduled dilaudid.  2 PIVs, one saline locked, other infusing LR at 125mL/hr on right lower arm.   and 220, no coverage d/t NPO order during this shift. Abdominal US today. Will continue to monitor and update MD with changes.

## 2018-03-16 NOTE — CONSULTS
I saw and evaluated this patient and reviewed pertinent lab and imaging findings in addition to his clinical history. I spent over 60 minutes in total. Greater than 30 minutes in face to face time. An additional 30 minutes in review of films, labs and coordination of care. 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 I think recurring infection is complicated by near occlusion of portal vein and biliary stricture.    Discussed with patient and wife - no plans for surgical intervention, but should focus on palliation of symptoms -     Agree with plans for ERCP - if unsuccessful, would recommend PTC for biliary decompression  ABx for hepatic abscess hopefully can resolve with decompression although situation complicate by near occlusion of portal vein which may inhibit ability of liver to heal. Arterial supply is uncertain based on imaging.  Will review with IR to see if any chance of portal vein stent or other vascular procedure which may improve - although I think unlikely  Optimize fluid management/diuresis to control ascites  Check fecal elastase for ongoing diarrhea   Dietary consult for possible dumping syndrome management  Recheck     Consider repeat Celiac plexus block for severe ongoing pain problems.    Patient would like to transfer care to [facility]. Therefor would recommend Oncology consult with someone who specializes in caring for patients with pancreas cancer.

## 2018-03-16 NOTE — PROGRESS NOTES
03/16/18 1555   Quick Adds   Type of Visit Initial PT Evaluation   Living Environment   Lives With spouse;child(bill), dependent   Living Arrangements house   Home Accessibility bed not on first floor   Number of Stairs to Enter Home 3   Number of Stairs Within Home 12   Stair Railings at Home inside, present at both sides;outside, present at both sides   Transportation Available car;family or friend will provide   Living Environment Comment Pt lives with his spouse and 3 daughters; reports he is never alone at home. House has modifications as one child has spina bifida, reports easily accessible.    Self-Care   Dominant Hand right   Usual Activity Tolerance good   Current Activity Tolerance moderate   Regular Exercise no   Equipment Currently Used at Home none   Activity/Exercise/Self-Care Comment Pt reports decline in functional activity tolerance   Functional Level Prior   Ambulation 0-->independent   Transferring 0-->independent   Toileting 0-->independent   Bathing 0-->independent   Dressing 0-->independent   Eating 0-->independent   Communication 0-->understands/communicates without difficulty   Swallowing 0-->swallows foods/liquids without difficulty   Cognition 0 - no cognition issues reported   Fall history within last six months no   Which of the above functional risks had a recent onset or change? (fatigue)   Prior Functional Level Comment Pt is independent with ADLs and mobility at baseline   General Information   Onset of Illness/Injury or Date of Surgery - Date 03/14/18   Referring Physician Jeanna Vinson MD   Patient/Family Goals Statement Pt does not endorse specific PT goals; would like to increase strength and activity tolerance   Pertinent History of Current Problem (include personal factors and/or comorbidities that impact the POC) Naresh Poole is a 47 year old with pancreatic cancer s/p Whipple and chemo in 2017 admitted with bacteremia and GI bleed.    Precautions/Limitations  no known precautions/limitations   Weight-Bearing Status - LUE full weight-bearing   Weight-Bearing Status - RUE full weight-bearing   Weight-Bearing Status - LLE full weight-bearing   Weight-Bearing Status - RLE full weight-bearing   General Observations up SBA in room; spouse present and very helpful   General Info Comments Activity: ambulate with assist   Cognitive Status Examination   Orientation orientation to person, place and time   Level of Consciousness alert   Follows Commands and Answers Questions 100% of the time   Personal Safety and Judgment intact   Memory intact   Cognitive Comment pt is alert and oriented   Pain Assessment   Patient Currently in Pain No   Integumentary/Edema   Integumentary/Edema no deficits were identifed   Posture    Posture Forward head position   Range of Motion (ROM)   ROM Comment BUE/BLE wfl   Strength   Strength Comments BUE/BLE >3+/5 per observation   Bed Mobility   Bed Mobility Comments SBA supine <> sit; limited by fatigue   Transfer Skills   Transfer Comments SBA sit <> stand   Gait   Gait Comments SBA ambulation in room, mild instability, slowed gait speed   Balance   Balance Comments up SBA in room.   Sensory Examination   Sensory Perception no deficits were identified   General Therapy Interventions   Planned Therapy Interventions balance training;bed mobility training;gait training;neuromuscular re-education;strengthening;transfer training;home program guidelines   Clinical Impression   Criteria for Skilled Therapeutic Intervention yes, treatment indicated   PT Diagnosis Impaired activity tolerance   Influenced by the following impairments weakness, fatigue, balance deficit   Functional limitations due to impairments decreased tolerance of functional mobility   Clinical Presentation Stable/Uncomplicated   Clinical Presentation Rationale pt presentation, clinical reasoning   Clinical Decision Making (Complexity) Low complexity   Therapy Frequency` (4x/week)   Predicted  "Duration of Therapy Intervention (days/wks) 1 week   Anticipated Discharge Disposition Home with Outpatient Therapy;Home with Assist   Risk & Benefits of therapy have been explained Yes   Patient, Family & other staff in agreement with plan of care Yes   Clinical Impression Comments evaluation completed, treatment initiated   [facility] TM \"6 Clicks\"   2016, Trustees of [facility], under license to [employer].  All rights reserved.   6 Clicks Short Forms Basic Mobility Inpatient Short Form   [facility]  \"6 Clicks\" V.2 Basic Mobility Inpatient Short Form   1. Turning from your back to your side while in a flat bed without using bedrails? 4 - None   2. Moving from lying on your back to sitting on the side of a flat bed without using bedrails? 4 - None   3. Moving to and from a bed to a chair (including a wheelchair)? 4 - None   4. Standing up from a chair using your arms (e.g., wheelchair, or bedside chair)? 4 - None   5. To walk in hospital room? 4 - None   6. Climbing 3-5 steps with a railing? 4 - None   Basic Mobility Raw Score (Score out of 24.Lower scores equate to lower levels of function) 24   Total Evaluation Time   Total Evaluation Time (Minutes) 8     "

## 2018-03-16 NOTE — PLAN OF CARE
Problem: Patient Care Overview  Goal: Plan of Care/Patient Progress Review  Discharge Planner PT 5B  Patient plan for discharge: home   Current status: Pt performs functional transfers and ambulation in room with SBA; pt endorses fatigue and decreased activity tolerance. Provided handout and reviewed LE strengthening HEP for pt to perform daily; would benefit from 4-5 walks daily with staff/family assist - may benefit from w/c follow for rest breaks.   Barriers to return to prior living situation: fatigue  Recommendations for discharge: home with assist, OP PT  Rationale for recommendations: pt below baseline and would benefit from continued therapy to improve activity tolerance, balance and mobility deficits.        Entered by: Lev Shepard 03/16/2018 4:20 PM

## 2018-03-16 NOTE — PROGRESS NOTES
"[facility]  HEPATOLOGY PROGRESS NOTE  Naresh Poole [id number]       CC: abdominal pain, BRBPR  SUBJECTIVE:  Main complaint related to feeling hungry and has been NPO for several days. Stool output has been having looser BM today, dark brown. Denies fevers, rigors or abdominal pain.     ROS:  A 10-point review of systems was negative.    OBJECTIVE:  VS: /76 (BP Location: Right arm)  Pulse 84  Temp 95.8  F (35.4  C) (Oral)  Resp 18  Ht 1.829 m (6' 0.01\")  Wt 70.3 kg (154 lb 14.4 oz)  SpO2 99%  BMI 21 kg/m2   General: In no acute distress, mild  facial muscle wasting  Neuro: AOx3, No asterixis  Lymph: No cervical lymphadenopathy  HEENT:  Mild scleral icterus, Nooral lesions  CV: Skin warm and dry  Lungs:  Respirations even and nonlabored on room air  Abd: Mild abdominal distention  Extrem: Noperipehral edema  Skin: mild jaundice  Psych: pleasant    MEDICATIONS:  Current Facility-Administered Medications   Medication     lactated ringers infusion     HYDROmorphone (DILAUDID) tablet 2-4 mg     vitamin D (ERGOCALCIFEROL) capsule 50,000 Units     multivitamin CF formula (DEKAs Plus) per capsule 1 capsule     ascorbic acid (vitamin C) chewable tablet 250 mg     naloxone (NARCAN) injection 0.1-0.4 mg     sertraline (ZOLOFT) tablet 50 mg     glucose 40 % gel 15-30 g    Or     dextrose 50 % injection 25-50 mL    Or     glucagon injection 1 mg     amylase-lipase-protease (CREON 24) 97630-04820 UNITS per EC capsule 48,000 Units     simethicone (MYLICON) chewable tablet 80 mg     methadone (DOLOPHINE) tablet 5 mg     insulin aspart (NovoLOG) inj (RAPID ACTING)     pantoprazole (PROTONIX) 40 mg IV push injection     piperacillin-tazobactam (ZOSYN) 4.5 g vial to attach to  mL bag       REVIEW OF LABORATORY, PATHOLOGY AND IMAGING RESULTS:  BMP    Recent Labs  Lab 03/16/18  0709 03/15/18  0658 03/14/18  0642 03/14/18  0228    136 134 134   POTASSIUM 4.1 4.7 5.0 5.0   CHLORIDE 104 105 103 101   CHIDI " 7.7* 7.7* 7.9* 8.0*   CO2 24 21 22 22   BUN 66* 75* 88* 84*   CR 1.21 1.24 1.35* 1.38*   * 197* 233* 241*     CBC  Recent Labs  Lab 03/16/18  0709 03/15/18  0658  03/14/18 0642 03/14/18 0228   WBC 10.9 10.4  --  13.9* 8.9   RBC 3.48* 3.37*  --  3.46* 3.42*   HGB 9.0* 8.7*  < > 9.1* 9.0*   HCT 27.5* 26.8*  --  27.4* 27.4*   MCV 79 80  --  79 80   MCH 25.9* 25.8*  --  26.3* 26.3*   MCHC 32.7 32.5  --  33.2 32.8   RDW 15.6* 15.3*  --  15.1* 15.1*   PLT 32* 34*  --  48* 55*   < > = values in this interval not displayed.  INR    Recent Labs  Lab 03/16/18  0709 03/14/18 0228   INR 1.60* 1.55*     LFTs    Recent Labs  Lab 03/16/18  0709 03/15/18  0658 03/14/18 0642 03/14/18 0228   ALKPHOS 680* 579* 656* 696*   AST 54* 54* 53* 57*   ALT 74* 74* 84* 92*   BILITOTAL 3.4* 5.1* 6.9* 6.0*   PROTTOTAL 5.7* 5.4* 5.9* 5.8*   ALBUMIN 1.9* 1.8* 2.2* 2.1*      PANC    Recent Labs  Lab 03/14/18  0228   LIPASE 20*        Imaging Results:  MRCP 3/15/18  IMPRESSION:   1. Moderate volume ascites degrades images on multiple sequences.  2. Clustered cystic lesions primarily involving hepatic segments 5 and  8 with peripheral enhancement and diffusion restriction, compatible  with provided history of hepatic abscesses.  3. Features suggestive of cirrhosis with evidence of portal  hypertension including splenomegaly and moderate volume ascites.  4. Hepatic and intrahepatic biliary ductal dilatation relate to  postcholecystectomy state and hepaticojejunostomy; peripheral biliary  tree is obscured by artifact.  5. High-grade narrowing of the splenoportal confluence seen on prior  imaging is obscured by artifact.    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  He recently underwent a liver biopsy on 2/21 which did not have evidence of fibrosis or sinusoidal obstruction, but did have some evidence of hepatoportal sclerosis with hepatocellular atrophy.     RECOMMENDATIONS:  1.  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  E. coli bacteremia  3. Hepatic abscess  4. Bacterial peritonitis  -E. coli bacteremia concerning for biliary source of infection.  MRCP with ductal dilation and post whipple. Plan for ERCP on Monday. Please have NPO after midnight on Sunday.    - remains on Zosyn and vancomycin..  - discussed with Dr. Carter. No surgical options. They will discuss with IR options for stenting.   - Ascites with peritonitis. Unclear if secondary to biliary process. Cytology and culture pending.     5.  Bright red blood per rectum  -Hemoglobin stable.  Resolved.     4.  Pancreatic insufficiency  -Related to recent Whipple.  Regular diet and continue creon with food and snacks.     Patient was discussed with attending physician, Dr. Chou. Pancreas/ Bili GI team will take over on Monday.       Thank you for the opportunity to be involved with  Naresh LANDIN [facility]. Please call with any questions or concerns.    Ana Rosa French, APRN, CNP  [phone]

## 2018-03-16 NOTE — PLAN OF CARE
Problem: Patient Care Overview  Goal: Plan of Care/Patient Progress Review  Outcome: No Change  D/I: Pt is alert and oriented x 4, vitally stable on room air. Verbalizes relief from abdominal pain after getting Dilaudid at the end of evening shift. Denied nausea, but continues to have watery, green stool. HS blood glucose 290 and covered with per s/s and 165. Calm, pleasant and cooperative with cares.  P: NPO for possible ERCP today. Continue to assess pain and effectiveness of interventions. Assess GI status and care per plan.

## 2018-03-16 NOTE — PROVIDER NOTIFICATION
Paged and notified [facility] cover that pt has a positive blood culture for gram -ve rods. Blood collected on 03/15/18 from left hand.

## 2018-03-16 NOTE — PLAN OF CARE
Problem: Patient Care Overview  Goal: Plan of Care/Patient Progress Review  Outcome: No Change  D/I/A; Patient A & O X 4, VSS no respiratory distress noted, abdominal distended, c/o abdominal pain and received Dilaudid PO PRN, X 2, post assessment with relief. Patient was NPO for most of the shift , and diet change to regular, BS ,118, 129. LR infusing at 100 cc/hr. And receiving IV Zosyn Q6hr. Patient void adequate, had two loose stool and stool sample sent. Patient up independently, in room. Spouse at bedside. Continue monitor closely and update MD with concerns.

## 2018-03-16 NOTE — CONSULTS
[facility] Infectious Disease Consult    Naresh Poole MRN# [id number]   Age: 47 year old YOB: 1970   ID Consult requested by Dr. Macrina Urbina.          Reason for Consult:   Gram neg and enterococcus bacteremia with liver lesions         Assessment and Plan:   Naresh Poole is a 47 year old with pancreatic cancer s/p Whipple and chemo in 2017 admitted with bacteremia and GI bleed.     # Multi-organism bacteremia with E. Coli, citrobacter, clostridum, enterococcus faecalis  Source likely intra-abdominal with concern for ascending cholangitis.     # Hepatic abscesses. Difficult to assess on CT. Would increase the treatment duration from 2 weeks to 4-6 weeks. Will need follow-up imaging to establish abx duration.     Summary of Recs:  - Order anaerobic blood cultures  - Daily blood cultures from 2 sites both aerobic and anaerobic until neg x 72 hours  - Source control per GI  - D/c vanco   - Continue zosyn x at least 2 weeks, 4-5 weeks if treating for hepatic abscesses  - Will need more imaging to follow-up hepatic lesions  - We will continue to follow along    Patient seen and plan of care developed with Dr. Reddy.    Jenna Santiago MD  Internal Medicine PGY3  Pager [phone]  03/16/2018    ID Staff Assessment and Plan:   Patient was seen and examined by me with the resident MD. The note above reflects our joint assessment and plan, which I discussed with the resident in detail.   Yamile Reddy MD  ID Staff Physician  Pager [phone]    ID Staff Assessment and Plan:   Patient was seen and examined by me with the resident MD. The note above reflects our joint assessment and plan, which I discussed with the resident in detail. In summary the patient has a complicated past medical history due to pancreatic cancer and complication from it's treatment. He has a past history of liver abscesses treated at [facility] by Dr. Lim last summer with several week of antibiotics per patient's  wife's report. Now he presents with polymicrobial bacteremia with 4 different bacteria as noted in reside note above all consistent with gut kenia, E. Coli, Citrobacter, Enterococcus feacalis and Clostridium perfringes. Suspect GI tract as the source of the bacteremia. I am concerned for possible recurrence of liver abscesses and also for suspected cholangitis. Agree with plan for ERCP to see obstruction of the bile ducts can be addressed to help treat suspected cholangitis. OK to treat with IV zosyn as a single drug. Anticipate 2 weeks course for bacteremia and an additional 2-4 weeks for suspected liver abscesses. These will need to be re-evaluated on follow-up imagine to help determine treatment course.     Yamile Reddy MD  ID Staff Physician  Pager [phone]               History of Present Illness:   History obtained from chart review and confirmed with patient.    48 yo M diagnosed with pancreatic cancer T3N1 in early 2017 (s/p Whipple procedure, FOLFIRINOX and gemzar/xeloda) complicated by enterococcus abscess of the liver admitted to the MICU on 3/14 with fatigue, weakness, fever, chills, acute anemia, abdominal pain, and one week of bloody diarrhea. Found to have possible liver abscesses on imaging and bacteremia.  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 Hepatology following on inpatient to determine the need for ERCP and evaluation of abscesses.   Complete ROS is otherwise negative.       Past Medical History:     Past Medical History:   Diagnosis Date     Hypertension      Pancreatic cancer (H) 01/2017          Past Surgical History:     Past Surgical History:   Procedure Laterality Date     LAPAROSCOPY DIAGNOSTIC (GENERAL)  09/2017     WHIPPLE PROCEDURE  05/2017          Social History:     Social History     Social History     Marital status:      Spouse name: N/A     Number of children: N/A     Years of education: N/A      Occupational History     Not on file.     Social History Main Topics     Smoking status: Never Smoker     Smokeless tobacco: Never Used     Alcohol use No     Drug use: No     Sexual activity: Not on file     Other Topics Concern     Not on file     Social History Narrative          Family History:     Family History   Problem Relation Age of Onset     Coronary Artery Disease Early Onset Maternal Grandmother 36     Liver Disease No family hx of      Colon Cancer No family hx of           Allergies:   No Known Allergies       Medications:     Prescriptions Prior to Admission   Medication Sig Dispense Refill Last Dose     alum & mag hydroxide-simethicone (MYLANTA) 200-200-20 MG/5ML SUSP suspension Take 15 mLs by mouth 2 times daily as needed for indigestion   3/13/2018 at AM     amLODIPine (NORVASC) 10 MG tablet Take 10 mg by mouth daily   3/13/2018 at AM     furosemide (LASIX) 20 MG tablet Take 40 mg by mouth daily   3/13/2018 at AM     HYDROmorphone (DILAUDID) 2 MG tablet Take 2 mg by mouth 3 times daily as needed for pain Patient taking differently: 1 tablets by mouth every 4 hours as needed (up to 6 tablets per day).   3/13/2018 at noon     amylase-lipase-protease (CREON 24) 59494-84016 UNITS CPEP per EC capsule Take 2 caps with meals and 1 cap with snacks.   3/13/2018 at PM     methadone (DOLOPHINE) 5 MG tablet Take 5 mg by mouth 3 times daily   3/13/2018 at noon     sertraline (ZOLOFT) 50 MG tablet Take 50 mg by mouth At Bedtime    3/12/2018 at HS     spironolactone (ALDACTONE) 100 MG tablet Take 100 mg by mouth daily   3/13/2018 at AM     simethicone (GAS-X) 80 MG chewable tablet Take 80 mg by mouth every 6 hours as needed for flatulence or cramping   3/13/2018 at AM     ONDANSETRON PO Take 4-8 mg by mouth every 8 hours as needed for nausea   NOT TAKING     polyethylene glycol (MIRALAX/GLYCOLAX) Packet Take 17 g by mouth daily as needed for constipation   NOT TAKING     bisacodyl (DULCOLAX) 5 MG EC tablet  "Take 5 mg by mouth daily as needed for constipation   NOT TAKING          Physical Exam:   /89 (BP Location: Right arm)  Pulse 70  Temp 96.8  F (36  C) (Oral)  Resp 16  Ht 1.829 m (6' 0.01\")  Wt 67.8 kg (149 lb 7.6 oz)  SpO2 98%  BMI 20.27 kg/m2  Vitals:    03/14/18 0300 03/14/18 1055 03/15/18 2000   Weight: 67.5 kg (148 lb 13 oz) 68.6 kg (151 lb 4.8 oz) 67.8 kg (149 lb 7.6 oz)     General: cachectic, uncomfortable appearing, not acutely distressed.  HEENT: NCAT. EOMI, anicteric sclera. Oral mucosa pink and moist with no lesions or thrush.  Respiratory: Non-labored breathing, good air exchange, lungs clear to auscultation bilaterally.  Cardiovascular: Regular rate and rhythm. No murmur or rub.   Gastrointestinal: Normoactive bowel sounds. Abdomen soft, non-distended, and mildly tender diffusely.  Extremities: Trace to 1+ extremity edema.   Neurologic: A&O x 3, CNs 2-12 grossly intact, speech normal, grossly non-focal.          Data:   CBC  Recent Labs  Lab 03/16/18  0709 03/15/18  0658 03/14/18 1938 03/14/18 0642 03/14/18 0228   WBC 10.9 10.4  --  13.9* 8.9   RBC 3.48* 3.37*  --  3.46* 3.42*   HGB 9.0* 8.7* 10.0* 9.1* 9.0*   HCT 27.5* 26.8*  --  27.4* 27.4*   MCV 79 80  --  79 80   MCH 25.9* 25.8*  --  26.3* 26.3*   MCHC 32.7 32.5  --  33.2 32.8   RDW 15.6* 15.3*  --  15.1* 15.1*   PLT 32* 34*  --  48* 55*     CMP  Recent Labs  Lab 03/16/18  0709 03/15/18  0658 03/14/18 0642 03/14/18 0228    136 134 134   POTASSIUM 4.1 4.7 5.0 5.0   CHLORIDE 104 105 103 101   CO2 24 21 22 22   ANIONGAP 9 10 10 11   * 197* 233* 241*   BUN 66* 75* 88* 84*   CR 1.21 1.24 1.35* 1.38*   GFRESTIMATED 64 62 56* 55*   GFRESTBLACK 77 75 68 66   CHIDI 7.7* 7.7* 7.9* 8.0*   MAG 2.5* 2.4*  --  2.4*   PHOS  --   --   --  3.1   PROTTOTAL 5.7* 5.4* 5.9* 5.8*   ALBUMIN 1.9* 1.8* 2.2* 2.1*   BILITOTAL 3.4* 5.1* 6.9* 6.0*   ALKPHOS 680* 579* 656* 696*   AST 54* 54* 53* 57*   ALT 74* 74* 84* 92*     INR  Recent Labs  Lab " 03/16/18  0709 03/14/18  0228   INR 1.60* 1.55*     All cultures:    Recent Labs  Lab 03/16/18  0711 03/16/18  0709 03/15/18  0704 03/15/18  0658 03/14/18  2144 03/14/18  1747 03/14/18  1742 03/14/18  1018 03/14/18  0528 03/14/18  0524   CULT No growth after 4 hours No growth after 4 hours Cultured on the 1st day of incubation:Gram negative rods*  Critical Value/Significant Value, preliminary result only, called to and read back byGAUTAM KING RN @0512 3/16/18. SCG No growth after 1 day No growth Cultured on the 1st day of incubation:Gram negative rods*  Critical Value/Significant Value, preliminary result only, called to and read back byTrini Garcia RN 0702 3/15/18. MS Cultured on the 1st day of incubation:Gram negative rods*  Critical Value/Significant Value, preliminary result only, called to and read back bySocorro Edouard RN 0600 3/15/18. MS Cultured on the 1st day of incubation:Escherichia coliSusceptibility testing done on previous specimen*  Cultured on the 1st day of incubation:Enterococcus faecalis*  Critical Value/Significant Value, preliminary result only, called to and read back byAllison Vallejo RN 0139 3/15/18. MS  (Note)NEGATIVE for the following: Staphylococcus spp., Staph aureus, Staphepidermidis, Staph lugdunensis, Streptococcus spp., Strep pneumoniae,Strep pyogenes, Strep agalactiae, Strep anginosus group, Enterococcusfaecalis, Enterococcus faecium, and Listeria spp. by HydroNovationigeneAppSurferltiplex nucleic acid test. Final identification and antimicrobialsusceptibility testing will be verified by standard methods.Critical Value/Significant Value called to and read back by FERN Dimas 0417 3/15/18. MS Cultured on the 1st day of incubation:Escherichia coliSusceptibility testing in progress*  Critical Value/Significant Value, preliminary result only, called to and read back byRadha Sims RN, @2031 03/14/18.DH.  Cultured on the 1st day of incubation:Citrobacter freundii complexSusceptibility testing  in progress*  Critical Value/Significant Value called to and read back byDr. Yamile Reddy @ 1227.cg 03/16/18  (Note)POSITIVE for E.COLI by Verigene multiplex nucleic acid test. Finalidentification and antimicrobial susceptibility testing will beverified by standard methods. Verigene test will not distinguishE.coli from Shigella species including S.dysenteriae, S.flexneri,S.boydii, and S.sonnei. Specimens containing Shigella species orE.coli will be reported as Positive for E.coli.Specimen tested with Verigene multiplex, gram-negative blood culturenucleic acid test for the following targets: Acinetobacter sp.,Citrobacter sp., Enterobacter sp., Proteus sp., E. coli, K.pneumoniae/oxytoca, P. aeruginosa, and the following resistancemarkers: CTXM, KPC, NDM, VIM, IMP and OXA.Critical Value/Significant Value called to and read back by Mary Jane SHIRLEY, @9136 03/14/18.. Cultured on the 1st day of incubation:Escherichia coliSusceptibility testing done on previous specimen*  Critical Value/Significant Value, preliminary result only, called to and read back byRadha Sims RN, @ 4636 03/14/18..

## 2018-03-16 NOTE — CONSULTS
Surgical Oncology Consult Note  Staff: Dr. Broussard  Date: 3/16/2018   Consulted for: Hepatic abscess by Dr. Vinson    Assessment/Plan:  47 year old male with HTN and pT3N0 pancreatic cancer s/p neoadjuvant Folfirinox, and Whipple in 5/2017 complicated by intra-operative portal vein injury and post-operative infection/hepatic abscess. He completed roughly 1 month of adjuvant gemcitabine+Xeloda before developing hepatic complications for which he has been in the process of work up at [facility]. He has not thrived since surgery and was admitted and transferred from [place] ED for concern for GI bleed. There has been no evidence of ongoing bleeding, but blood cultures were positive for EColi and Enterococcus with likely biliary origin. WBC improved with antibiotics and he has been afebrile.  - No indication for operative intervention  - Consult medical oncology (pancreatic cancer specialist) on Monday. Patient wishes to transfer care here  - Agree with GI/hepatology that biliary system needs reliable drainage and this is priority. If ERCP unsuccessful, could consider IR PCT. CA 19-9 could be falsely elevated from biliary obstruction. Ordered baseline for now and could recheck once obstruction ressolved  - Unclear if patient truly has pancreatic insufficiency since fecal elastase was never tested. Ordered. GI/nutrition could also titrate Creon dose as needed.  - Could consider celiac axis block for improved pain control. Would defer to GI for further evaluation.  - Continue treating hepatic abscess and bacteremia.  - Portal vein is narrowed from Whipple. Discussed with IR and it would be technically feasible to attempt dilation and stent placement. It would be pertinent to have hepatology and medical oncology discuss patient's overall prognosis. If they determine that the stent would be beneficial, would recommend they coordinate care with IR. MRA is not needed per IR.  - Surgical oncology will follow peripherally.  Please call with questions.    Discussed with Dr. Venegas and Raul Damon MD  General Surgery PGY-3    ------------------------------------------  HPI:   Naresh Poole is a 47 year old male with HTN and pT3N0 pancreatic cancer s/p neoadjuvant Folfirinox, and Whipple in 5/2017 complicated by intra-operative portal vein injury and post-operative infection/hepatic abscess who was admitted for concern for GI bleed.     He was initially diagnosed with T3N1 pancreatic cancer in January 2017. The cancer responded well to neoadjuvant Folfirinox and he underwent surgery in May 2017 at [facility]. The Whipple was complicated by injury of the portal confluence, which was oversewn and resulted in an estimated blood loss of 4L. He was eventually discharged, but in June/July was found to have blood cultures positive for enterococcus. Subsequent liver biopsy confirmed enterococcus and he was treated with antibiotics until Sept/October. During this time he was also not thriving after surgery, losing weight and feeling generally weak and unwell. He developed ascites and there was concern for progression of the pancreatic cancer. CA 19-9 at that time was 160's. The ascites was tested twice for malignancy and was negative both times. He also underwent EUS in September and the biopsied lymph node was negative. He then underwent diagnostic laparoscopy in October, which did not demonstrate malignant disease. PET at that time was also bland. He was then started on adjuvant gemcitabine+Xeloda, which he took for 1 month until he developed hepatic complications. He was then referred to Hepatology at [facility], but was unable to see anyone until February 2018. During this time, he continued to not thrive and developed loose stools and was put on creon, which did not help. He continued to lose weight and has lost a total of ~80 lbs since his diagnosis. Of note, he has lost ~10 lbs in the past few weeks. Hepatology determined  that the liver disease was not a result of sinusoidal obstructive syndrome.    On 3/13 he presented to [place] ED for worse abdominal pain and weakness. There was concern for GI bleed and he was transferred to the MICU. His hgb has remained stable, but he was found to have positive blood cultures for Ecoli and enterococcus for which he is on antibiotics. He and his wife would like to transfer all of his care to [facility]. They are hoping to go back on chemotherapy as soon as they can, but understand that the infection must resolve first.  ?  PMH:  Past Medical History:   Diagnosis Date     Hypertension      Pancreatic cancer (H) 01/2017        PSHx:  Past Surgical History:   Procedure Laterality Date     LAPAROSCOPY DIAGNOSTIC (GENERAL)  09/2017     WHIPPLE PROCEDURE  05/2017        Medications:  amLODIPine (NORVASC) 10 MG tablet Take 10 mg by mouth daily   HYDROmorphone (DILAUDID) 2 MG tablet 1 tablet by mouth every 4 hours as needed (up to 6 tablets per day).   amylase-lipase-protease (CREON 24) 98651-63391 UNITS CPEP per EC capsule Take 2 caps with meals and 1 cap with snacks.   methadone (DOLOPHINE) 5 MG tablet Take 5 mg by mouth 3 times daily   sertraline (ZOLOFT) 50 MG tablet Take 50 mg by mouth At Bedtime    spironolactone (ALDACTONE) 100 MG tablet Take 100 mg by mouth daily   simethicone (GAS-X) 80 MG chewable tablet Take 80 mg by mouth every 6 hours as needed for flatulence or cramping   polyethylene glycol (MIRALAX/GLYCOLAX) Packet Take 17 g by mouth daily as needed for constipation   bisacodyl (DULCOLAX) 5 MG EC tablet Take 5 mg by mouth daily as needed for constipation       Allergies:   Review of patient's allergies indicates no known allergies.     SocHx:  Never smoker.  No EtOH in 18 months. Prior to was social drinker    FamHx:  Family History   Problem Relation Age of Onset     Coronary Artery Disease Early Onset Maternal Grandmother 36     Liver Disease No family hx of      Colon Cancer No family hx  "of       Negative for bleeding disorders, clotting disorders, or problems with anesthesia    Review of Systems:  ROS: 10 point ROS neg other than the symptoms noted above in the HPI.    Physical Examination   /89 (BP Location: Right arm)  Pulse 86  Temp 96.7  F (35.9  C) (Oral)  Resp 16  Ht 1.829 m (6' 0.01\")  Wt 67.8 kg (149 lb 7.6 oz)  SpO2 98%  BMI 20.27 kg/m2  General: Awake and alert. NAD. Scleral icterus  Pulm: Non labored breathing, no tachypnea   CV: RRR  Abdomen: soft, mildly-tender in the RUQ/epigastrium, no guarding, no tenderness to percussion, mildly distended and tympanitic, no masses palpable, no peritoneal signs, reducible umbilical hernias, well healed vertical midline surgical scar  Musculoskel/Extremities: no edema, erythema, tenderness   Skin: jaundiced, no rashes, no diaphoresis    Labs: Reviewed  Positive blood cultures for Ecoli and enterococcus  UA negative  Stool cultures negative    WBC 9 -> 14 -> 10 -> 10    Lactate 1.1    Hgb 9 -> 10 -> 9 -> 9  Plt 55 -> 30 (baseline 150 in Feb 2018)    Cr 1.2    Alb 1.9  INR 1.6  Tbili 4 -> 7 -> 5 -> 3  Alk P 680  AST/ALT 50/70    Ca 19-9 160s in June 2017 -> 300-400s in December 2017    Imaging: Reviewed  MRCP:  1. Moderate volume ascites degrades images on multiple sequences.  2. Clustered cystic lesions primarily involving hepatic segments 5 and 8 with peripheral enhancement and diffusion restriction, compatible with provided history of hepatic abscesses.  3. Features suggestive of cirrhosis with evidence of portal hypertension including splenomegaly and moderate volume ascites.  4. Hepatic and intrahepatic biliary ductal dilatation relate to postcholecystectomy state and hepaticojejunostomy; peripheral biliary tree is obscured by artifact.  5. High-grade narrowing of the splenoportal confluence seen on prior imaging is obscured by artifact.    US Abd  1.  Scattered hypoechoic lesions in the liver, largest measuring 2.3 x 2.2 x 2.5 " cm in the subcapsular region of the right lobe of the liver findings are compatible with provided history of abscess; no convincing correlate identified on CT 2/12/2018. Liver MR could be considered for additional evaluation. Heterogeneous echotexture of the liver, limiting evaluation.  2.  Retrograde flow in the left portal vein. Splenomegaly. Findings may relate to high-grade narrowing/apparent occlusion of the splenoportal confluence seen on outside CT.  3.  Small to moderate volume ascites.  4.  Post surgical changes of Whipple's procedure.

## 2018-03-16 NOTE — PROGRESS NOTES
CLINICAL NUTRITION SERVICES - ASSESSMENT NOTE     Nutrition Prescription    RECOMMENDATIONS FOR MDs/PROVIDERS TO ORDER:  1. Obtain pancreatic elastase test  2. May consider a trial of TPN for nutrition maintenance given severe malabsorption / diarrhea.  Patient is at high refeeding risk given under wt and malnourished status. Please consult pharmacy/RD.   3. May increase Creon capsule to ( Creon 24000 x 3 capsule) with each meals,    Will provide 1058 unit of lipase per meal.   4. Continue with fat soluble vitamin supplementation    Malnutrition Status:    Severe malnutrition in the context of chronic illness    Recommendations already ordered by Registered Dietitian (RD):  Ordered Boost plus supplements TID ( vanilla @ 10:00, strawberry @ 2:00 and reece @ HS)  Will monitor PO via calorie count, ( despite malabsorption)     Future/Additional Recommendations:  -      REASON FOR ASSESSMENT  Naresh Poole is a/an 47 year old male assessed by the dietitian for:   - Nutrition Luis < 3   - Provider Order - Pt with pancreatic cancer, malabsorption on CREON. Wondering about recs for better nutrition. NPO now for possible procedure.  - Provider consult: for Pt with possible dumping syndrome. Please evaluate.    Chart reviewed: PMHx noted:  - Pancreatic cancer (T3N1) s/p whipple 9/2017 along with FOLFIRINOX ( 1/2017-3/2017) and Gemzar/Xeloda ( 10/2017 - 11/2017) with chronic cancer pain.   - Complicated by Enterococcal liver abscess along with non-malignant ascites, possible sinusoidal obstructive syndrome.    - Transferred from OSH with presentation of  Fatigue, weakness, weight loss, Bloody diarrhea and confusion- found to have acute hyponatremia, hyperkalemia, KAT, thrombocytopenia.     - Per providers note, patient recently underwent a liver biopsy on 2/21 with some evidence of hepatoportal sclerosis with hepatocellular atrophy.    - Started on IV vanc and zosyn ( BC positive for E.coli and EC bacteremia), per MD,  "concern for biliary sources of infection (ascending cholangitis). Per provider note, AMS improved with treatment.   - 3/15: MRCP and MRI abdomen to eval for hepatic abscess  - NPO at [facility] for ERCP in AM 3/16      NUTRITION HISTORY  Obtained from chart review:  Patient with pancreatic insufficiency, Has had chronic diarrhea / malabsorption since whipple surgery. It happens immediately after eating. Patient continues on creon at home with foods.     Obtained from patient: Patient notes good po and appetite however \" everything comes out after his meal intake\". Per patient, no history of dumping syndrome. Can tolerate sweets just fine. Patient does report oily stool for the past week. Patient feels that his wt loss related to malabsorption related to severe diarrhea with po intake vs. Decrease intake.       CURRENT NUTRITION ORDERS  Diet: : NPO for possible ERCP today 3/16.   Intake/Tolerance: was on a Reg diet post-MRCP yesterday since admit.   Per RN/flow sheet, patient consumed 25-50% of meals.  Patient notes feeling hungry. His diet just advanced to regular. Patient is awaiting meal tray.     LABS  Elevated LFT's ( alk phos: 680, T.bili: 3.4)  K+: 4.1, Mg++: 2.5 (elevated), Phos: N/A  BUN: 66 (H), Cr: 1.21  CRP: 160 ( 3/14) admission - H, albumin: 1.9 ( depleted 3/16), no prealbumin,   WBC: 13.9 ( H) on admit -- now 10.9 ( normal today 3/16),  Vitamin D: 6 ( depleted)     Iron study noted:[reference range]   Fe:16: (L) [] ,*Iron Binding Capacity: 190 (L) [240-430] - would expect IBC to be elevated in Fe deficiency , Fe Sat: 8% (low) [15-46%] , *Ferritin: 323 (WNL) [] - Normal range  MCV: 79 (normal), MCH: 25.9 (L) , *MCHC: 32.7 (normal) - would expect to be low in Fe deficiency anemia (<31.5)   *Not consistent with Fe deficiency anemia. Above iron studies are consistent with early stages of Fe deficiency, but stores are adequate.     MEDICATIONS  Vitamin D, 50,000 units once weekly for 6-8 weeks  " "  Vitamin C daily ( 250 mg vitamin C daily)                      MVA DEKAs plus supplementation - 1 capsule daily oral ( regimen will provide 70567 IU vitamin A, 1500 IU of vitamin D, 200 IU of vitamin E, 1000 mcg vitamin K).     Creon 32324, 2 capsule, 3 times daily with meals ( provides 706 units of fat / kg/meal)       ANTHROPOMETRICS  Height: 182.9 cm (6' .008\")  Most Recent Weight: 67.8 kg (149 lb 7.6 oz)    IBW: 80.9 kg ( 84% IBW)  BMI: 20.27 kg /m2- Normal BMI  Weight History: 18% wt loss over the past 9 month ( severe) since whipple surgery  Wt Readings from Last 10 Encounters:   03/14/18 67.5 kg (148 lb 13 oz)   02/21/18 65.3 kg (144 lb)   01/29/18         67.1 kg (148 lb)  10/30/17         68.5 kg (151 lb)  09/29/17          82.1 kg (181 lb)  08/02/17          80.9 kg (178 lb)  06/29/17          84.8 kg (187 lb)  04/28/17          92.5 kg (204 lb)    Dosing Weight: 68 kg admission dry wt     ASSESSED NUTRITION NEEDS  Estimated Energy Needs: 2040 - 2380 kcals/day (30 - 35 kcals/kg )  Justification: Increased needs and Underweight  Estimated Protein Needs: 102 - 136  grams protein/day (1.5 - 2 grams of pro/kg)  Justification: Hypercatabolism with acute illness and Repletion  Estimated Fluid Needs:(1 mL/kcal)   Justification: Maintenance + GI losses    PHYSICAL FINDINGS  +1 Pitting edema in lower extremities, bilaterally.  Severe fat/muscle wasting  Stool: 7 ( 3/14), 6 (3/15)    MALNUTRITION  % Intake: Decreased intake does not meet criteria, severe malabsorption   % Weight Loss: > 10% in 6 months (severe)  Subcutaneous Fat Loss: Facial region, Upper arm, Lower arm and Thoracic/intercostal: all severe   Muscle Loss: Temporal, Facial & jaw region and Thoracic region (clavicle, acromium bone, deltoid, trapezius, pectoral): Severe  Fluid Accumulation/Edema: Mild  Malnutrition Diagnosis: Severe malnutrition in the context of chronic illness    NUTRITION DIAGNOSIS  Inadequate protein-energy intake related to " altered GI status associated with malabsorption / maldigestion of nutrients likely related to whipple and biliary losses, as evidenced by severe 18% wt loss since post whipple surgery in September, evidence of severe fat / muscle loss.      INTERVENTIONS  Implementation  Nutrition Education: Role of RD in nutrition POC, may follow low fiber/low fat diet given severe malabsorption      Medical food supplement therapy - Ordered boost plus in between meals     Goals  Patient to consume % of nutritionally adequate meal trays TID, or the equivalent with supplements/snacks.     Monitoring/Evaluation  Progress toward goals will be monitored and evaluated per protocol.    Lexx Delgado RD/ЕКАТЕРИНА  Pager [phone]

## 2018-03-16 NOTE — PROCEDURES
PROCEDURE:   Diagnostic Paracentesis, U/S guided.    INDICATION:   Laboratory studies    DIAGNOSIS:  Worsening ascites.    PROCEDURE : Dr. Dio Marcelino    ATTENDING: Dr. Aarti Caba    CONSENT:   Informed consent was obtained after risks and benefits were explained at length.     PROCEDURE SUMMARY:   A time-out was performed. The area of the left abdomen was prepped and draped in a sterile fashion using chlorhexidine scrub. 1% lidocaine was used to numb the region. Ultrasound was used to locate a safe and appropriate location for paracentesis catheter insertion. A paracentesis kit needle was inserted and advanced with negative pressure. No blood was aspirated. Clear yellow fluid was retrieved and collected. Approximately 65 mL of ascitic fluid was collected and sent for laboratory analysis.The patient tolerated the procedure well without any immediate complications. Dr. Caba was present during the procedure.    ESTIMATED BLOOD LOSS: minimal

## 2018-03-16 NOTE — PLAN OF CARE
Problem: Patient Care Overview  Goal: Plan of Care/Patient Progress Review  PT 5B: orders received and appreciated; attempted to complete evaluation in early PM, pt requests PT return later in afternoon as he is about to take a shower.

## 2018-03-17 NOTE — PROGRESS NOTES
[facility]     Medicine Progress Note  Naresh Poole MRN: [id number]  1970  Date of Admission:3/14/2018  Date of Service: 03/16/2018  ___________________________________    Main Plans for Today   - Per ID Recs: DC vanc and cont zosyn; daily anaerobic and aerobic BCx until neg for 72 hrs  - Likely will need zosyn for 4-6 wks given abscesses  - s/p MRCP and MRI abdomen with and without contrast  - surg onc consult, per recs: , fecal elastase, consider celiac plexus block  - onc consult  - ERCP and if unsuccessful may trial IR PCT  - pt growing E coli, citrobacter, clostridium perfringens, and enterococcus per OSH Cx  - reg diet post-MRCP  - nutrition consult for CREON optimization due to possible dumping syndrome  - pt is s/p diagnostic paracentesis for microbiology and cytology  - consider SQH for prophy given pancreatic cancer in AM vs SCDs      Assessment & Plan   Naresh Poole is a 47 year old male with a PMHx of pancreatic cancer (T3N1) s/p whipple 5/2017 along with FOLFIRINOX and Gemzar/Xeloda 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 On imaging was found to have narrowing of the portal vein confluence, likely 2/2 complication of Whipple. This obstruction could be complicating proper drainage of liver leading to abscess formation and ascending cholangitis.     He recently underwent liver biopsy on 2/21 which did not show fibrosis or sinusoidal obstruction. It did show evidence of hepatoportal sclerosis with hepatocellular atrophy.    Cx from OSH obtained 3/13 are growing E coli, citrobacter, Clostridium perfringens, and enterococcus, all pan-sensitive to zosyn, thus vancomycin was discontinued on 3/16. We will continue to obtain daily BCx2 both aerobic and anaerobic until neg for 72 hours. Since starting zosyn, pt has remained afebrile and hemodynamically stable. Mentation has also improved.     ##E coli, C perfringens, Citrobacter, and enterococcus  bacteremia obtained on OSH BCx 3/13  ##Probably biliary source given past hx abscess, current abscess on imaging, and poor drainage of liver   ##Hepatoportal sclerosis  Bacteremia, suspect GI source with possible ascending cholangitis and/or liver abscess. Pt has been afebrile and hemodynamically stable.  U/S showed possible abscess, with retrograde flow in the L portal vein, w/ c/f narrowing or occlusion of the splenoportal confluence.    --cont vanc and zosyn   --f/u on sensitivities   --culture if sensitive    --MRCP and MRI abdomen with and without contrast    --reg diet following MRCP   --NPO at midnight for possible ERCP in AM    ##Possible SBP  Pt has WBC 1093 with 79% PMNs. So far gram stain neg. Cx pending. Elevated WBC could also be sec to malignant effusion. Cytology pending.    --pt is covered with zosyn   --will consider albumin in AM   -- follow up cytology result    #Pre-Renal KAT- improving with IVFs.   Cr peaked at 2.3 at [facility]. S/p 3L at OSH.   --continue LR infusion continuous    ##Hematochezia  ##Melena  Pt has been having tarry stools, melenic alternating with hematochezia. However, Hgb has been stable. GI is aware. Considering possible ishcemia vs other GI cause of bleed. Now with stable hemoglobin and brown color stools.   --not pursue endoscopy at this time   --await further GI recs   --transfuse for Hgb< 7   -- Continue PPI BID    ##Pancreatic Insufficiency  ##Chronic Diarrhea  ##Hypovitamin D  ##Pancreatic cancer  (T3N1) s/p whipple 9/2017 along with FOLFIRINOX and Gemzar/Xeloda c/b enterococcal liver abscess (treated) along with non-malignant ascites. Pt last took Gemzar and xeloda in Nov 2017.  Pt has had chronic diarrhea that occurs immediately after eating. Likely 2/2 malabsorption  2/2 pancreatic cancer and Whipple. . Pt has been continuing CREON at home.    --cont CREON with food   --GI aware, awaiting further recs   --nutrition consult placed    --start Vit D2 50K units once weekly  for 6-8 weeks    --MV ADEK supplementation        ##Chronic cancer pain   --cont dilaudid 2 mg q6hrs prn   --cont methadone 5 mg TID     ##Toxic metabolic Encephalopathy, improving  Likely suspect hepatic encephalopathy vs AMS 2/2 increased dilaudid use at home, vs infection (gram neg bacteremia). Pt has some asterixis on exam. Mental status improved within 24 hours of IV abx and also with decreasing frequency of pain meds. Ammonia at OSH <10, TSH WNL, B12 and folate WNL.   --cont to monitor    ##Normocytic Anemia  Pt has had a slow decline from hgb 10-11 throughout 2017 to now 7.7 and 8.6 at OSH and 9 here. It does not appear that the Hgb drop is acute, more subacute looking at the full trend. His microcytosis is likely due to iron deficiency from reduced po intake which he does note. His RI is 0.3 suggesting underproduction rather than hemolysis or consumption; possibly again from iron deficiency. He does have new thrombocytopenia raising concern for hemolysis but bilirubin is mostly conjugated arguing against this and will discuss below more likely thrombocytopenia causes. Fibrinogen is not low to suggest DIC. On DDx is also capecitabine and gemcitabine side effects- but he has not been taking these since November.   Iron panel shows low iron, low iron binding capacity, and low iron saturation index, and normal ferritin c/w ACD. Peripheral smear is normal, and  WNL.  Plan:   --Monitor     ##Thrombocytopenia  Patient possibly has sinusoidal obstructive syndrome which can cause low Plts along with the conjugated hyperbilirubinemia which the patient also has. 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 Possibly also due to capecitabine and gemcitabine as noted above, although pt has not taken this since Nov 2017. No EtOH use, no other med culprits, no reason to believe he has a new viral (HIV, EBV, etc) infection. No DIC given high fibrinogen. No hemolysis given mostly conjugated  bilirubin and normal LDH. Peripheral smear is normal   --CTM      ##Hyperglycemia - could be sec to pancreatic endocrine insufficiency in setting of panc cancer s/p whipple  Pt does not have a hx of diabetes.   --cont SSI   --hypoglycemia protocol   --Check HgbA1c    ##Rib pain  Xray did not show metastasis but is not sensitive.     ##Prophylaxis:    ##DVT: SCDs for now given slow Hgb drop, AM team to consider pharmacologic ppx give high risk  ##GI: PPI IV BID  ##Diet: Reg, NPO at [facility] for ERCP    Family updated     ##DNR/DNI    Pt was discussed and seen with Dr. Urbina.    Jeanna Vinson MD PhD   Internal Medicine, PGY 1  p       Interval History   -Nursing Notes Reviewed: Pt afebrile O/N but triggered sepsis protocol for elevated HR and leukocytosis, lactate was WNL, fluid were given O/N and pt was started on zosyn for positive blood cultures.    Pt does not feel fevers, chills, chest pain, n/v, does not have SOB. Pain is decently controlled.     Review of Systems   ROS:  10pt  point ROS including Respiratory, CV, GI and , other than that noted above    Physical Exam   Vital Signs with Ranges  Temp:  [95.8  F (35.4  C)-96.8  F (36  C)] 95.8  F (35.4  C)  Pulse:  [70-91] 84  Resp:  [16-18] 18  BP: (108-137)/(76-89) 108/76  SpO2:  [98 %-100 %] 99 %  I/O last 3 completed shifts:  In: 3070 [P.O.:880; I.V.:2190]  Out: -   Wt Readings from Last 1 Encounters:   03/16/18 70.3 kg (154 lb 14.4 oz)    Body mass index is 21 kg/(m^2). Resp: 18    General: Alert, oriented, cooperative, no apparent distress, appears nontoxic.  Eyes: Scleral icterus, pupils are reactive.  HEENT: Oropharynx is clear and moist.   Lymph/Hematologic: No anterior or posterior cervical, or supraclavicular lymphadenopathy is appreciated.  Cardiovascular: Regular rate and rhythm, normal S1 and S2, and no murmur noted. JVP is normal. Good peripheral pulses in wrists bilaterally. +1 pitting edema in lower extremities,  bilaterally.  Respiratory: Clear to auscultation bilaterally.   GI: Soft, tender to palpation, has umbilical hernia that is reducible, normal bowel sounds, surgical scars present.  Genitourinary: no hoff  Musculoskeletal: Normal muscle bulk and tone.  Skin: Warm and dry, no rashes.   Neurologic: Neck supple. No focal deficits.        Intake/Output Summary (Last 24 hours) at 03/15/18 1657  Last data filed at 03/15/18 1500   Gross per 24 hour   Intake             1835 ml   Output             3200 ml   Net            -1365 ml     Data   Lines/Tubes:   Peripheral IV 03/14/18 Left Upper arm (Active)   Site Assessment [facility] 3/15/2018 10:00 AM   Line Status Saline locked 3/15/2018 10:00 AM   Phlebitis Scale 0-->no symptoms 3/15/2018 10:00 AM   Infiltration Scale 0 3/15/2018 10:00 AM   Extravasation? No 3/15/2018  7:00 AM   Number of days:1       Peripheral IV 03/14/18 Right Lower forearm (Active)   Site Assessment [facility] 3/15/2018 10:00 AM   Line Status Infusing 3/15/2018 10:00 AM   Phlebitis Scale 0-->no symptoms 3/15/2018 10:00 AM   Infiltration Scale 0 3/15/2018 10:00 AM   Extravasation? No 3/15/2018  7:00 AM   Number of days:1       Port A Cath Single 03/14/18 Right Chest wall (Active)   Dressing Intervention No dressing 3/14/2018  4:00 PM   Number of days:1     Labs & Studies of Note: I personally reviewed the following studies:    Micro:  3/14  2 positive for E coli, Citrobacter, enterococcus, C perfringens  C diff neg  O & P neg  Ent pathogen panel neg  U Cx NGTD  UA noninfectious  Cryptosporidium and Giardia pending  Adenovirus PCR stool pending    ROUTINE LABS (Last four results)  CMP    Recent Labs  Lab 03/16/18  0709 03/15/18  0658 03/14/18  0642 03/14/18  0228    136 134 134   POTASSIUM 4.1 4.7 5.0 5.0   CHLORIDE 104 105 103 101   CO2 24 21 22 22   ANIONGAP 9 10 10 11   * 197* 233* 241*   BUN 66* 75* 88* 84*   CR 1.21 1.24 1.35* 1.38*   GFRESTIMATED 64 62 56* 55*   GFRESTBLACK 77 75 68 66   CHIDI 7.7*  7.7* 7.9* 8.0*   MAG 2.5* 2.4*  --  2.4*   PHOS  --   --   --  3.1   PROTTOTAL 5.7* 5.4* 5.9* 5.8*   ALBUMIN 1.9* 1.8* 2.2* 2.1*   BILITOTAL 3.4* 5.1* 6.9* 6.0*   ALKPHOS 680* 579* 656* 696*   AST 54* 54* 53* 57*   ALT 74* 74* 84* 92*     CBC    Recent Labs  Lab 03/16/18  0709 03/15/18  0658 03/14/18  1938 03/14/18  0642 03/14/18  0228   WBC 10.9 10.4  --  13.9* 8.9   RBC 3.48* 3.37*  --  3.46* 3.42*   HGB 9.0* 8.7* 10.0* 9.1* 9.0*   HCT 27.5* 26.8*  --  27.4* 27.4*   MCV 79 80  --  79 80   MCH 25.9* 25.8*  --  26.3* 26.3*   MCHC 32.7 32.5  --  33.2 32.8   RDW 15.6* 15.3*  --  15.1* 15.1*   PLT 32* 34*  --  48* 55*     INR    Recent Labs  Lab 03/16/18  0709 03/14/18  0228   INR 1.60* 1.55*     Inflammatory Markers    Recent Labs   Lab Test  03/14/18   1742  09/13/17   1810   CRP  160.0*  5.8     Immune Globulin Studies  No lab results found.    Microbiology:  Significant culture results:  Culture Micro   Date Value Ref Range Status   03/16/2018 PENDING  Preliminary   03/16/2018 No growth after 9 hours  Preliminary   03/16/2018 No growth after 9 hours  Preliminary   03/15/2018 (A)  Preliminary    Cultured on the 1st day of incubation:  Gram negative rods     03/15/2018   Preliminary    Critical Value/Significant Value, preliminary result only, called to and read back by  GAUTAM KING RN @0512 3/16/18. SCG     03/15/2018 No growth after 1 day  Preliminary   03/14/2018 No growth  Final   03/14/2018 (A)  Preliminary    Cultured on the 1st day of incubation:  Gram negative rods     03/14/2018   Preliminary    Critical Value/Significant Value, preliminary result only, called to and read back by  Trini Garcia RN 0702 3/15/18. MS     03/14/2018 (A)  Preliminary    Cultured on the 1st day of incubation:  Gram negative rods     03/14/2018   Preliminary    Critical Value/Significant Value, preliminary result only, called to and read back by  Socorro Edouard RN 0600 3/15/18. MS     03/14/2018 (A)  Preliminary    Cultured on the  1st day of incubation:  Escherichia coli  Susceptibility testing done on previous specimen     03/14/2018 (A)  Preliminary    Cultured on the 1st day of incubation:  Enterococcus faecalis     03/14/2018   Preliminary    Critical Value/Significant Value, preliminary result only, called to and read back by  Allison Vallejo RN 0139 3/15/18. MS     03/14/2018   Preliminary    (Note)  NEGATIVE for the following: Staphylococcus spp., Staph aureus, Staph  epidermidis, Staph lugdunensis, Streptococcus spp., Strep pneumoniae,  Strep pyogenes, Strep agalactiae, Strep anginosus group, Enterococcus  faecalis, Enterococcus faecium, and Listeria spp. by Verigene  multiplex nucleic acid test. Final identification and antimicrobial  susceptibility testing will be verified by standard methods.    Critical Value/Significant Value called to and read back by Trini Garcia RN 0417 3/15/18. MS     03/14/2018 (A)  Preliminary    Cultured on the 1st day of incubation:  Escherichia coli  Susceptibility testing in progress     03/14/2018   Preliminary    Critical Value/Significant Value, preliminary result only, called to and read back by  Radha Sims RN, @2031 03/14/18..     03/14/2018 (A)  Preliminary    Cultured on the 1st day of incubation:  Citrobacter freundii complex  Susceptibility testing in progress     03/14/2018   Preliminary    Critical Value/Significant Value called to and read back by  Dr. Yamile Reddy @ 1227. 03/16/18 03/14/2018   Preliminary    (Note)  POSITIVE for E.COLI by Verigene multiplex nucleic acid test. Final  identification and antimicrobial susceptibility testing will be  verified by standard methods. Verigene test will not distinguish  E.coli from Shigella species including S.dysenteriae, S.flexneri,  S.boydii, and S.sonnei. Specimens containing Shigella species or  E.coli will be reported as Positive for E.coli.    Specimen tested with Verigene multiplex, gram-negative blood culture  nucleic acid test  for the following targets: Acinetobacter sp.,  Citrobacter sp., Enterobacter sp., Proteus sp., E. coli, K.  pneumoniae/oxytoca, P. aeruginosa, and the following resistance  markers: CTXM, KPC, NDM, VIM, IMP and OXA.    Critical Value/Significant Value called to and read back by Radha Sims RN, @223 03/14/18..     03/14/2018 (A)  Preliminary    Cultured on the 1st day of incubation:  Escherichia coli  Susceptibility testing done on previous specimen     03/14/2018   Preliminary    Critical Value/Significant Value, preliminary result only, called to and read back by  Radha Sims RN, @ 6331 03/14/18..       Recent Labs   Lab Test  03/16/18   1130  03/16/18   0711  03/16/18   0709   CULT  PENDING  No growth after 9 hours  No growth after 9 hours   SDES  Ascites  Ascites  Blood Right Arm  Blood Left Arm       Urine Studies:    Recent Labs   Lab Test  03/14/18   2144   LEUKEST  Negative   NITRITE  Negative   WBCU  1   RBCU  2       Imaging:   No results found for this or any previous visit (from the past 24 hour(s)).    Medications   Medications list for Reference (delete if desired)  Current Facility-Administered Medications   Medication     lactated ringers infusion     HYDROmorphone (DILAUDID) tablet 2-4 mg     vitamin D (ERGOCALCIFEROL) capsule 50,000 Units     multivitamin CF formula (DEKAs Plus) per capsule 1 capsule     ascorbic acid (vitamin C) chewable tablet 250 mg     naloxone (NARCAN) injection 0.1-0.4 mg     sertraline (ZOLOFT) tablet 50 mg     glucose 40 % gel 15-30 g    Or     dextrose 50 % injection 25-50 mL    Or     glucagon injection 1 mg     amylase-lipase-protease (CREON 24) 49548-88700 UNITS per EC capsule 48,000 Units     simethicone (MYLICON) chewable tablet 80 mg     methadone (DOLOPHINE) tablet 5 mg     insulin aspart (NovoLOG) inj (RAPID ACTING)     pantoprazole (PROTONIX) 40 mg IV push injection     piperacillin-tazobactam (ZOSYN) 4.5 g vial to attach to  mL bag       # Pain  Assessment:   Current Pain Score 3/13/2018 3/13/2018 3/12/2018   Patient currently in pain? yes sleeping: patient not able to self report denies   Pain location Back - -   Pain descriptors Sore - -   - Naresh is experiencing pain due to past pancreatic cancer. Pain management was discussed with Naresh and his spouse and the plan was created in a collaborative fashion.  Naresh's response to the current recommendations: compliant  - Please see the plan for pain management as documented above    Physician Attestation  I, Macrina Urbina MD, saw this patient with the resident and agree with the resident s findings and plan of care as documented in the resident s note with my edits.     I personally reviewed vital signs, medications, labs and imaging.    Macrina Urbina MD  Date of Service (when I saw the patient): 3/16/18

## 2018-03-17 NOTE — PLAN OF CARE
Problem: Patient Care Overview  Goal: Plan of Care/Patient Progress Review  Outcome: Improving  D/I/A: Patient A & O X 4, VSS no respiratory distress noted. Abdominal pain manageable with Dilaudid PO PRN. Patient appetite fair BS , 109, 248, insulin given per order. Continue on iv Abx. Void adequate , Pt report had loose stool x 2. Patient up in chair and tolerated up in room independently. Patient family at bedside . Call light in reach. Continue with POC and update MD with concerns.

## 2018-03-17 NOTE — PLAN OF CARE
Problem: Patient Care Overview  Goal: Plan of Care/Patient Progress Review  Outcome: No Change  Pt A/O. VSS. Slept most of night. PRN oral dilaudid given for abdominal pain with relief. Up independently in room, bathroom voiding. Reported one loose brown BM. IV antibiotics and IVF continue. Blood sugars overnight 167 & 113. Calorie counts to start today. Will continue to monitor.

## 2018-03-17 NOTE — PROVIDER NOTIFICATION
Update from lab: positive blood culture from 3/16/18. Growing gram neg rods & gram positive cocci in pairs & chains. Maroon night float notified via text page ([id number]).

## 2018-03-17 NOTE — PROGRESS NOTES
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 On imaging was found to have narrowing of the portal vein confluence, likely 2/2 complication of Whipple. This obstruction could be complicating proper drainage of liver leading to abscess formation and ascending cholangitis.     He recently underwent liver biopsy on 2/21 which did not show fibrosis or sinusoidal obstruction. It did show evidence of hepatoportal sclerosis with hepatocellular atrophy.    Cx from OSH obtained 3/13 are growing E coli, citrobacter, Clostridium perfringens, and enterococcus, all pan-sensitive to zosyn, thus vancomycin was discontinued on 3/16. We will continue to obtain daily BCx2 both aerobic and anaerobic until neg for 72 hours. Since starting zosyn, pt has remained afebrile and hemodynamically stable. Mentation has also improved.     ##E coli, C perfringens, Citrobacter, and enterococcus bacteremia obtained on OSH BCx 3/13  ##Probably biliary source given past hx abscess, current abscess on imaging, and poor drainage of liver   ##Hepatoportal sclerosis  Bacteremia, suspect GI source with possible ascending cholangitis and/or liver abscess. Pt has been afebrile and hemodynamically stable.  U/S showed possible abscess, with retrograde flow in the L portal vein, w/ c/f narrowing or occlusion of the splenoportal confluence.    --cont zosyn   --f/u  on sensitivities   --culture if sensitive    --MRCP and MRI abdomen with and without contrast    --reg diet following MRCP   --NPO at midnight for possible ERCP in AM    ##Possible SBP  Pt has WBC 1093 with 79% PMNs. So far gram stain neg. Cx pending. Elevated WBC could also be sec to malignant effusion. Cytology pending.    --pt is covered with zosyn   --will consider albumin in AM   -- follow up cytology result    #Pre-Renal KAT- improving with IVFs.   Cr peaked at 2.3 at [facility] ED. S/p 3L at OSH.   --continue LR infusion continuous    ##Hematochezia  ##Melena  Pt has been having tarry stools, melenic alternating with hematochezia. However, Hgb has been stable. GI is aware. Considering possible ishcemia vs other GI cause of bleed. Now with stable hemoglobin and brown color stools.   --not pursue endoscopy at this time   --await further GI recs   --transfuse for Hgb< 7   -- Continue PPI BID    ##Pancreatic Insufficiency  ##Chronic Diarrhea  ##Hypovitamin D  ##Pancreatic cancer  (T3N1) s/p whipple 9/2017 along with FOLFIRINOX and Gemzar/Xeloda c/b enterococcal liver abscess (treated) along with non-malignant ascites. Pt last took Gemzar and xeloda in Nov 2017.  Pt has had chronic diarrhea that occurs immediately after eating. Likely 2/2 malabsorption  2/2 pancreatic cancer and Whipple. . Pt has been continuing CREON at home.    --cont CREON with food   --GI aware, awaiting further recs   --nutrition consult placed    --start Vit D2 50K units once weekly for 6-8 weeks    --MV ADEK supplementation      MELD-Na score: 19 at 3/17/2018  6:07 AM  MELD score: 19 at 3/17/2018  6:07 AM  Calculated from:  Serum Creatinine: 1.21 mg/dL at 3/17/2018  6:07 AM  Serum Sodium: 137 mmol/L at 3/17/2018  6:07 AM  Total Bilirubin: 4.8 mg/dL at 3/17/2018  6:07 AM  INR(ratio): 1.64 at 3/17/2018  6:07 AM  Age: 47 years    ##Chronic cancer pain   --cont dilaudid 2 mg q6hrs prn   --cont methadone 5 mg TID     ##Toxic metabolic Encephalopathy,  improving  Likely suspect hepatic encephalopathy vs AMS 2/2 increased dilaudid use at home, vs infection (gram neg bacteremia). Pt has some asterixis on exam. Mental status improved within 24 hours of IV abx and also with decreasing frequency of pain meds. Ammonia at OSH <10, TSH WNL, B12 and folate WNL.   --cont to monitor    ##Normocytic Anemia  Pt has had a slow decline from hgb 10-11 throughout 2017 to now 7.7 and 8.6 at OSH and 9 here. It does not appear that the Hgb drop is acute, more subacute looking at the full trend. His microcytosis is likely due to iron deficiency from reduced po intake which he does note. His RI is 0.3 suggesting underproduction rather than hemolysis or consumption; possibly again from iron deficiency. He does have new thrombocytopenia raising concern for hemolysis but bilirubin is mostly conjugated arguing against this and will discuss below more likely thrombocytopenia causes. Fibrinogen is not low to suggest DIC. On DDx is also capecitabine and gemcitabine side effects- but he has not been taking these since November.   Iron panel shows low iron, low iron binding capacity, and low iron saturation index, and normal ferritin c/w ACD. Peripheral smear is normal, and  WNL.  Plan:   --Monitor     ##Thrombocytopenia  Patient possibly has sinusoidal obstructive syndrome which can cause low Plts along with the conjugated hyperbilirubinemia which the patient also has. 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 Possibly also due to capecitabine and gemcitabine as noted above, although pt has not taken this since Nov 2017. No EtOH use, no other med culprits, no reason to believe he has a new viral (HIV, EBV, etc) infection. No DIC given high fibrinogen. No hemolysis given mostly conjugated bilirubin and normal LDH. Peripheral smear is normal   --CTM      ##Hyperglycemia - could be sec to pancreatic endocrine insufficiency in setting of panc cancer s/p whipple  Pt  does not have a hx of diabetes.   --cont SSI   --hypoglycemia protocol   --Check HgbA1c    ##Rib pain  Xray did not show metastasis but is not sensitive.     ##Prophylaxis:    ##DVT: SCDs for now given slow Hgb drop, AM team to consider pharmacologic ppx give high risk  ##GI: PPI IV BID  ##Diet: Reg, NPO at [facility] for ERCP    Family updated     ##DNR/DNI    Pt was discussed and seen with Dr. Urbina.    Jeanna Vinson MD PhD   Internal Medicine, PGY 1  p       Interval History   -Nursing Notes Reviewed: Pt afebrile O/N; cont to have + BCx; says pain is well controlled.    Pt does not feel fevers, chills, chest pain, n/v, does not have SOB. Complains of increased swelling in hands.     Review of Systems   ROS:  10pt  point ROS including Respiratory, CV, GI and , other than that noted above    Physical Exam   Vital Signs with Ranges  Temp:  [95.8  F (35.4  C)-96.7  F (35.9  C)] 96.6  F (35.9  C)  Pulse:  [80-84] 84  Heart Rate:  [73-77] 73  Resp:  [16-18] 16  BP: (108-127)/(76-87) 124/82  SpO2:  [98 %-100 %] 98 %  I/O last 3 completed shifts:  In: 3100 [P.O.:960; I.V.:2140]  Out: -   Wt Readings from Last 1 Encounters:   03/16/18 70.3 kg (154 lb 14.4 oz)    Body mass index is 21 kg/(m^2). Resp: 16    General: Alert, oriented, cooperative, no apparent distress, appears nontoxic.  Eyes: Scleral icterus, pupils are reactive.  HEENT: Oropharynx is clear and moist.   Lymph/Hematologic: No anterior or posterior cervical, or supraclavicular lymphadenopathy is appreciated.  Cardiovascular: Regular rate and rhythm, normal S1 and S2, and no murmur noted. JVP is normal. Good peripheral pulses in wrists bilaterally. +1 pitting edema in lower extremities, bilaterally.  Respiratory: Clear to auscultation bilaterally.   GI: Soft, tender to palpation, has umbilical hernia that is reducible, normal bowel sounds, surgical scars present.  Genitourinary: no hoff  Musculoskeletal: Normal muscle bulk and tone.  Skin: Warm and  dry, no rashes.   Neurologic: Neck supple. No focal deficits.          Intake/Output Summary (Last 24 hours) at 03/17/18 2001  Last data filed at 03/17/18 1847   Gross per 24 hour   Intake             2900 ml   Output                0 ml   Net             2900 ml        Data   Lines/Tubes:   Peripheral IV 03/14/18 Left Upper arm (Active)   Site Assessment WDL 3/15/2018 10:00 AM   Line Status Saline locked 3/15/2018 10:00 AM   Phlebitis Scale 0-->no symptoms 3/15/2018 10:00 AM   Infiltration Scale 0 3/15/2018 10:00 AM   Extravasation? No 3/15/2018  7:00 AM   Number of days:1       Peripheral IV 03/14/18 Right Lower forearm (Active)   Site Assessment WDL 3/15/2018 10:00 AM   Line Status Infusing 3/15/2018 10:00 AM   Phlebitis Scale 0-->no symptoms 3/15/2018 10:00 AM   Infiltration Scale 0 3/15/2018 10:00 AM   Extravasation? No 3/15/2018  7:00 AM   Number of days:1       Port A Cath Single 03/14/18 Right Chest wall (Active)   Dressing Intervention No dressing 3/14/2018  4:00 PM   Number of days:1     Labs & Studies of Note: I personally reviewed the following studies:    Micro:  3/14  2 positive for E coli, Citrobacter, enterococcus, C perfringens  C diff neg  O & P neg  Ent pathogen panel neg  U Cx NGTD  UA noninfectious  Cryptosporidium and Giardia pending  Adenovirus PCR stool pending    ROUTINE LABS (Last four results)  CMP    Recent Labs  Lab 03/17/18  0607 03/16/18  0709 03/15/18  0658 03/14/18  0642 03/14/18  0228    136 136 134 134   POTASSIUM 3.9 4.1 4.7 5.0 5.0   CHLORIDE 107 104 105 103 101   CO2 23 24 21 22 22   ANIONGAP 8 9 10 10 11   * 139* 197* 233* 241*   BUN 58* 66* 75* 88* 84*   CR 1.21 1.21 1.24 1.35* 1.38*   GFRESTIMATED 64 64 62 56* 55*   GFRESTBLACK 77 77 75 68 66   CHIDI 7.7* 7.7* 7.7* 7.9* 8.0*   MAG 2.3 2.5* 2.4*  --  2.4*   PHOS  --   --   --   --  3.1   PROTTOTAL 5.5* 5.7* 5.4* 5.9* 5.8*   ALBUMIN 1.7* 1.9* 1.8* 2.2* 2.1*   BILITOTAL 4.8* 3.4* 5.1* 6.9* 6.0*   ALKPHOS 1053* 680*  579* 656* 696*   AST 51* 54* 54* 53* 57*   ALT 69 74* 74* 84* 92*     CBC    Recent Labs  Lab 03/17/18  0607 03/16/18  0709 03/15/18  0658 03/14/18  1938 03/14/18  0642   WBC 10.5 10.9 10.4  --  13.9*   RBC 3.36* 3.48* 3.37*  --  3.46*   HGB 8.6* 9.0* 8.7* 10.0* 9.1*   HCT 27.0* 27.5* 26.8*  --  27.4*   MCV 80 79 80  --  79   MCH 25.6* 25.9* 25.8*  --  26.3*   MCHC 31.9 32.7 32.5  --  33.2   RDW 15.6* 15.6* 15.3*  --  15.1*   PLT 44* 32* 34*  --  48*     INR    Recent Labs  Lab 03/17/18  0607 03/16/18  0709 03/14/18  0228   INR 1.64* 1.60* 1.55*     Inflammatory Markers    Recent Labs   Lab Test  03/14/18   1742  09/13/17   1810   CRP  160.0*  5.8     Immune Globulin Studies  No lab results found.    Microbiology:  Significant culture results:  Culture Micro   Date Value Ref Range Status   03/17/2018 PENDING  Preliminary   03/17/2018 PENDING  Preliminary   03/17/2018 Canceled, Test credited  Final   03/17/2018 Duplicate request  Final   03/17/2018 Canceled, Test credited  Final   03/17/2018 Duplicate request  Final   03/16/2018 Culture negative monitoring continues  Preliminary   03/16/2018 No growth after 20 hours  Preliminary   03/16/2018 (A)  Preliminary    Cultured on the 1st day of incubation:  Gram negative rods     03/16/2018 (A)  Preliminary    Cultured on the 1st day of incubation:  Gram positive cocci in pairs and chains     03/16/2018   Preliminary    Critical Value/Significant Value, preliminary result only, called to and read back by  JESSICA LUTHER RN 3.16.18 2105. JAIME     03/15/2018 (A)  Preliminary    Cultured on the 1st day of incubation:  Gram negative rods     03/15/2018   Preliminary    Critical Value/Significant Value, preliminary result only, called to and read back by  GAUTAM KING RN @0512 3/16/18. SCG     03/15/2018 No growth after 2 days  Preliminary   03/14/2018 No growth  Final   03/14/2018 (A)  Preliminary    Cultured on the 1st day of incubation:  Citrobacter freundii     03/14/2018    Preliminary    Critical Value/Significant Value, preliminary result only, called to and read back by  Trini Garcia RN 0702 3/15/18. MS     03/14/2018 (A)  Preliminary    Cultured on the 1st day of incubation:  Escherichia coli     03/14/2018   Preliminary    Critical Value/Significant Value called to and read back by  Jayna Brady RN 5B @ 1021. 03/17/18 03/14/2018 Susceptibility testing done on previous specimen  Preliminary   03/14/2018 (A)  Preliminary    Cultured on the 1st day of incubation:  Citrobacter freundii  Susceptibility testing done on previous specimen     03/14/2018   Preliminary    Critical Value/Significant Value, preliminary result only, called to and read back by  Socorro Edouard RN 0600 3/15/18. MS     03/14/2018 (A)  Preliminary    Cultured on the 1st day of incubation:  Escherichia coli  Susceptibility testing done on previous specimen     03/14/2018 (A)  Preliminary    Cultured on the 1st day of incubation:  Enterococcus faecalis     03/14/2018   Preliminary    Critical Value/Significant Value, preliminary result only, called to and read back by  Allison Vallejo RN 0139 3/15/18. MS     03/14/2018   Preliminary    (Note)  NEGATIVE for the following: Staphylococcus spp., Staph aureus, Staph  epidermidis, Staph lugdunensis, Streptococcus spp., Strep pneumoniae,  Strep pyogenes, Strep agalactiae, Strep anginosus group, Enterococcus  faecalis, Enterococcus faecium, and Listeria spp. by [employer]  multiplex nucleic acid test. Final identification and antimicrobial  susceptibility testing will be verified by standard methods.    Critical Value/Significant Value called to and read back by Trini Garcia RN 0417 3/15/18. MS     03/14/2018 (A)  Preliminary    Cultured on the 1st day of incubation:  Escherichia coli     03/14/2018   Preliminary    Critical Value/Significant Value, preliminary result only, called to and read back by  Radha Sims RN, @2031 03/14/18.DH.     03/14/2018 (A)  Preliminary     Cultured on the 1st day of incubation:  Citrobacter freundii complex     03/14/2018   Preliminary    Critical Value/Significant Value called to and read back by  Dr. Yamile Reddy @ 1227. 03/16/18 03/14/2018   Preliminary    (Note)  POSITIVE for E.COLI by [employer] multiplex nucleic acid test. Final  identification and antimicrobial susceptibility testing will be  verified by standard methods. [employer] test will not distinguish  E.coli from Shigella species including S.dysenteriae, S.flexneri,  S.boydii, and S.sonnei. Specimens containing Shigella species or  E.coli will be reported as Positive for E.coli.    Specimen tested with [employer] multiplex, gram-negative blood culture  nucleic acid test for the following targets: Acinetobacter sp.,  Citrobacter sp., Enterobacter sp., Proteus sp., E. coli, K.  pneumoniae/oxytoca, P. aeruginosa, and the following resistance  markers: CTXM, KPC, NDM, VIM, IMP and OXA.    Critical Value/Significant Value called to and read back by Radha Sims RN, @2729 03/14/18..     03/14/2018 (A)  Preliminary    Cultured on the 1st day of incubation:  Escherichia coli  Susceptibility testing done on previous specimen     03/14/2018   Preliminary    Critical Value/Significant Value, preliminary result only, called to and read back by  Radha Sims RN, @ 4581 03/14/18..       Recent Labs   Lab Test  03/17/18   0613  03/17/18   0607  03/17/18   0601   CULT  PENDING  PENDING  Canceled, Test credited  Duplicate request  Canceled, Test credited  Duplicate request   SDES  Blood Left Arm  Blood Right Arm  Blood  Blood       Urine Studies:    Recent Labs   Lab Test  03/14/18   2144   LEUKEST  Negative   NITRITE  Negative   WBCU  1   RBCU  2       Imaging:   No results found for this or any previous visit (from the past 24 hour(s)).    Medications   Medications list for Reference (delete if desired)  Current Facility-Administered Medications   Medication     magnesium sulfate 1 gm in  100mL D5W intermittent infusion     HYDROmorphone (DILAUDID) tablet 2-4 mg     vitamin D (ERGOCALCIFEROL) capsule 50,000 Units     multivitamin CF formula (DEKAs Plus) per capsule 1 capsule     ascorbic acid (vitamin C) chewable tablet 250 mg     naloxone (NARCAN) injection 0.1-0.4 mg     sertraline (ZOLOFT) tablet 50 mg     glucose 40 % gel 15-30 g    Or     dextrose 50 % injection 25-50 mL    Or     glucagon injection 1 mg     amylase-lipase-protease (CREON 24) 54671-76617 UNITS per EC capsule 48,000 Units     simethicone (MYLICON) chewable tablet 80 mg     methadone (DOLOPHINE) tablet 5 mg     insulin aspart (NovoLOG) inj (RAPID ACTING)     pantoprazole (PROTONIX) 40 mg IV push injection     piperacillin-tazobactam (ZOSYN) 4.5 g vial to attach to  mL bag       # Pain Assessment:   Current Pain Score 3/13/2018 3/13/2018 3/12/2018   Patient currently in pain? yes sleeping: patient not able to self report denies   Pain location Back - -   Pain descriptors Sore - -   - Naresh is experiencing pain due to past pancreatic cancer. Pain management was discussed with Naresh and his spouse and the plan was created in a collaborative fashion.  Naresh's response to the current recommendations: compliant  - Please see the plan for pain management as documented above

## 2018-03-17 NOTE — PLAN OF CARE
Problem: Patient Care Overview  Goal: Plan of Care/Patient Progress Review  Outcome: No Change  Pt alert and oriented. VSS. Slept on and off most of evening. Tolerating small amounts of regular diet. Plan for ERCP Monday per report. Voiding adequate amounts, had a BM on days. MD updated about positive blood cultures. IV antibiotics continue. IVF infusing per order. HS blood sugar 232, sliding scale insulin provided. Will continue to monitor overnight.

## 2018-03-17 NOTE — PROVIDER NOTIFICATION
Lab called  with critical value of blood cx , page placed to Dr. Meeks ( [phone]) to update the result.

## 2018-03-18 NOTE — CONSULTS
Oncology Consultation    Naresh Poole MRN# [id number]   Age: 47 year old YOB: 1970     Date of Admission:  3/14/2018    Primary care provider: Maci Iglesias     Assessment and Plan:  46 yo male with h/o pancreatic cancer s/p neoadjuvant FOLFIRINOX, whipple, and adjuvant gemcitabine and Xeloda admitted with portal vein narrowing, hepatic abscesses, cholangitis, and polymicrobial bacteremia.    1.  Pancreatic cancer:  He last received treatment with gemcitabine and xeloda from 10/2017 - 12/2017.  At the time he was initiated on this treatment there was no definitive evidence of malignancy on imaging, however, treatment was initiated due to concern for recurrent disease given indeterminate PET/CT findings, symptoms of weight loss, abdominal pain, and failure to thrive, and elevated .   However, in retrospect, his symptoms were likely due to developing cirrhosis/portal hypertension and unresolved hepatic abscess.  On review of recent imaging (3/15 MRCP, 02/2018 CT abdomen/pelvis, 12/2017 CT C/A/P) there is no definitive evidence of residual malignancy.  Patient expresses frustration at communication with his medical team at his previous treating facility and would like to transfer care to the [facility].  - agree with repeating , drawn 3/16 results pending  - will schedule outpatient oncology visit with either Dr. Walls or Dr. Cuevas within the next 2-4 weeks  - recommend repeat CT C/A/P 1-2 days prior to oncology visit.    2.  Hepatic abscesses, portal vein narrowing, cholangitis, bacteremia:  He is on Zosyn.  Plan is for ERCP on 3/19.  Primary service is discussing possibility of portal vein stenting with interventional radiology.  Ongoing management per medicine, gastroenterology, and surgical oncology services.    3.  Pulmonary nodules:  Multiple subcentimeter pulmonary nodules indeterminate in nature.    - recommend repeat CT as above.          Chief Complaint:   46 yo male was  diagnosed with pancreatic cancer in 1/2017.  Clinical staging at diagnosis was T3N1M0.  He was treated with 2 months of neoadjuvant FOLFIRINOX.  Per patient, he tolerated this initial chemotherapy well.  He had a whipple procedure on 5/9/17.  Pathologic staging was T3N0M0.  Post-operative  was persistently elevated at 137.  Plan was to give adjuvant gemcitabine and xeloda.  Post-operative CT showed an ill-defined soft tissue density in the hilum of the liver, severe narrowing of the upper superior mesenteric and proximal portal veins by the soft tissue process concerning for residual malignancy and a 4.6 cm rim-enhancing mass along the inferior edge of the lateral left lobe of the liver.  Biopsy of the liver mass in 07/2017 showed inflammatory, necrotic tissue and grew enterococcus, therefore left liver mass was c/w abscess.  He was treated with multiple courses of antibiotics.      In 9/2017 he was noted to have weight loss, abdominal pain, and failure to thrive.  PET/CT showed mild decrease in soft tissue density in the postsurgical bed with mild associated FDG uptake that may be inflammatory/pancreatitis or represent recurrent disease.  There was also a small focus of FDG uptake in the left hepatic lobe anteriorly without correlative CT lesion.   was now elevated at 355.  He was treated with gemcitabine and Xeloda from 10/2017 - 12/2017.  This was very poorly tolerated and chemotherapy was eventually stopped due to worsening liver function.   at time of stopping chemotherapy was 397.  CT 12/21/17 showed heterogeneous appearance to the enhancement pattern of liver, nodular contour to the liver suggestive of early cirrhosis, extensive ascites, splenomegaly, mild esophageal varices, and patency of the portal and hepatic veins.  Patient had multiple paracenteses with negative cytology.  Plan was for patient to see hepatology at the [facility] in 01/2018, however, the appointment was cancelled.   Liver biopsy on 2/27/18 showed possible hepatoportal sclerosis.  Repeat CT the same day showed an unchanged 1 cm cystic lesion at the pancreatic neck/body, extensive edema in the postoperative bed extending to the jillian hepatis and resulting in narrowing and attenuation at the confluence of the splenic and superior mesenteric veins and narrowing of the extrahepatic portal vein.  Patient states he continued to lose weight and developed generalized weakness.      He presented to the [facility] ED with these symptoms as well as RUQ abdominal pain, hyperbilirubinemia, KAT, and melena.  US of the liver on 3/15 showed multiple hypoechoic lesions throughout the hepatic parenchyma, the largest measuring 3.5 cm int he right subcapsular region with a second 2.3 cm lesion in the right hepatic lobe consistent with hepatic abscesses.  MRCP confirmed liver abscesses, cirrhosis with portal hypertension, hepatic and intrahepatic biliary ductal dilatation, and high-grade narrowing of the splenoportal confluence.  Blood cultures are positive for E. coli, C. perfringens, Citrobacter, and enterococcus.           Past Medical History:     Past Medical History:   Diagnosis Date     Hypertension      Pancreatic cancer (H) 01/2017            Past Surgical History:      Past Surgical History:   Procedure Laterality Date     LAPAROSCOPY DIAGNOSTIC (GENERAL)  09/2017     WHIPPLE PROCEDURE  05/2017            Social History:   .  Has 3 daughters.  Nonsmoker.  At the time of his diagnosis, he was drinking 2-3 times a week, 5-8 drinks.  No recent alcohol intake.         Family History:   Father with h/o colon cancer at 66 yo.  Paternal grandmother with abdominal cancer of unknown primary source.  Paternal aunt also with abdominal cancer of unknown etiology.         Allergies:   No Known Allergies         Medications:     Current Facility-Administered Medications   Medication     vancomycin (VANCOCIN) 1,500 mg in sodium chloride 0.9 %  "250 mL intermittent infusion     HYDROmorphone (DILAUDID) tablet 2-4 mg     vitamin D (ERGOCALCIFEROL) capsule 50,000 Units     multivitamin CF formula (DEKAs Plus) per capsule 1 capsule     ascorbic acid (vitamin C) chewable tablet 250 mg     naloxone (NARCAN) injection 0.1-0.4 mg     sertraline (ZOLOFT) tablet 50 mg     glucose 40 % gel 15-30 g    Or     dextrose 50 % injection 25-50 mL    Or     glucagon injection 1 mg     amylase-lipase-protease (CREON 24) 74517-57735 UNITS per EC capsule 48,000 Units     simethicone (MYLICON) chewable tablet 80 mg     methadone (DOLOPHINE) tablet 5 mg     insulin aspart (NovoLOG) inj (RAPID ACTING)     pantoprazole (PROTONIX) 40 mg IV push injection     piperacillin-tazobactam (ZOSYN) 4.5 g vial to attach to  mL bag            Review of Systems:    ROS: A complete 10 point ROS neg other than the symptoms noted above in the HPI.    /74 (BP Location: Right arm)  Pulse 69  Temp 96.8  F (36  C) (Oral)  Resp 16  Ht 1.829 m (6' 0.01\")  Wt 70.3 kg (154 lb 14.4 oz)  SpO2 98%  BMI 21 kg/m2  General:  Fatigued appearing, very thin adult male in NAD.  HEENT:  Normocephalic.  Sclera icteric.  MMM.  No lesions of the oropharynx.  Lymph:  No palpable cervical or supraclavicular LAD.  Chest:  CTA bilaterally.  No wheezes or crackles.  CV:  RRR.  Nl S1 and S2.  No m/r/g.  Abd:  Mildly distended, diffusely tender to palpation.  BSs normoactive.    Ext:  1+ pitting edema of the bilateral lower extremities.  Pulses 2+ and symmetric.  Musculo:  Strength 5/5 throughout.  Neuro:  Cranial nerves grossly intact.  Gait stable.  Psych:  Mood and affect appear normal.  Skin:  Jaundiced           Data:   Both outside and in house imaging and laboratory studies were reviewed and summarized in the HPI.    Results for ALMAS MERAZ (MRN [id number]) as of 3/18/2018 10:58   Ref. Range 3/18/2018 05:48   Bilirubin Total Latest Ref Range: 0.2 - 1.3 mg/dL 8.2 (H)   Alkaline Phosphatase " Latest Ref Range: 40 - 150 U/L 2011 (H)   ALT Latest Ref Range: 0 - 70 U/L 79 (H)   AST Latest Ref Range: 0 - 45 U/L 52 (H)   Results for ALMAS MERAZ (MRN [id number]) as of 3/18/2018 10:58   Ref. Range 3/18/2018 05:48   Albumin Latest Ref Range: 3.4 - 5.0 g/dL 2.0 (L)   Results for ALMAS MERAZ (MRN [id number]) as of 3/18/2018 10:58   Ref. Range 3/18/2018 05:48   WBC Latest Ref Range: 4.0 - 11.0 10e9/L 11.9 (H)   Hemoglobin Latest Ref Range: 13.3 - 17.7 g/dL 9.5 (L)   Hematocrit Latest Ref Range: 40.0 - 53.0 % 30.1 (L)   Platelet Count Latest Ref Range: 150 - 450 10e9/L 63 (L)      Results for ALMAS MERAZ (MRN [id number]) as of 3/18/2018 10:58   Ref. Range 3/18/2018 05:48   INR Latest Ref Range: 0.86 - 1.14  1.44 (H)     3/15/18 MRCP:  IMPRESSION:   1. Moderate volume ascites degrades images on multiple sequences.  2. Clustered cystic lesions primarily involving hepatic segments 5 and  8 with peripheral enhancement and diffusion restriction, compatible  with provided history of hepatic abscesses.  3. Features suggestive of cirrhosis with evidence of portal  hypertension including splenomegaly and moderate volume ascites.  4. Hepatic and intrahepatic biliary ductal dilatation relate to  postcholecystectomy state and hepaticojejunostomy; peripheral biliary  tree is obscured by artifact.  5. High-grade narrowing of the splenoportal confluence seen on prior  imaging is obscured by artifact.    Tavia Pat MD

## 2018-03-18 NOTE — PLAN OF CARE
Problem: Patient Care Overview  Goal: Plan of Care/Patient Progress Review  Outcome: No Change  Pt A/O. Didn't get much sleep overnight. PRN dilaudid given x 1 for abdominal pain with relief. IV antibiotics continue. Team updated about positive blood cultures. Plan for ERCP Monday. Bathroom voiding and reports that loose stools continue. Blood sugars overnight 153 and 98. Calorie counts in progress.  VSS.

## 2018-03-18 NOTE — PROGRESS NOTES
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 On imaging was found to have narrowing of the portal vein confluence, likely 2/2 complication of Whipple. This obstruction could be complicating proper drainage of liver leading to abscess formation and ascending cholangitis.     He recently underwent liver biopsy on 2/21 which did not show fibrosis or sinusoidal obstruction. It did show evidence of hepatoportal sclerosis with hepatocellular atrophy.    Cx from OSH obtained 3/13 are growing E coli, citrobacter, Clostridium perfringens, and enterococcus, all pan-sensitive to zosyn, thus vancomycin was discontinued on 3/16. We will continue to obtain daily BCx2 both aerobic and anaerobic until neg for 72 hours. Since starting zosyn, pt has remained afebrile and hemodynamically stable. Mentation has also improved.     ##E coli, C perfringens, Citrobacter, and enterococcus bacteremia obtained on OSH BCx 3/13  ##Probably biliary source given past hx abscess, current abscess on imaging, and poor drainage of liver   ##Hepatoportal sclerosis  Bacteremia, suspect GI source with possible ascending cholangitis and/or liver abscess. Pt has been afebrile and hemodynamically stable.  U/S showed possible abscess, with retrograde flow in the L portal vein, w/ c/f narrowing or occlusion of the splenoportal confluence.    --cont zosyn,  restarted vancomycin per ID   --f/u on sensitivities   --culture if sensitive    --MRCP and MRI abdomen with and without contrast    --reg diet following MRCP   --NPO at midnight for possible ERCP in AM        ##Possible SBP  Pt has WBC 1093 with 79% PMNs. So far gram stain neg. Cx pending. Elevated WBC could also be sec to malignant effusion. Cytology pending.    --pt is covered with zosyn   --will consider albumin in AM   -- follow up cytology result    #Pre-Renal KAT- improving with IVFs.   Cr peaked at 2.3 at [facility] ED. S/p 3L at OSH.   --continue LR infusion continuous    ##Hematochezia  ##Melena  Pt has been having tarry stools, melenic alternating with hematochezia. However, Hgb has been stable. GI is aware. Considering possible ishcemia vs other GI cause of bleed. Now with stable hemoglobin and brown color stools.   --not pursue endoscopy at this time   --await further GI recs   --transfuse for Hgb< 7   -- Continue PPI BID    ##Pancreatic Insufficiency  ##Chronic Diarrhea  ##Hypovitamin D  ##Pancreatic cancer  (T3N1) s/p whipple 9/2017 along with FOLFIRINOX and Gemzar/Xeloda c/b enterococcal liver abscess (treated) along with non-malignant ascites. Pt last took Gemzar and xeloda in Nov 2017.  Pt has had chronic diarrhea that occurs immediately after eating. Likely 2/2 malabsorption  2/2 pancreatic cancer and Whipple. . Pt has been continuing CREON at home.    --cont CREON with food   --GI aware, awaiting further recs   --nutrition consult placed    --start Vit D2 50K units once weekly for 6-8 weeks    --MV ADEK supplementation    --per oncology recs: will f/u as OP with oncology in 2-4 weeks; recommend repeat      lCAP 1-2 days prior to OP oncology visit     MELD-Na score: 19 at 3/18/2018  5:48 AM  MELD score: 19 at 3/18/2018  5:48 AM  Calculated from:  Serum Creatinine: 1.14 mg/dL at 3/18/2018  5:48 AM  Serum Sodium: 138 mmol/L (Rounded to 137) at 3/18/2018  5:48 AM  Total Bilirubin: 8.2 mg/dL at 3/18/2018   5:48 AM  INR(ratio): 1.44 at 3/18/2018  5:48 AM  Age: 47 years    ##Chronic cancer pain   --cont dilaudid 2 mg q6hrs prn   --cont methadone 5 mg TID     ##Toxic metabolic Encephalopathy, improving  Likely suspect hepatic encephalopathy vs AMS 2/2 increased dilaudid use at home, vs infection (gram neg bacteremia). Pt has some asterixis on exam. Mental status improved within 24 hours of IV abx and also with decreasing frequency of pain meds. Ammonia at OSH <10, TSH WNL, B12 and folate WNL.   --cont to monitor    ##Normocytic Anemia  Pt has had a slow decline from hgb 10-11 throughout 2017 to now 7.7 and 8.6 at OSH and 9 here. It does not appear that the Hgb drop is acute, more subacute looking at the full trend. His microcytosis is likely due to iron deficiency from reduced po intake which he does note. His RI is 0.3 suggesting underproduction rather than hemolysis or consumption; possibly again from iron deficiency. He does have new thrombocytopenia raising concern for hemolysis but bilirubin is mostly conjugated arguing against this and will discuss below more likely thrombocytopenia causes. Fibrinogen is not low to suggest DIC. On DDx is also capecitabine and gemcitabine side effects- but he has not been taking these since November.   Iron panel shows low iron, low iron binding capacity, and low iron saturation index, and normal ferritin c/w ACD. Peripheral smear is normal, and  WNL.  Plan:   --Monitor     ##Thrombocytopenia  Patient possibly has sinusoidal obstructive syndrome which can cause low Plts along with the conjugated hyperbilirubinemia which the patient also has. 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 Possibly also due to capecitabine and gemcitabine as noted above, although pt has not taken this since Nov 2017. No EtOH use, no other med culprits, no reason to believe he has a new viral (HIV, EBV, etc) infection. No DIC given high fibrinogen. No hemolysis given mostly  conjugated bilirubin and normal LDH. Peripheral smear is normal   --CTM      ##Hyperglycemia - could be sec to pancreatic endocrine insufficiency in setting of panc cancer s/p whipple  Pt does not have a hx of diabetes.   --cont SSI   --hypoglycemia protocol   --Check HgbA1c    ##Rib pain  Xray did not show metastasis but is not sensitive.     ##Prophylaxis:    ##DVT: SCDs for now given slow Hgb drop, AM team to consider pharmacologic ppx give high risk  ##GI: PPI IV BID  ##Diet: Reg, NPO at [facility] for ERCP    Family updated     ##DNR/DNI    Pt was discussed and seen with Dr. Urbina.    Jeanna Vinson MD PhD   Internal Medicine, PGY 1  p       Interval History   -Nursing Notes Reviewed: Pt afebrile O/N; cont to have + BCx; says pain is well controlled.    Pt does not feel fevers, chills, chest pain, n/v, does not have SOB. Complains of increased swelling in hands.     Review of Systems   ROS:  10pt  point ROS including Respiratory, CV, GI and , other than that noted above    Physical Exam   Vital Signs with Ranges  Temp:  [96.4  F (35.8  C)-96.9  F (36.1  C)] 96.9  F (36.1  C)  Pulse:  [71] 71  Heart Rate:  [65-69] 69  Resp:  [12-16] 12  BP: (119-124)/(74-79) 120/79  SpO2:  [98 %-99 %] 99 %  I/O last 3 completed shifts:  In: 1630 [P.O.:1080; I.V.:550]  Out: -   Wt Readings from Last 1 Encounters:   03/17/18 70.3 kg (154 lb 14.4 oz)    Body mass index is 21 kg/(m^2). Resp: 12    General: Alert, oriented, cooperative, no apparent distress, appears nontoxic.  Eyes: Scleral icterus, pupils are reactive.  HEENT: Oropharynx is clear and moist.   Lymph/Hematologic: No anterior or posterior cervical, or supraclavicular lymphadenopathy is appreciated.  Cardiovascular: Regular rate and rhythm, normal S1 and S2, and no murmur noted. JVP is normal. Good peripheral pulses in wrists bilaterally. +1 pitting edema in lower extremities, bilaterally.  Respiratory: Clear to auscultation bilaterally.   GI: Soft, tender to  palpation, has umbilical hernia that is reducible, normal bowel sounds, surgical scars present.  Genitourinary: no hoff  Musculoskeletal: Normal muscle bulk and tone.  Skin: Warm and dry, no rashes.   Neurologic: Neck supple. No focal deficits.          Intake/Output Summary (Last 24 hours) at 03/17/18 2001  Last data filed at 03/17/18 1847   Gross per 24 hour   Intake             2900 ml   Output                0 ml   Net             2900 ml        Data   Lines/Tubes:   Peripheral IV 03/14/18 Left Upper arm (Active)   Site Assessment WDL 3/15/2018 10:00 AM   Line Status Saline locked 3/15/2018 10:00 AM   Phlebitis Scale 0-->no symptoms 3/15/2018 10:00 AM   Infiltration Scale 0 3/15/2018 10:00 AM   Extravasation? No 3/15/2018  7:00 AM   Number of days:1       Peripheral IV 03/14/18 Right Lower forearm (Active)   Site Assessment [facility] 3/15/2018 10:00 AM   Line Status Infusing 3/15/2018 10:00 AM   Phlebitis Scale 0-->no symptoms 3/15/2018 10:00 AM   Infiltration Scale 0 3/15/2018 10:00 AM   Extravasation? No 3/15/2018  7:00 AM   Number of days:1       Port A Cath Single 03/14/18 Right Chest wall (Active)   Dressing Intervention No dressing 3/14/2018  4:00 PM   Number of days:1     Labs & Studies of Note: I personally reviewed the following studies:    Micro:  See below in results for updated blood culture results  C diff neg  O & P neg  Ent pathogen panel neg  U Cx NGTD  UA noninfectious    ROUTINE LABS (Last four results)  CMP    Recent Labs  Lab 03/18/18  0548 03/17/18  0607 03/16/18  0709 03/15/18  0658  03/14/18  0228    137 136 136  < > 134   POTASSIUM 3.3* 3.9 4.1 4.7  < > 5.0   CHLORIDE 106 107 104 105  < > 101   CO2 23 23 24 21  < > 22   ANIONGAP 9 8 9 10  < > 11   * 109* 139* 197*  < > 241*   BUN 44* 58* 66* 75*  < > 84*   CR 1.14 1.21 1.21 1.24  < > 1.38*   GFRESTIMATED 68 64 64 62  < > 55*   GFRESTBLACK 83 77 77 75  < > 66   CHIDI 7.9* 7.7* 7.7* 7.7*  < > 8.0*   MAG 2.4* 2.3 2.5* 2.4*  --  2.4*    PHOS  --   --   --   --   --  3.1   PROTTOTAL 6.3* 5.5* 5.7* 5.4*  < > 5.8*   ALBUMIN 2.0* 1.7* 1.9* 1.8*  < > 2.1*   BILITOTAL 8.2* 4.8* 3.4* 5.1*  < > 6.0*   ALKPHOS 2011* 1053* 680* 579*  < > 696*   AST 52* 51* 54* 54*  < > 57*   ALT 79* 69 74* 74*  < > 92*   < > = values in this interval not displayed.  CBC    Recent Labs  Lab 03/18/18  0548 03/17/18  0607 03/16/18  0709 03/15/18  0658   WBC 11.9* 10.5 10.9 10.4   RBC 3.69* 3.36* 3.48* 3.37*   HGB 9.5* 8.6* 9.0* 8.7*   HCT 30.1* 27.0* 27.5* 26.8*   MCV 82 80 79 80   MCH 25.7* 25.6* 25.9* 25.8*   MCHC 31.6 31.9 32.7 32.5   RDW 15.8* 15.6* 15.6* 15.3*   PLT 63* 44* 32* 34*     INR    Recent Labs  Lab 03/18/18  0548 03/17/18  0607 03/16/18  0709 03/14/18  0228   INR 1.44* 1.64* 1.60* 1.55*     Inflammatory Markers    Recent Labs   Lab Test  03/14/18   1742  09/13/17   1810   CRP  160.0*  5.8     Immune Globulin Studies  No lab results found.    Microbiology:  Significant culture results:  Culture Micro   Date Value Ref Range Status   03/18/2018 No growth after 6 hours  Preliminary   03/18/2018 No growth after 6 hours  Preliminary   03/17/2018 (A)  Preliminary    Cultured on the 1st day of incubation:  Gram positive cocci in pairs and chains     03/17/2018   Preliminary    Critical Value/Significant Value, preliminary result only, called to and read back by  Mingo Davis RN U5B 0043 03.18.18 CF     03/17/2018 No growth after 1 day  Preliminary   03/17/2018 Canceled, Test credited  Final   03/17/2018 Duplicate request  Final   03/17/2018 Canceled, Test credited  Final   03/17/2018 Duplicate request  Final   03/16/2018 Culture negative monitoring continues  Preliminary   03/16/2018 Culture negative monitoring continues  Preliminary   03/16/2018   Final    Canceled, Test credited  Cancelled by lab - Specimen never received.     03/16/2018   Final    Canceled, Test credited  Cancelled by lab - Specimen never received.     03/16/2018 No growth after 2 days   Preliminary   03/16/2018 (A)  Preliminary    Cultured on the 1st day of incubation:  Escherichia coli  Susceptibility testing done on previous specimen     03/16/2018 (A)  Preliminary    Cultured on the 1st day of incubation:  Enterococcus faecalis  Susceptibility testing done on previous specimen     03/16/2018   Preliminary    Critical Value/Significant Value, preliminary result only, called to and read back by  JESSICA LUTHER RN 3.16.18 2105. JAIME     03/15/2018 (A)  Preliminary    Cultured on the 1st day of incubation:  Citrobacter freundii  Susceptibility testing done on previous specimen     03/15/2018   Preliminary    Critical Value/Significant Value, preliminary result only, called to and read back by  GAUTAM KING RN @0512 3/16/18. SCG     03/15/2018 No growth after 3 days  Preliminary   03/14/2018 No growth  Final   03/14/2018 (A)  Preliminary    Cultured on the 1st day of incubation:  Citrobacter freundii     03/14/2018   Preliminary    Critical Value/Significant Value, preliminary result only, called to and read back by  Trini Garcia RN 0702 3/15/18. MS     03/14/2018 (A)  Preliminary    Cultured on the 1st day of incubation:  Escherichia coli     03/14/2018   Preliminary    Critical Value/Significant Value called to and read back by  Jayna Brady RN 5B @ 1021. 03/17/18 03/14/2018 Susceptibility testing done on previous specimen  Preliminary   03/14/2018 (A)  Preliminary    Cultured on the 1st day of incubation:  Citrobacter freundii     03/14/2018   Preliminary    Critical Value/Significant Value, preliminary result only, called to and read back by  Socorro Edouard RN 0600 3/15/18. MS     03/14/2018 (A)  Preliminary    Cultured on the 1st day of incubation:  Escherichia coli     03/14/2018   Preliminary    Critical Value/Significant Value called to and read back by  Jayna Brady 5B @ 1216. 03/17/18 03/14/2018 Susceptibility testing done on previous specimen  Preliminary   03/14/2018 (A)   Preliminary    Cultured on the 1st day of incubation:  Escherichia coli  Susceptibility testing done on previous specimen     03/14/2018 (A)  Preliminary    Cultured on the 1st day of incubation:  Enterococcus faecalis     03/14/2018   Preliminary    Critical Value/Significant Value, preliminary result only, called to and read back by  Allison Vallejo RN 0139 3/15/18. MS     03/14/2018   Preliminary    (Note)  NEGATIVE for the following: Staphylococcus spp., Staph aureus, Staph  epidermidis, Staph lugdunensis, Streptococcus spp., Strep pneumoniae,  Strep pyogenes, Strep agalactiae, Strep anginosus group, Enterococcus  faecalis, Enterococcus faecium, and Listeria spp. by Verigene  multiplex nucleic acid test. Final identification and antimicrobial  susceptibility testing will be verified by standard methods.    Critical Value/Significant Value called to and read back by Trini Garcia RN 0417 3/15/18. MS     03/14/2018 (A)  Preliminary    Cultured on the 1st day of incubation:  Escherichia coli     03/14/2018   Preliminary    Critical Value/Significant Value, preliminary result only, called to and read back by  Radha Sims RN, @2031 03/14/18..     03/14/2018 (A)  Preliminary    Cultured on the 1st day of incubation:  Citrobacter freundii complex     03/14/2018   Preliminary    Critical Value/Significant Value called to and read back by  Dr. Yamile Reddy @ 1227. 03/16/18 03/14/2018   Preliminary    (Note)  POSITIVE for E.COLI by Verigene multiplex nucleic acid test. Final  identification and antimicrobial susceptibility testing will be  verified by standard methods. Verigene test will not distinguish  E.coli from Shigella species including S.dysenteriae, S.flexneri,  S.boydii, and S.sonnei. Specimens containing Shigella species or  E.coli will be reported as Positive for E.coli.    Specimen tested with Verigene multiplex, gram-negative blood culture  nucleic acid test for the following targets: Acinetobacter  sp.,  Citrobacter sp., Enterobacter sp., Proteus sp., E. coli, K.  pneumoniae/oxytoca, P. aeruginosa, and the following resistance  markers: CTXM, KPC, NDM, VIM, IMP and OXA.    Critical Value/Significant Value called to and read back by Radha Sims RN, @1349 03/14/18..     03/14/2018 (A)  Preliminary    Cultured on the 1st day of incubation:  Escherichia coli  Susceptibility testing done on previous specimen     03/14/2018   Preliminary    Critical Value/Significant Value, preliminary result only, called to and read back by  Radha Sims RN, @ 2239 03/14/18..       Recent Labs   Lab Test  03/18/18   0558  03/18/18   0548  03/17/18   0613   CULT  No growth after 6 hours  No growth after 6 hours  Cultured on the 1st day of incubation:  Gram positive cocci in pairs and chains  *  Critical Value/Significant Value, preliminary result only, called to and read back by  Mingo Davis RN U5B 0043 03.18.18 CF     SDES  Blood Left FOREARM  Blood Left Arm  Blood Left Arm       Urine Studies:    Recent Labs   Lab Test  03/14/18   2144   LEUKEST  Negative   NITRITE  Negative   WBCU  1   RBCU  2       Imaging:   No results found for this or any previous visit (from the past 24 hour(s)).    Medications   Medications list for Reference (delete if desired)  Current Facility-Administered Medications   Medication     vancomycin (VANCOCIN) 1,500 mg in sodium chloride 0.9 % 250 mL intermittent infusion     HYDROmorphone (DILAUDID) tablet 2-4 mg     vitamin D (ERGOCALCIFEROL) capsule 50,000 Units     multivitamin CF formula (DEKAs Plus) per capsule 1 capsule     ascorbic acid (vitamin C) chewable tablet 250 mg     naloxone (NARCAN) injection 0.1-0.4 mg     sertraline (ZOLOFT) tablet 50 mg     glucose 40 % gel 15-30 g    Or     dextrose 50 % injection 25-50 mL    Or     glucagon injection 1 mg     amylase-lipase-protease (CREON 24) 65190-84063 UNITS per EC capsule 48,000 Units     simethicone (MYLICON) chewable tablet 80 mg      methadone (DOLOPHINE) tablet 5 mg     insulin aspart (NovoLOG) inj (RAPID ACTING)     pantoprazole (PROTONIX) 40 mg IV push injection     piperacillin-tazobactam (ZOSYN) 4.5 g vial to attach to  mL bag       # Pain Assessment:   Current Pain Score 3/13/2018 3/13/2018 3/12/2018   Patient currently in pain? yes sleeping: patient not able to self report denies   Pain location Back - -   Pain descriptors Sore - -   - Naresh is experiencing pain due to past pancreatic cancer. Pain management was discussed with Naresh and his spouse and the plan was created in a collaborative fashion.  Naresh's response to the current recommendations: compliant  - Please see the plan for pain management as documented above

## 2018-03-18 NOTE — PROGRESS NOTES
Calorie Count  Intake recorded for: 3/17                  Kcals: 1169                       Protein: 30g  # Meals Recorded: 3 meals (First- 100% blueberry muffin, 8oz orange juice, 25% mushroom and cheese omelet)      (Second- 100% tomato soup, pears, chocolate pudding, 50% peanut butter and jelly sandwich, vanilla pudding)      (Third- 100% fruit cup, popsicle, 50% turkey sandwich)  # Supplements Recorded: 0

## 2018-03-18 NOTE — PHARMACY-VANCOMYCIN DOSING SERVICE
Pharmacy Vancomycin Initial Note  Date of Service 2018  Patient's  1970  47 year old, male    Indication: Bacteremia    Current estimated CrCl = Estimated Creatinine Clearance: 78.8 mL/min (based on Cr of 1.14).    Creatinine for last 3 days  3/16/2018:  7:09 AM Creatinine 1.21 mg/dL  3/17/2018:  6:07 AM Creatinine 1.21 mg/dL  3/18/2018:  5:48 AM Creatinine 1.14 mg/dL    Recent Vancomycin Level(s) for last 3 days  No results found for requested labs within last 72 hours.      Vancomycin IV Administrations (past 72 hours)      No vancomycin orders with administrations in past 72 hours.                Nephrotoxins and other renal medications (Future)    Start     Dose/Rate Route Frequency Ordered Stop    18 1030  vancomycin (VANCOCIN) 1,500 mg in sodium chloride 0.9 % 250 mL intermittent infusion      1,500 mg  over 90 Minutes Intravenous EVERY 12 HOURS 18 1025      18 2100  piperacillin-tazobactam (ZOSYN) 4.5 g vial to attach to  mL bag      4.5 g  over 30 Minutes Intravenous EVERY 6 HOURS 18 2048            Contrast Orders - past 72 hours (72h ago through future)    Start     Dose/Rate Route Frequency Ordered Stop    03/15/18 1745  gadobutrol (GADAVIST) injection 7.5 mL      7.5 mL Intravenous ONCE 03/15/18 1736 03/15/18 1737                Plan:  1.  Start vancomycin  1500 mg IV q12hrs.   2.  Goal Trough Level: 15-20 mg/L   3.  Pharmacy will check trough levels as appropriate in 1-3 Days.    4. Serum creatinine levels will be ordered daily for the first week of therapy and at least twice weekly for subsequent weeks.    5. [place] method utilized to dose vancomycin therapy: Method 2    Thanks for the consult  Nola Caballero, Pharm.D, BCPS

## 2018-03-18 NOTE — PROGRESS NOTES
Began caring for pt at 1900. VSS. Up independently in room. Abdominal pain at tolerable level. Fair appetite, calorie counts continue. IV antibiotics per order. Will continue with plan of care.

## 2018-03-18 NOTE — PLAN OF CARE
Problem: Patient Care Overview  Goal: Plan of Care/Patient Progress Review  Outcome: No Change  D/I/A: Patient A & O X 4, VSS no respiratory distress noted. Abdominal distended. C/o abdominal pain and received Dilaudid po PRN x 2, and scheduled meds. Patient continue receiving IV abx as ordered. Appetite fair at 75% breakfast and snacks 100% . Potassium level 3.3 and replaced per order.  Patient up in chair for most of time , and up independently in room void adequate and report have three watery  stool. Plan NPO after MN and ERCP on Monday. Family here to visit. Continue monitor closely and update MD with change.

## 2018-03-19 NOTE — PROVIDER NOTIFICATION
Notified of positive blood cultures from 3/17. Right arm. Gram positive cocci in pairs and chains. [facility] cross cover notified.

## 2018-03-19 NOTE — PLAN OF CARE
Problem: Patient Care Overview  Goal: Plan of Care/Patient Progress Review  PT: pt in OR this PM for PT session.   CX PT session today.

## 2018-03-19 NOTE — OR NURSING
Dr. Mcnally is busy in OR.  MD states she will place sign out when she is able, and that pt my transfer back to 5A.

## 2018-03-19 NOTE — ANESTHESIA CARE TRANSFER NOTE
Patient: Naresh Poole    Procedure(s):  enteroscopy with biopsies - Wound Class: II-Clean Contaminated    Diagnosis: biliary stricture  Diagnosis Additional Information: No value filed.    Anesthesia Type:   General, ETT     Note:  Airway :Face Mask  Patient transferred to:PACU  Comments: To pACU, VSS, pt exchanging well, report to RN, care accepted.Handoff Report: Identifed the Patient, Identified the Reponsible Provider, Reviewed the pertinent medical history, Discussed the surgical course, Reviewed Intra-OP anesthesia mangement and issues during anesthesia, Set expectations for post-procedure period and Allowed opportunity for questions and acknowledgement of understanding      Vitals: (Last set prior to Anesthesia Care Transfer)    CRNA VITALS  3/19/2018 1718 - 3/19/2018 1752      3/19/2018             Ht Rate: 76    SpO2: 99 %    Resp Rate (observed): 12    EKG: Sinus rhythm                Electronically Signed By: KASHIF Hopson CRNA  March 19, 2018  5:52 PM

## 2018-03-19 NOTE — PLAN OF CARE
Problem: Patient Care Overview  Goal: Plan of Care/Patient Progress Review  Outcome: No Change  Pt A/O. VSS. No reported BM's overnight, bathroom voiding adequate amounts. Declined SCD's. PRN dilaudid adequately controlling pain. NPO since midnight for ERCP today. Vanco and Zosyn infused per order. Will continue to monitor.

## 2018-03-19 NOTE — PROGRESS NOTES
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 Presented with weakness and AMS. On imaging was found to have narrowing of the portal vein confluence concerning for liver abscess formation and ascending cholangitis. Also found to have polymicrobial bacteremia likely from GI source. Pt has remained afebrile and hemodynamically stable. Mentation has also improved.      ##E coli, C perfringens, Citrobacter, and enterococcus bacteremia obtained on OSH BCx 3/13  ##Probably biliary source given past hx abscess, current abscess on imaging, and poor drainage of liver   ##Hepatoportal sclerosis  Bacteremia, suspect GI source with possible ascending cholangitis and/or liver abscess. Pt has been afebrile and hemodynamically stable.  U/S showed possible abscess, with retrograde flow in the L portal vein, w/ c/f narrowing or occlusion of the splenoportal confluence.                            --cont zosyn, restarted vancomycin per ID                           --f/u on sensitivities                           --culture if sensitive                                             --MRCP and MRI abdomen with and without contrast                                           --reg diet following MRCP                           --ERCP this afternoon--pending results and recs from GI        ##Possible SBP  Pt has WBC 1093 with 79% PMNs. So far gram stain neg. Cx pending. Elevated WBC could also be sec to malignant effusion. Cytology pending.                            --pt is covered with zosyn+vanc                           --will consider albumin in AM                           -- follow up cytology result     #Pre-Renal KAT- improving with IVFs.   Cr peaked at 2.3 at [facility]. S/p 3L  at OSH.                          --continue LR infusion continuous     ##Hematochezia  ##Melena  Pt has been having tarry stools, melenic alternating with hematochezia. However, Hgb has been stable. GI is aware. Considering possible ishcemia vs other GI cause of bleed. Now with stable hemoglobin and brown color stools.                           --not pursue endoscopy at this time                           --await further GI recs                           --transfuse for Hgb< 7                           -- Continue PPI BID     ##Pancreatic Insufficiency  ##Chronic Diarrhea  ##Hypovitamin D  ##Pancreatic cancer  (T3N1) s/p whipple 9/2017 along with FOLFIRINOX and Gemzar/Xeloda c/b enterococcal liver abscess (treated) along with non-malignant ascites. Pt last took Gemzar and xeloda in Nov 2017.  Pt has had chronic diarrhea that occurs immediately after eating. Likely 2/2 malabsorption  2/2 pancreatic cancer and Whipple. . Pt has been continuing CREON at home.                            --cont CREON with food                           --GI aware, awaiting further recs                           --nutrition consult placed                                                  --start Vit D2 50K units once weekly for 6-8 weeks                                                 --MV ADEK supplementation                            --per oncology recs: will f/u as OP with oncology in 2-4 weeks; recommend repeat                           lCAP 1-2 days prior to OP oncology visit      MELD-Na score: 21 at 3/19/2018  6:38 AM  MELD score: 21 at 3/19/2018  6:38 AM  Calculated from:  Serum Creatinine: 1.07 mg/dL at 3/19/2018  6:38 AM  Serum Sodium: 139 mmol/L (Rounded to 137) at 3/19/2018  6:38 AM  Total Bilirubin: 8.9 mg/dL at 3/19/2018  6:38 AM  INR(ratio): 1.59 at 3/19/2018  6:38 AM  Age: 47 years    ##Chronic cancer pain                           --cont dilaudid 2 mg q6hrs prn                           --cont methadone 5 mg TID       ##Toxic metabolic Encephalopathy, improving  Likely suspect hepatic encephalopathy vs AMS 2/2 increased dilaudid use at home, vs infection (gram neg bacteremia). Pt has some asterixis on exam. Mental status improved within 24 hours of IV abx and also with decreasing frequency of pain meds. Ammonia at OSH <10, TSH WNL, B12 and folate WNL.                           --cont to monitor     ##Normocytic Anemia  Pt has had a slow decline from hgb 10-11 throughout 2017 to now 7.7 and 8.6 at OSH and 9 here. It does not appear that the Hgb drop is acute, more subacute looking at the full trend. His microcytosis is likely due to iron deficiency from reduced po intake which he does note. His RI is 0.3 suggesting underproduction rather than hemolysis or consumption; possibly again from iron deficiency. He does have new thrombocytopenia raising concern for hemolysis but bilirubin is mostly conjugated arguing against this and will discuss below more likely thrombocytopenia causes. Fibrinogen is not low to suggest DIC. On DDx is also capecitabine and gemcitabine side effects- but he has not been taking these since November.   Iron panel shows low iron, low iron binding capacity, and low iron saturation index, and normal ferritin c/w ACD. Peripheral smear is normal, and  WNL.  Plan:                          --Monitor      ##Thrombocytopenia  Patient possibly has sinusoidal obstructive syndrome which can cause low Plts along with the conjugated hyperbilirubinemia which the patient also has. 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 Possibly also due to capecitabine and gemcitabine as noted above, although pt has not taken this since Nov 2017. No EtOH use, no other med culprits, no reason to believe he has a new viral (HIV, EBV, etc) infection. No DIC given high fibrinogen. No hemolysis given mostly conjugated bilirubin and normal LDH. Peripheral smear is normal                           "--CTM        ##Hyperglycemia - could be sec to pancreatic endocrine insufficiency in setting of panc cancer s/p whipple  Pt does not have a hx of diabetes.                          --cont SSI                          --hypoglycemia protocol                          --Check HgbA1c     ##Rib pain  Xray did not show metastasis but is not sensitive.      ##Prophylaxis:    ##DVT: SCDs for now given slow Hgb drop, AM team to consider pharmacologic ppx give high risk  ##GI: PPI IV BID  ##Diet: Reg, NPO  for ERCP --pending GI recs    Family updated      ##DNR/DNI     Pt was discussed and seen with Dr. Osman Macias MD  PGY-3  Internal Medicine  [phone]  -----------------------------------------------------------------------------------------------------------------------------------------------      Interval History  No acute events overnight. Went to ERCP this afternoon.     Review of Systems:   A 4 point review of systems was negative except as noted above.    OBJECTIVE:  /83 (BP Location: Right arm)  Pulse 73  Temp 97.6  F (36.4  C) (Oral)  Resp 16  Ht 1.829 m (6' 0.01\")  Wt 74.5 kg (164 lb 4.8 oz)  SpO2 97%  BMI 22.28 kg/m2      Intake/Output Summary (Last 24 hours) at 03/19/18 0703  Last data filed at 03/19/18 0500   Gross per 24 hour   Intake             1720 ml   Output                0 ml   Net             1720 ml       GENERAL APPEARANCE: alert and no distress  Pulmonary: CTAB  CV: regular rhythm, normal rate, no murmur, no rub   - JVP  not elevated    - Edema 1+ BLE  GI: soft, nontender, no HSM   NEURO: mentation intact and speech normal, equally move    Labs:   All labs reviewed by me  Electrolytes/Renal -   Recent Labs   Lab Test  03/18/18   0548  03/17/18   0607  03/16/18   0709   03/14/18   0228   NA  138  137  136   < >  134   POTASSIUM  3.3*  3.9  4.1   < >  5.0   CHLORIDE  106  107  104   < >  101   CO2  23  23  24   < >  22   BUN  44*  58*  66*   < >  84*   CR  1.14  1.21  " 1.21   < >  1.38*   GLC  113*  109*  139*   < >  241*   CHIDI  7.9*  7.7*  7.7*   < >  8.0*   MAG  2.4*  2.3  2.5*   < >  2.4*   PHOS   --    --    --    --   3.1    < > = values in this interval not displayed.     CBC -   Recent Labs   Lab Test  03/18/18   0548  03/17/18   0607  03/16/18   0709   WBC  11.9*  10.5  10.9   HGB  9.5*  8.6*  9.0*   PLT  63*  44*  32*     LFTs   Recent Labs   Lab Test  03/18/18   0548  03/17/18   0607  03/16/18   0709   ALKPHOS  2011*  1053*  680*   BILITOTAL  8.2*  4.8*  3.4*   ALT  79*  69  74*   AST  52*  51*  54*   PROTTOTAL  6.3*  5.5*  5.7*   ALBUMIN  2.0*  1.7*  1.9*       Imaging:  none    Current Medications:    vancomycin (VANCOCIN) IV  1,500 mg Intravenous Q12H     vitamin D  50,000 Units Oral Q7 Days     multivitamin CF formula  1 capsule Oral Daily     ascorbic acid  250 mg Oral Daily     sertraline  50 mg Oral Daily     amylase-lipase-protease  2 capsule Oral TID w/meals     methadone  5 mg Oral TID     insulin aspart  1-6 Units Subcutaneous Q4H     pantoprazole (PROTONIX) IV  40 mg Intravenous BID     piperacillin-tazobactam  4.5 g Intravenous Q6H       Kalin Macias MD

## 2018-03-19 NOTE — PLAN OF CARE
Problem: Patient Care Overview  Goal: Plan of Care/Patient Progress Review  Outcome: No Change  D/I/A; patient A & O X 4, VSS no respiratory distress noted, abdominal distended. Patient remain NPO for procedure. Potassium level 3.3 and replaced per MD order. Patient continue receiving IV abx per order. Patient BS , 123, 158. Patient left to O Rat ~ 1310, via litter accompanied by transport and Spouse, , report given to OR Nurse, and chart sent with transport. Continue carry on as order put in.

## 2018-03-19 NOTE — OP NOTE
ERCP 03/19/2018  3:11 PM [facility], [address] [place] [address] [phone]     Endoscopy Department   _______________________________________________________________________________   Patient Name: Naresh Poole       Procedure Date: 3/19/2018 3:11 PM   MRN: [id number]                       Account Number: [id number]   YOB: 1970              Admit Type: Inpatient   Age: 47                               Room: OR   Gender: Male                          Note Status: Finalized   Attending MD: Jared Cooney MD  Pause for the Cause: pause for cause    completed   Total Sedation Time:                     _______________________________________________________________________________       Procedure:           ERCP   Indications:         Preop exam: Whipple, Bile duct stricture   Providers:           Jared Cooney MD   Patient Profile:     Mr Poole is a 48yo with T3N1M? pancreatic cancer                        status post Whipple past May at an OSH and a more recent                        course complicated by enterococcal liver abscess and                        non-malignant ascites with recent imaging suggesting                        biliary stenosis(es). He now proceeds to enteroscopy                        assisted ERCP.   Referring MD:        Maci Iglesias   Medicines:           General Anesthesia, Antibiotics as scheduled   Complications:       No immediate complications.   _______________________________________________________________________________   Procedure:           Pre-Anesthesia Assessment:                        - Prior to the procedure, a History and Physical was                        performed, and patient medications and allergies were                        reviewed. The patient is competent. The risks and                        benefits of the procedure and the sedation options and                        risks were  discussed with the patient. All questions                        were answered and informed consent was obtained. Patient                        identification and proposed procedure were verified by                        the nurse in the pre-procedure area. Mental Status                        Examination: alert and oriented. Airway Examination:                        Mallampati Class II (the uvula but not tonsillar pillars                        visualized). Respiratory Examination: clear to                        auscultation. CV Examination: normal. ASA Grade                        Assessment: III - A patient with severe systemic                        disease. After reviewing the risks and benefits, the                        patient was deemed in satisfactory condition to undergo                        the procedure. The anesthesia plan was to use general                        anesthesia. Immediately prior to administration of                        medications, the patient was re-assessed for adequacy to                        receive sedatives. The heart rate, respiratory rate,                        oxygen saturations, blood pressure, adequacy of                        pulmonary ventilation, and response to care were                        monitored throughout the procedure. The physical status                        of the patient was re-assessed after the procedure.                        After obtaining informed consent, the scope was passed                        under direct vision. Throughout the procedure, the                        patient's blood pressure, pulse, and oxygen saturations                        were monitored continuously. The pediateic colonoscope                        and the enteroscope with ballooned overtube were                        introduced through the mouth, and used to inject                        contrast into without successful cannulation. The                         patient tolerated the procedure well. The ERCP was                        technically difficult and complex due to challenging                        cannulation because of abnormal anatomy.                                                                                     Findings:        The patient was supine thoughout.  films demonstrated numerous        surgical clips and a linear structure that may be a retained surgical        stent. We began with a pediatric colonoscope. The esophagus demonstrated        impressive esohageal varices without active bleeding or stigmata of        imminent bleedand the stomach intact suggesting pylorus sparing anatomy.        The duodenojejunostomy was widely patent and the feeding first explored        for 100cm and found unremarkable. We then back tracked to the        pancreaticobiliary limb. On approach to the right upper quadrant the        bowel was stenotic (perhaps inflammatory) without overt mucosal        abnormality. We then exchanged for the enteroscope alone and ultimately        the enteroscope with single ballooned over tube. We passed the stenotic        region revealing a second synchronous stenosis, this time with        suggestion of mucosal abnormality (biopsied, possibly just edematous        tissues). At this juncture while there was bile the hepaticojejunostomy        could not be located depsite over 90m of evaluation and various        maneuvers such as irrigation. The endoscope was withdrawn.                                                                                     Impression:          - Impressive esohageal varices without active bleeding                        or stigmata of imminent bleed                        - [id number] stenoses of the pancreaticobiliary limb                        with one suspected to involve the hepaticojejunostomy                        and demonstrating abnormal appearing mucosa (biopsied)                         - Hepaticojejunostomy was not demonstrated despite                        numerous maneuvers and over 2h of total effort                        - Healthy appearing pylorus sparing duodenojejunostomy   Recommendation:      - Standard inpatient general anesthesia recovery with                        return to the floor when appropriate                        - If biliary obstruction remains high on the                        differential for biliary abscesses consider percutaneous                        internal external drain placement; obiously care should                        be taken given the cirrhotic nature of the liver; we                        could then rendezvoud if desired to expedite drain                        removal as this would identify the orifice on subsuquent                        endoscopy                        - Dr Cooney to communicate findings of histology when                        available                        - The findings and recommendations were discussed with                        the patient and their family                                                                                       electronically signed by FLAVIA Cooney   _____________________   Jared Cooney MD

## 2018-03-19 NOTE — ANESTHESIA PREPROCEDURE EVALUATION
Naresh Poole is a 47 year old male with a PMH of  worsening liver failure;Weakness;biliary stricture who is scheduled for Procedure(s):  ENDOSCOPIC RETROGRADE CHOLANGIOPANCREATOGRAM  - Wound Class: II-Clean Contaminated    NPO Status: Adequate.  > 6 hours solids, > 2 hours clear liquids.       Past Surgical History:   Procedure Laterality Date     LAPAROSCOPY DIAGNOSTIC (GENERAL)  09/2017     WHIPPLE PROCEDURE  05/2017       Anesthesia Evaluation     . Pt has had prior anesthetic. Type: General    No history of anesthetic complications          ROS/MED HX    ENT/Pulmonary:      (-) tobacco use, asthma and COPD   Neurologic:      (-) CVA, TIA and Neuropathy   Cardiovascular:     (+) hypertension----. : . . . :. .      (-) CAD, irregular heartbeat/palpitations and stent   METS/Exercise Tolerance:     Hematologic:        (-) anemia   Musculoskeletal:         GI/Hepatic:        (-) GERD and liver disease   Renal/Genitourinary:      (-) renal disease   Endo:      (-) Type I DM, Type II DM and thyroid disease   Psychiatric:         Infectious Disease:  - neg infectious disease ROS       Malignancy:   (+) Malignancy History of GI  GI CA status post Surgery,         Other:                     Physical Exam  Normal systems: cardiovascular, pulmonary and dental    Airway   Mallampati: II  TM distance: >3 FB  Neck ROM: full    Dental     Cardiovascular   Rhythm and rate: regular and normal      Pulmonary    breath sounds clear to auscultation                    Anesthesia Plan      History & Physical Review  History and physical reviewed and following examination; no interval change.    ASA Status:  3 .        Plan for General and ETT with Intravenous induction. Maintenance will be Balanced.    PONV prophylaxis:  Ondansetron (or other 5HT-3) and Dexamethasone or Solumedrol       Postoperative Care  Postoperative pain management:  Oral pain medications.      Consents  Anesthetic plan, risks, benefits and alternatives  discussed with:  Patient..        William Villalta MD  2:51 PM March 19, 2018                       .

## 2018-03-19 NOTE — PROGRESS NOTES
Calorie Counts  Intake recorded for: 3/18 Kcal: 919 Protein: 53g  # Meals recorded: 3 meals (First: 100% raisin bran, 1% milk, 50% omelet)      (Second: Fruit Loops, 1% milk, orange jello)      (Third: 100% pears, 50% hot roast beef, green beans, white rice)  # Supplements: 0

## 2018-03-19 NOTE — PROGRESS NOTES
Began caring for pt at 1900. A&O, VSS. Up independently in room. PRN dilaudid given x 1 for R sided abdominal pain. Zosyn and vanco continue. NPO at midnight for ERCP. Will continue to monitor.

## 2018-03-20 NOTE — ANESTHESIA POSTPROCEDURE EVALUATION
Patient: Naresh Poole    Procedure(s):  enteroscopy with biopsies - Wound Class: II-Clean Contaminated    Diagnosis:biliary stricture  Diagnosis Additional Information: No value filed.    Anesthesia Type:  General, ETT    Note:  Anesthesia Post Evaluation    Patient location during evaluation: PACU  Patient participation: Able to fully participate in evaluation  Level of consciousness: awake and alert  Pain management: adequate  Airway patency: patent  Cardiovascular status: hemodynamically stable  Respiratory status: acceptable  PONV: none     Anesthetic complications: None          Last vitals:  Vitals:    03/19/18 1830 03/19/18 1845 03/19/18 1900   BP: 112/83 (!) 126/91 (!) 130/93   Pulse:      Resp: 10 12 12   Temp: 35.9  C (96.6  F)  36  C (96.8  F)   SpO2: 99% 99% 100%         Electronically Signed By: Augusta Mcnally MD  March 19, 2018  7:20 PM

## 2018-03-20 NOTE — PLAN OF CARE
Problem: Patient Care Overview  Goal: Plan of Care/Patient Progress Review  PT: checked in with pt this AM pt wanting to wait for wife to come. Will stop back if time allows, pt is schedule for tomorrow.

## 2018-03-20 NOTE — PLAN OF CARE
Problem: Patient Care Overview  Goal: Plan of Care/Patient Progress Review  Outcome: No Change  D/I/A: Patient  A & O X 4, VSS no respiratory distress noted. Abdominal distended, and pain manageable with dilaudid PRN. Patient tolerating clear diet.. Patient up independently in room, void adequate. Patient Vancomycin level 31.9, and Pharmacy update and aware. Patient has order for  Albumin 25% 75gm iv will given when iv Zosyn completed. Pan possible Cholangiogram today. Patient and family aware. Continue monitor closely and update MD with change.

## 2018-03-20 NOTE — ANESTHESIA PREPROCEDURE EVALUATION
Anesthesia Evaluation     . Pt has had prior anesthetic. Type: General    No history of anesthetic complications          ROS/MED HX    ENT/Pulmonary:      (-) tobacco use, asthma and COPD   Neurologic:      (-) CVA, TIA and Neuropathy   Cardiovascular:     (+) hypertension----. : . . . :. .      (-) CAD, irregular heartbeat/palpitations and stent   METS/Exercise Tolerance:     Hematologic:        (-) anemia   Musculoskeletal:         GI/Hepatic:        (-) GERD and liver disease   Renal/Genitourinary:      (-) renal disease   Endo:      (-) Type I DM, Type II DM and thyroid disease   Psychiatric:         Infectious Disease:  - neg infectious disease ROS       Malignancy:   (+) Malignancy History of GI  GI CA status post Surgery,         Other:                     Physical Exam  Normal systems: pulmonary and dental    Airway   Mallampati: II  TM distance: >3 FB  Neck ROM: full    Dental     Cardiovascular   Rhythm and rate: regular and normal      Pulmonary                     ANESTHESIA PREOP EVALUATION    Procedure: Procedure(s):  Anesthesia Out of OR  Percutaneous Transhepatic Cholangiography    HPI: Naresh Poole is a 47 year old male who is presenting for above stated procedure.    PMHx/PSHx/ROS:  Past Medical History:   Diagnosis Date     Hypertension      Pancreatic cancer (H) 01/2017       Past Surgical History:   Procedure Laterality Date     LAPAROSCOPY DIAGNOSTIC (GENERAL)  09/2017     WHIPPLE PROCEDURE  05/2017       ROS as stated above    Soc Hx:   Social History   Substance Use Topics     Smoking status: Never Smoker     Smokeless tobacco: Never Used     Alcohol use No       Allergies: No Known Allergies    Meds:   Prescriptions Prior to Admission   Medication Sig Dispense Refill Last Dose     alum & mag hydroxide-simethicone (MYLANTA) 200-200-20 MG/5ML SUSP suspension Take 15 mLs by mouth 2 times daily as needed for indigestion   3/13/2018 at AM     amLODIPine (NORVASC) 10 MG tablet Take 10 mg  by mouth daily   3/13/2018 at AM     furosemide (LASIX) 20 MG tablet Take 40 mg by mouth daily   3/13/2018 at AM     HYDROmorphone (DILAUDID) 2 MG tablet Take 2 mg by mouth 3 times daily as needed for pain Patient taking differently: 1 tablets by mouth every 4 hours as needed (up to 6 tablets per day).   3/13/2018 at noon     amylase-lipase-protease (CREON 24) 92119-72631 UNITS CPEP per EC capsule Take 2 caps with meals and 1 cap with snacks.   3/13/2018 at PM     methadone (DOLOPHINE) 5 MG tablet Take 5 mg by mouth 3 times daily   3/13/2018 at noon     sertraline (ZOLOFT) 50 MG tablet Take 50 mg by mouth At Bedtime    3/12/2018 at HS     spironolactone (ALDACTONE) 100 MG tablet Take 100 mg by mouth daily   3/13/2018 at AM     simethicone (GAS-X) 80 MG chewable tablet Take 80 mg by mouth every 6 hours as needed for flatulence or cramping   3/13/2018 at AM     ONDANSETRON PO Take 4-8 mg by mouth every 8 hours as needed for nausea   NOT TAKING     polyethylene glycol (MIRALAX/GLYCOLAX) Packet Take 17 g by mouth daily as needed for constipation   NOT TAKING     bisacodyl (DULCOLAX) 5 MG EC tablet Take 5 mg by mouth daily as needed for constipation   NOT TAKING       No current outpatient prescriptions on file.       Physical Exam:  VS: Temp:  [36  C (96.8  F)-36.6  C (97.9  F)] 36.4  C (97.5  F)  Pulse:  [71-81] 71  Heart Rate:  [77] 77  Resp:  [9-16] 16  BP: (122-158)/() 149/96  SpO2:  [96 %-99 %] 98 %   98%, Weight   Wt Readings from Last 2 Encounters:   03/20/18 78.7 kg (173 lb 6.4 oz)   02/21/18 65.3 kg (144 lb)       Labs:    BMP:  Recent Labs   Lab Test  03/20/18   0712   NA  135   POTASSIUM  4.3   CHLORIDE  107   CO2  18*   BUN  45*   CR  1.26*   GLC  183*   CHIDI  7.4*     LFTs:   Recent Labs   Lab Test  03/20/18   0712   PROTTOTAL  6.2*   ALBUMIN  1.9*   BILITOTAL  5.4*   ALKPHOS  >2330*   AST  42   ALT  69     CBC:   Recent Labs   Lab Test  03/20/18   0712   WBC  16.0*   RBC  3.48*   HGB  8.9*   HCT   28.3*   MCV  81   MCH  25.6*   MCHC  31.4*   RDW  16.4*   PLT  157     Coags:  Recent Labs   Lab Test  03/20/18   0712   03/14/18   0228   INR  1.47*   < >  1.55*   PTT   --    --   31   FIBR   --    --   511*    < > = values in this interval not displayed.           Patient discussed with Staff Anesthesiologist.    Sg Mchugh  Anesthesia Resident CA-2  Pager [phone]  March 21, 2018, 7:01 AM    Anesthesia Plan      History & Physical Review      ASA Status:  3 .        Plan for General and ETT with Intravenous induction. Maintenance will be Balanced.    PONV prophylaxis:  Ondansetron (or other 5HT-3) and Dexamethasone or Solumedrol    Naresh Poole is a 47 year old male with pancreatic cancer and ascending cholangitis requiring percutaneous transhepatic cholangiography on 3/21/2018. Completed ERCP on 3/20 and needs percutaneous procedure. Current medical problems include pancreatic cancer with DNR/DNI status, chronic cancer pain, concern for SBP, melena, anemia, and thrombocytopenia.       Postoperative Care  Postoperative pain management:  IV analgesics.      Consents

## 2018-03-20 NOTE — CONSULTS
Patient is on IR schedule 3/21/2018 at 11:30 AM for a PTC with biliary drain placement with offsite anesthesia.   Labs WNL for procedure.  Orders for NPO and scrubs have been entered.  Consent will be done prior to procedure.    Please contact the IR charge RN at [phone] for estimated time of procedure.     Case discussed with Dr. Nguyen from IR and Dr. Vinson.    Adrianne Dale, JOSEFINA, APRN  Interventional Radiology  Phone: [phone]  Pager: [phone]

## 2018-03-20 NOTE — PROGRESS NOTES
Calorie Count  Intake recorded for: 3/19 Kcals: 0  Protein: 0g  # Meals Recorded: no meals ordered from kitchen, no intake recorded.   # Supplements Recorded: no intake recorded.

## 2018-03-20 NOTE — PLAN OF CARE
Problem: Patient Care Overview  Goal: Plan of Care/Patient Progress Review  Assumed cares at 1900. Pt A&O x4. Pt had ERCP can back at 2000. On capno. Blood pressure 140's/90's. Team notified. Other VSS on 2L O2. Pt on clear liquid diet tolerating well. Urine x1 no BM. . K+ replacement given in AM. No coverage needed. Will continue to monitor.

## 2018-03-20 NOTE — PLAN OF CARE
Problem: Patient Care Overview  Goal: Plan of Care/Patient Progress Review  Outcome: No Change  Pt alert and oriented X 4. AVSS. C/O abdominal pain without relief from methadone at 8 pm and dilaudid at 10 pm, requesting for more pain med. Notified MD. PO dilaudid 1 mg given around 0100 per order.  O2 sats 99 % on 2L NC. Respiratory rate 10, ETCO2 29 , IPI 7, encouraged cough and deep breathing, refused incentive spirometer for now, reinforce. Charge RN and float float came to assess. Notified night crosscover. No concerns for now, monitored closely.  and 137. At around 0300, pt sitting at the edge of the bed, refused vital signs, asked the writer to come back later and that he would call me but didn't. Rechecked vital signs around 0445. Respiration 10, ETCO2 31, IPI 8-9. BP elevated. Pt complained of abdominal pain requesting for dilaudid. Prn PO dilaudid 2 mg offered , requested for full dose of 4 mg , given. Monitored respiratory status closely.   P: Monitor respiratory status and pain. Continue to monitor and follow POC.     Pt reports small loose BM X 3, refused to collect sample for C.diff. Ate strawberry popsicle X 1.

## 2018-03-20 NOTE — PHARMACY-VANCOMYCIN DOSING SERVICE
Pharmacy Vancomycin Note  Date of Service 2018  Patient's  1970   47 year old, male    Indication: Bacteremia  Goal Trough Level: 15-20 mg/L  Day of Therapy: 3  Current Vancomycin regimen:  1500 mg IVPB q 12 hrs    Current estimated CrCl = Estimated Creatinine Clearance: 78.1 mL/min (based on Cr of 1.26).    Creatinine for last 3 days  3/18/2018:  5:48 AM Creatinine 1.14 mg/dL  3/19/2018:  6:38 AM Creatinine 1.07 mg/dL  3/20/2018:  7:12 AM Creatinine 1.26 mg/dL    Recent Vancomycin Levels (past 3 days)  3/20/2018: 11:21 AM Vancomycin Level 31.9 mg/L. This represents a 13 hr trough    Vancomycin IV Administrations (past 72 hours)                   vancomycin (VANCOCIN) 1,500 mg in sodium chloride 0.9 % 250 mL intermittent infusion (mg) 1,500 mg New Bag 18 2212     1,500 mg New Bag  1154     1,500 mg New Bag 18 2227     1,500 mg New Bag  1047                Nephrotoxins and other renal medications (Future)    Start     Dose/Rate Route Frequency Ordered Stop    18 2200  vancomycin (VANCOCIN) 1,500 mg in sodium chloride 0.9 % 250 mL intermittent infusion      1,500 mg  over 90 Minutes Intravenous EVERY 24 HOURS 18 1209      18 1200  piperacillin-tazobactam (ZOSYN) 4.5 g vial to attach to  mL bag      4.5 g  over 30 Minutes Intravenous EVERY 6 HOURS 18 1052               Contrast Orders - past 72 hours (72h ago through future)    Start     Dose/Rate Route Frequency Ordered Stop    18 1538  iopamidol (ISOVUE-250) solution  Status:  Discontinued        PRN 18 1539 18 1919          Interpretation of levels and current regimen:  Trough level is  Supratherapeutic    Has serum creatinine changed > 50% in last 72 hours: No    Urine output:  unable to determine    Renal Function: Worsening    Plan:  1.  Decrease Dose to 1500 mg IVPB Q24hrs  2.  Pharmacy will check trough levels as appropriate in 1-3 Days.    3. Serum creatinine levels will be ordered  daily for the first week of therapy and at least twice weekly for subsequent weeks.      Thanks for the consult  Nola Caballero, Pharm.D, BCPS        .

## 2018-03-20 NOTE — PROGRESS NOTES
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 His course was complicated by polymicrobial bacteremia suspected from GI source. Patient underwent ERCP 3/19 revealed a synchronous stenosis of the pancreaticobiliary limb with one suspected to involve the hepaticojejunostomy and showing abnormal mucosa biopsied. Could not demonstrate the hepaticojejunostomy despite numerous maneuvers    RECOMMENDATIONS:  - Recommend IR guided PTC internal/external drain placement   - Trend CBC, BMP and LFTs daily  - Please consider therapeutic tap for patient's ascites (very distended and causing discomfort. Give albumin when tap for fluid)   - Nutrition following.     GI will continue to follow, please page if any questions.     The patient was discussed and plan agreed upon with GI staff, Dr. Eros Lopez  GI Fellow  Pager   ______________________________________________________________  S: reports worsening abdominal distention causing pain.     O:  Blood pressure 122/83, pulse 79, temperature 96.8  F (36  C), temperature source Oral, resp. rate 16, height 6' 0.01\" (1.829 m), weight 169 lb 10.3 oz (76.9 kg), SpO2 99 %.    Gen: no acute distress, very thin  HEENT: atraumatic, positive sclera icterus   CV: RRR, no murmur   Lungs: CLA b/l, no wheezing or crackles   Abd: distended, firm, and diffuse tenderness.   Skin: +ve jaundice   MS: no skeletal pain   Neuro: no asterixis, A&Ox3, no focal neurological deficit  Psych: normal mood      LABS:  BMP  Recent Labs  Lab 03/20/18  0712 03/19/18  0638 03/18/18  0548 03/17/18  0607    139 138 137   POTASSIUM 4.3 " 3.3* 3.3* 3.9   CHLORIDE 107 109 106 107   CHIDI 7.4* 7.7* 7.9* 7.7*   CO2 18* 20 23 23   BUN 45* 33* 44* 58*   CR 1.26* 1.07 1.14 1.21   * 134* 113* 109*     CBC  Recent Labs  Lab 03/20/18  0712 03/19/18  0638 03/18/18  0548 03/17/18  0607   WBC 16.0* 10.3 11.9* 10.5   RBC 3.48* 3.25* 3.69* 3.36*   HGB 8.9* 8.3* 9.5* 8.6*   HCT 28.3* 26.3* 30.1* 27.0*   MCV 81 81 82 80   MCH 25.6* 25.5* 25.7* 25.6*   MCHC 31.4* 31.6 31.6 31.9   RDW 16.4* 16.0* 15.8* 15.6*    69* 63* 44*     INR  Recent Labs  Lab 03/20/18  0712 03/19/18  0638 03/18/18  0548 03/17/18  0607   INR 1.47* 1.59* 1.44* 1.64*     LFTs  Recent Labs  Lab 03/20/18  0712 03/19/18  0638 03/18/18  0548 03/17/18  0607   ALKPHOS >2330* >2330* 2011* 1053*   AST 42 49* 52* 51*   ALT 69 68 79* 69   BILITOTAL 5.4* 8.9* 8.2* 4.8*   PROTTOTAL 6.2* 5.7* 6.3* 5.5*   ALBUMIN 1.9* 1.7* 2.0* 1.7*      PANC  Recent Labs  Lab 03/14/18  0228   LIPASE 20*

## 2018-03-20 NOTE — PROGRESS NOTES
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 Presented with weakness and AMS. On imaging was found to have narrowing of the portal vein confluence concerning for liver abscess formation and ascending cholangitis. Also found to have polymicrobial bacteremia likely from GI source. Pt has remained afebrile and hemodynamically stable. Mentation has also improved.     We are working to determine the best way to control infection source. Pt is s/p ERCP on 3/19 and we are pursuing IR percutaneous drainage on 3/21/18.      ##E coli, C perfringens, Citrobacter, and enterococcus bacteremia obtained on OSH BCx 3/13  ##Probably biliary source given past hx abscess, current abscess on imaging, and poor drainage of liver   ##Hepatoportal sclerosis  Bacteremia, suspect GI source with possible ascending cholangitis and/or liver abscess. Pt has been afebrile and hemodynamically stable.  U/S showed possible abscess, with retrograde flow in the L portal vein, w/ c/f narrowing or occlusion of the splenoportal confluence.                            --cont zosyn and vanc                           --f/u on sensitivities--all covered by vanc and zosyn                           --culture if spikes                                                         --reg diet following MRCP                           --ERCP on 3/20; GI recommends IR drainage with percutaneous drain        ##Possible SBP  Pt has WBC 1093 with 79% PMNs. So far gram stain neg. Cx pending. Elevated WBC could also be sec to malignant effusion. Cytology pending.                            --pt is covered with zosyn+vanc                           --giving 75g Albumin on 3/20 for kidney function                             -- follow up cytology result     #Pre-Renal KAT- improving with IVFs.   Cr peaked at 2.3 at [facility] ED. S/p 3L at OSH.                          --giving albumin challenge 75 g on 3/20    --considered changing IV vanco and zosyn to prevent KAT, however, imipenem coverage is not ideal, so will                CTM Cr      ##Hematochezia  ##Melena  Pt has been having tarry stools, melenic alternating with hematochezia. However, Hgb has been stable. GI is aware. Considering possible ishcemia vs other GI cause of bleed. Now with stable hemoglobin and brown color stools.                           --not pursue endoscopy at this time                           --await further GI recs                           --transfuse for Hgb< 7                           -- Continue PPI BID     ##Pancreatic Insufficiency  ##Chronic Diarrhea  ##Hypovitamin D  ##Pancreatic cancer  (T3N1) s/p whipple 9/2017 along with FOLFIRINOX and Gemzar/Xeloda c/b enterococcal liver abscess (treated) along with non-malignant ascites. Pt last took Gemzar and xeloda in Nov 2017.  Pt has had chronic diarrhea that occurs immediately after eating. Likely 2/2 malabsorption  2/2 pancreatic cancer and Whipple. . Pt has been continuing CREON at home.                            --cont CREON with food                           --GI aware, awaiting further recs                           --nutrition consult placed                                                  --start Vit D2 50K units once weekly for 6-8 weeks                                                 --MV ADEK supplementation                            --per oncology recs: will f/u as OP with oncology in 2-4 weeks; recommend repeat CT CAP       1-2 days prior to OP oncology visit      MELD-Na score: 21 at 3/20/2018  7:12 AM  MELD score: 20 at 3/20/2018  7:12 AM  Calculated from:  Serum Creatinine: 1.26 mg/dL at 3/20/2018  7:12 AM  Serum Sodium: 135 mmol/L at 3/20/2018  7:12 AM  Total Bilirubin: 5.4 mg/dL at  3/20/2018  7:12 AM  INR(ratio): 1.47 at 3/20/2018  7:12 AM  Age: 47 years    ##Chronic cancer pain                           --cont dilaudid 2 mg q6hrs prn                           --cont methadone 5 mg TID      ##Toxic metabolic Encephalopathy, improving  Likely suspect hepatic encephalopathy vs AMS 2/2 increased dilaudid use at home, vs infection (gram neg bacteremia). Pt has some asterixis on exam. Mental status improved within 24 hours of IV abx and also with decreasing frequency of pain meds. Ammonia at OSH <10, TSH WNL, B12 and folate WNL.                           --cont to monitor     ##Normocytic Anemia  Pt has had a slow decline from hgb 10-11 throughout 2017 to now 7.7 and 8.6 at OSH and 9 here. It does not appear that the Hgb drop is acute, more subacute looking at the full trend. His microcytosis is likely due to iron deficiency from reduced po intake which he does note. His RI is 0.3 suggesting underproduction rather than hemolysis or consumption; possibly again from iron deficiency. He does have new thrombocytopenia raising concern for hemolysis but bilirubin is mostly conjugated arguing against this and will discuss below more likely thrombocytopenia causes. Fibrinogen is not low to suggest DIC. On DDx is also capecitabine and gemcitabine side effects- but he has not been taking these since November.   Iron panel shows low iron, low iron binding capacity, and low iron saturation index, and normal ferritin c/w ACD. Peripheral smear is normal, and  WNL.  Plan:                          --Monitor      ##Thrombocytopenia  Patient possibly has sinusoidal obstructive syndrome which can cause low Plts along with the conjugated hyperbilirubinemia which the patient also has. 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 Possibly also due to capecitabine and gemcitabine as noted above, although pt has not taken this since Nov 2017. No EtOH use, no other med culprits, no reason to  "believe he has a new viral (HIV, EBV, etc) infection. No DIC given high fibrinogen. No hemolysis given mostly conjugated bilirubin and normal LDH. Peripheral smear is normal                          --CTM        ##Hyperglycemia - could be sec to pancreatic endocrine insufficiency in setting of panc cancer s/p whipple  Pt does not have a hx of diabetes.                          --cont SSI                          --hypoglycemia protocol                          --Check HgbA1c     ##Rib pain  Xray did not show metastasis but is not sensitive.      ##Prophylaxis:    ##DVT: SCDs for now given slow Hgb drop, AM team to consider pharmacologic ppx give high risk and also procedures  ##GI: PPI IV BID  ##Diet: Reg, NPO at [facility]  for IR stent placement in AM on 3/20/18  Family updated      ##DNR/DNI     Pt was discussed and seen with Dr. Arizmendi.    Jeanna Vinson MD PhD   Internal Medicine, PGY 1  p     -----------------------------------------------------------------------------------------------------------------------------------------------      Interval History  No acute events overnight. Went to ERCP this afternoon.     Review of Systems:   A 4 point review of systems was negative except as noted above.    OBJECTIVE:  /83 (BP Location: Right arm)  Pulse 79  Temp 96.8  F (36  C) (Oral)  Resp 16  Ht 1.829 m (6' 0.01\")  Wt 76.9 kg (169 lb 10.3 oz)  SpO2 99%  BMI 23 kg/m2      Intake/Output Summary (Last 24 hours) at 03/19/18 0703  Last data filed at 03/19/18 0500   Gross per 24 hour   Intake             1720 ml   Output                0 ml   Net             1720 ml       GENERAL APPEARANCE: alert and no distress  Pulmonary: CTAB  CV: regular rhythm, normal rate, no murmur, no rub   - JVP  not elevated    - Edema 1+ BLE  GI: soft, tender to palpation, distended, has umbilical hernia that is reducible  NEURO: mentation intact and speech normal, equally move    Labs:   All labs reviewed by " me  Electrolytes/Renal -   Recent Labs   Lab Test  03/20/18   0712  03/19/18   0638  03/18/18   0548   03/14/18   0228   NA  135  139  138   < >  134   POTASSIUM  4.3  3.3*  3.3*   < >  5.0   CHLORIDE  107  109  106   < >  101   CO2  18*  20  23   < >  22   BUN  45*  33*  44*   < >  84*   CR  1.26*  1.07  1.14   < >  1.38*   GLC  183*  134*  113*   < >  241*   CHIDI  7.4*  7.7*  7.9*   < >  8.0*   MAG  2.4*  2.3  2.4*   < >  2.4*   PHOS   --    --    --    --   3.1    < > = values in this interval not displayed.     CBC -   Recent Labs   Lab Test  03/20/18   0712  03/19/18   0638  03/18/18   0548   WBC  16.0*  10.3  11.9*   HGB  8.9*  8.3*  9.5*   PLT  157  69*  63*     LFTs   Recent Labs   Lab Test  03/20/18   0712  03/19/18   0638  03/18/18   0548   ALKPHOS  >2330*  >2330*  2011*   BILITOTAL  5.4*  8.9*  8.2*   ALT  69  68  79*   AST  42  49*  52*   PROTTOTAL  6.2*  5.7*  6.3*   ALBUMIN  1.9*  1.7*  2.0*       Imaging:  none    Current Medications:    albumin human  75 g Intravenous Once     vancomycin (VANCOCIN) IV  1,500 mg Intravenous Q24H     piperacillin-tazobactam  3.375 g Intravenous Q6H     vitamin D  50,000 Units Oral Q7 Days     multivitamin CF formula  1 capsule Oral Daily     ascorbic acid  250 mg Oral Daily     sertraline  50 mg Oral Daily     amylase-lipase-protease  2 capsule Oral TID w/meals     methadone  5 mg Oral TID     insulin aspart  1-6 Units Subcutaneous Q4H     pantoprazole (PROTONIX) IV  40 mg Intravenous BID       Jeanna Vinson MD

## 2018-03-20 NOTE — PHARMACY-VANCOMYCIN DOSING SERVICE
Pharmacy Vancomycin Initial Note  Date of Service 2018  Patient's  1970  47 year old, male     Indication: Bacteremia     Current estimated CrCl = Estimated Creatinine Clearance: 78.8 mL/min (based on Cr of 1.14).     Creatinine for last 3 days  3/16/2018:  7:09 AM Creatinine 1.21 mg/dL  3/17/2018:  6:07 AM Creatinine 1.21 mg/dL  3/18/2018:  5:48 AM Creatinine 1.14 mg/dL     Recent Vancomycin Level(s) for last 3 days  No results found for requested labs within last 72 hours.          Vancomycin IV Administrations (past 72 hours)             No vancomycin orders with administrations in past 72 hours.                      Nephrotoxins and other renal medications (Future)    Start       Dose/Rate Route Frequency Ordered Stop     18 1030   vancomycin (VANCOCIN) 1,500 mg in sodium chloride 0.9 % 250 mL intermittent infusion       1,500 mg  over 90 Minutes Intravenous EVERY 12 HOURS 18 1025        18 2100   piperacillin-tazobactam (ZOSYN) 4.5 g vial to attach to  mL bag       4.5 g  over 30 Minutes Intravenous EVERY 6 HOURS 18 2048               Contrast Orders - past 72 hours (72h ago through future)    Start       Dose/Rate Route Frequency Ordered Stop     03/15/18 1745   gadobutrol (GADAVIST) injection 7.5 mL       7.5 mL Intravenous ONCE 03/15/18 1736 03/15/18 1737                Plan:  1.  Continue Vancomycin 1500 mg IV q12hrs.   2.  Goal Trough Level: 15-20 mg/L   3.  Pharmacy will check trough levels as appropriate in 1-3 Days.    4. Serum creatinine levels will be ordered daily for the first week of therapy and at least twice weekly for subsequent weeks.    5. Pendroy method utilized to dose vancomycin therapy: Method 2     Thanks for the consult  Nola Caballero, Pharm.D, BCPS

## 2018-03-21 NOTE — ANESTHESIA CARE TRANSFER NOTE
Patient: Naresh Meraz    Procedure(s):  Anesthesia Out of OR  Percutaneous Transhepatic Cholangiography    Diagnosis: Cholangitis   Diagnosis Additional Information: No value filed.    Anesthesia Type:   General, ETT     Note:  Airway :Face Mask  Patient transferred to:PACU  Comments: NARESH MERAZ was transferred to PACU, breathing from face mask 6 L O2.   Oxygen saturation 99%  /86  HR 70  RR 16  Pain well controlled, no complaints.     Sg Mchugh MD CA2  March 21, 2018 11:41 AMHandoff Report: Identifed the Patient, Identified the Reponsible Provider, Reviewed the pertinent medical history, Discussed the surgical course, Reviewed Intra-OP anesthesia mangement and issues during anesthesia, Set expectations for post-procedure period and Allowed opportunity for questions and acknowledgement of understanding      Vitals: (Last set prior to Anesthesia Care Transfer)    CRNA VITALS  3/21/2018 1101 - 3/21/2018 1141      3/21/2018             Ht Rate: 73                Electronically Signed By: Sg Mchugh MD  March 21, 2018  11:41 AM

## 2018-03-21 NOTE — ANESTHESIA POSTPROCEDURE EVALUATION
Patient: Naresh Poole    Procedure(s):  Anesthesia Out of OR  Percutaneous Transhepatic Cholangiography    Diagnosis:Cholangitis   Diagnosis Additional Information: No value filed.    Anesthesia Type:  General, ETT    Note:  Anesthesia Post Evaluation    Patient location during evaluation: PACU  Patient participation: Able to fully participate in evaluation  Level of consciousness: sleepy but conscious  Pain management: adequate  Airway patency: patent  Cardiovascular status: acceptable  Respiratory status: acceptable  Hydration status: acceptable  PONV: none     Anesthetic complications: None          Last vitals:  Vitals:    03/21/18 1135 03/21/18 1145 03/21/18 1200   BP: 121/86 117/82 113/83   Pulse:      Resp: 10 10 10   Temp:  36.3  C (97.3  F)    SpO2: 98% 93% 97%         Electronically Signed By: Rafal Elkins MD  March 21, 2018  12:30 PM

## 2018-03-21 NOTE — PROCEDURES
Interventional Radiology Brief Post Procedure Note    Procedure: PTC with internal/external biliary drain placement    Proceduralist: Reyes Christian MD    Assistant: Didier Burch MD    Time Out: Prior to the start of the procedure and with procedural staff participation, I verbally confirmed the patient s identity using two indicators, relevant allergies, that the procedure was appropriate and matched the consent or emergent situation, and that the correct equipment/implants were available. Immediately prior to starting the procedure I conducted the Time Out with the procedural staff and re-confirmed the patient s name, procedure, and site/side. (The Joint Commission universal protocol was followed.)  Yes    Sedation: General Endotracheal Anesthesia (GET) administered and documented by Anesthesia Care Provider    Findings: 10F 35cm locking Flexima int/ext biliary drain placed RUQ    Estimated Blood Loss: Minimal    Fluoroscopy Time:  28.4 minute(s)    SPECIMENS: None    Complications: 1. None     Condition: Stable    Plan: Routine exchange in 3 months.     Comments: See dictated procedure note for full details.    Didier Burch MD

## 2018-03-21 NOTE — PLAN OF CARE
Problem: Liver Failure, Acute/Chronic (Adult)  Goal: Signs and Symptoms of Listed Potential Problems Will be Absent, Minimized or Managed (Liver Failure, Acute/Chronic)  Signs and symptoms of listed potential problems will be absent, minimized or managed by discharge/transition of care (reference Liver Failure, Acute/Chronic (Adult) CPG).   Outcome: No Change  DI: assumed cares at 1300 when pt returned from OR. Vss, sats in mid to high 90's on 2L/nc. Denies pain. Drain placed in OR on (R) side of abd. Just under drain is small incision that was bleeding . drsg upon arrival to unit was saturated. Removed and replaced x 1. Glucose =133. P; cont to monitor resp status, drain, assess and treat pain

## 2018-03-21 NOTE — PLAN OF CARE
Problem: Patient Care Overview  Goal: Plan of Care/Patient Progress Review  Outcome: No Change  [address]  [id number]    Vitals: VSS on RA.  Neuro: A&O x4.  Activity: Up ind but weak.   Pain: C/o abd pain. PRN dilaudid given. Heating pad also provided.   Resp: WDL no c/o SOB.  Cardiac: WDL ex HTN 150s/90s  GI/: Voiding WDL. Last BM 3/20. Abd distended.   LDAs: R chest port (Not being used) R PIV TKO with int IV ABX.   Diet: NPO at MN.  Skin: Jaundiced with some bruising. Pre-op shower and scrub completed at 0530.   Glucose: Monitored Q4H SSI given per orders.  Plan: Perc drain placement in IR today at 11:30. Possibility of therapeutic paracentesis-per GI recs. Will continue to monitor and follow POC.

## 2018-03-21 NOTE — PROGRESS NOTES
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 His course was complicated by polymicrobial bacteremia suspected from GI source. Patient underwent ERCP 3/19 revealed a synchronous stenosis of the pancreaticobiliary limb with one suspected to involve the hepaticojejunostomy and showing abnormal mucosa biopsied. Could not demonstrate the hepaticojejunostomy despite numerous maneuvers. Patient had PTC with internal/external biliary drain placement, and paracentesis with 3.5 L fluid removal.     RECOMMENDATIONS:  - Follow up drain output   - ID on board, continue antibiotics per ID recommendation   - Nutrition following   - Trend CBC, BMP and LFTs daily  - GI will continue to follow, may consider endoscopic intervention if infectious process not improving with drain and IV antibiotics.     The patient was discussed and plan agreed upon with GI staff, Dr Gatito Lopez  GI Fellow  Pager   ______________________________________________________________  S: patient got PTC drain and paracentesis with 3.5 L removal, felt better after fluid removed.     O:  Blood pressure (!) 128/91, pulse 72, temperature 97.3  F (36.3  C), temperature source Oral, resp. rate 18, height 1.829 m (6' 0.01\"), weight 78.7 kg (173 lb 6.4 oz), SpO2 100 %.    Gen: no acute distress  HEENT: positive sclera icterus   CV: RRR, no murmur   Lungs: bilateral crackles at bibasilar areas   Abd: soft, distended, positive fluid waves, hypoactive BS  Skin: positive jaundice   MS: no skeletal pain   Neuro: no asterixis, A&Ox3, no focal neurological deficit       LABS:  BMP  Recent Labs  Lab 03/21/18  0656 " 03/20/18  0712 03/19/18  0638 03/18/18  0548    135 139 138   POTASSIUM 3.9 4.3 3.3* 3.3*   CHLORIDE 106 107 109 106   CHIDI 7.7* 7.4* 7.7* 7.9*   CO2 20 18* 20 23   BUN 43* 45* 33* 44*   CR 1.22 1.26* 1.07 1.14   * 183* 134* 113*     CBC  Recent Labs  Lab 03/21/18  0656 03/20/18  0712 03/19/18  0638 03/18/18  0548   WBC 12.6* 16.0* 10.3 11.9*   RBC 3.24* 3.48* 3.25* 3.69*   HGB 8.2* 8.9* 8.3* 9.5*   HCT 25.9* 28.3* 26.3* 30.1*   MCV 80 81 81 82   MCH 25.3* 25.6* 25.5* 25.7*   MCHC 31.7 31.4* 31.6 31.6   RDW 16.4* 16.4* 16.0* 15.8*    157 69* 63*     INR  Recent Labs  Lab 03/21/18  0656 03/20/18  0712 03/19/18  0638 03/18/18  0548   INR 1.53* 1.47* 1.59* 1.44*     LFTs  Recent Labs  Lab 03/21/18  0656 03/20/18  0712 03/19/18  0638 03/18/18  0548   ALKPHOS >2330* >2330* >2330* 2011*   * 42 49* 52*   ALT 91* 69 68 79*   BILITOTAL 9.0* 5.4* 8.9* 8.2*   PROTTOTAL 6.4* 6.2* 5.7* 6.3*   ALBUMIN 2.6* 1.9* 1.7* 2.0*      PANCNo lab results found in last 7 days.

## 2018-03-21 NOTE — PLAN OF CARE
Problem: Patient Care Overview  Goal: Plan of Care/Patient Progress Review  Outcome: No Change  Patient A & O X 4, remain NPO patient left to OR at ~ 0710, change of shift, via litter accompanied  by transport. NOC RN NIR given report to OR Nurse. Patient asked if we can call his spouse , writer call placed and went to  voice mail and will try to call .

## 2018-03-21 NOTE — PLAN OF CARE
Problem: Patient Care Overview  Goal: Plan of Care/Patient Progress Review  PT 5B: Cx, with PT arrival pt's spouse reported pt not available due to meal arrival, pt needing to eat. Pt and RN also noted pt returned from OR this PM. Pt fatigued.

## 2018-03-21 NOTE — PROGRESS NOTES
[facility] Infectious Disease Progress note: 3/20/18    Naresh Poole MRN# [id number]   Age: 47 year old YOB: 1970   ID Consult requested by Dr. Macrina Urbina.          Reason for Consult:   Gram neg and enterococcus bacteremia with liver lesions         Assessment and Plan:   Naresh Poole is a 47 year old with pancreatic cancer s/p Whipple and chemo in 2017 admitted with bacteremia and GI bleed.     # Multi-organism bacteremia with E. Coli, citrobacter, clostridum, enterococcus faecalis  Source likely intra-abdominal with concern for ascending cholangitis.     # Hepatic abscesses. Difficult to assess on CT. Would increase the treatment duration from 2 weeks to 4-6 weeks. Will need follow-up imaging to establish abx duration.     Summary of Recs:  - Continue vancomycin and zosyn. monitor renal function closely      03/20/2018  Yamile Reddy MD  ID Staff Physician  Pager [phone]      INTERVAL HISTORY; 3/20/18 Patient underwent ERCP yesterday they were unable to relieve bile duct obstruction.  Patient feeling OK this morning. No fevers no abdominal pain.           History of Present Illness:   History obtained from chart review and confirmed with patient.    48 yo M diagnosed with pancreatic cancer T3N1 in early 2017 (s/p Whipple procedure, FOLFIRINOX and gemzar/xeloda) complicated by enterococcus abscess of the liver admitted to the MICU on 3/14 with fatigue, weakness, fever, chills, acute anemia, abdominal pain, and one week of bloody diarrhea. Found to have possible liver abscesses on imaging and bacteremia.  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 Hepatology following on inpatient to determine the need for ERCP and evaluation of abscesses.   Complete ROS is otherwise negative.       Past Medical History:     Past Medical History:   Diagnosis Date     Hypertension      Pancreatic cancer (H) 01/2017          Past  Surgical History:     Past Surgical History:   Procedure Laterality Date     LAPAROSCOPY DIAGNOSTIC (GENERAL)  09/2017     WHIPPLE PROCEDURE  05/2017          Social History:     Social History     Social History     Marital status:      Spouse name: N/A     Number of children: N/A     Years of education: N/A     Occupational History     Not on file.     Social History Main Topics     Smoking status: Never Smoker     Smokeless tobacco: Never Used     Alcohol use No     Drug use: No     Sexual activity: Not on file     Other Topics Concern     Not on file     Social History Narrative          Family History:     Family History   Problem Relation Age of Onset     Coronary Artery Disease Early Onset Maternal Grandmother 36     Liver Disease No family hx of      Colon Cancer No family hx of           Allergies:   No Known Allergies       Medications:     Prescriptions Prior to Admission   Medication Sig Dispense Refill Last Dose     alum & mag hydroxide-simethicone (MYLANTA) 200-200-20 MG/5ML SUSP suspension Take 15 mLs by mouth 2 times daily as needed for indigestion   3/13/2018 at AM     amLODIPine (NORVASC) 10 MG tablet Take 10 mg by mouth daily   3/13/2018 at AM     furosemide (LASIX) 20 MG tablet Take 40 mg by mouth daily   3/13/2018 at AM     HYDROmorphone (DILAUDID) 2 MG tablet Take 2 mg by mouth 3 times daily as needed for pain Patient taking differently: 1 tablets by mouth every 4 hours as needed (up to 6 tablets per day).   3/13/2018 at noon     amylase-lipase-protease (CREON 24) 96[address]-37289 UNITS CPEP per EC capsule Take 2 caps with meals and 1 cap with snacks.   3/13/2018 at PM     methadone (DOLOPHINE) 5 MG tablet Take 5 mg by mouth 3 times daily   3/13/2018 at noon     sertraline (ZOLOFT) 50 MG tablet Take 50 mg by mouth At Bedtime    3/12/2018 at HS     spironolactone (ALDACTONE) 100 MG tablet Take 100 mg by mouth daily   3/13/2018 at AM     simethicone (GAS-X) 80 MG chewable tablet Take 80 mg by  "mouth every 6 hours as needed for flatulence or cramping   3/13/2018 at AM     ONDANSETRON PO Take 4-8 mg by mouth every 8 hours as needed for nausea   NOT TAKING     polyethylene glycol (MIRALAX/GLYCOLAX) Packet Take 17 g by mouth daily as needed for constipation   NOT TAKING     bisacodyl (DULCOLAX) 5 MG EC tablet Take 5 mg by mouth daily as needed for constipation   NOT TAKING          Physical Exam:   /84 (BP Location: Right arm)  Pulse 71  Temp 97.9  F (36.6  C) (Oral)  Resp 16  Ht 1.829 m (6' 0.01\")  Wt 78.7 kg (173 lb 6.4 oz)  SpO2 96%  BMI 23.51 kg/m2  Vitals:    03/18/18 2141 03/19/18 1946 03/20/18 1700   Weight: 74.5 kg (164 lb 4.8 oz) 76.9 kg (169 lb 10.3 oz) 78.7 kg (173 lb 6.4 oz)     General: cachectic and jaundiced, ill appearing, not acutely distressed.  HEENT: NCAT. EOMI, Oral mucosa pink and moist with no lesions or thrush.  Respiratory: Non-labored breathing, good air exchange, lungs clear to auscultation bilaterally.  Cardiovascular: Regular rate and rhythm. No murmur or rub.   Gastrointestinal: Normoactive bowel sounds. Abdomen soft, distended,  + ascites, non tender  Extremities: Trace to 1+ extremity edema.   Neurologic: A&O x 3, CNs 2-12 grossly intact, speech normal, grossly non-focal.          Data:   CBC    Recent Labs  Lab 03/20/18  0712 03/19/18  0638 03/18/18  0548 03/17/18  0607   WBC 16.0* 10.3 11.9* 10.5   RBC 3.48* 3.25* 3.69* 3.36*   HGB 8.9* 8.3* 9.5* 8.6*   HCT 28.3* 26.3* 30.1* 27.0*   MCV 81 81 82 80   MCH 25.6* 25.5* 25.7* 25.6*   MCHC 31.4* 31.6 31.6 31.9   RDW 16.4* 16.0* 15.8* 15.6*    69* 63* 44*     CMP  Recent Labs  Lab 03/20/18  0712 03/19/18  0638 03/18/18  0548 03/17/18  0607  03/14/18  0228    139 138 137  < > 134   POTASSIUM 4.3 3.3* 3.3* 3.9  < > 5.0   CHLORIDE 107 109 106 107  < > 101   CO2 18* 20 23 23  < > 22   ANIONGAP 10 9 9 8  < > 11   * 134* 113* 109*  < > 241*   BUN 45* 33* 44* 58*  < > 84*   CR 1.26* 1.07 1.14 1.21  < > " 1.38*   GFRESTIMATED 61 74 68 64  < > 55*   GFRESTBLACK 74 89 83 77  < > 66   CHIDI 7.4* 7.7* 7.9* 7.7*  < > 8.0*   MAG 2.4* 2.3 2.4* 2.3  < > 2.4*   PHOS  --   --   --   --   --  3.1   PROTTOTAL 6.2* 5.7* 6.3* 5.5*  < > 5.8*   ALBUMIN 1.9* 1.7* 2.0* 1.7*  < > 2.1*   BILITOTAL 5.4* 8.9* 8.2* 4.8*  < > 6.0*   ALKPHOS >2330* >2330* 2011* 1053*  < > 696*   AST 42 49* 52* 51*  < > 57*   ALT 69 68 79* 69  < > 92*   < > = values in this interval not displayed.  INR    Recent Labs  Lab 03/20/18  0712 03/19/18  0638 03/18/18  0548 03/17/18  0607   INR 1.47* 1.59* 1.44* 1.64*     All cultures:    Recent Labs  Lab 03/20/18  0712 03/19/18  0639 03/19/18  0601 03/18/18  0601 03/18/18  0558 03/18/18  0548 03/17/18  0613 03/17/18  0607 03/17/18  0601 03/16/18  1130 03/16/18  0800 03/16/18  0711 03/16/18  0709 03/15/18  0704 03/15/18  0658 03/14/18  2144 03/14/18  1747 03/14/18  1742 03/14/18  1018 03/14/18  0528 03/14/18  0524   CULT No growth after 10 hours  No growth after 10 hours No growth after 1 day  No growth after 1 day Canceled, Test creditedDuplicate requestPER EAST ACL  Canceled, Test creditedDuplicate requestPER EAST ACL Canceled, Test credited  Cancelled by lab - Specimen never received.  Canceled, Test credited  Cancelled by lab - Specimen never received. No growth after 2 days No growth after 2 days Cultured on the 1st day of incubation:Enterococcus faecalisSusceptibility testing done on previous specimen*  Critical Value/Significant Value, preliminary result only, called to and read back byMingo Davis RN U5B 0043 03.18.18 CF Cultured on the 2nd day of incubation:Enterococcus faecalisSusceptibility testing done on previous specimen*  Critical Value/Significant Value, preliminary result only, called to and read back byJEREMY MONET RN (5B).  03.19.18 0452 GJS  Cultured on the 2nd day of incubation:Citrobacter freundii complexSusceptibility testing done on previous specimen*  Called Kalpesh Brady RN  at 1415 on 3/20/17 by KLARISSA.  (Note)POSITIVE for ENTEROCOCCUS FAECALIS and NEGATIVE for Ralph/vanB genesby Verigene multiplex nucleic acid test. Final identification andantimicrobial susceptibility testing will be verified by standardmethods.Specimen tested with Verigene multiplex, gram-positive blood culturenucleic acid test for the following targets: Staph aureus, Staphepidermidis, Staph lugdunensis, other Staph species, Enterococcusfaecalis, Enterococcus faecium, Streptococcus species, S. agalactiae,S. anginosus grp., S. pneumoniae, S. pyogenes, Listeria sp., mecA(methicillin resistance) and Ralph/B (vancomycin resistance).Critical Value/Significant Value called to and read back by FERN Brown 5A, on 3/19/2018 @0739, tk Canceled, Test credited  Duplicate request  Canceled, Test credited  Duplicate request Culture negative monitoring continues  Culture negative monitoring continues Canceled, Test creditedCancelled by lab - Specimen never received.  Canceled, Test creditedCancelled by lab - Specimen never received. No growth after 4 days Cultured on the 1st day of incubation:Escherichia coliSusceptibility testing done on previous specimen*  Cultured on the 1st day of incubation:Enterococcus faecalisSusceptibility testing done on previous specimen*  Critical Value/Significant Value, preliminary result only, called to and read back byJESSICA LUTHER RN 3.16.18 2105. JAIME Cultured on the 1st day of incubation:Citrobacter freundiiSusceptibility testing done on previous specimen*  Critical Value/Significant Value, preliminary result only, called to and read back byGAUTAM KING RN @0512 3/16/18. SCG No growth after 5 days No growth Cultured on the 1st day of incubation:Citrobacter freundii*  Critical Value/Significant Value, preliminary result only, called to and read back byTrini Garcia RN 0702 3/15/18. MS  Cultured on the 1st day of incubation:Escherichia coli*  Critical Value/Significant Value called to and  read back byJayna Brady RN 5B @ 1021. 03/17/18  Susceptibility testing done on previous specimen Cultured on the 1st day of incubation:Citrobacter freundii*  Critical Value/Significant Value, preliminary result only, called to and read back bySocorro Edouard RN 0600 3/15/18. MS  Cultured on the 1st day of incubation:Escherichia coli*  Critical Value/Significant Value called to and read back byJayna Brady 5B @ 1216. 03/17/18  Susceptibility testing done on previous specimen Cultured on the 1st day of incubation:Escherichia coliSusceptibility testing done on previous specimen*  Cultured on the 1st day of incubation:Enterococcus faecalis*  Critical Value/Significant Value, preliminary result only, called to and read back byAllison Vallejo RN 0139 3/15/18. MS  (Note)NEGATIVE for the following: Staphylococcus spp., Staph aureus, Staphepidermidis, Staph lugdunensis, Streptococcus spp., Strep pneumoniae,Strep pyogenes, Strep agalactiae, Strep anginosus group, Enterococcusfaecalis, Enterococcus faecium, and Listeria spp. by Verigenemultiplex nucleic acid test. Final identification and antimicrobialsusceptibility testing will be verified by standard methods.Critical Value/Significant Value called to and read back by FERN Dimas 0417 3/15/18. MS Cultured on the 1st day of incubation:Escherichia coli*  Critical Value/Significant Value, preliminary result only, called to and read back byRadha Sims RN, @2031 03/14/18.DH.  Cultured on the 1st day of incubation:Citrobacter freundii complex*  Critical Value/Significant Value called to and read back byDr. Yamile Reddy @ 1227. 03/16/18  (Note)POSITIVE for E.COLI by Verigene multiplex nucleic acid test. Finalidentification and antimicrobial susceptibility testing will beverified by standard methods. Verigene test will not distinguishE.coli from Shigella species including S.dysenteriae, S.flexneri,S.boydii, and S.sonnei. Specimens containing Shigella species  orE.coli will be reported as Positive for E.coli.Specimen tested with Verigene multiplex, gram-negative blood culturenucleic acid test for the following targets: Acinetobacter sp.,Citrobacter sp., Enterobacter sp., Proteus sp., E. coli, K.pneumoniae/oxytoca, P. aeruginosa, and the following resistancemarkers: CTXM, KPC, NDM, VIM, IMP and OXA.Critical Value/Significant Value called to and read back by Mary Jane SHIRLEY, @2928 03/14/18.. Cultured on the 1st day of incubation:Escherichia coliSusceptibility testing done on previous specimen*  Critical Value/Significant Value, preliminary result only, called to and read back byRadha Sims RN, @ 0977 03/14/18..

## 2018-03-21 NOTE — IR NOTE
Patient Name: Naresh Poole  Medical Record Number: [id number]  Today's Date: 3/21/2018    Procedure: percutaneous transhepatic cholangiogram and internal external drain placement, paracentesis  Proceduralist: Didier Burch MD; Reyes Christian MD    Sedation managed by anesthesia department: Sg Mchugh MD; Jairo Rossi MD    Procedure start time: 0928  Puncture time: 0935      Other Notes: Pt arrived to IR room 4 from . Pt denies any questions or concerns regarding procedure. Pt positioned supine and monitored per anesthesia's protocol. 3500cc yellow fluid drained via paracenthesis. Cholangiogram completed/images obtained. Drain placed. See doc flow sheet for specific information. Albumin given per anesthesia staff. Pt tolerated procedure without any noted complications. Pt transferred back to .

## 2018-03-21 NOTE — OR NURSING
Dr. Fanta Plunkett at bedside in PACU to assess patient.  Dr. Plunkett informed by writing RN irregular breathing pattern.  Awakes with repeated stimuli.  Deep breathing practiced with patient.  Patient able to respond verbally to pain assessment.

## 2018-03-21 NOTE — DISCHARGE INSTRUCTIONS
1. No heavy lifting for 2 days (no greater than 10 pounds)    2. Resume usual diet    3. Can shower tomorrow    4. Dressing can come off at time of next shower    5. Pain killers as needed    6. Interventional Radiology will call you and arrange your follow up

## 2018-03-21 NOTE — PLAN OF CARE
Problem: Patient Care Overview  Goal: Plan of Care/Patient Progress Review  Outcome: No Change  Pt VSS on RA. A&Ox4. Up independently in room, SBA in hallway. C/o abdominal pain/fullness, PO dilaudid given with some decrease in pain/discomfort. Pt continues with flat affect. Pt took shower this evening and ate 50% of dinner tray. Pt and family stated that they are wondering if paracentesis will be done soon, message sent to providers to address tomorrow.     Plan: IR drain placement tomorrow, NPO at midnight.

## 2018-03-21 NOTE — OR NURSING
Text page sent to Dr. Didier Burch requesting physician to speak with patient's wife regarding surgery outcome.

## 2018-03-21 NOTE — PROGRESS NOTES
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 Presented with weakness and AMS. On imaging was found to have narrowing of the portal vein confluence concerning for liver abscess formation and ascending cholangitis. Also found to have polymicrobial bacteremia likely from GI source. Pt has remained afebrile and hemodynamically stable. Mentation has also improved.     We are working to determine the best way to control infection source. Pt is s/p ERCP on 3/19 and we are pursuing IR percutaneous drainage on 3/21/18. Pt also underwent paracentesis in IR on 3/21/18.    Changes Today:  -Fecal elastase 15 (low)  -Will draw CA 19-9 today per family request  -Will speak with GI tomorrow and ID regarding discharge recs  -Cr stable  -s/p drain-will need to be replaced in 3 weeks       ##E coli, C perfringens, Citrobacter, and enterococcus bacteremia obtained on OSH BCx 3/13  ##Probably biliary source given past hx abscess, current abscess on imaging, and poor drainage of liver   ##Hepatoportal sclerosis  Bacteremia, suspect GI source with possible ascending cholangitis and/or liver abscess. Pt has been afebrile and hemodynamically stable.  U/S showed possible abscess, with retrograde flow in the L portal vein, w/ c/f narrowing or occlusion of the splenoportal confluence.                            --cont zosyn and vanc                           --f/u on sensitivities--all covered by vanc and zosyn                           --culture if spikes                                                         --reg diet following MRCP                           --ERCP on 3/20--bx did not show malignancy     -- s/p IR drainage with percutaneous drain; s/p para today in IR     --speak with ID re  abx        ##Possible SBP  Pt has WBC 1093 with 79% PMNs. So far gram stain neg. Cx pending. Elevated WBC could also be sec to malignant effusion.                          --pt is covered with zosyn+vanc                           --giving 75g Albumin on 3/20 for kidney function                                 #Pre-Renal KAT- improving with IVFs.   Cr peaked at 2.3 at [facility]. S/p 3L at OSH.                          --giving albumin challenge 75 g on 3/20    --considered changing IV vanco and zosyn to prevent KAT, however, imipenem coverage is not ideal, so will                     CTM Cr      ##Hematochezia  ##Melena  Pt has been having tarry stools, melenic alternating with hematochezia. However, Hgb has been stable. GI is aware. Considering possible ishcemia vs other GI cause of bleed. Now with stable hemoglobin and brown color stools.                           --not pursue endoscopy at this time                           --await further GI recs                           --transfuse for Hgb< 7                           -- Continue PPI BID     ##Pancreatic Insufficiency  ##Chronic Diarrhea  ##Hypovitamin D  ##Pancreatic cancer  (T3N1) s/p whipple 9/2017 along with FOLFIRINOX and Gemzar/Xeloda c/b enterococcal liver abscess (treated) along with non-malignant ascites. Pt last took Gemzar and xeloda in Nov 2017.  Pt has had chronic diarrhea that occurs immediately after eating. Likely 2/2 malabsorption  2/2 pancreatic cancer and Whipple. . Pt has been continuing CREON at home.                            --cont CREON with food     --fecal elastase 15 (low)--indicating diarrhea is not 2/2 malabsorption     --fecal calprotectin elevated, indicating possible ischemia                           --GI aware, awaiting further recs                           --nutrition consult placed                                                  --start Vit D2 50K units once weekly for 6-8 weeks                                                  --MV ADEK supplementation                            --per oncology recs: will f/u as OP with oncology in 2-4 weeks; recommend repeat CT CAP            1-2 days prior to OP oncology visit      MELD-Na score: 21 at 3/21/2018  6:56 AM  MELD score: 21 at 3/21/2018  6:56 AM  Calculated from:  Serum Creatinine: 1.22 mg/dL at 3/21/2018  6:56 AM  Serum Sodium: 137 mmol/L at 3/21/2018  6:56 AM  Total Bilirubin: 9.0 mg/dL at 3/21/2018  6:56 AM  INR(ratio): 1.53 at 3/21/2018  6:56 AM  Age: 47 years    ##Chronic cancer pain                           --cont dilaudid 2 mg q6hrs prn                           --cont methadone 5 mg TID      ##Toxic metabolic Encephalopathy, Resolved  Likely suspect hepatic encephalopathy vs AMS 2/2 increased dilaudid use at home, vs infection (gram neg bacteremia). Pt has some asterixis on exam. Mental status improved within 24 hours of IV abx and also with decreasing frequency of pain meds. Ammonia at OSH <10, TSH WNL, B12 and folate WNL.                           --cont to monitor     ##Normocytic Anemia  Pt has had a slow decline from hgb 10-11 throughout 2017 to now 7.7 and 8.6 at OSH and 9 here. It does not appear that the Hgb drop is acute, more subacute looking at the full trend. His microcytosis is likely due to iron deficiency from reduced po intake which he does note. His RI is 0.3 suggesting underproduction rather than hemolysis or consumption; possibly again from iron deficiency. He does have new thrombocytopenia raising concern for hemolysis but bilirubin is mostly conjugated arguing against this and will discuss below more likely thrombocytopenia causes. Fibrinogen is not low to suggest DIC. On DDx is also capecitabine and gemcitabine side effects- but he has not been taking these since November.   Iron panel shows low iron, low iron binding capacity, and low iron saturation index, and normal ferritin c/w ACD. Peripheral smear is normal, and  WNL.  Plan:                          " --Monitor      ##Thrombocytopenia  Patient possibly has sinusoidal obstructive syndrome which can cause low Plts along with the conjugated hyperbilirubinemia which the patient also has. 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 Possibly also due to capecitabine and gemcitabine as noted above, although pt has not taken this since Nov 2017. No EtOH use, no other med culprits, no reason to believe he has a new viral (HIV, EBV, etc) infection. No DIC given high fibrinogen. No hemolysis given mostly conjugated bilirubin and normal LDH. Peripheral smear is normal                          --CTM        ##Hyperglycemia - could be sec to pancreatic endocrine insufficiency in setting of panc cancer s/p whipple  Pt does not have a hx of diabetes.                          --cont SSI                          --hypoglycemia protocol       ##Rib pain  Xray did not show metastasis but is not sensitive.      ##Prophylaxis:    ##DVT: SCDs for now given slow Hgb drop, AM team to consider pharmacologic ppx give high risk and also procedures  ##GI: PPI IV BID  ##Diet: Reg  Family updated      ##DNR/DNI     Pt was discussed and seen with Dr. Arizmendi.    Jeanna Vinson MD PhD   Internal Medicine, PGY 1  p     -----------------------------------------------------------------------------------------------------------------------------------------------      Interval History  No acute events overnight. Back from stent placement and paracentesis. Has some abd pain but has not received any pain meds. Diarrhea is continuing.      Review of Systems:   A 4 point review of systems was negative except as noted above.    OBJECTIVE:  BP (!) 128/91 (BP Location: Right arm)  Pulse 72  Temp 97.3  F (36.3  C) (Oral)  Resp 18  Ht 1.829 m (6' 0.01\")  Wt 78.7 kg (173 lb 6.4 oz)  SpO2 100%  BMI 23.51 kg/m2    GENERAL APPEARANCE: alert and no distress  Pulmonary: CTAB  CV: regular rhythm, normal rate, no murmur, " no rub   - JVP  not elevated    - Edema 1+ BLE  GI: soft, tender to palpation, distended, has umbilical hernia that is reducible  Site of drain is c/d/i; site of paracentesis is draining a little bit of fluid/blood; has bandage  NEURO: mentation intact and speech normal, equally move    Labs:   All labs reviewed by me  Electrolytes/Renal -   Recent Labs   Lab Test  03/21/18   0656  03/20/18   0712  03/19/18   0638   03/14/18   0228   NA  137  135  139   < >  134   POTASSIUM  3.9  4.3  3.3*   < >  5.0   CHLORIDE  106  107  109   < >  101   CO2  20  18*  20   < >  22   BUN  43*  45*  33*   < >  84*   CR  1.22  1.26*  1.07   < >  1.38*   GLC  170*  183*  134*   < >  241*   CHIDI  7.7*  7.4*  7.7*   < >  8.0*   MAG  2.4*  2.4*  2.3   < >  2.4*   PHOS   --    --    --    --   3.1    < > = values in this interval not displayed.     CBC -   Recent Labs   Lab Test  03/21/18   0656  03/20/18   0712  03/19/18   0638   WBC  12.6*  16.0*  10.3   HGB  8.2*  8.9*  8.3*   PLT  180  157  69*     LFTs   Recent Labs   Lab Test  03/21/18   0656  03/20/18   0712  03/19/18   0638   ALKPHOS  >2330*  >2330*  >2330*   BILITOTAL  9.0*  5.4*  8.9*   ALT  91*  69  68   AST  108*  42  49*   PROTTOTAL  6.4*  6.2*  5.7*   ALBUMIN  2.6*  1.9*  1.7*       Imaging:  none    Current Medications:    vancomycin (VANCOCIN) IV  1,500 mg Intravenous Q24H     piperacillin-tazobactam  3.375 g Intravenous Q6H     vitamin D  50,000 Units Oral Q7 Days     multivitamin CF formula  1 capsule Oral Daily     ascorbic acid  250 mg Oral Daily     sertraline  50 mg Oral Daily     amylase-lipase-protease  2 capsule Oral TID w/meals     methadone  5 mg Oral TID     insulin aspart  1-6 Units Subcutaneous Q4H     pantoprazole (PROTONIX) IV  40 mg Intravenous BID       Jeanna M. Moutsoglou, MD

## 2018-03-22 NOTE — PROGRESS NOTES
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 Presented with weakness and AMS. On imaging was found to have narrowing of the portal vein confluence concerning for liver abscess formation and ascending cholangitis. Also found to have polymicrobial bacteremia likely from GI source. Pt has remained afebrile and hemodynamically stable. Mentation has also improved.     We are working to determine the best way to control infection source. Pt is s/p ERCP on 3/19 and we are pursuing IR percutaneous drainage on 3/21/18. Pt also underwent paracentesis in IR on 3/21/18.    Changes Today:  Changes Today:  -cleared from GI to go home with f/u in 3-4 weeks.   -cleared from ID to go home on ertapenem.   -will F/U with Dr. Patino (ID) in 4 weeks  -we will place a PICC today  -one dose of ertapenem tomorrow prior to D/C       ##E coli, C perfringens, Citrobacter, and enterococcus bacteremia obtained on OSH BCx 3/13  ##Probably biliary source given past hx abscess, current abscess on imaging, and poor drainage of liver   ##Hepatoportal sclerosis  Bacteremia, suspect GI source with possible ascending cholangitis and/or liver abscess. Pt has been afebrile and hemodynamically stable.  U/S showed possible abscess, with retrograde flow in the L portal vein, w/ c/f narrowing or occlusion of the splenoportal confluence.                            --cont zosyn and vanc                           --go home on ertapenem, one dose of ertapenem tomorrow.                            --culture if spikes                                                         --reg diet following MRCP                           --ERCP on 3/20--bx did not show malignancy     -- s/p IR drainage with percutaneous drain; s/p para today in  IR     --will F/U with Dr. Patino (ID) in 4 weeks          ##Possible SBP  Pt has WBC 1093 with 79% PMNs. So far gram stain neg. Cx pending. Elevated WBC could also be sec to malignant effusion.                          --pt is covered with zosyn+vanc                           --giving 75g Albumin on 3/20 for kidney function                                 #Pre-Renal KAT- improving with IVFs.   Cr peaked at 2.3 at [facility]. S/p 3L at OSH.                          --giving albumin challenge 75 g on 3/20    --considered changing IV vanco and zosyn to prevent KAT, however, imipenem coverage is not ideal, so will                     CTM Cr      ##Hematochezia  ##Melena  Pt has been having tarry stools, melenic alternating with hematochezia. However, Hgb has been stable. GI is aware. Considering possible ishcemia vs other GI cause of bleed. Now with stable hemoglobin and brown color stools.                           --not pursue endoscopy at this time                           --await further GI recs                           --transfuse for Hgb< 7                           -- Continue PPI BID     ##Pancreatic Insufficiency  ##Chronic Diarrhea  ##Hypovitamin D  ##Pancreatic cancer  (T3N1) s/p whipple 9/2017 along with FOLFIRINOX and Gemzar/Xeloda c/b enterococcal liver abscess (treated) along with non-malignant ascites. Pt last took Gemzar and xeloda in Nov 2017.  Pt has had chronic diarrhea that occurs immediately after eating. Likely 2/2 malabsorption  2/2 pancreatic cancer and Whipple. . Pt has been continuing CREON at home.                            --cont CREON with food     --fecal elastase 15 (low)--indicating diarrhea is not 2/2 malabsorption     --fecal calprotectin elevated, indicating possible ischemia-but according to GI, difficult to interpret in the setting of inflammation                           --GI aware, awaiting further recs                           --nutrition consult placed                                                   --start Vit D2 50K units once weekly for 6-8 weeks                                                 --MV ADEK supplementation                            --per oncology recs: will f/u as OP with oncology in 2-4 weeks; recommend repeat CT CAP            1-2 days prior to OP oncology visit      MELD-Na score: 19 at 3/22/2018  6:58 AM  MELD score: 19 at 3/22/2018  6:58 AM  Calculated from:  Serum Creatinine: 1.22 mg/dL at 3/22/2018  6:58 AM  Serum Sodium: 138 mmol/L (Rounded to 137) at 3/22/2018  6:58 AM  Total Bilirubin: 4.0 mg/dL at 3/22/2018  6:58 AM  INR(ratio): 1.58 at 3/22/2018  6:58 AM  Age: 47 years    ##Chronic cancer pain                           --cont dilaudid 2 mg q6hrs prn                           --cont methadone 5 mg TID      ##Toxic metabolic Encephalopathy, Resolved  Likely suspect hepatic encephalopathy vs AMS 2/2 increased dilaudid use at home, vs infection (gram neg bacteremia). Pt has some asterixis on exam. Mental status improved within 24 hours of IV abx and also with decreasing frequency of pain meds. Ammonia at OSH <10, TSH WNL, B12 and folate WNL.                           --cont to monitor     ##Normocytic Anemia  Pt has had a slow decline from hgb 10-11 throughout 2017 to now 7.7 and 8.6 at OSH and 9 here. It does not appear that the Hgb drop is acute, more subacute looking at the full trend. His microcytosis is likely due to iron deficiency from reduced po intake which he does note. His RI is 0.3 suggesting underproduction rather than hemolysis or consumption; possibly again from iron deficiency. He does have new thrombocytopenia raising concern for hemolysis but bilirubin is mostly conjugated arguing against this and will discuss below more likely thrombocytopenia causes. Fibrinogen is not low to suggest DIC. On DDx is also capecitabine and gemcitabine side effects- but he has not been taking these since November.   Iron panel shows low iron, low iron binding  "capacity, and low iron saturation index, and normal ferritin c/w ACD. Peripheral smear is normal, and  WNL.  Plan:                          --Monitor      ##Thrombocytopenia  Patient possibly has sinusoidal obstructive syndrome which can cause low Plts along with the conjugated hyperbilirubinemia which the patient also has. 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 Possibly also due to capecitabine and gemcitabine as noted above, although pt has not taken this since Nov 2017. No EtOH use, no other med culprits, no reason to believe he has a new viral (HIV, EBV, etc) infection. No DIC given high fibrinogen. No hemolysis given mostly conjugated bilirubin and normal LDH. Peripheral smear is normal                          --CTM        ##Hyperglycemia - could be sec to pancreatic endocrine insufficiency in setting of panc cancer s/p whipple  Pt does not have a hx of diabetes.                          --cont SSI                          --hypoglycemia protocol       ##Rib pain  Xray did not show metastasis but is not sensitive.      ##Prophylaxis:    ##DVT: SCDs for now given slow Hgb drop, AM team to consider pharmacologic ppx give high risk and also procedures  ##GI: PPI IV BID  ##Diet: Reg  Family updated      ##DNR/DNI     Pt was discussed and seen with Dr. Arizmendi.    Jeanna Vinson MD PhD   Internal Medicine, PGY 1  p     -----------------------------------------------------------------------------------------------------------------------------------------------      Interval History  No acute events overnight.Has some pain at perc site. Diarrhea is better.  Excited to go home.     Review of Systems:   A 4 point review of systems was negative except as noted above.    OBJECTIVE:  /81 (BP Location: Right arm)  Pulse 74  Temp 97.2  F (36.2  C) (Oral)  Resp 14  Ht 1.829 m (6' 0.01\")  Wt 77.1 kg (170 lb)  SpO2 98%  BMI 23.05 kg/m2    GENERAL APPEARANCE: " alert and no distress  Pulmonary: CTAB  CV: regular rhythm, normal rate, no murmur, no rub   - JVP  not elevated    - Edema 1+ BLE  GI: soft, tender to palpation, distended, has umbilical hernia that is reducible; perc drain site c/d/i  NEURO: mentation intact and speech normal, equally move    Labs:   All labs reviewed by me  Electrolytes/Renal -   Recent Labs   Lab Test  03/22/18   0658  03/21/18   0656  03/20/18   0712   03/14/18   0228   NA  138  137  135   < >  134   POTASSIUM  4.5  3.9  4.3   < >  5.0   CHLORIDE  108  106  107   < >  101   CO2  22  20  18*   < >  22   BUN  38*  43*  45*   < >  84*   CR  1.22  1.22  1.26*   < >  1.38*   GLC  129*  170*  183*   < >  241*   CHIDI  7.6*  7.7*  7.4*   < >  8.0*   MAG  2.2  2.4*  2.4*   < >  2.4*   PHOS   --    --    --    --   3.1    < > = values in this interval not displayed.     CBC -   Recent Labs   Lab Test  03/22/18   0658  03/21/18   0656  03/20/18   0712   WBC  11.2*  12.6*  16.0*   HGB  7.6*  8.2*  8.9*   PLT  166  180  157     LFTs   Recent Labs   Lab Test  03/22/18   0658  03/21/18   0656  03/20/18   0712   ALKPHOS  2031*  >2330*  >2330*   BILITOTAL  4.0*  9.0*  5.4*   ALT  83*  91*  69   AST  64*  108*  42   PROTTOTAL  6.1*  6.4*  6.2*   ALBUMIN  2.4*  2.6*  1.9*       Imaging:  none    Current Medications:    sodium chloride (PF)  10 mL Irrigation Q24H     vancomycin (VANCOCIN) IV  1,500 mg Intravenous Q24H     piperacillin-tazobactam  3.375 g Intravenous Q6H     vitamin D  50,000 Units Oral Q7 Days     multivitamin CF formula  1 capsule Oral Daily     ascorbic acid  250 mg Oral Daily     sertraline  50 mg Oral Daily     amylase-lipase-protease  2 capsule Oral TID w/meals     methadone  5 mg Oral TID     insulin aspart  1-6 Units Subcutaneous Q4H     pantoprazole (PROTONIX) IV  40 mg Intravenous BID       Jeanna Vinson MD

## 2018-03-22 NOTE — PLAN OF CARE
Problem: Patient Care Overview  Goal: Plan of Care/Patient Progress Review  Outcome: No Change  VSS on RA. Blood sugars 165 and 131. Dilaudid PO given once for pain at the drain site. Denies nausea. Regular diet. Ate a popsicle overnight. PIV infusing TKO. Voiding on demand. No BM overnight. Biliary drain not putting out anything, supposed to be irrigated once per day (in the EMAR). Able to ambulate independently.

## 2018-03-22 NOTE — PLAN OF CARE
Problem: Patient Care Overview  Goal: Plan of Care/Patient Progress Review  Outcome: No Change  D: Drain that was placed has drained very little and it has stayed in the tube, it was flushed earlier as ordered. Mild pain to abdomen that was diminished with pain meds. Slept off and on, up to bathroom with stand by assist. Rec. IV antibiotics, vitals stable. Bleeding more around drain site was reinforced. Dressing was changed by MPATRICIA In later afternoon and reinforced later pm.P: Monitor drain and dressing for bleeding, comfort and respiratory status.

## 2018-03-22 NOTE — PLAN OF CARE
Problem: Patient Care Overview  Goal: Plan of Care/Patient Progress Review  Outcome: Improving  D/I/A: Patient A & O X 4, VSS no respiratory distress noted. Abdominal less distended , c/o abdominal pain received scheduled medications and Dilaudid 2 mg po x 2. Post assessment with relief. Patient with BLE edema.  Patient appetite fair ate 50% breakfast and tolerated well.  BS , 125. Patient Biliary drain open to gravity, flushed x 1 with 10cc NS, and returned with 20cc drain. Patient up to bathroom independently , void adequate , report had watery stool x 1.   Patient left for PICC placement at 14:30, and plan possible discharge to home tomorrow, with IV Abx. Patient Spouse at bedside. Continue with POC and update MD with concerns.

## 2018-03-22 NOTE — PROGRESS NOTES
Brief interventional radiology daily progress note    Patient is postop day #1 from a right upper extremity internal/external biliary drain placement. Patient is sitting at the edge of the bed, and feeling well. Patient states that his abdominal pain is significantly improved since yesterday. No new current complaints. Denies chest pain, shortness of breath, abdominal pain, new nausea or vomiting. Vital signs stable. Afebrile. Bag to gravity drainage without significant output from the drain in the last 24 hours, however patient is likely draining internally. Total bili has dropped from 9 to now 4. Alkaline phosphatase is not yet back this morning. Right upper quadrant drain dressings are clean and dry. No significant right upper quadrant tenderness to palpation. Patient has significantly clinically improved since yesterday and drain appears to be functioning normally. IR will continue to follow patient peripherally while he is in the hospital. Patient return to IR in 3 months for routine biliary drain exchange.

## 2018-03-22 NOTE — PROGRESS NOTES
Care Coordinator Progress Note     Admission Date/Time:  3/14/2018  Attending MD:  Manohar Arizmendi MD     Data  Chart reviewed, discussed with interdisciplinary team.   Patient was admitted for: Weakness.    Concerns with insurance coverage for discharge needs: None.  Current Living Situation: Patient lives with spouse.  Support System: Supportive and Involved  Services Involved: none prior to admission  Transportation: Family or Friend will provide  Barriers to Discharge: outpatient antibiotic plan    Coordination of Care and Referrals: Provided patient/family with options for Home Infusion.        Assessment  Patient admitted with weakness, weight loss, diarrhea - found to have acute hyponatremia, hyperkalemia, KAT, thrombocytopenia. History of pancreatic cancer, s/p whipple, enterococcal liver abscess, and possible sinusoidal obstructive syndrome.   Notified by medicine team that patient will be ready to discharge soon and will need IV abx at home. Met with patient and wife at bedside to discuss. Patient states he is agreeable and has done IV's at home in the past. Worked with [facility] and choosing to work with them again. Referral placed and patient will have 100% coverage. Declined need for PLC. HI liaison updated. Awaiting final IV antibiotic plan from ID. RNCC to continue to monitor for discharge planning and needs.     Plan  Anticipated Discharge Date:  TBD  Anticipated Discharge Plan:  Home with home infusion    Jennie Oneil RN

## 2018-03-23 NOTE — PLAN OF CARE
Problem: Patient Care Overview  Goal: Plan of Care/Patient Progress Review  Outcome: No Change  Alert and oriented x4. C/o right sided abdominal pain. Dilaudid administered prn with effect. New positive blood culture from 3/21 growing yeast. Pt was started on micafungin this evening. PICC line left arm. PIV S.L. Up in room independently. Poor appetite. Loose BM. Denied problems voiding. Biliary drain in place with small amt drainage. Irrigated per orders. Dressing C/D/I. Pt stated plan was for hopeful D/C home tomorrow. Continue to monitor GI status. Assess pain and effectiveness of interventions. Monitor for signs/symptoms of sepsis.

## 2018-03-23 NOTE — PLAN OF CARE
Problem: Patient Care Overview  Goal: Plan of Care/Patient Progress Review  Outcome: No Change  D/I/A; Patient A & O X 4, VSS no respiratory distress noted abdominal pain manageable with scheduled medications and Dilaudid PRN. Patient remain NPO after 08:30 for procedure Port cath removal at IR. Patient Biliary drain open to gravity, and flushed with NS 10 cc x 1 and returened  10cc. Left arm PICC removed, TIP intact  and TIP sen for culture. Patient up independently in room and report void adequate had loose stool x 1. Patient just left to IR via litter accompanied, by transport and family. Report given to IR RN. Continue carry on as order put in.

## 2018-03-23 NOTE — PROGRESS NOTES
Patient Name: Naresh Poole  Medical Record Number: [id number]  Today's Date: 3/23/2018    Procedure: Port removal  Proceduralist: SHAUN Aguirre PA-C    Sedation start time: 1554  Sedation end time: 1515  Sedation medications administered: Versed 1 mg   Fentanyl  50 mcg  Total sedation time: 20 min    Procedure start time: 1552  Puncture time: 1553  Procedure end time: 20 min    Report given to: FERN Baugh   : No    Other Notes: Pt arrived to IR room 2 from . Consent obtained by FLAVIA MAYO. Pt denies any further questions or concerns regarding procedure. Pt positioned supine, prepped and monitored per protocol. Sedation given. Port catheter tip sent for culture.Pt dozed throughout procedure and tolerated procedure without any noted complications. Pt transferred back to

## 2018-03-23 NOTE — PROGRESS NOTES
Care Coordinator- Discharge Planning     Admission Date/Time:  3/14/2018  Attending MD:  Manohar Arizmendi MD     Data  Date of initial CC assessment:  3/22/18  Chart reviewed, discussed with interdisciplinary team.   Patient was admitted for:   1. Weakness         Assessment  Full assessment completed in previous note     Addendum 1002: Notified by MD team that patient WILL NO LONGER DISCHARGE TODAY per ID recommendation. Port and PICC line to be removed and patient to be started on new IV abx. Will remain hospitalized thru wknd.     Coordination of Care and Referrals: Provided patient/family with options for Home Infusion.  Plan is for patient to discharge home today on IV ertapenum and possibly IV micafungin; awaiting culture results today. [facility] liaison updated on discharge. Family to transport home. Will need to receive the daily doses of each med today prior to discharge.       Plan  Anticipated Discharge Date:  Today  Anticipated Discharge Plan:  Home with home infusion        Jennie Oneil RN

## 2018-03-23 NOTE — PHARMACY-VANCOMYCIN DOSING SERVICE
Pharmacy Empiric Dose Change Per Policy    Original Dose Ordered: 1250 mg IVPB Q12hrs  Dose Changed To: 1500 mg IVPB Q24hrs    This dose change was based on discussion with Infectious disease staff concern of supra-therapeutic levels  Creatinine Clearance=     Estimated Creatinine Clearance: 95.2 mL/min (based on Cr of 1.03).  Will continue to follow and modify dosage according to levels, organ function and clinical condition

## 2018-03-23 NOTE — PLAN OF CARE
Problem: Patient Care Overview  Goal: Plan of Care/Patient Progress Review  Outcome: Improving  Complaint of pain treated with 2 mg Dilaudid, patient slept and appeared comfortable. Blood glucose 160 and treated with 1 unit insulin. This morning BG was 119, no treatment needed. Drainage from biliary drain was 0 ml.  Dilaudid given for abdominal pain at 0631. Patient is sitting up in chair now. Patient may discharge with home infusion today. Continue with current plan of nursing care, and update MD with concerns as needed.

## 2018-03-23 NOTE — PROCEDURES
Interventional Radiology Brief Post Procedure Note    Procedure: IR PORT REMOVAL RIGHT    Proceduralist: MAHI Diaz PA-C    Assistant: None    Time Out: Prior to the start of the procedure and with procedural staff participation, I verbally confirmed the patient s identity using two indicators, relevant allergies, that the procedure was appropriate and matched the consent or emergent situation, and that the correct equipment/implants were available. Immediately prior to starting the procedure I conducted the Time Out with the procedural staff and re-confirmed the patient s name, procedure, and site/side. (The Joint Commission universal protocol was followed.)  Yes    Medications   Medication Event Details Admin User Admin Time   fentaNYL (PF) (SUBLIMAZE) injection 25-50 mcg Medication Given Dose: 50 mcg; Route: Intravenous Odessa Lujan RN 3/23/2018  3:51 PM   midazolam (VERSED) injection 0.5-1 mg Medication Given Dose: 1 mg; Route: Intravenous Odessa Lujan RN 3/23/2018  3:52 PM   lidocaine 1 % 1-30 mL Medication Given by Other Dose: 6 mL; Route: Intradermal; Comment: By Odessa Moreira PA-C, RN 3/23/2018  4:15 PM       Sedation: IR Nurse Monitored Care   Post Procedure Summary:  Prior to the start of the procedure and with procedural staff participation, I verbally confirmed the patient s identity using two indicators, relevant allergies, that the procedure was appropriate and matched the consent or emergent situation, and that the correct equipment/implants were available. Immediately prior to starting the procedure I conducted the Time Out with the procedural staff and re-confirmed the patient s name, procedure, and site/side. (The Joint [facility] universal protocol was followed.)  Yes       Sedatives: Fentanyl and Midazolam (Versed)    Vital signs, airway and pulse oximetry were monitored and remained stable throughout the procedure and sedation was maintained until the  procedure was complete.  The patient was monitored by staff until sedation discharge criteria were met.    Patient tolerance: Patient tolerated the procedure well with no immediate complications.    Time of sedation in minutes: 30 Minutes minutes from beginning to end of physician one to one monitoring.          Findings: Completed removal of single lumen venous chest port via right chest. Dx: Bacteremia; Fungemia. Carla.  30 1 50    Estimated Blood Loss: Less than 10 ml    Fluoroscopy Time:  minute(s)    SPECIMENS: Cath tip for culture    Complications: 1. None     Condition: Stable    Plan:     Comments: See dictated procedure note for full details.    Jairo Aguirre PA-C

## 2018-03-23 NOTE — CONSULTS
Patient is on IR schedule 3/23/2018 for chest port removal due to infection. As the patient ate at 0830, procedure will be scheduled some time after 1430.    Labs WNL for procedure.      Orders for NPO, scrubs and antibiotics have been entered.     Consent will be done prior to procedure.     Please contact the IR [place] RN at [phone] for estimated time of procedure.     Case discussed with Primary team.    Raymundo Estrada PA-C  Interventional Radiology  [phone] pgr.

## 2018-03-23 NOTE — PHARMACY-VANCOMYCIN DOSING SERVICE
Pharmacy Vancomycin Initial Note  Date of Service 2018  Patient's  1970  47 year old, male    Indication: Bacteremia    Current estimated CrCl = Estimated Creatinine Clearance: 95.2 mL/min (based on Cr of 1.03).    Creatinine for last 3 days  3/21/2018:  6:56 AM Creatinine 1.22 mg/dL  3/22/2018:  6:58 AM Creatinine 1.22 mg/dL  3/23/2018:  6:31 AM Creatinine 1.03 mg/dL    Recent Vancomycin Level(s) for last 3 days  3/20/2018: 11:21 AM Vancomycin Level 31.9 mg/L      Vancomycin IV Administrations (past 72 hours)                   vancomycin (VANCOCIN) 1,500 mg in sodium chloride 0.9 % 250 mL intermittent infusion (mg) 1,500 mg New Bag 18 2203     1,500 mg New Bag 18 2224                Nephrotoxins and other renal medications (Future)    Start     Dose/Rate Route Frequency Ordered Stop    18 0800  furosemide (LASIX) tablet 20 mg      20 mg Oral DAILY 18 1553            Contrast Orders - past 72 hours (72h ago through future)    Start     Dose/Rate Route Frequency Ordered Stop    18 0900  iodixanol (VISIPAQUE 320) injection 50 mL      50 mL Intravenous ONCE 18 0853 18 1111        *Note: Patient has been on two prior courses of vancomycin during this admission:  3/14 -3/16  3/18-3/21 - during this course, vancomycin was decreased from 1500 mg IV q12h to 1500 mg IV q24h due to an elevated vancomycin trough at 31.9 mg/L while SCr was elevated to 1.26 mg/dl.  Now that creatinine has returned closer to 1 mg/dl, will go back to q12h dosing with a slightly lower dose.  Need to monitor carefully as decreased total daily dose may be needed.         Plan:  1.  Start vancomycin  1250 mg IV q12h.   2.  Goal Trough Level: 15-20 mg/L   3.  Pharmacy will check trough levels as appropriate in 1-3 Days.    4. Serum creatinine levels will be ordered daily for the first week of therapy and at least twice weekly for subsequent weeks.    5. Berkey method utilized to dose  vancomycin therapy: Method 2    Flaquita Hewitt

## 2018-03-23 NOTE — PROCEDURES
Interventional Radiology Pre-Procedure Sedation Assessment   Time of Assessment: 3:30 PM    Expected Level: Moderate Sedation    Indication: Sedation is required for the following type of Procedure: Central Venous Chest Port Removal     Sedation and procedural consent: Risks, benefits and alternatives were discussed with Patient    PO Intake: Appropriately NPO for procedure    ASA Class: Class 3 - SEVERE SYSTEMIC DISEASE, DEFINITE FUNCTIONAL LIMITATIONS.    Mallampati: Not assessed     Lungs: Lungs Clear with good breath sounds bilaterally    Heart: Normal heart sounds and rate    History and physical reviewed and no updates needed. I have reviewed the lab findings, diagnostic data, medications, and the plan for sedation. I have determined this patient to be an appropriate candidate for the planned sedation and procedure and have reassessed the patient IMMEDIATELY PRIOR to sedation and procedure.    GAEL Hammond

## 2018-03-23 NOTE — PROGRESS NOTES
CLINICAL NUTRITION SERVICES - REASSESSMENT NOTE     Nutrition Prescription    RECOMMENDATIONS FOR MDs/PROVIDERS TO ORDER:  1. Recommend High kcal / high protein diet + Ensure/Boost plus TID - (discussed with spouse)   2. Recommend Creon 24000 x 3 capsule with each meals,    Will provide 1058 unit of lipase per kg/meal ( reference range 500-2500 unit of lipase /kg/meal).     Malnutrition Status:    Severe malnutrition in the context of chronic illness    Recommendations already ordered by Registered Dietitian (RD):  Family declined snacks in between meals due to patient will be discharging today    Future/Additional Recommendations:  None      EVALUATION OF THE PROGRESS TOWARD GOALS   Diet:   Regular diet + Ensure plus in between meals     Intake:  Visited with patient and his wife earlier. Patient now off floor for procedure. Per patients wife, patients appetite has improved significantly. Patient has been consuming 3 meals per day when allowed ( not NPO) and has been consuming >> 75% of his meals. Per spouse, patient also consumes 2 Ensure plus daily.     Per RN/flow sheet, patient has been consuming mostly 50-75% of meals.     Calorie count:  3/18: 920 kcal and 53 gm protein from 3 meals   3/17: 1169 kcal and 30 gm protein from 3 meals   Ensure supplement intake not recorded. Per spouse patient drinks 2 Ensure plus daily  Each supplement provides additional 350 kcals, 13 gms PRO and 51 gms CHO.    Average 2 day intake : 1744 kcal and 68 gm protein   Met 85% estimated kcal and 67% estimated protein needs.     Updated ASSESSED NUTRITION NEEDS per  68 kg admission dry wt   Estimated Energy Needs: 2380 - 2720 kcals/day (35  - 40 kcals/kg )  Justification: Increased needs and Underweight, higher end for malabsorption / wt restoration   Estimated Protein Needs: 102 - 136  grams protein/day (1.5 - 2 grams of pro/kg)  Justification: Hypercatabolism with acute illness and Repletion  Estimated Fluid Needs:(1 mL/kcal)    Justification: Maintenance + GI losses    NEW FINDINGS   Patient may discharge with home infusion today. Continue with current plan of nursing care, and update MD with concerns as needed.    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 Presented with weakness and AMS. On imaging was found to have narrowing of the portal vein confluence concerning for liver abscess formation and ascending cholangitis. Also found to have polymicrobial bacteremia likely from GI source.    - ERCP on 3/20--bx did not show malignancy. s/p IR drainage with percutaneous drain; s/p para today in IR    - Patient with BLE edema +1-2  - Biliary drain open to gravity,  - Has a PICC line for abx  - Chronic cancer pain    MALNUTRITION  % Intake: Malabsorption despite fair intake   % Weight Loss: Unable to assess  Subcutaneous Fat Loss: Facial region Severe and Thoracic/intercostal: Severe  Muscle Loss: Thoracic region (clavicle, acromium bone, deltoid, trapezius, pectoral), Upper arm (bicep, tricep) and Lower arm  (forearm) all Severe   Fluid Accumulation/Edema: Moderate LLE   Malnutrition Diagnosis: Severe malnutrition in the context of chronic illness    Previous Goals   Patient to consume % of nutritionally adequate meal trays TID, or the equivalent with supplements/snacks.  Evaluation: Met intermittently     Previous Nutrition Diagnosis  Inadequate protein-energy intake related to altered GI status associated with malabsorption / maldigestion of nutrients likely related to whipple and biliary losses, as evidenced by severe 18% wt loss since post whipple surgery in September, evidence of severe fat / muscle loss.    Evaluation: No change    CURRENT NUTRITION DIAGNOSIS  Inadequate protein-energy intake related to altered GI status associated with GI losses of nutrients related to whipple surgery and biliary losses, as evidenced by severe 18% wt loss  since post whipple surgery in September, evidence of severe fat / muscle loss.      INTERVENTIONS  Implementation  Medical food supplement therapy - encouraged patient to consume 3 oral supplements + 3 small meals and snacks to meet estimated needs, wt restoration     Goals  Patient to consume % of nutritionally adequate meal trays TID, or the equivalent with supplements/snacks.    Monitoring/Evaluation  Progress toward goals will be monitored and evaluated per protocol.    Lexx Delgado RD/ЕКАТЕРИНА  Pager [phone]

## 2018-03-23 NOTE — PROGRESS NOTES
[facility] Infectious Disease Progress note: 3/20/18    Naresh Poole MRN# [id number]   Age: 47 year old YOB: 1970   ID Consult requested by Dr. Macrina Urbina.          Reason for Consult:   Gram neg and enterococcus bacteremia with liver lesions         Assessment and Plan:   Naresh Poole is a 47 year old with pancreatic cancer s/p Whipple and chemo in 2017 admitted with bacteremia and GI bleed.     # Multi-organism bacteremia with E. Coli, citrobacter, clostridum, enterococcus faecalis  Source likely intra-abdominal with concern for ascending cholangitis.  Would treat with ertapenem plus IV vancomycin. Right now based on patient's renal clearance of vancomycin in recent past with high vanco trough levels on Q 12 hour dosing the this should be dosed Q 24 hours and then check levels.     # Yeast in blood on blood culture from 3/21 positive by 3/22/18.   Suspect that this will likely end up being Candida, but ID and susceptibilities are pending. Agree with empiric IV micafungin for now. If this yeast is found to be Candida albicans should be able to transition to PO fluconazole. Recommend pulling the PICC line and the port-a-cath   to help clear the suspected candidemia. Check blood cultures daily from 2 sites until blood cultures are cleared X 48- 72 hours on these three sets prior to replacing a PICC line.     # Hepatic abscesses. Difficult to assess on CT. Would increase the antibiotic treatment duration from 2 weeks for blood stream infections to 4-6 weeks for the liver abscesses. Will need follow-up imaging of liver with CT or ultrasound to establish abx duration.     Summary of Recs:  - Continue vancomycin and ertapenem and micefungin monitor renal function closely. Monitoring LFTs on micafungin.       03/23/2018  Yamile Reddy MD  ID Staff Physician  Pager [phone]      INTERVAL HISTORY; 3/23/18   Patient feeling OK this morning. No fevers no abdominal pain.  Patient states he has  less abdominal pain since IR placed biliary internal/external drain on 3/21/18. He is eating well without pain.           History of Present Illness:   History obtained from chart review and confirmed with patient.    46 yo M diagnosed with pancreatic cancer T3N1 in early 2017 (s/p Whipple procedure, FOLFIRINOX and gemzar/xeloda) complicated by enterococcus abscess of the liver admitted to the MICU on 3/14 with fatigue, weakness, fever, chills, acute anemia, abdominal pain, and one week of bloody diarrhea. Found to have possible liver abscesses on imaging and bacteremia.  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 Hepatology following on inpatient to determine the need for ERCP and evaluation of abscesses.   Complete ROS is otherwise negative.       Past Medical History:     Past Medical History:   Diagnosis Date     Hypertension      Pancreatic cancer (H) 01/2017          Past Surgical History:     Past Surgical History:   Procedure Laterality Date     ENTEROSCOPY WITH OVERTUBE N/A 3/19/2018    Procedure: ENTEROSCOPY WITH OVERTUBE;  enteroscopy with biopsies;  Surgeon: Jared Cooney MD;  Location: [facility] OR     LAPAROSCOPY DIAGNOSTIC (GENERAL)  09/2017     PICC INSERTION Left 03/22/2018    4Fr - 50cm (2cm external), L medial brachial vein, SVC RA junction     WHIPPLE PROCEDURE  05/2017          Social History:     Social History     Social History     Marital status:      Spouse name: N/A     Number of children: N/A     Years of education: N/A     Occupational History     Not on file.     Social History Main Topics     Smoking status: Never Smoker     Smokeless tobacco: Never Used     Alcohol use No     Drug use: No     Sexual activity: Not on file     Other Topics Concern     Not on file     Social History Narrative          Family History:     Family History   Problem Relation Age of Onset     Coronary Artery Disease Early Onset  "Maternal Grandmother 36     Liver Disease No family hx of      Colon Cancer No family hx of           Allergies:   No Known Allergies       Medications:     Prescriptions Prior to Admission   Medication Sig Dispense Refill Last Dose     alum & mag hydroxide-simethicone (MYLANTA) 200-200-20 MG/5ML SUSP suspension Take 15 mLs by mouth 2 times daily as needed for indigestion   3/13/2018 at AM     amLODIPine (NORVASC) 10 MG tablet Take 10 mg by mouth daily   3/13/2018 at AM     furosemide (LASIX) 20 MG tablet Take 40 mg by mouth daily   3/13/2018 at AM     HYDROmorphone (DILAUDID) 2 MG tablet Take 2 mg by mouth 3 times daily as needed for pain Patient taking differently: 1 tablets by mouth every 4 hours as needed (up to 6 tablets per day).   3/13/2018 at noon     amylase-lipase-protease (CREON 24) 19555-40585 UNITS CPEP per EC capsule Take 2 caps with meals and 1 cap with snacks.   3/13/2018 at PM     methadone (DOLOPHINE) 5 MG tablet Take 5 mg by mouth 3 times daily   3/13/2018 at noon     sertraline (ZOLOFT) 50 MG tablet Take 50 mg by mouth At Bedtime    3/12/2018 at HS     spironolactone (ALDACTONE) 100 MG tablet Take 100 mg by mouth daily   3/13/2018 at AM     simethicone (GAS-X) 80 MG chewable tablet Take 80 mg by mouth every 6 hours as needed for flatulence or cramping   3/13/2018 at AM     ONDANSETRON PO Take 4-8 mg by mouth every 8 hours as needed for nausea   NOT TAKING     polyethylene glycol (MIRALAX/GLYCOLAX) Packet Take 17 g by mouth daily as needed for constipation   NOT TAKING     bisacodyl (DULCOLAX) 5 MG EC tablet Take 5 mg by mouth daily as needed for constipation   NOT TAKING          Physical Exam:   /89 (BP Location: Left arm)  Pulse 80  Temp 96.8  F (36  C) (Oral)  Resp 12  Ht 1.829 m (6' 0.01\")  Wt 76.7 kg (169 lb 1.6 oz)  SpO2 99%  BMI 22.93 kg/m2  Vitals:    03/20/18 1700 03/21/18 2148 03/22/18 2127   Weight: 78.7 kg (173 lb 6.4 oz) 77.1 kg (170 lb) 76.7 kg (169 lb 1.6 oz) "     General: cachectic and jaundiced, ill appearing, not acutely distressed.  HEENT: NCAT. EOMI, Oral mucosa pink and moist with no lesions or thrush.  Respiratory: Non-labored breathing, good air exchange, lungs clear to auscultation bilaterally.  Cardiovascular: Regular rate and rhythm. No murmur or rub.   Gastrointestinal: Normoactive bowel sounds. Abdomen soft, slightly distended,  + ascites, non tender, external drain exiting RUQ abdomen with green bilious discharge.    Extremities: Trace to 1+ extremity edema.   Neurologic: A&O x 3, CNs 2-12 grossly intact, speech normal, grossly non-focal.          Data:   CBC    Recent Labs  Lab 03/23/18  0631 03/22/18  0658 03/21/18  0656 03/20/18  0712   WBC 12.8* 11.2* 12.6* 16.0*   RBC 3.03* 2.97* 3.24* 3.48*   HGB 7.8* 7.6* 8.2* 8.9*   HCT 24.5* 24.1* 25.9* 28.3*   MCV 81 81 80 81   MCH 25.7* 25.6* 25.3* 25.6*   MCHC 31.8 31.5 31.7 31.4*   RDW 17.3* 16.8* 16.4* 16.4*    166 180 157     CMP    Recent Labs  Lab 03/23/18  0631 03/22/18  0658 03/21/18  0656 03/20/18  0712    138 137 135   POTASSIUM 4.4 4.5 3.9 4.3   CHLORIDE 108 108 106 107   CO2 23 22 20 18*   ANIONGAP 7 8 11 10   * 129* 170* 183*   BUN 37* 38* 43* 45*   CR 1.03 1.22 1.22 1.26*   GFRESTIMATED 77 63 63 61   GFRESTBLACK >90 77 77 74   CHIDI 7.8* 7.6* 7.7* 7.4*   MAG 2.2 2.2 2.4* 2.4*   PROTTOTAL 6.3* 6.1* 6.4* 6.2*   ALBUMIN 2.3* 2.4* 2.6* 1.9*   BILITOTAL 3.1* 4.0* 9.0* 5.4*   ALKPHOS 1749* 2031* >2330* >2330*   AST 38 64* 108* 42   ALT 71* 83* 91* 69     INR    Recent Labs  Lab 03/23/18  0631 03/22/18  0658 03/21/18  0656 03/20/18  0712   INR 1.53* 1.58* 1.53* 1.47*     All cultures:    Recent Labs  Lab 03/23/18  1131 03/23/18  1127 03/22/18  2154 03/22/18  2148 03/21/18  0704 03/21/18  0656 03/20/18  0712 03/19/18  0639 03/19/18  0601 03/18/18  0601 03/18/18  0558 03/18/18  0548 03/17/18  0613 03/17/18  0607 03/17/18  0601   CULT No growth after 2 hours No growth after 2 hours No growth  after 17 hours No growth after 17 hours Cultured on the 2nd day of incubation:Yeast*  Critical Value/Significant Value, preliminary result only, called to and read back byMarielos Starr RN on 3.22.2018 at 1753. KVO No growth after 2 days No growth after 3 days  No growth after 3 days No growth after 4 days  No growth after 4 days Canceled, Test creditedDuplicate requestPER EAST ACL  Canceled, Test creditedDuplicate requestPER EAST ACL Canceled, Test credited  Cancelled by lab - Specimen never received.  Canceled, Test credited  Cancelled by lab - Specimen never received. No growth after 5 days No growth after 5 days Cultured on the 1st day of incubation:Enterococcus faecalisSusceptibility testing done on previous specimen*  Critical Value/Significant Value, preliminary result only, called to and read back byMingo Davis RN U5B [id number] 03.18.18 CF  Cultured on the 1st day of incubation:Citrobacter freundii complexSusceptibility testing done on previous specimen* Cultured on the 2nd day of incubation:Enterococcus faecalisSusceptibility testing done on previous specimen*  Critical Value/Significant Value, preliminary result only, called to and read back byJEREMY MONET RN (5B).  03.19.18 0452 GJS  Cultured on the 2nd day of incubation:Citrobacter freundii complexSusceptibility testing done on previous specimen*  Called Kalpesh Brady RN at 1415 on 3/20/17 by KLARISSA.  (Note)POSITIVE for ENTEROCOCCUS FAECALIS and NEGATIVE for Ralph/vanB genesby Verigene multiplex nucleic acid test. Final identification andantimicrobial susceptibility testing will be verified by standardmethods.Specimen tested with Verigene multiplex, gram-positive blood culturenucleic acid test for the following targets: Staph aureus, Staphepidermidis, Staph lugdunensis, other Staph species, Enterococcusfaecalis, Enterococcus faecium, Streptococcus species, S. agalactiae,S. anginosus grp., S. pneumoniae, S. pyogenes, Listeria sp.,  mecA(methicillin resistance) and Ralph/B (vancomycin resistance).Critical Value/Significant Value called to and read back by FERN Brown 5A, on 3/19/2018 @6992, tk Canceled, Test credited  Duplicate request  Canceled, Test credited  Duplicate request

## 2018-03-23 NOTE — PLAN OF CARE
Problem: Patient Care Overview  Goal: Plan of Care/Patient Progress Review  PT 5B: Cancel - attempted pt in PM however pt declines participation in anticipation of procedure off unit later this PM. Discussed pt's mobility and safe d/c plan - pt has no concerns with return home, will benefit from OP PT to progress strength and endurance.

## 2018-03-24 NOTE — PLAN OF CARE
Problem: Patient Care Overview  Goal: Plan of Care/Patient Progress Review  Outcome: No Change  C/o abdominal pain. Dilaudid administered with effect. Poor appetite. Bm this evening. Reports voiding without difficulty, not saving. Biliary tube in place with bile colored output. Abdomen distended. Had port removed today. Incision C/D/I with liquid bandage. Diastolic pressures elevated. Continue to monitor GI status. Assess pain and effectiveness of interventions. Follow lab results.

## 2018-03-24 NOTE — PLAN OF CARE
Problem: Patient Care Overview  Goal: Plan of Care/Patient Progress Review  Outcome: No Change  D:Patient up himself in room, using bathroom and sitting in chair for meal. Family currently visiting, to have abdominal or thoracic tap this afternoon. Pain to abdomen and back controlled with current plan.

## 2018-03-24 NOTE — PROGRESS NOTES
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 Presented with weakness and AMS. On imaging was found to have narrowing of the portal vein confluence concerning for liver abscess formation and ascending cholangitis. Also found to have polymicrobial bacteremia likely from GI source. Pt has remained afebrile and hemodynamically stable. Mentation has also improved.     We are working to determine the best way to control infection source. Pt is s/p ERCP on 3/19 and we are pursuing IR percutaneous drainage on 3/21/18. Pt also underwent paracentesis in IR on 3/21/18.    Pt has + BCx positive for 3/20 and 3/21 growing candida. Needs negative BCx for 72 hrs before we can replace PICC. BCx neg for 3/22. Will CTM.     Changes Today:  -cont vanc and ertapenem  -IV micafungin for yeast  -daily BCx  -await sensitivities  -increased spironolactone to 100 mg and lasix to 40 mg      ##Yeast in blood on blood culture from 3/21 and 3/20 positive by 3/22/18.   Plan:  -IV micafungin for now.   -pulled the PICC line and the port-a-cath   -daily BCx       ##E coli, C perfringens, Citrobacter, and enterococcus bacteremia obtained on OSH BCx 3/13  ##Probably biliary source given past hx abscess, current abscess on imaging, and poor drainage of liver   ##Hepatoportal sclerosis  Bacteremia, suspect GI source with possible ascending cholangitis and/or liver abscess. Pt has been afebrile and hemodynamically stable.  U/S showed possible abscess, with retrograde flow in the L portal vein, w/ c/f narrowing or occlusion of the splenoportal confluence.                            --cont zosyn and vanc                           --go home on ertapenem, one dose of ertapenem tomorrow.                            --culture  if spikes                                                         --reg diet following MRCP                           --ERCP on 3/20--bx did not show malignancy     -- s/p IR drainage with percutaneous drain; s/p para today in IR     --will F/U with Dr. Patino (ID) in 4 weeks          ##Possible SBP  Pt has WBC 1093 with 79% PMNs. So far gram stain neg. Cx pending. Elevated WBC could also be sec to malignant effusion.                          --pt is covered with zosyn+vanc                           --giving 75g Albumin on 3/20 for kidney function                                 #Pre-Renal KAT- improving with IVFs.   Cr peaked at 2.3 at [facility]. S/p 3L at OSH.                          --giving albumin challenge 75 g on 3/20    --considered changing IV vanco and zosyn to prevent KAT, however, imipenem coverage is not ideal, so will                     CTM Cr      ##Hematochezia  ##Melena  Pt has been having tarry stools, melenic alternating with hematochezia. However, Hgb has been stable. GI is aware. Considering possible ishcemia vs other GI cause of bleed. Now with stable hemoglobin and brown color stools.                           --not pursue endoscopy at this time                           --await further GI recs                           --transfuse for Hgb< 7                           -- Continue PPI BID     ##Pancreatic Insufficiency  ##Chronic Diarrhea  ##Hypovitamin D  ##Pancreatic cancer  (T3N1) s/p whipple 9/2017 along with FOLFIRINOX and Gemzar/Xeloda c/b enterococcal liver abscess (treated) along with non-malignant ascites. Pt last took Gemzar and xeloda in Nov 2017.  Pt has had chronic diarrhea that occurs immediately after eating. Likely 2/2 malabsorption  2/2 pancreatic cancer and Whipple. . Pt has been continuing CREON at home.                            --cont CREON with food     --fecal elastase 15 (low)--indicating diarrhea is not 2/2 malabsorption     --fecal calprotectin elevated, indicating  possible ischemia-but according to GI, difficult to interpret in the setting of inflammation                           --GI aware, awaiting further recs                           --nutrition consult placed                                                  --start Vit D2 50K units once weekly for 6-8 weeks                                                 --MV ADEK supplementation                            --per oncology recs: will f/u as OP with oncology in 2-4 weeks; recommend repeat CT CAP            1-2 days prior to OP oncology visit      MELD-Na score: 17 at 3/24/2018  6:40 AM  MELD score: 17 at 3/24/2018  6:40 AM  Calculated from:  Serum Creatinine: 1.18 mg/dL at 3/24/2018  6:40 AM  Serum Sodium: 138 mmol/L (Rounded to 137) at 3/24/2018  6:40 AM  Total Bilirubin: 2.8 mg/dL at 3/24/2018  6:40 AM  INR(ratio): 1.48 at 3/24/2018  6:40 AM  Age: 47 years    ##Chronic cancer pain                           --cont dilaudid 2 mg q6hrs prn                           --cont methadone 5 mg TID      ##Toxic metabolic Encephalopathy, Resolved  Likely suspect hepatic encephalopathy vs AMS 2/2 increased dilaudid use at home, vs infection (gram neg bacteremia). Pt has some asterixis on exam. Mental status improved within 24 hours of IV abx and also with decreasing frequency of pain meds. Ammonia at OSH <10, TSH WNL, B12 and folate WNL.                           --cont to monitor     ##Normocytic Anemia  Pt has had a slow decline from hgb 10-11 throughout 2017 to now 7.7 and 8.6 at OSH and 9 here. It does not appear that the Hgb drop is acute, more subacute looking at the full trend. His microcytosis is likely due to iron deficiency from reduced po intake which he does note. His RI is 0.3 suggesting underproduction rather than hemolysis or consumption; possibly again from iron deficiency. He does have new thrombocytopenia raising concern for hemolysis but bilirubin is mostly conjugated arguing against this and will discuss below  more likely thrombocytopenia causes. Fibrinogen is not low to suggest DIC. On DDx is also capecitabine and gemcitabine side effects- but he has not been taking these since November.   Iron panel shows low iron, low iron binding capacity, and low iron saturation index, and normal ferritin c/w ACD. Peripheral smear is normal, and  WNL.  Plan:                          --Monitor      ##Thrombocytopenia  Patient possibly has sinusoidal obstructive syndrome which can cause low Plts along with the conjugated hyperbilirubinemia which the patient also has. 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 Possibly also due to capecitabine and gemcitabine as noted above, although pt has not taken this since Nov 2017. No EtOH use, no other med culprits, no reason to believe he has a new viral (HIV, EBV, etc) infection. No DIC given high fibrinogen. No hemolysis given mostly conjugated bilirubin and normal LDH. Peripheral smear is normal                          --CTM        ##Hyperglycemia - could be sec to pancreatic endocrine insufficiency in setting of panc cancer s/p whipple  Pt does not have a hx of diabetes.                          --cont SSI                          --hypoglycemia protocol       ##Rib pain  Xray did not show metastasis but is not sensitive.      ##Prophylaxis:    ##DVT: SCDs for now given slow Hgb drop, AM team to consider pharmacologic ppx give high risk and also procedures  ##GI: PPI IV BID  ##Diet: Reg  Family updated      ##DNR/DNI     Pt was discussed and seen with Dr. Arizmendi.    Jeanna Vinson MD PhD   Internal Medicine, PGY 1  p     -----------------------------------------------------------------------------------------------------------------------------------------------      Interval History  No acute events overnight. Pain is doing better today. Swelling in hands is better.     Review of Systems:   A 4 point review of systems was negative except as  "noted above.    OBJECTIVE:  BP (!) 138/97 (BP Location: Right arm)  Pulse 76  Temp 96.8  F (36  C) (Oral)  Resp 16  Ht 1.829 m (6' 0.01\")  Wt 79.9 kg (176 lb 1.6 oz)  SpO2 98%  BMI 23.88 kg/m2    GENERAL APPEARANCE: alert and no distress  HEENT: has scleral icterus, MMM  Pulmonary: CTAB  CV: regular rhythm, normal rate, no murmur, no rub   - JVP  not elevated    - Edema 1+ BLE  Skin: has jaundice  GI: soft, tender to palpation, distended, has umbilical hernia that is reducible; perc drain site c/d/i  NEURO: mentation intact and speech normal, equally move    Labs:   All labs reviewed by me  Electrolytes/Renal -   Recent Labs   Lab Test  03/24/18   0640  03/23/18   0631  03/22/18 0658   03/14/18 0228   NA  138  137  138   < >  134   POTASSIUM  4.7  4.4  4.5   < >  5.0   CHLORIDE  106  108  108   < >  101   CO2  23  23  22   < >  22   BUN  44*  37*  38*   < >  84*   CR  1.18  1.03  1.22   < >  1.38*   GLC  156*  129*  129*   < >  241*   CHIDI  7.8*  7.8*  7.6*   < >  8.0*   MAG  2.3  2.2  2.2   < >  2.4*   PHOS   --    --    --    --   3.1    < > = values in this interval not displayed.     CBC -   Recent Labs   Lab Test  03/24/18   0640  03/23/18   0631  03/22/18 0658   WBC  11.4*  12.8*  11.2*   HGB  8.2*  7.8*  7.6*   PLT  263  260  166     LFTs   Recent Labs   Lab Test  03/24/18   0640  03/23/18   0631  03/22/18   0658   ALKPHOS  1628*  1749*  2031*   BILITOTAL  2.8*  3.1*  4.0*   ALT  70  71*  83*   AST  34  38  64*   PROTTOTAL  6.8  6.3*  6.1*   ALBUMIN  2.4*  2.3*  2.4*       Imaging:  none    Current Medications:    spironolactone  100 mg Oral Daily     [START ON 3/25/2018] furosemide  40 mg Oral Daily     vancomycin (VANCOCIN) IV  1,500 mg Intravenous Q24H     pantoprazole  40 mg Oral BID     sodium chloride (PF)  10 mL Intracatheter Q7 Days     ertapenem (INVanz) IV  1 g Intravenous Q24H     micafungin  100 mg Intravenous Q24H     sodium chloride (PF)  10 mL Irrigation Q24H     vitamin D  50,000 " Units Oral Q7 Days     multivitamin CF formula  1 capsule Oral Daily     ascorbic acid  250 mg Oral Daily     sertraline  50 mg Oral Daily     amylase-lipase-protease  2 capsule Oral TID w/meals     methadone  5 mg Oral TID     insulin aspart  1-6 Units Subcutaneous Q4H       Jeanna Vinson MD

## 2018-03-24 NOTE — PROGRESS NOTES
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 His course was complicated by polymicrobial bacteremia suspected from GI source. Patient underwent ERCP 3/19 revealed a synchronous stenosis of the pancreaticobiliary limb with one suspected to involve the hepaticojejunostomy and showing abnormal mucosa biopsied. Could not demonstrate the hepaticojejunostomy despite numerous maneuvers. Patient had PTC with internal/external biliary drain placement, and paracentesis with 3.5 L fluid removal. Blood culture 3/21 positive for yeast, concerning for candida infection. Had PICC and port fulled today/     RECOMMENDATIONS:  - Follow up drain output   - ID on board, continue antibiotics and anti-fungal regimen per ID recommendation   - Nutrition following   - Trend CBC, BMP and LFTs daily  - GI will continue to follow, may consider endoscopic intervention if infectious process not improving with drain and IV antibiotics.     The patient was discussed and plan agreed upon with GI staff, Dr Gatito Lopez  GI Fellow  Pager   ______________________________________________________________  S: no complaints today.     O:  Blood pressure (!) 135/99, pulse 80, temperature 96.8  F (36  C), temperature source Oral, resp. rate 16, height 1.829 m (6' 0.01\"), weight 77.2 kg (170 lb 3.2 oz), SpO2 99 %.    Gen: no acute distress  HEENT: positive sclera icterus   CV: RRR, no murmur   Lungs: bilateral crackles at bibasilar areas   Abd: mild diffuse abdominal tenderness, soft, normal active  BS  Skin: positive jaundice   MS: no skeletal pain   Neuro: no asterixis, A&Ox3, no focal " neurological deficit       LABS:  BMP    Recent Labs  Lab 03/23/18  0631 03/22/18  0658 03/21/18  0656 03/20/18  0712    138 137 135   POTASSIUM 4.4 4.5 3.9 4.3   CHLORIDE 108 108 106 107   CHIDI 7.8* 7.6* 7.7* 7.4*   CO2 23 22 20 18*   BUN 37* 38* 43* 45*   CR 1.03 1.22 1.22 1.26*   * 129* 170* 183*     CBC    Recent Labs  Lab 03/23/18  0631 03/22/18  0658 03/21/18  0656 03/20/18  0712   WBC 12.8* 11.2* 12.6* 16.0*   RBC 3.03* 2.97* 3.24* 3.48*   HGB 7.8* 7.6* 8.2* 8.9*   HCT 24.5* 24.1* 25.9* 28.3*   MCV 81 81 80 81   MCH 25.7* 25.6* 25.3* 25.6*   MCHC 31.8 31.5 31.7 31.4*   RDW 17.3* 16.8* 16.4* 16.4*    166 180 157     INR    Recent Labs  Lab 03/23/18  0631 03/22/18  0658 03/21/18  0656 03/20/18  0712   INR 1.53* 1.58* 1.53* 1.47*     LFTs    Recent Labs  Lab 03/23/18  0631 03/22/18  0658 03/21/18  0656 03/20/18  0712   ALKPHOS 1749* 2031* >2330* >2330*   AST 38 64* 108* 42   ALT 71* 83* 91* 69   BILITOTAL 3.1* 4.0* 9.0* 5.4*   PROTTOTAL 6.3* 6.1* 6.4* 6.2*   ALBUMIN 2.3* 2.4* 2.6* 1.9*      PANCNo lab results found in last 7 days.

## 2018-03-24 NOTE — PROGRESS NOTES
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 Presented with weakness and AMS. On imaging was found to have narrowing of the portal vein confluence concerning for liver abscess formation and ascending cholangitis. Also found to have polymicrobial bacteremia likely from GI source. Pt has remained afebrile and hemodynamically stable. Mentation has also improved.     We are working to determine the best way to control infection source. Pt is s/p ERCP on 3/19 and we are pursuing IR percutaneous drainage on 3/21/18. Pt also underwent paracentesis in IR on 3/21/18.    Changes Today:  -cont vanc and ertapenem  -IV micafungin for yeast  -daily BCx  -await sensitivities  -pulled port and PICC today    ##Yeast in blood on blood culture from 3/21 positive by 3/22/18.   Plan:  -IV micafungin for now.   -pulled the PICC line and the port-a-cath   -daily BCx       ##E coli, C perfringens, Citrobacter, and enterococcus bacteremia obtained on OSH BCx 3/13  ##Probably biliary source given past hx abscess, current abscess on imaging, and poor drainage of liver   ##Hepatoportal sclerosis  Bacteremia, suspect GI source with possible ascending cholangitis and/or liver abscess. Pt has been afebrile and hemodynamically stable.  U/S showed possible abscess, with retrograde flow in the L portal vein, w/ c/f narrowing or occlusion of the splenoportal confluence.                            --cont zosyn and vanc                           --go home on ertapenem, one dose of ertapenem tomorrow.                            --culture if spikes                                                         --reg diet following MRCP                           --ERCP on 3/20--bx did not show malignancy     -- s/p IR drainage  with percutaneous drain; s/p para today in IR     --will F/U with Dr. Patino (ID) in 4 weeks          ##Possible SBP  Pt has WBC 1093 with 79% PMNs. So far gram stain neg. Cx pending. Elevated WBC could also be sec to malignant effusion.                          --pt is covered with zosyn+vanc                           --giving 75g Albumin on 3/20 for kidney function                                 #Pre-Renal KAT- improving with IVFs.   Cr peaked at 2.3 at [facility]. S/p 3L at OSH.                          --giving albumin challenge 75 g on 3/20    --considered changing IV vanco and zosyn to prevent KAT, however, imipenem coverage is not ideal, so will                     CTM Cr      ##Hematochezia  ##Melena  Pt has been having tarry stools, melenic alternating with hematochezia. However, Hgb has been stable. GI is aware. Considering possible ishcemia vs other GI cause of bleed. Now with stable hemoglobin and brown color stools.                           --not pursue endoscopy at this time                           --await further GI recs                           --transfuse for Hgb< 7                           -- Continue PPI BID     ##Pancreatic Insufficiency  ##Chronic Diarrhea  ##Hypovitamin D  ##Pancreatic cancer  (T3N1) s/p whipple 9/2017 along with FOLFIRINOX and Gemzar/Xeloda c/b enterococcal liver abscess (treated) along with non-malignant ascites. Pt last took Gemzar and xeloda in Nov 2017.  Pt has had chronic diarrhea that occurs immediately after eating. Likely 2/2 malabsorption  2/2 pancreatic cancer and Whipple. . Pt has been continuing CREON at home.                            --cont CREON with food     --fecal elastase 15 (low)--indicating diarrhea is not 2/2 malabsorption     --fecal calprotectin elevated, indicating possible ischemia-but according to GI, difficult to interpret in the setting of inflammation                           --GI aware, awaiting further recs                            --nutrition consult placed                                                  --start Vit D2 50K units once weekly for 6-8 weeks                                                 --MV ADEK supplementation                            --per oncology recs: will f/u as OP with oncology in 2-4 weeks; recommend repeat CT CAP            1-2 days prior to OP oncology visit      MELD-Na score: 15 at 3/23/2018  6:31 AM  MELD score: 15 at 3/23/2018  6:31 AM  Calculated from:  Serum Creatinine: 1.03 mg/dL at 3/23/2018  6:31 AM  Serum Sodium: 137 mmol/L at 3/23/2018  6:31 AM  Total Bilirubin: 3.1 mg/dL at 3/23/2018  6:31 AM  INR(ratio): 1.53 at 3/23/2018  6:31 AM  Age: 47 years    ##Chronic cancer pain                           --cont dilaudid 2 mg q6hrs prn                           --cont methadone 5 mg TID      ##Toxic metabolic Encephalopathy, Resolved  Likely suspect hepatic encephalopathy vs AMS 2/2 increased dilaudid use at home, vs infection (gram neg bacteremia). Pt has some asterixis on exam. Mental status improved within 24 hours of IV abx and also with decreasing frequency of pain meds. Ammonia at OSH <10, TSH WNL, B12 and folate WNL.                           --cont to monitor     ##Normocytic Anemia  Pt has had a slow decline from hgb 10-11 throughout 2017 to now 7.7 and 8.6 at OSH and 9 here. It does not appear that the Hgb drop is acute, more subacute looking at the full trend. His microcytosis is likely due to iron deficiency from reduced po intake which he does note. His RI is 0.3 suggesting underproduction rather than hemolysis or consumption; possibly again from iron deficiency. He does have new thrombocytopenia raising concern for hemolysis but bilirubin is mostly conjugated arguing against this and will discuss below more likely thrombocytopenia causes. Fibrinogen is not low to suggest DIC. On DDx is also capecitabine and gemcitabine side effects- but he has not been taking these since November.   Iron panel  "shows low iron, low iron binding capacity, and low iron saturation index, and normal ferritin c/w ACD. Peripheral smear is normal, and  WNL.  Plan:                          --Monitor      ##Thrombocytopenia  Patient possibly has sinusoidal obstructive syndrome which can cause low Plts along with the conjugated hyperbilirubinemia which the patient also has. 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 Possibly also due to capecitabine and gemcitabine as noted above, although pt has not taken this since Nov 2017. No EtOH use, no other med culprits, no reason to believe he has a new viral (HIV, EBV, etc) infection. No DIC given high fibrinogen. No hemolysis given mostly conjugated bilirubin and normal LDH. Peripheral smear is normal                          --CTM        ##Hyperglycemia - could be sec to pancreatic endocrine insufficiency in setting of panc cancer s/p whipple  Pt does not have a hx of diabetes.                          --cont SSI                          --hypoglycemia protocol       ##Rib pain  Xray did not show metastasis but is not sensitive.      ##Prophylaxis:    ##DVT: SCDs for now given slow Hgb drop, AM team to consider pharmacologic ppx give high risk and also procedures  ##GI: PPI IV BID  ##Diet: Reg  Family updated      ##DNR/DNI     Pt was discussed and seen with Dr. Arizmendi.    Jeanna Vinson MD PhD   Internal Medicine, PGY 1  p     -----------------------------------------------------------------------------------------------------------------------------------------------      Interval History  No acute events overnight.Has some pain at perc site. Diarrhea is better.  Sad that he will not be going home.     Review of Systems:   A 4 point review of systems was negative except as noted above.    OBJECTIVE:  BP (!) 135/99 (BP Location: Right arm)  Pulse 80  Temp 96.8  F (36  C) (Oral)  Resp 16  Ht 1.829 m (6' 0.01\")  Wt 77.2 kg (170 lb 3.2 " oz)  SpO2 99%  BMI 23.08 kg/m2    GENERAL APPEARANCE: alert and no distress  Pulmonary: CTAB  CV: regular rhythm, normal rate, no murmur, no rub   - JVP  not elevated    - Edema 1+ BLE  GI: soft, tender to palpation, distended, has umbilical hernia that is reducible; perc drain site c/d/i  NEURO: mentation intact and speech normal, equally move    Labs:   All labs reviewed by me  Electrolytes/Renal -   Recent Labs   Lab Test  03/23/18   0631  03/22/18   0658  03/21/18   0656   03/14/18   0228   NA  137  138  137   < >  134   POTASSIUM  4.4  4.5  3.9   < >  5.0   CHLORIDE  108  108  106   < >  101   CO2  23  22  20   < >  22   BUN  37*  38*  43*   < >  84*   CR  1.03  1.22  1.22   < >  1.38*   GLC  129*  129*  170*   < >  241*   CHIDI  7.8*  7.6*  7.7*   < >  8.0*   MAG  2.2  2.2  2.4*   < >  2.4*   PHOS   --    --    --    --   3.1    < > = values in this interval not displayed.     CBC -   Recent Labs   Lab Test  03/23/18   0631  03/22/18   0658  03/21/18   0656   WBC  12.8*  11.2*  12.6*   HGB  7.8*  7.6*  8.2*   PLT  260  166  180     LFTs   Recent Labs   Lab Test  03/23/18   0631  03/22/18   0658  03/21/18   0656   ALKPHOS  1749*  2031*  >2330*   BILITOTAL  3.1*  4.0*  9.0*   ALT  71*  83*  91*   AST  38  64*  108*   PROTTOTAL  6.3*  6.1*  6.4*   ALBUMIN  2.3*  2.4*  2.6*       Imaging:  none    Current Medications:    [START ON 3/24/2018] vancomycin (VANCOCIN) IV  1,500 mg Intravenous Q24H     pantoprazole  40 mg Oral BID     sodium chloride (PF)  10 mL Intracatheter Q7 Days     ertapenem (INVanz) IV  1 g Intravenous Q24H     spironolactone  50 mg Oral Daily     furosemide  20 mg Oral Daily     micafungin  100 mg Intravenous Q24H     sodium chloride (PF)  10 mL Irrigation Q24H     vitamin D  50,000 Units Oral Q7 Days     multivitamin CF formula  1 capsule Oral Daily     ascorbic acid  250 mg Oral Daily     sertraline  50 mg Oral Daily     amylase-lipase-protease  2 capsule Oral TID w/meals     methadone  5 mg  Oral TID     insulin aspart  1-6 Units Subcutaneous Q4H       Jeanna Vinson MD

## 2018-03-24 NOTE — PLAN OF CARE
Problem: Patient Care Overview  Goal: Plan of Care/Patient Progress Review  Outcome: No Change  Pt complains of abdominal pain ,prn dilaudid given at 2250, requesting for dilaudid at around 0100, next dose due at 0250. Offered to call MD but pt declined. Dilaudid given at 0237 with relief. , 1 unit of novolog given. Port removed yesterday, liquid bandage on. Biliary tube intact and patent, 20 cc greenish output.  Up independently in room. Will continue to monitor.

## 2018-03-25 NOTE — PHARMACY-VANCOMYCIN DOSING SERVICE
Pharmacy Vancomycin Note  Date of Service 2018  Patient's  1970   47 year old, male    Indication: Bacteremia  Goal Trough Level: 15-20 mg/L  Day of Therapy: 2 days (after re-start)  Current Vancomycin regimen:  1500 mg IV q24h     Current estimated CrCl = Estimated Creatinine Clearance: 73.4 mL/min (based on Cr of 1.39).    Creatinine for last 3 days  3/23/2018:  6:31 AM Creatinine 1.03 mg/dL  3/24/2018:  6:40 AM Creatinine 1.18 mg/dL  3/25/2018:  7:19 AM Creatinine 1.39 mg/dL    Recent Vancomycin Levels (past 3 days)  3/25/2018: 10:20 AM Vancomycin Level 23.4 mg/L    Vancomycin IV Administrations (past 72 hours)                   vancomycin (VANCOCIN) 1,500 mg in sodium chloride 0.9 % 250 mL intermittent infusion (mg) 1,500 mg New Bag 18 1121    vancomycin (VANCOCIN) 1,250 mg in sodium chloride 0.9 % 250 mL intermittent infusion (mg) 1,250 mg New Bag 18 1134                Nephrotoxins and other renal medications (Future)    Start     Dose/Rate Route Frequency Ordered Stop    18 0800  furosemide (LASIX) tablet 20 mg      20 mg Oral DAILY 18 1028      18 1500  vancomycin (VANCOCIN) 1000 mg in dextrose 5% 200 mL PREMIX      1,000 mg  200 mL/hr over 1 Hours Intravenous EVERY 24 HOURS 18 1113               Contrast Orders - past 72 hours     None          Interpretation of levels and current regimen:  Trough level is  Supratherapeutic    Has serum creatinine changed > 50% in last 72 hours: No    Renal Function: Worsening    Plan:  1.  Decrease Dose to 1G IV Vanco Q24H  2.  Pharmacy will check trough levels as appropriate in 1-3 Days.    3. Serum creatinine levels will be ordered daily for the first week of therapy and at least twice weekly for subsequent weeks.      Sg ShellD, [facility]    [phone]  Pager [phone]          .

## 2018-03-25 NOTE — PLAN OF CARE
Problem: Patient Care Overview  Goal: Plan of Care/Patient Progress Review  Outcome: No Change  C/o abdominal pain. Dilaudid administered with effect. BLE edema. LE's cold to touch, purplish discolorations to toes when dependent. Capillary refill intact. Ambulation encouraged. Pt able to ambulate short distance in [place]ott with SBA. Allowed SCD's after encouragement. Sticky note left to assess if pt lymphedema candidate. Biliary drain intact, patent. Only small amt drainage this evening. Flushed per orders. Dressing C/D/I. Poor appetite. Denied difficulty voiding but pt not saving. Reported having BM today. Continue to assess pain and effectiveness of interventions. Assess edema. Encourage activity. Assess GI status.

## 2018-03-25 NOTE — PLAN OF CARE
"Problem: Patient Care Overview  Goal: Plan of Care/Patient Progress Review  BP (!) 140/97 (BP Location: Right arm)  Pulse 77  Temp 97.6  F (36.4  C) (Oral)  Resp 16  Ht 1.829 m (6' 0.01\")  Wt 79.9 kg (176 lb 1.6 oz)  SpO2 96%  BMI 23.88 kg/m2     4877-1916: A&Ox4, afebrile, hypertensive but within parameters, AOVSS on RA. Denies nausea or dyspnea. Abdominal pain managed w/ PRN dilaudid 4mg PO x 1. Ambulated to bathroom, independent in room. Biliary drain patent, draining to gravity, small, clear green output, dressing CDI. Voiding, not saving. Pt ate a popsicle this shift. Edema +4 to BLE. BGs q4h, 166 and 151 this shift, insulin given per SS. Will continue to monitor and follow POC.       "

## 2018-03-25 NOTE — PROGRESS NOTES
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 Presented with weakness and AMS. On imaging was found to have narrowing of the portal vein confluence concerning for liver abscess formation and ascending cholangitis. Also found to have polymicrobial bacteremia likely from GI source. Pt has remained afebrile and hemodynamically stable. Mentation has also improved.     We are working to determine the best way to control infection source. Pt is s/p ERCP on 3/19 and we are pursuing IR percutaneous drainage on 3/21/18. Pt also underwent paracentesis in IR on 3/21/18.    Pt has + BCx positive for 3/20 and 3/21 growing candida. Needs negative BCx for 72 hrs before we can replace PICC. BCx neg for 3/22. Will CTM.     Changes Today:  -cont vanc and ertapenem  -IV micafungin for yeast  -daily BCx  -await sensitivities  - Decreased lasix back to 20 mg and spironolactone to 50 mg due to worsening kidney function  - Plan PICC placement tomorrow if OK per ID    ##Yeast in blood on blood culture from 3/21 and 3/20 positive by 3/22/18.   Plan:  -IV micafungin for now.   -pulled the PICC line and the port-a-cath 3/23  -daily BCx       ##E coli, C perfringens, Citrobacter, and enterococcus bacteremia obtained on OSH BCx 3/13  ##Probably biliary source given past hx abscess, current abscess on imaging, and poor drainage of liver   ##Hepatoportal sclerosis  Bacteremia, suspect GI source with possible ascending cholangitis and/or liver abscess. Pt has been afebrile and hemodynamically stable.  U/S showed possible abscess, with retrograde flow in the L portal vein, w/ c/f narrowing or occlusion of the splenoportal confluence.                            --cont zosyn and vanc                            --go home on ertapenem, one dose of ertapenem tomorrow.                            --culture if spikes                                                         --reg diet following MRCP                           --ERCP on 3/20--bx did not show malignancy     -- s/p IR drainage with percutaneous drain; s/p para in IR on 3/21     --will F/U with Dr. Patino (ID) in 4 weeks       ##Possible SBP  Pt has WBC 1093 with 79% PMNs. So far gram stain neg. Cx pending. Elevated WBC could also be sec to malignant effusion.                          --pt is covered with zosyn+vanc                           --giving 75g Albumin on 3/20 for kidney function                                 #Pre-Renal KAT- improving with IVFs.   Cr peaked at 2.3 at [facility]. S/p 3L at OSH.                          --giving albumin challenge 75 g on 3/20    --considered changing IV vanco and zosyn to prevent KAT, however, imipenem coverage is not ideal, so will CTM Cr     -- worsening kidney function after increase diuretics to lasix 40 and spironolactone 100-->so decrease back to lasix 20 and spironolactone 50 on 3/25     ##Hematochezia  ##Melena  Pt has been having tarry stools, melenic alternating with hematochezia. However, Hgb has been stable. GI is aware. Considering possible ishcemia vs other GI cause of bleed. Now with stable hemoglobin and brown color stools.                           --not pursue endoscopy at this time                           --await further GI recs                           --transfuse for Hgb< 7                           -- Continue PPI BID     ##Pancreatic Insufficiency  ##Chronic Diarrhea  ##Hypovitamin D  ##Pancreatic cancer  (T3N1) s/p whipple 9/2017 along with FOLFIRINOX and Gemzar/Xeloda c/b enterococcal liver abscess (treated) along with non-malignant ascites. Pt last took Gemzar and xeloda in Nov 2017.  Pt has had chronic diarrhea that occurs immediately after eating. Likely 2/2 malabsorption  2/2 pancreatic cancer and  [place]. . Pt has been continuing CREON at home.                            --cont CREON with food     --fecal elastase 15 (low)--indicating diarrhea is not 2/2 malabsorption     --fecal calprotectin elevated, indicating possible ischemia-but according to GI, difficult to interpret in the setting of inflammation                           --GI aware, awaiting further recs                           --nutrition consult placed                                                  --start Vit D2 50K units once weekly for 6-8 weeks                                                 --MV ADEK supplementation                            --per oncology recs: will f/u as OP with oncology in 2-4 weeks; recommend repeat CT CAP 1-2 days prior to OP oncology visit      ##Chronic cancer pain                           --cont dilaudid 2 mg q6hrs prn                           --cont methadone 5 mg TID      ##Toxic metabolic Encephalopathy, Resolved  Likely suspect hepatic encephalopathy vs AMS 2/2 increased dilaudid use at home, vs infection (gram neg bacteremia). Pt has some asterixis on exam. Mental status improved within 24 hours of IV abx and also with decreasing frequency of pain meds. Ammonia at OSH <10, TSH WNL, B12 and folate WNL.                           --cont to monitor     ##Normocytic Anemia  Pt has had a slow decline from hgb 10-11 throughout 2017 to now 7.7 and 8.6 at OSH and 9 here. It does not appear that the Hgb drop is acute, more subacute looking at the full trend. His microcytosis is likely due to iron deficiency from reduced po intake which he does note. His RI is 0.3 suggesting underproduction rather than hemolysis or consumption; possibly again from iron deficiency. He does have new thrombocytopenia raising concern for hemolysis but bilirubin is mostly conjugated arguing against this and will discuss below more likely thrombocytopenia causes. Fibrinogen is not low to suggest DIC. On DDx is also capecitabine and  gemcitabine side effects- but he has not been taking these since November.   Iron panel shows low iron, low iron binding capacity, and low iron saturation index, and normal ferritin c/w ACD. Peripheral smear is normal, and  WNL.  Plan:                          --Monitor      ##Thrombocytopenia  Patient possibly has sinusoidal obstructive syndrome which can cause low Plts along with the conjugated hyperbilirubinemia which the patient also has. 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 Possibly also due to capecitabine and gemcitabine as noted above, although pt has not taken this since Nov 2017. No EtOH use, no other med culprits, no reason to believe he has a new viral (HIV, EBV, etc) infection. No DIC given high fibrinogen. No hemolysis given mostly conjugated bilirubin and normal LDH. Peripheral smear is normal                          --CTM        ##Hyperglycemia - could be sec to pancreatic endocrine insufficiency in setting of panc cancer s/p whipple  Pt does not have a hx of diabetes.                          --cont SSI                          --hypoglycemia protocol       ##Rib pain  Xray did not show metastasis but is not sensitive.      PPx:  ##DVT: SCDs for now given slow Hgb drop, AM team to consider pharmacologic ppx give high risk and also procedures  ##GI: PPI IV BID    ##Diet: Reg  Family updated      ##DNR/DNI     Pt was discussed and seen with Dr. Arizmendi.    Kalin Macias MD  IM, PGY-3  [phone]  -----------------------------------------------------------------------------------------------------------------------------------------------      Interval History  No acute events overnight. Patient feels that his abdominal distension is improving after increasing diuretics. He denies new pain. Breathing is good.     Review of Systems:   A 4 point review of systems was negative except as noted above.    OBJECTIVE:  /87 (BP Location: Right arm)  Pulse 69   "Temp 96.8  F (36  C) (Oral)  Resp 16  Ht 1.829 m (6' 0.01\")  Wt 79.9 kg (176 lb 1.6 oz)  SpO2 98%  BMI 23.88 kg/m2    GENERAL APPEARANCE: alert and no distress  HEENT: has scleral icterus, MMM  Pulmonary: CTAB  CV: regular rhythm, normal rate, no murmur, no rub   - JVP  not elevated    - Edema 1+ BLE  Skin: has jaundice  GI: soft, tender to palpation, distended, + ascites, no rebound, has umbilical hernia that is reducible; perc drain site c/d/i  NEURO: mentation intact and speech normal, equally move    Labs:   All labs reviewed by me  Electrolytes/Renal -   Recent Labs   Lab Test  03/24/18   0640  03/23/18 0631  03/22/18 0658   03/14/18 0228   NA  138  137  138   < >  134   POTASSIUM  4.7  4.4  4.5   < >  5.0   CHLORIDE  106  108  108   < >  101   CO2  23  23  22   < >  22   BUN  44*  37*  38*   < >  84*   CR  1.18  1.03  1.22   < >  1.38*   GLC  156*  129*  129*   < >  241*   CHIDI  7.8*  7.8*  7.6*   < >  8.0*   MAG  2.3  2.2  2.2   < >  2.4*   PHOS   --    --    --    --   3.1    < > = values in this interval not displayed.     CBC -   Recent Labs   Lab Test  03/24/18   0640  03/23/18   0631  03/22/18   0658   WBC  11.4*  12.8*  11.2*   HGB  8.2*  7.8*  7.6*   PLT  263  260  166     LFTs   Recent Labs   Lab Test  03/24/18   0640  03/23/18   0631  03/22/18   0658   ALKPHOS  1628*  1749*  2031*   BILITOTAL  2.8*  3.1*  4.0*   ALT  70  71*  83*   AST  34  38  64*   PROTTOTAL  6.8  6.3*  6.1*   ALBUMIN  2.4*  2.3*  2.4*       Imaging:  none    Current Medications:    spironolactone  100 mg Oral Daily     furosemide  40 mg Oral Daily     vancomycin (VANCOCIN) IV  1,500 mg Intravenous Q24H     pantoprazole  40 mg Oral BID     sodium chloride (PF)  10 mL Intracatheter Q7 Days     ertapenem (INVanz) IV  1 g Intravenous Q24H     micafungin  100 mg Intravenous Q24H     sodium chloride (PF)  10 mL Irrigation Q24H     vitamin D  50,000 Units Oral Q7 Days     multivitamin CF formula  1 capsule Oral Daily     " ascorbic acid  250 mg Oral Daily     sertraline  50 mg Oral Daily     amylase-lipase-protease  2 capsule Oral TID w/meals     methadone  5 mg Oral TID     insulin aspart  1-6 Units Subcutaneous Q4H       Kalin Macias MD

## 2018-03-26 NOTE — PROGRESS NOTES
03/26/18 1430   Rehab Discipline   Discipline OT   General Information   Start of care 03/26/18   Referring physician Manohar Arizmendi MD   Orders Evaluate and treat as indicated   Order date 03/26/18   Medical diagnosis 47 year old male with a PMHx of pancreatic cancer    Edema onset (acute onset with hospital admission, per patient. )   Affected body parts RLE;LLE   Edema etiology comments Decreased mobility, cancer, low Albumin (2.1)   Surgical / medical history reviewed Yes   Prior level of functional mobility Independent    Prior treatment Elevation   Subjective Report   Patient report of symptoms patient reports tightness in B LEs with edema onset.    Pain   Pain comments patient reports no pain in LEs at this time.    Edema Exam / Assessment   Skin condition Pitting;Dryness;Intact   Skin condition comments Skin intact with shiny appearance. Moderate dryness throughout.    Pitting 2+   Pitting location MTPs to knee creases    Range of Motion   ROM No deficits were identified   Strength   Strength (generalized weakness throughout )   Sensory   Sensory perception Light touch   Light touch Intact   Planned Edema Interventions   Planned edema interventions Gradient compression bandaging;Fit for compression garment;Exercises;Precautions to prevent infection / exacerbation;Education   Planned edema intervention comments GCB applied from MTPs to knee creases bilaterally.    Clinical Impression   Criteria for skilled therapeutic intervention met Yes   Therapy diagnosis Recommend use of compression wraps at this time for edema management and increased ease with functional mobility    Influenced by the following impairments / conditions Edema   Assessment of Occupational Performance 1-3 Performance Deficits   Identified Performance Deficits Decreased functional mobility    Clinical Decision Making (Complexity) Low complexity   Treatment frequency 5 times / week   Treatment duration 1-2 weeks    Patient / family  and/or staff in agreement with plan of care Yes   Risks and benefits of therapy have been explained Yes   Total Evaluation Time   Total evaluation time 5

## 2018-03-26 NOTE — PROCEDURES
Procedure Note    Attending: Lawrence Alvarado MD  Resident: Trini Zhao MD  Procedure: Diagnostic and therapeutic paracentesis  Indication: Abdominal pain, leukocyctosis  Pre-procedure diagnosis: Ascites  Post-procedure diagnosis: Ascites    The risks and benefits of the procedure were explained to the patient who expressed understanding and opted to proceed.  Consent was obtained and placed in the chart.  A time out was performed.  An area of ascites was located and marked using ultrasound guidance in the left lower quadrant; the area was prepped and draped in the usual sterile fashion.  7  ml of 1% lidocaine was instilled and ascites located.  A [facility] paracentesis catheter and needle were inserted until ascites obtained then the needle removed.  The apparatus was connected to vacuum bottles and a total of 5000 ml removed.   A specimen was sent for analysis. The catheter was withdrawn and the area dressed.    Patient tolerated the procedure well with no immediate complications.  The primary team was informed of the procedure.    The procedure was performed under the direct supervision of Dr. Alvarado.     Trini Zhao MD  Internal Medicine- Pediatrics PGY3  [phone]  DOS:  3/26/18    I was present for the key portions of the procedure and available throughout.    Lawrence Alvarado MD

## 2018-03-26 NOTE — PLAN OF CARE
Problem: Patient Care Overview  Goal: Plan of Care/Patient Progress Review  Outcome: No Change  D/I/A: Patient A & O X 4, VSS c/o abdominal pain and abdomin distended . Patient received scheduled medications and Dilaudid 4mg po x 1 and dilaudid 1mg po x 1 for break through. Post assessment with relief. Patient with poor appetite did not eat today. BS , 168, 133. Patient had done Paracentesis in room by MD , post VSS . Patient right drain patent out put 60 cc. Patient void forget to save for urine sample. And aware to save urine . Spouse at bedside. Continue monitor. And update MD concerns.

## 2018-03-26 NOTE — PLAN OF CARE
Problem: Patient Care Overview  Goal: Plan of Care/Patient Progress Review  PT-5B: attempted to see pt for PT session this am; he declines as he is scheduled for paracentesis. Will check back again this pm as schedule permits. Scheduled PT session for 3/27/18 if pt is not seen this date.

## 2018-03-26 NOTE — PLAN OF CARE
Problem: Patient Care Overview  Goal: Plan of Care/Patient Progress Review  Outcome: No Change  Patient reported being very uncomfortable and 'not feeling well' during the night. Dilaudid given twice for back and abdominal pain and somewhat helpful. Patient is also using heat to help abdomen and lower back. Complained of nausea, new order for Zofran received from Dr. Jane. Nausea was improved. Hoping to have a paracentesis done today. Continue with current plan of nursing care, and update MD with concerns as needed.

## 2018-03-26 NOTE — PLAN OF CARE
Problem: Patient Care Overview  Goal: Plan of Care/Patient Progress Review  Outcome: No Change  C/o abdominal pain. Dilaudid administered with some relief. Describes abdominal pain as burning. Abdomen distended, firm with ascites fluid. Pt states probable paracentesis tomorrow. BLE edema. SCD's and elevation. Encouraged ambulation but pt declined due to abdominal pain. Poor appetite. Biliary drain in place and to gravity drainage. Bile colored output. Dressing changed. Denies problems voiding (not saving). Reports having BM today. Assess GI status. Assess pain and effectiveness of interventions. Encourage activity. Monitor swelling. Consider lymphedema consult- on sticky note and white board.

## 2018-03-26 NOTE — PLAN OF CARE
Problem: Patient Care Overview  Goal: Plan of Care/Patient Progress Review  Edema 5B: Initial edema evaluation completed. Patient presents with progressing acute onset LE edema - skin intact with soft 2+ pitting distally and mild skin dryness. Application of GCB from MTPs to knee creases for management of edema and increased ease with functional mobility. Remove wraps if causing pain/numbness or become soiled.

## 2018-03-26 NOTE — PROVIDER NOTIFICATION
Patient's abdomen is very distended. Complained of nausea, text paged [phone], and new order received. New order received 0327 from Dr. Jane for 4 mg oral Zofran.

## 2018-03-27 NOTE — PROGRESS NOTES
"  [facility] INFECTIOUS DISEASES : PROGRESS NOTE  Naresh Poole : 1970 Sex: male:   Medical record number [id number] Attending Physician: Manohar Arizmendi MD  Date of Service: 2018    ASSESSMENT :  Naresh Poole is a 47 year old with pancreatic cancer s/p Whipple and chemo in 2017 admitted with bacteremia and GI bleed.      # Multi-organism bacteremia with E. Coli, Citrobacter, Clostridum, Enterococcus faecalis  - Source likely intra-abdominal with concern for ascending cholangitis.    - continue Ertapenem plus IV vancomycin.       # Candida albicans/ candidemia  on blood culture from 3/21 positive by 3/22/18. Negative since 3/22/18   - continue micafungin      # Hepatic abscesses.   Difficult to assess on CT. Would increase the antibiotic treatment duration from 2 weeks for blood stream infections to 4-6 weeks for the liver abscesses. Will need follow-up imaging of liver with CT or ultrasound to establish abx duration.     ID will continue to follow.     Jose Cam MD, M.Med.Sc.  Staff, Infectious Diseases  Pager: [phone]     SUBJECTIVE:   Feels better. Denies pain. No shortness of breath. Appetite is ok . No diarrhea     ROS:  A five-point review of systems was obtained and was negative with the exception of that which is described above.  No Known Allergies    CURRENT ANTI-INFECTIVES:   Vancomycin IV   Ertapenem IV   Micafungin IV     EXAMINATION:   Vital Signs: /67 (BP Location: Right arm)  Pulse 65  Temp 97  F (36.1  C) (Oral)  Resp 16  Ht 1.829 m (6' 0.01\")  Wt 69.6 kg (153 lb 8 oz)  SpO2 100%  BMI 20.81 kg/m2   GENERAL:  Alert, oriented , in bed in no acute distress.  HEENT:  Head is normocephalic, atraumatic   EYES: pale   ENT:  Oropharynx is moist without exudates or ulcers. Tongue is midline  NECK:  Supple. No  Cervical lymphadenopathy  LUNGS:  Clear to auscultation bilateral.   CARDIOVASCULAR:  Regular rate and rhythm with no murmurs, " gallops or rubs.  ABDOMEN:  Normal bowel sounds, soft, nontender. No appreciable hepatosplenomegaly  SKIN:  No acute rashes.    NEUROLOGIC:  Alert, oriented     NEW DATA/RESULTS:   Culture Micro   Date Value Ref Range Status   03/27/2018 No growth after 4 hours  Preliminary   03/27/2018 No growth after 4 hours  Preliminary   03/26/2018 No growth after 1 day  Preliminary   03/26/2018 No growth after 1 day  Preliminary   03/25/2018 No growth after 2 days  Preliminary     Recent Labs   Lab Test  03/14/18   1742  09/13/17   1810   CRP  160.0*  5.8     Recent Labs   Lab Test  03/27/18   1153  03/27/18   0950  03/27/18   0743  03/26/18   0659  03/25/18   0719  03/24/18   0640   WBC  10.3  7.7  Canceled, Test credited  14.1*  11.1*  11.4*     Recent Labs   Lab Test  03/27/18   0743  03/26/18   1826  03/26/18   0659  03/25/18   0719   CR  1.34*  1.45*  1.44*  1.39*   GFRESTIMATED  57*  52*  52*  54*       Hematology Studies  Recent Labs   Lab Test  03/27/18   1153  03/27/18   0950  03/27/18   0743  03/26/18   0659  03/25/18   0719  03/24/18   0640   03/15/18   0658  03/14/18   0642   09/13/17   1810   WBC  10.3  7.7  Canceled, Test credited  14.1*  11.1*  11.4*   < >  10.4  13.9*   < >  3.4*   ANEU  9.2*   --    --    --    --    --    --   8.9*  12.4*   --   2.0   AEOS  0.0   --    --    --    --    --    --   0.0  0.0   --   0.1   HCT  22.0*  21.1*  Canceled, Test credited  25.7*  25.7*  26.4*   < >  26.8*  27.4*   < >  32.6*   PLT  142*  127*  Canceled, Test credited  287  289  263   < >  34*  48*   < >  155    < > = values in this interval not displayed.     Metabolic  Recent Labs   Lab Test  03/27/18   0743  03/26/18   1826  03/26/18   0659  03/25/18   0719   NA  139  137  136  138   BUN  55*   --   56*  50*   CO2  26   --   23  23   CR  1.34*  1.45*  1.44*  1.39*   GFRESTIMATED  57*  52*  52*  54*     Hepatic Studies  Recent Labs   Lab Test  03/27/18   0743  03/26/18   0659  03/25/18   0719   BILITOTAL  2.3*  2.8*   2.9*   ALKPHOS  802*  1351*  1459*   ALBUMIN  3.0*  2.1*  2.3*   AST  19  35  32   ALT  42  68  69

## 2018-03-27 NOTE — PLAN OF CARE
Problem: Patient Care Overview  Goal: Plan of Care/Patient Progress Review  Outcome: No Change  A&Ox4, VSS.  Pt c/o abd pain, using aqua k pad and received dilaudid x1.  Appetite is fair, BG monitoring completed.  Urine sample collected and sent to lab.  Biliary drain irrigated and emptied.  Lymph wraps on BLEs.  Pt reports feeling weak today.  NPO at midnight for abd US tomorrow.  Continue to monitor and follow POC.

## 2018-03-27 NOTE — PLAN OF CARE
Problem: Patient Care Overview  Goal: Plan of Care/Patient Progress Review  Outcome: No Change  D/I/A: Patient A & O X 3, VSS no respiratory distress noted. Abdominal pain manageable, with PRN Dilaudid . Patient ate late breakfast and tolerated well. Biliary drain patent . And dressing intact. Patient Hgb low and patient receiving one unit PRBS , at this time and VSS. Patient up to bath room independently, report void x 1 and loose stool x 1.call light in reach . Continue monitor closely and update MD with concerns.

## 2018-03-27 NOTE — PLAN OF CARE
"Problem: Patient Care Overview  Goal: Plan of Care/Patient Progress Review  5B Edema: Pt seated EOB upon arrival.  Wraps on from yesterday, loosening.  Pt stating LEs feeling lighter today.  Swelling appearing reduced especially in calf with tibia quite prominent.  Ankles and feet still appearing edematous.  Pitting 1+ edema from MTPs to knee crease.  Skin cares performed.  Reapplied LE wraps from MTPs to knee creases using \"quick wrap\" technique.  Pt reporting comfort with cares and understanding of wear schedule.  Pt declining elevation as he is planning on getting up now.  Ok to wear GCB for 48 hours until therapist returns, however REMOVE if wraps are causing pain/numbness/discoloration or if wraps become soiled.        "

## 2018-03-27 NOTE — PROGRESS NOTES
Therapy: IV ABX  Insurance: [employer]  Ded: $2000  Met: $2000  Co-Insurance: 80%  Max Out of Pocket: $3000  Met: $3000    Please contact Intake with any questions, [phone] or In Basket pool, [facility] ([phone]).    [facility]: in reference to admission date 03/14/2018 to check IV coverage.

## 2018-03-27 NOTE — PROGRESS NOTES
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 Presented with weakness and AMS. On imaging was found to have narrowing of the portal vein confluence concerning for liver abscess formation and ascending cholangitis. Also found to have polymicrobial bacteremia likely from GI source. Pt has remained afebrile and hemodynamically stable. Mentation has also improved.     We are working to determine the best way to control infection source. Pt is s/p ERCP on 3/19 and we are pursuing IR percutaneous drainage on 3/21/18. Pt also underwent paracentesis in IR on 3/21/18.    Pt has + BCx positive for 3/20 and 3/21 growing candida. Needs negative BCx for 72 hrs before we can replace PICC. BCx neg for 3/22. Will CTM.     Changes Today:  -cont vanc and ertapenem  -IV micafungin for yeast  -daily BCx  -await sensitivities  - Decreased lasix back to 20 mg and spironolactone to 50 mg due to worsening kidney function  - gave 75 g albumin challenge x2 (before and after paracentesis for KAT)  - paracentesis today  - spoke with nutrition regarding shakes protein   - lymphedema consult  - UA urine electrolytes pending  - Cr recheck in PM    ##Yeast in blood on blood culture from 3/21 and 3/20 positive by 3/22/18.   Plan:  -IV micafungin for now.   -pulled the PICC line and the port-a-cath 3/23  -daily BCx       ##E coli, C perfringens, Citrobacter, and enterococcus bacteremia obtained on OSH BCx 3/13  ##Probably biliary source given past hx abscess, current abscess on imaging, and poor drainage of liver   ##Hepatoportal sclerosis  Bacteremia, suspect GI source with possible ascending cholangitis and/or liver abscess. Pt has been afebrile and hemodynamically stable.  U/S showed possible abscess, with  retrograde flow in the L portal vein, w/ c/f narrowing or occlusion of the splenoportal confluence.                            --cont zosyn and vanc                           --go home on ertapenem, one dose of ertapenem tomorrow.                            --culture if spikes                                                         --reg diet following MRCP                           --ERCP on 3/20--bx did not show malignancy     -- s/p IR drainage with percutaneous drain; s/p para in IR on 3/21     --will F/U with Dr. Patino (ID) in 4 weeks       ##Possible SBP  Pt has WBC 1093 with 79% PMNs. So far gram stain neg. Cx pending. Elevated WBC could also be sec to malignant effusion.                          --pt is covered with zosyn+vanc                           --giving 75g Albumin on 3/20 for kidney function                                 #Pre-Renal KAT- improving with IVFs.   Cr peaked at 2.3 at [facility]. S/p 3L at OSH. Likely intravascularly depleted.                           --giving albumin challenge 75 g on 3/20, given 150 g on 3/26    --considered changing IV vanco and zosyn to prevent KAT, however, imipenem coverage is not ideal, so will CTM Cr     -- worsening kidney function after increase diuretics to lasix 40 and spironolactone 100-->so decrease back to lasix 20 and spironolactone 50 on 3/25     ##Hematochezia  ##Melena  Pt has been having tarry stools, melenic alternating with hematochezia. However, Hgb has been stable. GI is aware. Considering possible ishcemia vs other GI cause of bleed. Now with stable hemoglobin and brown color stools.                           --not pursue endoscopy at this time                           --await further GI recs                           --transfuse for Hgb< 7                           -- Continue PPI BID     ##Pancreatic Insufficiency  ##Chronic Diarrhea  ##Hypovitamin D  ##Pancreatic cancer  (T3N1) s/p whipple 9/2017 along with FOLFIRINOX and Gemzar/Xeloda c/b  enterococcal liver abscess (treated) along with non-malignant ascites. Pt last took Gemzar and xeloda in Nov 2017.  Pt has had chronic diarrhea that occurs immediately after eating. Likely 2/2 malabsorption  2/2 pancreatic cancer and Whipple. . Pt has been continuing CREON at home.                            --cont CREON with food     --fecal elastase 15 (low)--indicating diarrhea is not 2/2 malabsorption     --fecal calprotectin elevated, indicating possible ischemia-but according to GI, difficult to interpret in the setting of inflammation                           --GI aware, awaiting further recs                           --nutrition consult placed                                                  --start Vit D2 50K units once weekly for 6-8 weeks                                                 --MV ADEK supplementation                            --per oncology recs: will f/u as OP with oncology in 2-4 weeks; recommend repeat CT CAP 1-2 days prior to OP oncology visit      ##Chronic cancer pain                           --cont dilaudid 2 mg q6hrs prn                           --cont methadone 5 mg TID      ##Toxic metabolic Encephalopathy, Resolved  Likely suspect hepatic encephalopathy vs AMS 2/2 increased dilaudid use at home, vs infection (gram neg bacteremia). Pt has some asterixis on exam. Mental status improved within 24 hours of IV abx and also with decreasing frequency of pain meds. Ammonia at OSH <10, TSH WNL, B12 and folate WNL.                           --cont to monitor     ##Normocytic Anemia  Pt has had a slow decline from hgb 10-11 throughout 2017 to now 7.7 and 8.6 at OSH and 9 here. It does not appear that the Hgb drop is acute, more subacute looking at the full trend. His microcytosis is likely due to iron deficiency from reduced po intake which he does note. His RI is 0.3 suggesting underproduction rather than hemolysis or consumption; possibly again from iron deficiency. He does have new  thrombocytopenia raising concern for hemolysis but bilirubin is mostly conjugated arguing against this and will discuss below more likely thrombocytopenia causes. Fibrinogen is not low to suggest DIC. On DDx is also capecitabine and gemcitabine side effects- but he has not been taking these since November.   Iron panel shows low iron, low iron binding capacity, and low iron saturation index, and normal ferritin c/w ACD. Peripheral smear is normal, and  WNL.  Plan:                          --Monitor      ##Thrombocytopenia  Patient possibly has sinusoidal obstructive syndrome which can cause low Plts along with the conjugated hyperbilirubinemia which the patient also has. 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 Possibly also due to capecitabine and gemcitabine as noted above, although pt has not taken this since Nov 2017. No EtOH use, no other med culprits, no reason to believe he has a new viral (HIV, EBV, etc) infection. No DIC given high fibrinogen. No hemolysis given mostly conjugated bilirubin and normal LDH. Peripheral smear is normal                          --CTM        ##Hyperglycemia - could be sec to pancreatic endocrine insufficiency in setting of panc cancer s/p whipple  Pt does not have a hx of diabetes.                          --cont SSI                          --hypoglycemia protocol       ##Rib pain  Xray did not show metastasis but is not sensitive.      PPx:  ##DVT: SCDs for now given slow Hgb drop, AM team to consider pharmacologic ppx give high risk and also procedures  ##GI: PPI IV BID    ##Diet: Reg  Family updated      ##DNR/DNI     Pt was discussed and seen with Dr. Arizmendi.    Jeanna Vinson MD PhD   Internal Medicine, PGY 1  p     -----------------------------------------------------------------------------------------------------------------------------------------------      Interval History  No acute events overnight. Patient feels that  "his abdominal distension is worse due to swlling. He denies new pain. Breathing is good.     Review of Systems:   A 4 point review of systems was negative except as noted above.    OBJECTIVE:  /85 (BP Location: Right arm)  Pulse 75  Temp 96.5  F (35.8  C) (Oral)  Resp 16  Ht 1.829 m (6' 0.01\")  Wt 69.6 kg (153 lb 8 oz)  SpO2 95%  BMI 20.81 kg/m2    GENERAL APPEARANCE: alert and no distress  HEENT: has scleral icterus, MMM  Pulmonary: CTAB  CV: regular rhythm, normal rate, no murmur, no rub   - JVP  not elevated    - Edema 2+ BLE, very swollen LEs  Skin: has jaundice  GI: soft, tender to palpation, distended, + ascites, no rebound, has umbilical hernia that is reducible; perc drain site c/d/i  NEURO: mentation intact and speech normal, equally move    Labs:   All labs reviewed by me  Electrolytes/Renal -   Recent Labs   Lab Test  03/26/18   1826  03/26/18 0659  03/25/18 0719 03/24/18 0640   03/14/18   0228   NA  137  136  138  138   < >  134   POTASSIUM   --   4.2  4.7  4.7   < >  5.0   CHLORIDE   --   105  106  106   < >  101   CO2   --   23  23  23   < >  22   BUN   --   56*  50*  44*   < >  84*   CR  1.45*  1.44*  1.39*  1.18   < >  1.38*   GLC   --   179*  111*  156*   < >  241*   CHIDI   --   7.9*  8.2*  7.8*   < >  8.0*   MAG   --   2.0  2.2  2.3   < >  2.4*   PHOS   --    --    --    --    --   3.1    < > = values in this interval not displayed.     CBC -   Recent Labs   Lab Test  03/26/18 0659 03/25/18   0719  03/24/18   0640   WBC  14.1*  11.1*  11.4*   HGB  8.1*  8.1*  8.2*   PLT  287  289  263     LFTs   Recent Labs   Lab Test  03/26/18 0659  03/25/18   0719  03/24/18   0640   ALKPHOS  1351*  1459*  1628*   BILITOTAL  2.8*  2.9*  2.8*   ALT  68  69  70   AST  35  32  34   PROTTOTAL  6.4*  6.7*  6.8   ALBUMIN  2.1*  2.3*  2.4*       Imaging:  none    Current Medications:    [START ON 3/27/2018] amLODIPine  5 mg Oral Daily     lidocaine (PF)         lidocaine (buffered or not buffered) "  5-10 mL Subcutaneous Once     furosemide  20 mg Oral Daily     spironolactone  50 mg Oral Daily     vancomycin (VANCOCIN) IV  1,000 mg Intravenous Q24H     pantoprazole  40 mg Oral BID     sodium chloride (PF)  10 mL Intracatheter Q7 Days     ertapenem (INVanz) IV  1 g Intravenous Q24H     micafungin  100 mg Intravenous Q24H     sodium chloride (PF)  10 mL Irrigation Q24H     vitamin D  50,000 Units Oral Q7 Days     multivitamin CF formula  1 capsule Oral Daily     ascorbic acid  250 mg Oral Daily     sertraline  50 mg Oral Daily     amylase-lipase-protease  2 capsule Oral TID w/meals     methadone  5 mg Oral TID     insulin aspart  1-6 Units Subcutaneous Q4H       Jeanna Vinson MD

## 2018-03-27 NOTE — PROGRESS NOTES
GASTROENTEROLOGY PROGRESS NOTE    ASSESSMENT:  46 yo M with a PMHx of pancreatic cancer (T3N1) s/p whipple 5/2017 along with FOLFIRINOX and Gemzar/Xeloda 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, presented with generalized weakness and AMS, with CT finding suggest narrowing of portal vein confluence concerning for liver abscess formation and ascending cholangitis. His course was complicated by polymicrobial bacteremia suspected from GI source. Patient underwent ERCP 3/19 revealed a synchronous stenosis of the pancreaticobiliary limb with one suspected to involve the hepaticojejunostomy and showing abnormal mucosa biopsied. Could not demonstrate the hepaticojejunostomy despite numerous maneuvers. Patient had PTC with internal/external biliary drain placement, and paracentesis with 3.5 L fluid removal. Blood culture 3/21 positive for yeast, concerning for candida infection. Had PICC and port pulled on 3/23. Noted hemoglobin drop today. Patient reports brown stool, no melena or hematochezia. No bloody drain outpatient from PTC.     RECOMMENDATIONS:  - Trend CBC   - Continue PO PPI BID.   - ID on board, continue antibiotics and anti-fungal regimen per ID recommendation   - Recommend IR guided biliary stricture brushing or biopsy via PTC. (touch base with IR to arrange, can be done as outpatient if patient getting discharged tomorrow).   - Nutrition following   - Trend CBC, BMP and LFTs daily  - GI will continue to follow, may consider endoscopic intervention if infectious process not improving with drain and IV antibiotics.     The patient was discussed and plan agreed upon with GI staff, Dr Gatito Lopez  GI Fellow  Pager   ______________________________________________________________  S: patient denied melena or hematochezia. Reports brown stool. Denied bloody drain from PTC. Denied abdominal pain or epigastric pain with PO intakes.       O:  Blood pressure 120/77, pulse  "70, temperature 97  F (36.1  C), temperature source Oral, resp. rate 18, height 1.829 m (6' 0.01\"), weight 69.6 kg (153 lb 8 oz), SpO2 99 %.    Gen: no acute distress  HEENT: positive sclera icterus   CV: RRR, no murmur   Lungs: clear to auscultation bilaterally, no wheezing or crackles   Abd: mild abdominal distention, non tender, soft, normal active BS, has greenish bilious drain from PTC drain, no blood.   Skin: positive jaundice   MS: no skeletal pain   Neuro: no asterixis, A&Ox3, no focal neurological deficit       LABS:  BMP    Recent Labs  Lab 03/27/18  0743 03/26/18  1826 03/26/18  0659 03/25/18  0719 03/24/18  0640    137 136 138 138   POTASSIUM 3.6  --  4.2 4.7 4.7   CHLORIDE 104  --  105 106 106   CHIDI 7.5*  --  7.9* 8.2* 7.8*   CO2 26  --  23 23 23   BUN 55*  --  56* 50* 44*   CR 1.34* 1.45* 1.44* 1.39* 1.18   GLC 83  --  179* 111* 156*     CBC    Recent Labs  Lab 03/27/18  1153 03/27/18  0950 03/27/18  0743 03/26/18  0659   WBC 10.3 7.7 Canceled, Test credited 14.1*   RBC 2.64* 2.53* Canceled, Test credited 3.11*   HGB 7.0* 6.7* Canceled, Test credited 8.1*   HCT 22.0* 21.1* Canceled, Test credited 25.7*   MCV 83 83 Canceled, Test credited 83   MCH 26.5 26.5 Canceled, Test credited 26.0*   MCHC 31.8 31.8 Canceled, Test credited 31.5   RDW 18.6* 18.4* Canceled, Test credited 18.2*   * 127* Canceled, Test credited 287     INR    Recent Labs  Lab 03/25/18  0719 03/24/18  0640 03/23/18  0631 03/22/18  0658   INR 1.51* 1.48* 1.53* 1.58*     LFTs    Recent Labs  Lab 03/27/18  0743 03/26/18  0659 03/25/18  0719 03/24/18  0640   ALKPHOS 802* 1351* 1459* 1628*   AST 19 35 32 34   ALT 42 68 69 70   BILITOTAL 2.3* 2.8* 2.9* 2.8*   PROTTOTAL 6.0* 6.4* 6.7* 6.8   ALBUMIN 3.0* 2.1* 2.3* 2.4*      PANCNo lab results found in last 7 days.          "

## 2018-03-27 NOTE — PLAN OF CARE
Problem: Patient Care Overview  Goal: Plan of Care/Patient Progress Review  Outcome: No Change  Pt's AVSS. C/O abdominal pain with relief from prn PO dilaudid 2 mg. Up independently to bathroom, encouraged to use call light for assistance. Pt reports voiding X 1, BM X 1. Pt asked to remain NPO for abdominal ultrasound in AM. Dressing clean and intact on right biliary drain site. No leakage from paracentesis site. Continue to monitor and follow POC.

## 2018-03-27 NOTE — PHARMACY-VANCOMYCIN DOSING SERVICE
Pharmacy Vancomycin Note  Date of Service 2018  Patient's  1970   47 year old, male    Indication: Bacteremia  Goal Trough Level: 15-20 mg/L  Day of Therapy: day 4  Current Vancomycin regimen:  1000 mg IV q24h    Current estimated CrCl = Estimated Creatinine Clearance: 66.4 mL/min (based on Cr of 1.34).    Creatinine for last 3 days  3/25/2018:  7:19 AM Creatinine 1.39 mg/dL  3/26/2018:  6:59 AM Creatinine 1.44 mg/dL;  6:26 PM Creatinine 1.45 mg/dL  3/27/2018:  7:43 AM Creatinine 1.34 mg/dL    Recent Vancomycin Levels (past 3 days)  3/25/2018: 10:20 AM Vancomycin Level 23.4 mg/L  3/27/2018:  1:54 PM Vancomycin Level 21.8 mg/L - for 23 hour level, 24 hour level extrapolated = 20.9 mg/L     Vancomycin IV Administrations (past 72 hours)                   vancomycin (VANCOCIN) 1000 mg in dextrose 5% 200 mL PREMIX (mg) 1,000 mg New Bag 18 1446     1,000 mg New Bag 18 1648                Nephrotoxins and other renal medications (Future)    Start     Dose/Rate Route Frequency Ordered Stop    18 0800  furosemide (LASIX) tablet 20 mg      20 mg Oral DAILY 18 1028      18 1500  vancomycin (VANCOCIN) 1000 mg in dextrose 5% 200 mL PREMIX      1,000 mg  200 mL/hr over 1 Hours Intravenous EVERY 24 HOURS 18 1113               Contrast Orders - past 72 hours     None          Interpretation of levels and current regimen:  Trough level is  Subtherapeutic - slightly     Has serum creatinine changed > 50% in last 72 hours: No    Renal Function: Improving    Plan:  1.  Continue Current Dose - although the trough level is slightly elevated, the patient's renal functions are improving. In addition, the patient has only received 4 doses (1 dose of 1250 mg, 1 dose of 1500 mg and 2 doses of 1000 mg) so the lab value is not a true trough level and we can anticipate a lower level on the next lab check.   2.  Pharmacy will check trough levels as appropriate in 1-3 Days.    3. Serum  creatinine levels will be ordered daily for the first week of therapy and at least twice weekly for subsequent weeks.      Stephan Naylor, Pharmacy Student         .

## 2018-03-28 NOTE — PLAN OF CARE
Problem: Patient Care Overview  Goal: Plan of Care/Patient Progress Review  Discharge Planner PT   Patient plan for discharge: Home  Current status: Pt supine in bed upon arrival and agreeable to therapy. Pt SBA for bed mobility, sit <> stand from EOB. Pt min A sit <> stand from chair. Pt ambulated 150' with SBA but had 3 LOB, verbal and tactile cues needed in order to ambulate in straight line. Needs gait belt when ambulating. Changed to daily in order to increase strength and activity tolerance for discharge.  Barriers to return to prior living situation: Strength, activity tolerance, balance  Recommendations for discharge: Rehab  Rationale for recommendations: Pt would benefit from additional therapy in order to increase strength, activity tolerance, balance, and increase safety.       Entered by: Omid Dunbar 03/28/2018 2:49 PM

## 2018-03-28 NOTE — PLAN OF CARE
Problem: Patient Care Overview  Goal: Plan of Care/Patient Progress Review  Outcome: No Change  Pt A&Ox4.  VSS on RA.  Up independently in room and able to make needs known.  C/o pain in R abd tolerable with PRN 2mg Dilaudid x2.  BG checks q4h, stable.  Snacking and drinking fluids overnight.  R PIV SL.  R biliary drain has small amount of green OP.  Last hgb 7.1, continue to monitor and follow POC.

## 2018-03-28 NOTE — CONSULTS
Patient is on IR schedule 3/29/2018 for a Biliary tube exchange with possible brush biopsy of duct distal to stricture.   Labs WNL for procedure. INR pending.  Orders for NPO have been entered.   Consent will be done prior to procedure.     Please contact the IR charge RN at [phone] for estimated time of procedure.     Case discussed with Dr. Lin from IR and Dr. Vinson.    Adrianne Dale, JOSEFINA, APRN  Interventional Radiology  Phone: [phone]  Pager: [phone]

## 2018-03-28 NOTE — PLAN OF CARE
Problem: Patient Care Overview  Goal: Plan of Care/Patient Progress Review  Outcome: No Change  A&Ox4, VSS. Finished administering blood, pt tolerated well.  Pt denied pain this shift, any abd discomfort managed by aqua K pad.  Appetite is fair, BG monitoring completed.  Pt reports loose stool x2.  Biliary drain irrigated and emptied.  Lymph wraps on BLEs.  R PIV saline locked. Continue to monitor and follow POC.

## 2018-03-28 NOTE — PROGRESS NOTES
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 Presented with weakness and AMS. On imaging was found to have narrowing of the portal vein confluence concerning for liver abscess formation and ascending cholangitis. Also found to have polymicrobial bacteremia likely from GI source. Pt has remained afebrile and hemodynamically stable. Mentation has also improved.     We are working to determine the best way to control infection source. Pt is s/p ERCP on 3/19 and we are pursuing IR percutaneous drainage on 3/21/18. Pt also underwent paracentesis in IR on 3/21/18.    Pt has + BCx positive for 3/20 and 3/21 growing candida. Needs negative BCx for 72 hrs before we can replace PICC. BCx neg for 3/22. Will CTM.     Changes Today:  -cont vanc and ertapenem  -IV micafungin for yeast  -daily BCx  -await sensitivities  - gave 75 g albumin challenge   - s/p Blood tx; rechecking hgb  - IR consult to obtain brushings of abscess distal to stricture along drain     ##Yeast in blood on blood culture from 3/21 and 3/20 positive by 3/22/18.   Plan:  -IV micafungin for now.   -pulled the PICC line and the port-a-cath 3/23  -daily BCx       ##E coli, C perfringens, Citrobacter, and enterococcus bacteremia obtained on OSH BCx 3/13  ##Probably biliary source given past hx abscess, current abscess on imaging, and poor drainage of liver   ##Hepatoportal sclerosis  Bacteremia, suspect GI source with possible ascending cholangitis and/or liver abscess. Pt has been afebrile and hemodynamically stable.  U/S showed possible abscess, with retrograde flow in the L portal vein, w/ c/f narrowing or occlusion of the splenoportal confluence.                            --cont zosyn and vanc                           --go  home on ertapenem, one dose of ertapenem tomorrow.                            --culture if spikes                                                         --reg diet following MRCP                           --ERCP on 3/20--bx did not show malignancy     -- s/p IR drainage with percutaneous drain; s/p para in IR on 3/21     --will F/U with Dr. Patino (ID) in 4 weeks       ##Possible SBP  Pt has WBC 1093 with 79% PMNs. So far gram stain neg. Cx pending. Elevated WBC could also be sec to malignant effusion.                          --pt is covered with zosyn+vanc                           --giving 75g Albumin on 3/20 for kidney function                                 #Pre-Renal KAT- improving with IVFs.   Cr peaked at 2.3 at [facility]. S/p 3L at OSH. Likely intravascularly depleted.    --giving albumin challenge 75 g on 3/20, given 150 g on 3/26, 75 g on 3.27   --considered changing IV vanco and zosyn to prevent KAT, however, imipenem coverage is not ideal, so will CTM Cr    -- worsening kidney function after increase diuretics to lasix 40 and spironolactone 100-->so decrease back to lasix 20 and spironolactone 50 on 3/25     ##Hematochezia  ##Melena  Pt has been having tarry stools, melenic alternating with hematochezia. However, Hgb has been stable. GI is aware. Considering possible ishcemia vs other GI cause of bleed. Now with downtrending hemoglobin and dark brown color stools.                           --not pursue endoscopy at this time                           --await further GI recs                           --transfuse for Hgb< 7                           -- Continue PPI BID     ##Pancreatic Insufficiency  ##Chronic Diarrhea  ##Hypovitamin D  ##Pancreatic cancer  (T3N1) s/p whipple 9/2017 along with FOLFIRINOX and Gemzar/Xeloda c/b enterococcal liver abscess (treated) along with non-malignant ascites. Pt last took Gemzar and xeloda in Nov 2017.  Pt has had chronic diarrhea that occurs immediately after eating.  Likely 2/2 malabsorption  2/2 pancreatic cancer and Whipple. . Pt has been continuing CREON at home.                            --cont CREON with food     --fecal elastase 15 (low)--indicating diarrhea is not 2/2 malabsorption     --fecal calprotectin elevated, indicating possible ischemia-but according to GI, difficult to interpret in the setting of inflammation                           --GI aware, awaiting further recs                           --nutrition consult placed                                                  --start Vit D2 50K units once weekly for 6-8 weeks                                                 --MV ADEK supplementation                            --per oncology recs: will f/u as OP with oncology in 2-4 weeks; recommend repeat CT CAP 1-2 days prior to OP oncology visit      ##Chronic cancer pain                           --cont dilaudid 2 mg q6hrs prn                           --cont methadone 5 mg TID      ##Toxic metabolic Encephalopathy, Resolved  Likely suspect hepatic encephalopathy vs AMS 2/2 increased dilaudid use at home, vs infection (gram neg bacteremia). Pt has some asterixis on exam. Mental status improved within 24 hours of IV abx and also with decreasing frequency of pain meds. Ammonia at OSH <10, TSH WNL, B12 and folate WNL.                           --cont to monitor     ##Normocytic Anemia  Pt has had a slow decline from hgb 10-11 throughout 2017 to now 7.7 and 8.6 at OSH and 9 here. It does not appear that the Hgb drop is acute, more subacute looking at the full trend. His microcytosis is likely due to iron deficiency from reduced po intake which he does note. His RI is 0.3 suggesting underproduction rather than hemolysis or consumption; possibly again from iron deficiency. He does have new thrombocytopenia raising concern for hemolysis but bilirubin is mostly conjugated arguing against this and will discuss below more likely thrombocytopenia causes. Fibrinogen is not low to  suggest DIC. On DDx is also capecitabine and gemcitabine side effects- but he has not been taking these since November.   Iron panel shows low iron, low iron binding capacity, and low iron saturation index, and normal ferritin c/w ACD. Peripheral smear is normal, and  WNL.    Pt received blood tx on 3/27.   Plan:   --hemolysis labs drawn + peripheral smear   --haptoglobin WNL   --Retic Index 0.45--hypoprolif         --tot bili 2.3, direct 1.4, indirect 0.9   --labs are suggestive of blood loss, not hemolysis         --LDH WNL                  ##Thrombocytopenia  Patient possibly has sinusoidal obstructive syndrome which can cause low Plts along with the conjugated hyperbilirubinemia which the patient also has. 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 Possibly also due to capecitabine and gemcitabine as noted above, although pt has not taken this since Nov 2017. No EtOH use, no other med culprits, no reason to believe he has a new viral (HIV, EBV, etc) infection. No DIC given high fibrinogen. No hemolysis given mostly conjugated bilirubin and normal LDH. Peripheral smear is normal                          --CTM        ##Hyperglycemia - could be sec to pancreatic endocrine insufficiency in setting of panc cancer s/p whipple  Pt does not have a hx of diabetes.                          --cont SSI                          --hypoglycemia protocol       ##Rib pain  Xray did not show metastasis but is not sensitive.      PPx:  ##DVT: SCDs for now given slow Hgb drop, AM team to consider pharmacologic ppx give high risk and also procedures  ##GI: PPI IV BID    ##Diet: Reg  Family updated      ##DNR/DNI     Pt was discussed and seen with Dr. Urbina.     Jeanna Vinson MD PhD   Internal Medicine, PGY 1  p     -----------------------------------------------------------------------------------------------------------------------------------------------      Interval History  No acute  "events overnight. Patient feels that his abdominal distension is worse due to swlling. He denies new pain. Breathing is good.     Review of Systems:   A 4 point review of systems was negative except as noted above.    OBJECTIVE:  /77  Pulse 70  Temp 97  F (36.1  C) (Oral)  Resp 18  Ht 1.829 m (6' 0.01\")  Wt 68.5 kg (151 lb 0.2 oz)  SpO2 99%  BMI 20.48 kg/m2    GENERAL APPEARANCE: alert and no distress  HEENT: has scleral icterus, MMM  Pulmonary: CTAB  CV: regular rhythm, normal rate, no murmur, no rub   - JVP  not elevated    - Edema 2+ BLE,  swollen LEs  Skin: has jaundice  GI: soft, tender to palpation, distended, + ascites, no rebound, has umbilical hernia that is reducible; perc drain site c/d/i  NEURO: mentation intact and speech normal, equally move    Labs:   All labs reviewed by me  Electrolytes/Renal -   Recent Labs   Lab Test  03/27/18   0743  03/26/18   1826  03/26/18   0659  03/25/18   0719   03/14/18   0228   NA  139  137  136  138   < >  134   POTASSIUM  3.6   --   4.2  4.7   < >  5.0   CHLORIDE  104   --   105  106   < >  101   CO2  26   --   23  23   < >  22   BUN  55*   --   56*  50*   < >  84*   CR  1.34*  1.45*  1.44*  1.39*   < >  1.38*   GLC  83   --   179*  111*   < >  241*   CHIDI  7.5*   --   7.9*  8.2*   < >  8.0*   MAG  2.0   --   2.0  2.2   < >  2.4*   PHOS   --    --    --    --    --   3.1    < > = values in this interval not displayed.     CBC -   Recent Labs   Lab Test  03/27/18   1153  03/27/18   0950  03/27/18   0743   WBC  10.3  7.7  Canceled, Test credited   HGB  7.0*  6.7*  Canceled, Test credited   PLT  142*  127*  Canceled, Test credited     LFTs   Recent Labs   Lab Test  03/27/18   0743  03/26/18   0659  03/25/18   0719   ALKPHOS  802*  1351*  1459*   BILITOTAL  2.3*  2.8*  2.9*   ALT  42  68  69   AST  19  35  32   PROTTOTAL  6.0*  6.4*  6.7*   ALBUMIN  3.0*  2.1*  2.3*       Imaging:  See below    Current Medications:    amLODIPine  5 mg Oral Daily     furosemide  " 20 mg Oral Daily     spironolactone  50 mg Oral Daily     vancomycin (VANCOCIN) IV  1,000 mg Intravenous Q24H     pantoprazole  40 mg Oral BID     sodium chloride (PF)  10 mL Intracatheter Q7 Days     ertapenem (INVanz) IV  1 g Intravenous Q24H     micafungin  100 mg Intravenous Q24H     sodium chloride (PF)  10 mL Irrigation Q24H     vitamin D  50,000 Units Oral Q7 Days     multivitamin CF formula  1 capsule Oral Daily     ascorbic acid  250 mg Oral Daily     sertraline  50 mg Oral Daily     amylase-lipase-protease  2 capsule Oral TID w/meals     methadone  5 mg Oral TID     insulin aspart  1-6 Units Subcutaneous Q4H       Jeanna Vinson MD      US Abd  IMPRESSION:   1.  Diffusely heterogeneous liver echotexture with numerous focal  hypoechoic lesions corresponding to hepatic abscesses, better  characterized on 3/15/2018 MRI. The largest lesion measures 2.5 x 2.3  x 3 cm in the right hepatic lobe.  2.  Partially visualized biliary drain in the right hepatic lobe  extending towards the liver hilum, presumably right hepatic duct. No  definite fluid collections along the visualized portions of the drain.  3.  Moderate volume ascites.

## 2018-03-28 NOTE — PROGRESS NOTES
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 Presented with weakness and AMS. On imaging was found to have narrowing of the portal vein confluence concerning for liver abscess formation and ascending cholangitis s/p perc biliary drain by IR. Also found to have polymicrobial bacteremia and fungemia likely from GI source. Pt has remained afebrile and hemodynamically stable. Mentation has also improved.     Changes Today:  -cont vanc and ertapenem  -IV micafungin for yeast  -daily BCx  - await sensitivities  - gave 75 g albumin challenge (complete 3 days today)  - s/p Blood tx; rechecking hgb  - IR consult to obtain brushings of abscess distal to stricture along drain on 3/29  - 1 unit of PRBC for hgb of 7.1    ##Yeast in blood on blood culture from 3/21 and 3/20. Negative BC since 3/22/18.   Plan:  -IV micafungin for now.   -pulled the PICC line and the port-a-cath 3/23         ##E coli, C perfringens, Citrobacter, and enterococcus bacteremia obtained on OSH BCx 3/13  ##Probably biliary source given past hx abscess, current abscess on imaging, and poor drainage of liver   ##Hepatoportal sclerosis  Bacteremia, suspect GI source with possible ascending cholangitis and/or liver abscess. Pt has been afebrile and hemodynamically stable.  U/S showed possible abscess, with retrograde flow in the L portal vein, w/ c/f narrowing or occlusion of the splenoportal confluence.                            --cont ertapenem and vanc                            --culture if spikes                                                         --ERCP on 3/20--bx did not show malignancy     -- s/p IR drainage with percutaneous biliary drain    -- s/p para in IR on 3/21     --will F/U with Dr. Reddy (ID) in 4  weeks       ##Possible SBP  Pt has WBC 1093 with 79% PMNs. So far gram stain neg. Cx pending. Elevated WBC could also be sec to malignant effusion. However, cytology is negative.     Repeat paracentesis 3/26 showing 1200 WBC w/ 92% neutrophils -> worse than prior          --pt is covered with ertapenem+vanc+micafungin    -- Should repeat diagnostic para 3/29 to assess response to treatment                           --giving 75g Albumin on 3/20 for kidney function                                 #Pre-Renal KAT- improving with albumin.    --giving albumin challenge 150 g on 3/26, 75 g on 3/27 and 3/28   -- worsening kidney function after increase diuretics to lasix 40 and spironolactone 100-->so decrease back to lasix 20 and spironolactone 50 on 3/25        ##Pancreatic Insufficiency  ##Chronic Diarrhea  ##Hypovitamin D  ##Pancreatic cancer  (T3N1) s/p whipple 9/2017 along with FOLFIRINOX and Gemzar/Xeloda c/b enterococcal liver abscess (treated) along with non-malignant ascites. Pt last took Gemzar and xeloda in Nov 2017.  Pt has had chronic diarrhea that occurs immediately after eating. Pt has been continuing CREON at home.                            --cont CREON with food     --fecal elastase 15 (low)--indicating diarrhea is not 2/2 malabsorption     --fecal calprotectin elevated, indicating possible ischemia-but according to GI, difficult to interpret in the setting of inflammation                           --GI aware,                           --nutrition consult placed                                                  --start Vit D2 50K units once weekly for 6-8 weeks                                                 --MV ADEK supplementation                            --per oncology recs: will f/u as OP with oncology in 2-4 weeks; recommend repeat CT CAP 1-2 days prior to OP oncology visit    -- IR to obtain brushing bx from biliary stricture tomorrow.      ##Chronic cancer pain                           --cont  dilaudid 2 mg q6hrs prn                           --cont methadone 5 mg TID         ##Normocytic Anemia - stably low  Iron panel shows low iron, low iron binding capacity, and low iron saturation index, and normal ferritin c/w ACD. Peripheral smear is normal, and hemolysis labs negative.    Pt received blood tx on 3/27.   Plan:    --CTM              ##Thrombocytopenia - worsening  Patient possibly has sinusoidal obstructive syndrome which can cause low Plts along with the conjugated hyperbilirubinemia which the patient also has. 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                           --CTM        ##New diagnosis Diabetes - could be sec to pancreatic endocrine insufficiency in setting of panc cancer s/p whipple. HgbA1c 9.1%  Pt does not have a hx of diabetes.                          --cont SSI                          --hypoglycemia protocol    --Needs diabetes education and treatment plan for outpt management            PPx:  ##DVT: SCDs for now given slow Hgb drop, AM team to consider pharmacologic ppx give high risk and also procedures  ##GI: PPI IV BID  ##Diet: Reg  Family updated      ##DNR/DNI     Pt was discussed and seen with Dr. Urbina.     Kalin Macias MD  IM, PGY-3  [phone]    Physician Attestation  I, Macrina Urbina MD, saw this patient with the resident and agree with the resident s findings and plan of care as documented in the resident s note with my edits.     I personally reviewed vital signs, medications, labs and imaging.    Macrina Urbina MD  Date of Service (when I saw the patient): 3/28/18            -----------------------------------------------------------------------------------------------------------------------------------------------      Interval History  No acute events overnight. Patient reported that overall fine. Continues to have low energy. Denies pain. Had normal, brown BM this morning. No black    Review of Systems:   A 4 point review of systems  "was negative except as noted above.    OBJECTIVE:  /69  Pulse 64  Temp 96.6  F (35.9  C) (Oral)  Resp 20  Ht 1.829 m (6' 0.01\")  Wt 68.5 kg (151 lb 0.2 oz)  SpO2 98%  BMI 20.48 kg/m2    GENERAL APPEARANCE: alert and no distress  HEENT: has scleral icterus, MMM  Pulmonary: CTAB  CV: regular rhythm, normal rate, no murmur, no rub   - JVP  not elevated    - Edema 2+ BLE,  swollen LEs  Skin: has jaundice  GI: soft, tender to palpation, distended, + ascites, no rebound, has umbilical hernia that is reducible; perc drain site c/d/i  NEURO: mentation intact and speech normal, equally move    Labs:   All labs reviewed by me  Electrolytes/Renal -   Recent Labs   Lab Test  03/28/18   0737  03/27/18   0743  03/26/18   1826  03/26/18   0659   03/14/18   0228   NA  142  139  137  136   < >  134   POTASSIUM  3.1*  3.6   --   4.2   < >  5.0   CHLORIDE  107  104   --   105   < >  101   CO2  26  26   --   23   < >  22   BUN  48*  55*   --   56*   < >  84*   CR  1.06  1.34*  1.45*  1.44*   < >  1.38*   GLC  137*  83   --   179*   < >  241*   CHIDI  7.5*  7.5*   --   7.9*   < >  8.0*   MAG  2.0  2.0   --   2.0   < >  2.4*   PHOS   --    --    --    --    --   3.1    < > = values in this interval not displayed.     CBC -   Recent Labs   Lab Test  03/28/18   0737  03/27/18   2229  03/27/18   1153  03/27/18   0950   WBC  7.8   --   10.3  7.7   HGB  7.2*  7.1*  7.0*  6.7*   PLT  92*   --   142*  127*     LFTs   Recent Labs   Lab Test  03/28/18   0737  03/27/18   0743  03/26/18   0659   ALKPHOS  743*  802*  1351*   BILITOTAL  3.0*  2.3*  2.8*   ALT  44  42  68   AST  26  19  35   PROTTOTAL  6.2*  6.0*  6.4*   ALBUMIN  3.2*  3.0*  2.1*       Imaging:  See below    Current Medications:    amLODIPine  5 mg Oral Daily     furosemide  20 mg Oral Daily     spironolactone  50 mg Oral Daily     vancomycin (VANCOCIN) IV  1,000 mg Intravenous Q24H     pantoprazole  40 mg Oral BID     sodium chloride (PF)  10 mL Intracatheter Q7 Days     " ertapenem (INVanz) IV  1 g Intravenous Q24H     micafungin  100 mg Intravenous Q24H     sodium chloride (PF)  10 mL Irrigation Q24H     vitamin D  50,000 Units Oral Q7 Days     multivitamin CF formula  1 capsule Oral Daily     ascorbic acid  250 mg Oral Daily     sertraline  50 mg Oral Daily     amylase-lipase-protease  2 capsule Oral TID w/meals     methadone  5 mg Oral TID     insulin aspart  1-6 Units Subcutaneous Q4H       Kalin Macias MD

## 2018-03-28 NOTE — PROGRESS NOTES
Brief GI note:     Chart reviewed. Hgb stable.     Recommend IR guided biliary stricture brushing or biopsy via PTC    GI team will sign off at this time, please page if any questions.       Gastroenterology Inpatient Sign Off Note    Inpatient GI consults service will sign off. No further recommendations at this time. If primary team has addition questions, please page consult fellow listed in Jod[facility].    Current GI Consult Staff: Dr. Rutledge    Follow up recommendations:   No outpatient GI clinic follow-up indicated. Follow-up with primary care provider at timing determined by discharge physician.    If outpatient GI follow-up is felt indicated by primary care, they may coordinate this by placing new GI referral and contacting; General GI clinic - ([phone]); Advanced Endoscopy clinic (Esophageal and Pancreas Biliary Clinics) - ([phone]); IBD clinic - ([phone]); Hepatology Clinic (Liver) - ([phone])     Patient is discussed with Dr. Aamir Lopez  GI fellow  p [phone]

## 2018-03-28 NOTE — PLAN OF CARE
Problem: Patient Care Overview  Goal: Plan of Care/Patient Progress Review  Outcome: No Change  D/I/A Patient A & O X 4, VSS abdominal distended, c/o abd pain received Dilaudid PO X 2, and received scheduled medications as order. Patient ate  Late breakfast 75% and tolerated ok. Biliary drain patent with small out put. Patient received albumin 25% 75gm. . Potassium 3.1 received replacement x 1 per order. Hgb 7.2 and receiving one unit PRBC , VSS. Continue monitor closely and update MD with concerns.

## 2018-03-29 NOTE — PLAN OF CARE
Interventional Radiology Pre-Procedure Sedation Assessment   Time of Assessment: 4:16 PM    Expected Level: Moderate Sedation    Indication: Sedation is required for the following type of Procedure: Biopsy    Sedation and procedural consent: Risks, benefits and alternatives were discussed with Patient    PO Intake: Appropriately NPO for procedure    ASA Class: Class 2 - MILD SYSTEMIC DISEASE, NO ACUTE PROBLEMS, NO FUNCTIONAL LIMITATIONS.    Mallampati: Grade 1:  Soft palate, uvula, tonsillar pillars, and posterior pharyngeal wall visible    Lungs: Lungs Clear with good breath sounds bilaterally    Heart: Normal heart sounds and rate    History and physical reviewed and no updates needed. I have reviewed the lab findings, diagnostic data, medications, and the plan for sedation. I have determined this patient to be an appropriate candidate for the planned sedation and procedure and have reassessed the patient IMMEDIATELY PRIOR to sedation and procedure.    Philly Cui, RPA

## 2018-03-29 NOTE — PROGRESS NOTES
Notified of patient unwitnessed fall. Evaluated at the bedside. Patient reports he was seated at the edge of the bed and got caught on his stockings causing him to slide to the floor. Does endorse falling onto his knee and side. Denies LOC or hitting his head. Denies any knee, hip, shoulder, or elbow pain. No contusions or abrasions noted. Nontender on exam. VSS and wnl.

## 2018-03-29 NOTE — PROGRESS NOTES
"  [facility] GENERAL INFECTIOUS DISEASES : PROGRESS NOTE  Naresh Poole : 1970 Sex: male:   Medical record number [id number] Attending Physician: Manohar Arizmendi MD  Date of Service: 2018    ASSESSMENT :  Naresh Poole is a 47 year old with pancreatic cancer s/p Whipple and chemo in 2017 admitted with bacteremia and GI bleed.      # Multi-organism bacteremia with E. Coli, Citrobacter, Clostridum, Enterococcus faecalis  - Source likely intra-abdominal with concern for ascending cholangitis.       # Candida albicans/ candidemia  on blood culture from 3/21 positive by 3/22/18. Negative since 3/22/18      # Hepatic abscesses.     Recommendations:   - May place PICC  - Can stop daily blood cultures   - stop  Micafungin, start fluconazole 400 mg po daily , now that AST, ALT have normalized  . Monitor  LFTs   - continue Ertapenem plus IV vancomycin.  , PO fluconazole x 4-6 weeks from 3/22/18   - Difficult to assess on CT. will need follow-up imaging of liver with CT or ultrasound to establish abx duration.     ID will sign off . Please call with questions . Follow-up with Dr Reddy/ me in 3-4 weeks     Jose Cam MD, M.Med.Sc.  Staff, Infectious Diseases  Pager: [phone]     SUBJECTIVE:   Feels better. Denies pain. No shortness of breath. Appetite is ok . No diarrhea     ROS:  A five-point review of systems was obtained and was negative with the exception of that which is described above.  No Known Allergies    CURRENT ANTI-INFECTIVES:   Vancomycin IV   Ertapenem IV   Micafungin IV     EXAMINATION:   Vital Signs: /82 (BP Location: Right arm)  Pulse 67  Temp 96.4  F (35.8  C) (Oral)  Resp 20  Ht 1.829 m (6' 0.01\")  Wt 68.5 kg (151 lb 0.2 oz)  SpO2 100%  BMI 20.48 kg/m2   GENERAL:  Alert, oriented , in bed in no acute distress. Tired looking   HEENT:  Head is normocephalic, atraumatic   EYES: pale   ENT:  Oropharynx is moist without exudates or ulcers. Tongue is " midline  NECK:  Supple. No  Cervical lymphadenopathy  LUNGS:  Clear to auscultation bilateral.   CARDIOVASCULAR:  Regular rate and rhythm with no murmurs, gallops or rubs.  ABDOMEN:  Normal bowel sounds, soft, nontender.   SKIN:  No acute rashes.    NEUROLOGIC:  Alert, oriented     NEW DATA/RESULTS:   Culture Micro   Date Value Ref Range Status   03/28/2018 No growth after 10 hours  Preliminary   03/28/2018 No growth after 10 hours  Preliminary   03/27/2018 No growth after 1 day  Preliminary   03/27/2018 No growth after 1 day  Preliminary   03/26/2018 Culture negative after 2 days  Preliminary     Recent Labs   Lab Test  03/14/18   1742  09/13/17   1810   CRP  160.0*  5.8     Recent Labs   Lab Test  03/28/18   0737  03/27/18   1153  03/27/18   0950  03/27/18   0743  03/26/18   0659  03/25/18   0719   WBC  7.8  10.3  7.7  Canceled, Test credited  14.1*  11.1*     Recent Labs   Lab Test  03/28/18   0737  03/27/18   0743  03/26/18   1826  03/26/18   0659   CR  1.06  1.34*  1.45*  1.44*   GFRESTIMATED  74  57*  52*  52*       Hematology Studies  Recent Labs   Lab Test  03/28/18   0737  03/27/18   1153  03/27/18   0950  03/27/18   0743  03/26/18   0659  03/25/18   0719   03/15/18   0658  03/14/18   0642   09/13/17   1810   WBC  7.8  10.3  7.7  Canceled, Test credited  14.1*  11.1*   < >  10.4  13.9*   < >  3.4*   ANEU   --   9.2*   --    --    --    --    --   8.9*  12.4*   --   2.0   AEOS   --   0.0   --    --    --    --    --   0.0  0.0   --   0.1   HCT  22.2*  22.0*  21.1*  Canceled, Test credited  25.7*  25.7*   < >  26.8*  27.4*   < >  32.6*   PLT  92*  142*  127*  Canceled, Test credited  287  289   < >  34*  48*   < >  155    < > = values in this interval not displayed.     Metabolic  Recent Labs   Lab Test  03/28/18   0737  03/27/18   0743  03/26/18   1826  03/26/18   0659   NA  142  139  137  136   BUN  48*  55*   --   56*   CO2  26  26   --   23   CR  1.06  1.34*  1.45*  1.44*   GFRESTIMATED  74  57*  52*  52*      Hepatic Studies  Recent Labs   Lab Test  03/28/18   0737  03/27/18   0743  03/26/18   0659   BILITOTAL  3.0*  2.3*  2.8*   ALKPHOS  743*  802*  1351*   ALBUMIN  3.2*  3.0*  2.1*   AST  26  19  35   ALT  44  42  68

## 2018-03-29 NOTE — PROGRESS NOTES
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 Presented with weakness and AMS. On imaging was found to have narrowing of the portal vein confluence concerning for liver abscess formation and ascending cholangitis s/p perc biliary drain by IR. Also found to have polymicrobial bacteremia and fungemia likely from GI source. Pt has remained afebrile and hemodynamically stable. Mentation has also improved.     Changes Today:  -cont vanc and ertapenem  -D/C IV micafungin for yeast; now on PO fluconazole  -placing PICC; pt to D/C tomorrow, and f/u w/ID with Dr. Patino 3-4 weeks  -had 3L removed by paracentesis; gave 75 g albumin  -diagnostic para shows 269 WBC so no SBP!  -pt to go for IR brushing of abscess  -pt given 100 mEq of K for K of 3.1  -hemoglobin stable today  -low platelets-checking HIT screen    ##Candidemia: from BCx on 3/21 and 3/20. Negative BC since 3/22/18.   Plan:  -IV micafungin D/C  -transitioned to PO micafungin  -pulled the PICC line and the port-a-cath 3/23 and replacing PICC on 3/29         ##E coli, C perfringens, Citrobacter, and enterococcus bacteremia obtained on OSH BCx 3/13  ##Probably biliary source given past hx abscess, current abscess on imaging, and poor drainage of liver   ##Hepatoportal sclerosis  Bacteremia, suspect GI source with possible ascending cholangitis and/or liver abscess. Pt has been afebrile and hemodynamically stable.  U/S showed possible abscess, with retrograde flow in the L portal vein, w/ c/f narrowing or occlusion of the splenoportal confluence.                           --cont ertapenem and vanc                          --culture if spikes                                                        --ERCP on 3/20--bx did not show  malignancy    -- s/p IR drainage with percutaneous biliary drain    -- s/p para in IR on 3/21    --pt to go for IR brushing of hepatic abscess on 3/29    --placing PICC on 3/29    --will F/U with Dr. Reddy (ID) in 4 weeks       ##Possible SBP  Pt has WBC 1093 with 79% PMNs. So far gram stain neg. Cx pending. Elevated WBC could also be sec to malignant effusion. However, cytology is negative.     Repeat paracentesis 3/26 showing 1200 WBC w/ 92% neutrophils -> worse than prior          --pt is covered with ertapenem+vanc+micafungin    -- Repeat diagnostic para 3/29 shows 269 WBC; 3L also removed; given 75 mg albumin                           #Pre-Renal KAT- Resolved with albumin.    --giving albumin challenge 150 g on 3/26, 75 g on 3/27 and 3/28   -- worsening kidney function after increase diuretics to lasix 40 and spironolactone 100-->so decrease back to lasix 20 and spironolactone 50 on 3/25   --pt underwent therapeutic para on 3/29 with 3L removed; pt given 75 g albumin to prevent recurrent KAT        ##Pancreatic Insufficiency  ##Chronic Diarrhea  ##Hypovitamin D  ##Pancreatic cancer  (T3N1) s/p whipple 9/2017 along with FOLFIRINOX and Gemzar/Xeloda c/b enterococcal liver abscess (treated) along with non-malignant ascites. Pt last took Gemzar and xeloda in Nov 2017.  Pt has had chronic diarrhea that occurs immediately after eating. Pt has been continuing CREON at home.                            --cont CREON with food     --fecal elastase 15 (low)--indicating diarrhea is not 2/2 malabsorption     --fecal calprotectin elevated, indicating possible ischemia-but according to GI, difficult to interpret in the setting of inflammation                           --GI aware,                           --nutrition consult placed                                                  --start Vit D2 50K units once weekly for 6-8 weeks                                                 --MV ADEK supplementation                             --per oncology recs: will f/u as OP with oncology in 2-4 weeks; recommend repeat CT CAP 1-2 days prior to OP oncology visit    -- IR to obtain brushing bx from biliary stricture 3/29; fill f/u on pathology      ##Chronic cancer pain                           --cont dilaudid 2 mg q6hrs prn                           --cont methadone 5 mg TID         ##Normocytic Anemia - stably low  Iron panel shows low iron, low iron binding capacity, and low iron saturation index, and normal ferritin c/w ACD. Peripheral smear is normal, and hemolysis labs negative.    Pt received blood tx on 3/27 and 3/28; hemoglobin stable O/N 3/29  Plan:    --CTM              ##Thrombocytopenia - worsening  Patient possibly has sinusoidal obstructive syndrome which can cause low Plts along with the conjugated hyperbilirubinemia which the patient also has. 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                           --HIT screen sent    --holding heparin     --CTM        ##New diagnosis Diabetes - could be sec to pancreatic endocrine insufficiency in setting of panc cancer s/p whipple. HgbA1c 9.1%  Pt does not have a hx of diabetes.                          --cont SSI                          --hypoglycemia protocol    --diabetes education consult placed              PPx:  ##DVT: SCDs for now given slow Hgb drop, AM team to consider pharmacologic ppx give high risk and also procedures  ##GI: PPI IV BID  ##Diet: Reg  Family updated      ##DNR/DNI     Pt was discussed and seen with Dr. Osman Vinson MD PhD   Internal Medicine, PGY 1  p   -----------------------------------------------------------------------------------------------------------------------------------------------      Interval History  Pt had controlled fall to floor due to lymphedema wraps being too loose. Pt and family feel comfortable to go home. Patient reported that overall fine. Feels better today with better energy.   "Denies pain. Had normal, brown BM this morning. No black or bloody stools.     Review of Systems:   A 4 point review of systems was negative except as noted above.    OBJECTIVE:  BP (!) 132/92 (BP Location: Right arm)  Pulse 72  Temp 97.9  F (36.6  C) (Oral)  Resp 16  Ht 1.829 m (6' 0.01\")  Wt 68.9 kg (152 lb)  SpO2 96%  BMI 20.61 kg/m2    GENERAL APPEARANCE: alert and no distress  HEENT: has scleral icterus, MMM  Pulmonary: CTAB  CV: regular rhythm, normal rate, no murmur, no rub   - JVP  not elevated    - Extremities without edema or swelling   Skin: has jaundice  GI: soft, tender to palpation, less distended, + ascites, no rebound, has umbilical hernia that is reducible; perc drain site c/d/i  NEURO: mentation intact and speech normal, equally move    Labs:   All labs reviewed by me  Electrolytes/Renal -   Recent Labs   Lab Test  03/29/18 0709 03/28/18 0737 03/27/18 0743   03/14/18   0228   NA  143  142  139   < >  134   POTASSIUM  3.1*  3.1*  3.6   < >  5.0   CHLORIDE  108  107  104   < >  101   CO2  27  26  26   < >  22   BUN  40*  48*  55*   < >  84*   CR  0.82  1.06  1.34*   < >  1.38*   GLC  127*  137*  83   < >  241*   CHIDI  7.6*  7.5*  7.5*   < >  8.0*   MAG  2.1  2.0  2.0   < >  2.4*   PHOS   --    --    --    --   3.1    < > = values in this interval not displayed.     CBC -   Recent Labs   Lab Test  03/29/18   0709 03/28/18 2020 03/28/18 0737   03/27/18   1153   WBC  7.6   --   7.8   --   10.3   HGB  8.1*  8.3*  7.2*   < >  7.0*   PLT  70*   --   92*   --   142*    < > = values in this interval not displayed.     LFTs   Recent Labs   Lab Test  03/29/18   0709 03/28/18 0737 03/27/18 0743   ALKPHOS  671*  743*  802*   BILITOTAL  3.5*  3.0*  2.3*   ALT  43  44  42   AST  24  26  19   PROTTOTAL  6.4*  6.2*  6.0*   ALBUMIN  3.5  3.2*  3.0*       Imaging:  See below    Current Medications:    fluconazole  400 mg Oral Daily     amLODIPine  5 mg Oral Daily     furosemide  20 mg Oral " Daily     spironolactone  50 mg Oral Daily     vancomycin (VANCOCIN) IV  1,000 mg Intravenous Q24H     pantoprazole  40 mg Oral BID     sodium chloride (PF)  10 mL Intracatheter Q7 Days     ertapenem (INVanz) IV  1 g Intravenous Q24H     sodium chloride (PF)  10 mL Irrigation Q24H     vitamin D  50,000 Units Oral Q7 Days     multivitamin CF formula  1 capsule Oral Daily     ascorbic acid  250 mg Oral Daily     sertraline  50 mg Oral Daily     amylase-lipase-protease  2 capsule Oral TID w/meals     methadone  5 mg Oral TID     insulin aspart  1-6 Units Subcutaneous Q4H       - MEDICATION INSTRUCTIONS -       - MEDICATION INSTRUCTIONS -       Jeanna Vinson MD

## 2018-03-29 NOTE — PROGRESS NOTES
Care Coordinator- Discharge Planning     Admission Date/Time:  3/14/2018  Attending MD:  Macrina Urbina MD     Data  Date of initial CC assessment:    Chart reviewed, discussed with interdisciplinary team.   Patient was admitted for:   1. Weakness         Assessment  Full assessment completed in previous note    Coordination of Care and Referrals: Provided patient/family with options for Home Care.  Per therapy recommendations, patient would benefit from home therapies post discharge. Met with patient and wife about home care. Patient would like outpatient therapy but unable to drive and get there, so he has agreed to home care. Given choice of agencies and referral placed to [facility] for RN, PT, and OT services.   Plan is for discharge home tomorrow with [facility] and [facility].       Plan  Anticipated Discharge Date:  Tomorrow  Anticipated Discharge Plan:  Home with services        Jennie Oneil RN

## 2018-03-29 NOTE — PLAN OF CARE
Problem: Patient Care Overview  Goal: Plan of Care/Patient Progress Review  PLC staff called and reported to writer that pt was called to interventional radiology for a procedure, so staff had to finish diabetic education with wife. PLC staff said she gave a glucometer to wife, but pt needs more strips and needle at discharge to check blood sugar at home.

## 2018-03-29 NOTE — PLAN OF CARE
03/29/18 1658     Starla Malave-Registered Nurse (Nursing)  Patient will need more One Touch Verio IQ strips and lancets ordered for discharge.All of above information discussed with the patient's nurse.  03/29/18 1657     Starla Malave-Registered Nurse (Nursing)  3/30/18 Patient(pt),friend,and wife came to [facility] BGM and insulin appointment.Pt.got called to IR right away.I took the pt.and his friend to IR and the pt's wife stayed in the [facility] for education.Wife stated she had Gestational diabetes and had to check her blood sugars and gave herself SQ insulin.Wife correctly returned the bascis of a One Touch Verio BGM and Novolog pen ([facility] demo pen) and SQ into [facility] model.I also covered basic diabetic survival skills.  Pt.to have a PICC placed tomorrow.I covered basic PICC information and troubleshooting.Wife also returned saline flush and IV medication via [facility] using [facility] supplies and model.Home care to follow.Wife will continue to practice skills on the unit with nursing supervision.  All written material given and reviewed.Also see education flow sheet.

## 2018-03-29 NOTE — PROCEDURES
Interventional Radiology Brief Post Procedure Note    Procedure: IR BILIARY TUBE CHANGE    Proceduralist: Tonny Mari MD    Assistant: Jhoan Scott MD    Time Out: Prior to the start of the procedure and with procedural staff participation, I verbally confirmed the patient s identity using two indicators, relevant allergies, that the procedure was appropriate and matched the consent or emergent situation, and that the correct equipment/implants were available. Immediately prior to starting the procedure I conducted the Time Out with the procedural staff and re-confirmed the patient s name, procedure, and site/side. (The Joint Commission universal protocol was followed.)  Yes    Medications   Medication Event Details Admin User Admin Time   iodixanol (VISIPAQUE 320) injection 50 mL Medication Given Dose: 25 mL; Route: Tube; Scheduled Time:  4:45 PM Francisca Medina 3/29/2018  5:09 PM       Sedation: IR Nurse Monitored Care   Post Procedure Summary:  Prior to the start of the procedure and with procedural staff participation, I verbally confirmed the patient s identity using two indicators, relevant allergies, that the procedure was appropriate and matched the consent or emergent situation, and that the correct equipment/implants were available. Immediately prior to starting the procedure I conducted the Time Out with the procedural staff and re-confirmed the patient s name, procedure, and site/side. (The Joint Commission universal protocol was followed.)  Yes       Sedatives: Fentanyl and Midazolam (Versed)    Vital signs, airway and pulse oximetry were monitored and remained stable throughout the procedure and sedation was maintained until the procedure was complete.  The patient was monitored by staff until sedation discharge criteria were met.    Patient tolerance: Patient tolerated the procedure well with no immediate complications.    Time of sedation in minutes: 30 Minutes minutes from beginning to end of  physician one to one monitoring.          Findings:   Brush and forceps core biopsy of the biliary stricture in the CBD     Estimated Blood Loss: Minimal    Fluoroscopy Time:  minute(s)    SPECIMENS: Core needle biopsy specimens sent for pathological analysis    Complications: 1. None     Condition: Stable    Plan:   - bedrest for 1 hr   - keep the biliary drain to gravity drainage     Comments: See dictated procedure note for full details.    Jhoan Scott MD

## 2018-03-29 NOTE — PROGRESS NOTES
Patient Name: Naresh Poole  Medical Record Number: [id number]  Today's Date: 3/29/2018    Procedure: Cholangiogram, biliary biopsy, biliary tube exchange  Proceduralist: Dr. Sonia Carroll    Sedation start time: 1640  Sedation end time: 1710  Sedation medications administered: 0.5 mg Versed, 25 mcg Fentanyl  Total sedation time: 30 min    Procedure start time: 1640  Procedure end time: 1710    Report given to:  charge    Other Notes: Pt arrived to IR room 5 from 2A. Pt denies any questions or concerns regarding procedure. Pt positioned supine and monitored per protocol. Pt tolerated procedure without any noted complications. Pt transferred back to .

## 2018-03-29 NOTE — PLAN OF CARE
Problem: Patient Care Overview  Goal: Plan of Care/Patient Progress Review  Discharge Planner PT 5B  Patient plan for discharge: home with home PT, assist from family  Current status: Pt performs bed mobility independently, completes functional transfers from bed, toilet and chair heights with SBA; does rely on use of BUE to perform. Ambulates 2 x 125' distances with SBA, no LOB noted today however gait speed significantly slowed with short and wide JOLIE during ambulation. Completes x 4 stairs with BUE support on railings and close SBA, has x 1 knee buckling during ascending however able to complete remainder of stairs without knee buckling with cues for step-to gait pattern vs reciprocal stepping. Discussed d/c plan with pt and spouse; pt does not believe he needs a rehab stay and endorses his fall was a 'fluke' and 'completely preventable' as his compression stockings were caught on the bed when attempting to transfer. Pt and spouse both endorse feeling comfortable and safe with home d/c plan. Agreeable to continued PT to improve strength.   Barriers to return to prior living situation: functional strength, activity tolerance, balance  Recommendations for discharge: pt denies need for TCU; recommend home with 24 hr family assist and HH vs OP PT. Pt will benefit from FWW for safety with mobility.  Rationale for recommendations: pt and family feel comfortable with discharge home once medically appropriate, completes x 4 stairs and mobility adequate for return home with assistance from spouse. Pt is able to stay on the main floor of his home once inside. Due to gait instability and fatigue, pt would benefit from FWW.        Entered by: Lev Shepard 03/29/2018 11:21 AM

## 2018-03-29 NOTE — PLAN OF CARE
Problem: Patient Care Overview  Goal: Plan of Care/Patient Progress Review  Outcome: Improving  D/I/A: Patient A & O X 3, VSS received scheduled medications and Dilaudid po PRN x 2. Patient remain NPO for procedure, and has order for PICC placement. BS , 110, 100. Potassium level 3.1, and replaced with potassium 60mEQ solution. Also has order for anther 40 mEq not here yet from Pharmacy.  Void adequate. Patient up independently in room Spouse at bedside . Call light in reach. Continue with POC and update MD with concerns.     Patient had Paracinesis at bedside and tolerate Procedure well.

## 2018-03-29 NOTE — PROCEDURES
Procedure Note  The risks and benefits of the procedure were explained to the patient, who expressed understanding and opted to proceed.  Consent had been obtained 3 days earlier, and placed in the chart.  A time out was performed.  An area of ascites was located using ultrasound and marked in the left lower quadrant; the area was prepped and draped in the usual sterile fashion.  4 ml of 1% lidocaine was instilled and ascites located.  The paracentesis catheter and needle were inserted until ascites obtained then the needle removed.  The apparatus was connected to vacuum bottles and a total of 3000 ml removed.   The catheter was withdrawn and the area dressed. The patient tolerated the procedure well. Pre and post procedure static ultrasound images were obtained and saved in the patient's medical record.   Location: LLQ  Total volume removed: 3L   Fluid sent to the lab:  Yes    Lev Miguel M.D.  Attending Physician  [facility] Medicine  Bedside Procedures Service  [phone]

## 2018-03-29 NOTE — PLAN OF CARE
Problem: Patient Care Overview  Goal: Plan of Care/Patient Progress Review  Edema 5B: Significant reductions noted in B LE edema - generalized 1+ pitting in legs with soft 2+ in dorsal feet. Patient transitioned from wraps to size F comperm compression stockings for further management of edema. Compression stockings to be worn 23/24 hours per day and removed daily for skin cares however removed completely if causing pain/numbness or become soiled - see patient care order for details.

## 2018-03-29 NOTE — PROGRESS NOTES
Vascular Access Services Notes:    Patient was not available for PICC placement. Patient was getting ready for an IR procedure per RN (Jayna). 5B RN to call VAS when pt's back from IR.        NUBIA CartyN, RN [facility]

## 2018-03-29 NOTE — PROGRESS NOTES
CLINICAL NUTRITION SERVICES - REASSESSMENT NOTE     Nutrition Prescription    RECOMMENDATIONS FOR MDs/PROVIDERS TO ORDER:  1.  High kcal / high protein diet + Ensure/Boost plus TID - (discussed with spouse)   2. Recommend Creon 87555, please increase to 3 capsules with each meals,    Will provide 1058 unit of lipase per kg/meal ( reference range 500-2500 unit of lipase /kg/meal).   3. Continue with ANGÉLICA plus CF MVI formula (  1 capsule daily oral provides 00196 IU vitamin A, 1500 IU of vitamin D, 200 IU of vitamin E, 1000 mcg vitamin K) for malabsorption.     Malnutrition Status:    Severe malnutrition in the context of chronic illness    Recommendations already ordered by Registered Dietitian (RD):  Per spouse patient will be discharging tomorrow , no more intervention at this time     Future/Additional Recommendations:  None      EVALUATION OF THE PROGRESS TOWARD GOALS   Diet:   Regular diet + Ensure plus in between meals     Intake:  Visited with patient and his wife earlier. Per spouse, patient has been kept NPO today for IR Procedure ( abdominal paracentesis). Patient notes that he was hungry and wanting to eat soon.     - Patient was getting ready for IR procedure. Per patients wife, patient continues to eat well when not NPO. Has had significant days of NPO for test or procedure over the past week.   - Per spouse, patient continues to consumes 2 Ensure/Boost plus daily.     Per RN/flow sheet, patient has been consuming mostly 50-75% of meals.       Updated ASSESSED NUTRITION NEEDS per  68 kg admission dry wt   Estimated Energy Needs: 2380 - 2720 kcals/day (35  - 40 kcals/kg )  Justification: Increased needs and Underweight, higher end for malabsorption / wt restoration   Estimated Protein Needs: 102 - 136  grams protein/day (1.5 - 2 grams of pro/kg)  Justification: Hypercatabolism with acute illness and Repletion  Estimated Fluid Needs:(1 mL/kcal)   Justification: Maintenance + GI losses    NEW FINDINGS   -  Per spouse, Pt may discharge home tomorrow.     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 Presented with weakness and AMS. On imaging was found to have narrowing of the portal vein confluence concerning for liver abscess formation and ascending cholangitis. S/P IR drainage with percutaneous drain;     - ERCP on 3/20--bx did not show malignancy.   - s/p para in IR, 3 liters removed   - IR brushing of hepatic abscess on 3/29    - Patient with BLE edema +1-2  - Biliary drain open to gravity,  - Chronic cancer pain, on abx  - Chronic diarrhea, pancreatic insufficiency, On creon, 2 capsule 40854, TID with meals       MALNUTRITION  % Intake: Malabsorption despite fair intake   % Weight Loss: within baseline this admit, severe wt loss previously   Subcutaneous Fat Loss: Facial region Severe and Thoracic/intercostal: Severe  Muscle Loss: Thoracic region (clavicle, acromium bone, deltoid, trapezius, pectoral), Upper arm (bicep, tricep) and Lower arm  (forearm) all Severe   Fluid Accumulation/Edema: Moderate LLE (  1+ pitting in legs with soft 2+ in dorsal feet).   Malnutrition Diagnosis: Severe malnutrition in the context of chronic illness    Previous Goals   Patient to consume % of nutritionally adequate meal trays TID, or the equivalent with supplements/snacks.  Evaluation: Met intermittently     Previous Nutrition Diagnosis  Inadequate protein-energy intake related to altered GI status associated with GI losses of nutrients related to whipple surgery and biliary losses, as evidenced by severe 18% wt loss since post whipple surgery in September, evidence of severe fat / muscle loss.     Evaluation: No change    CURRENT NUTRITION DIAGNOSIS  Inadequate protein-energy intake related to altered GI status associated with GI losses of nutrients related to whipple surgery and biliary losses, as evidenced by severe 18% wt loss since  post whipple surgery in September, evidence of severe fat / muscle loss.      INTERVENTIONS  Implementation  Medical food supplement therapy - Discussed oral supplements x 3 per day, encouraged intake. Encouraged high kcal meals and snacks to meet estimated needs, wt restoration     Goals  Patient to consume % of nutritionally adequate meal trays TID, or the equivalent with supplements/snacks.    Monitoring/Evaluation  Progress toward goals will be monitored and evaluated per protocol.    Lexx Delgado RD/ЕКАТЕРИНА  Pager [phone]

## 2018-03-29 NOTE — CONSULTS
Patient wife to be seen by [facility] for education on insulin administration and blood glucose monitoring.  Lupis George MS RN CDE [facility]  [phone]

## 2018-03-29 NOTE — PLAN OF CARE
"Problem: Patient Care Overview  Goal: Plan of Care/Patient Progress Review  Outcome: No Change  /86 (BP Location: Right arm)  Pulse 72  Temp 96  F (35.6  C) (Oral)  Resp 16  Ht 1.829 m (6' 0.01\")  Wt 68.9 kg (152 lb)  SpO2 98%  BMI 20.61 kg/m2    0734-4869:  Pt A&O.  Up independently, but weak, had fall on eves.  VSS on RA.  R PIV TKO.  Orders placed for new PICC placement.  R biliary drain to gravity, small output.  NPO for biopsy of biliary drain today.  4mg PO dilaudid given x 1 for c/o abdominal pain, distended.  Hgb=8.3, K=3.1, Platelets=92,  and Bili=3.0.  PY=080 and 136, no SSI given per order.  Need sputum sample.  Voiding.  No BM this shift.  Will continue to monitor and follow POC.        "

## 2018-03-29 NOTE — PROGRESS NOTES
[facility]  Met with pt and spouse to discuss plans for HC.  Pt to be discharged home 3/30  and has agreed to have [facility] follow with services of SN and PT. Patient care support center processing referral.  Pt and spouse verbalized understanding that initial visit is scheduled for  1 to 2 days after discharge.   Pt has 24 hour phone number for [facility] for any questions or concerns.    Thank you  Marta Bartlett RN, BSN  [facility] Liaison  [phone]

## 2018-03-29 NOTE — PLAN OF CARE
Problem: Patient Care Overview  Goal: Plan of Care/Patient Progress Review  Outcome: No Change  A&Ox4, VSS. Finished administering blood, pt tolerated well.  Pt had fall at 2014, was found by CN bending down on one knee and unable to get up on own d/t weakness.  Pt reports that he slipped on a pillow that was laying on the floor.  No injuries noted, pt denies pain r/t fall.  Pt c/o R abd pain, received dilaudid x1 and using aqua K pad.  Appetite is good, BG monitoring completed.  PIV infiltrated, new one placed.  Biliary drain irrigated.  Lymph wraps on BLEs. Continue to monitor and follow POC

## 2018-03-29 NOTE — PHARMACY-VANCOMYCIN DOSING SERVICE
Pharmacy Vancomycin Note  Date of Service 2018  Patient's  1970   47 year old, male    Indication: Bacteremia  Goal Trough Level: 15-20 mg/L  Day of Therapy: 6  Current Vancomycin regimen:  1000 mg IV q24h    Current estimated CrCl = Estimated Creatinine Clearance: 107.4 mL/min (based on Cr of 0.82).    Creatinine for last 3 days  3/26/2018:  6:26 PM Creatinine 1.45 mg/dL  3/27/2018:  7:43 AM Creatinine 1.34 mg/dL  3/28/2018:  7:37 AM Creatinine 1.06 mg/dL  3/29/2018:  7:09 AM Creatinine 0.82 mg/dL    Recent Vancomycin Levels (past 3 days)  3/27/2018:  1:54 PM Vancomycin Level 21.8 mg/L  3/29/2018:  2:54 PM Vancomycin Level 15.0 mg/L 22.5 hour level in a 24 hour dosing regimen: Extrapolated trough ~ 13.5    Vancomycin IV Administrations (past 72 hours)                   vancomycin (VANCOCIN) 1000 mg in dextrose 5% 200 mL PREMIX (mg) 1,000 mg New Bag 18 1725     1,000 mg New Bag 18 1735                Nephrotoxins and other renal medications (Future)    Start     Dose/Rate Route Frequency Ordered Stop    18 1619  vancomycin (VANCOCIN) 1,250 mg in sodium chloride 0.9 % 250 mL intermittent infusion      1,250 mg  over 90 Minutes Intravenous EVERY 24 HOURS 18 1619      18 0800  furosemide (LASIX) tablet 20 mg      20 mg Oral DAILY 18 1028               Contrast Orders - past 72 hours     None          Interpretation of levels and current regimen:  Trough level is  Subtherapeutic- slightly    Has serum creatinine changed > 50% in last 72 hours: Yes    Renal Function: Improving- likely stable now    Plan:  1.  Increase Dose to 1250mg IV vanco Q24H  2.  Pharmacy will check trough levels as appropriate in 1-3 Days.    3. Serum creatinine levels will be ordered daily for the first week of therapy and at least twice weekly for subsequent weeks.      Sg Salmeron PharmD, [facility]    [phone]  Pager [phone]        .

## 2018-03-29 NOTE — PROGRESS NOTES
"D/I  Answered patient's call light and went to his room and found patient in a kneeling position on the far side of his bed.  States \" I slipped on the pillow down there and my socks are too big\". Assisted patient to a standing position and back to bed.  Right knee with some indentation but other than that,  No acute changes noted, VS remain stable, Reminded patient to call for assistance when getting up and/or OOB.    "

## 2018-03-30 NOTE — PLAN OF CARE
Problem: Patient Care Overview  Goal: Plan of Care/Patient Progress Review  Outcome: No Change  3169-2329: Pt POD#0 s/p cholangiogram, biliary biopsy, and biliary tube exchange. Pt AVSS; RA. Pt c/o abdominal pain managed with prn po dilaudid (see MAR). PICC placed today in LUE; currently infusing IV vanco. PIV SL. Pt up ad cristy in room. Regular diet. . Pt had PLC diabetes and PICC today; will need supplies for fingerstick ordered (needles and glucose strips). Continue POC.

## 2018-03-30 NOTE — CONSULTS
[facility] Hematology-Oncology New Consult          Naresh Poole MRN# [id number]   Age: 47 year old YOB: 1970   Date of Admission: 3/14/2018     Reason for consult: Thrombocytopenia         Assessment and Recommendations:     1  Radiographically stage T3 N1 (stage IIb) pancreas cancer diagnosed January 10, 2017, status post neoadjuvant chemotherapy, status post pylorus preserving Whipple procedure without splenectomy May 9, 2017, and adjuvant chemotherapy in fall 2017  2.  Intra-abdominal abscess s/p per biliary drain, and recurrent ascites, nonmalignant  3.  Progressive splenomegaly  4.  Recent GI bleeding  5.  Hepatic dysfunction, suspect portal hypertension  6.  Recurrent non-malignant ascites  7.  Polymicrobial bacteremia/fungemia  8.  Coagulopathy of liver disease  9.  Iron studies suggestive of ACI.   10. Chronic, progressive thrombocytopenia    Mr. Naresh Poole is a  47 year old man with a complex PMHx a/w weakness and AMS, found to have narrowing of the portal vein confluence concerning for liver abscess formation/ascending cholangitis s/p percutaneous biliary drain.     Patient describes recent increased dark tarry stools and diarrhea, concerning for GI bleeding event.  Recent iron studies demonstrate ACI, and reticulocyte count is suppressed (secondary to chronic illness); these factors likely are sufficient to account for patients anemia.  Ertapenem and other recent antibiotics may also be contributing.     Thrombocytopenia is multifactorial, attributable to patients splenomegaly (spleen increasing on size on serial imaging, to 17 cm now) and BM suppression due to chronic illness.     Recommendations:  --Check   --Assess for causes of hepatic dysfunction including hepatitis virologies if these have not been performed  --Consider checking isolated factor levels (2,5,7,8,10) to evaluate degree of factor deficiency, guide further VK repletion.  --Will follow brush  border biopsy results  --Please recheck folate (borderline low 3/14), consider supplementation  --Continue to assess for ongoing bleeding    Pt seen and discussed with Dr. Bullock who agrees with the plan.    Please don't hesitate to call us with any questions    Fidel Craft MD/PhD  Fellow, Division of Hematology/Oncology and Bone Marrow Transplantation  [facility]  [phone]      Attending Note:  I have reviewed the patient chart, and interviewed and examined the patient.  I agree with the assessment and plan.  Maria A Bullock MD  Hematology      HPI:   Mr. Naresh Poole is a 47 year old man with a history of T3 N1, stage IIb, pancreas cancer diagnosed January 10, 2017, status post neoadjuvant for FOLFIRINOX, pylorus and spleen-sparing Whipple resection May 9, 2017, and adjuvant gemcitabine and Xeloda in October 2017.  He is now admitted with altered mental status and weakness, found to have a polymicrobial bacteremia secondary to intra-abdominal source in the setting of known and worsening hepatic abscess, now status post biliary drain.  Hematology consult service has been asked to evaluate his progressive thrombocytopenia and anemia.      Recent Labs   Lab Test  03/30/18   0707  03/29/18   0709  03/28/18   2020  03/28/18   0737  03/27/18   2229  03/27/18   1153  03/27/18   0950   03/15/18   0658   03/14/18   0642   09/13/17   1810   WBC  5.2  7.6   --   7.8   --   10.3  7.7   < >  10.4   --   13.9*   < >  3.4*   NEUTROPHIL   --    --    --    --    --   89.5   --    --   85.8   --   89.6   --   57.4   HGB  7.0*  8.1*  8.3*  7.2*  7.1*  7.0*  6.7*   < >  8.7*   < >  9.1*   < >  10.9*   PLT  57*  70*   --   92*   --   142*  127*   < >  34*   --   48*   < >  155    < > = values in this interval not displayed.     Recent Labs   Lab Test  03/30/18   0707  03/29/18   2150  03/29/18   0709  03/28/18   0737  03/27/18   0743  03/26/18   1826  03/26/18   0659   NA  144   --   143  142  139  137  136    POTASSIUM  3.8  3.9  3.1*  3.1*  3.6   --   4.2   CHLORIDE  110*   --   108  107  104   --   105   CO2  25   --   27  26  26   --   23   ANIONGAP  8   --   8  8  9   --   9   BUN  40*   --   40*  48*  55*   --   56*   CR  0.77   --   0.82  1.06  1.34*  1.45*  1.44*   CHIDI  8.1*   --   7.6*  7.5*  7.5*   --   7.9*     Recent Labs   Lab Test  03/30/18   0707 03/29/18   0709  03/28/18   0737  03/27/18 0743   03/14/18 0228   MAG  2.1  2.1  2.0  2.0   < >  2.4*   PHOS   --    --    --    --    --   3.1   LDH   --    --    --   149   --   124    < > = values in this interval not displayed.     Recent Labs   Lab Test  03/30/18 0707 03/29/18   0709  03/28/18 0737  03/27/18 0743   03/14/18 0228   BILITOTAL  3.3*  3.5*  3.0*  2.3*   < >  6.0*   ALKPHOS  577*  671*  743*  802*   < >  696*   ALT  43  43  44  42   < >  92*   AST  25  24  26  19   < >  57*   ALBUMIN  3.6  3.5  3.2*  3.0*   < >  2.1*   LDH   --    --    --   149   --   124    < > = values in this interval not displayed.       Review of system: A 14-point ROS was negative except as noted in the HPI.         Past Medical History:     Past Medical History:   Diagnosis Date     Hypertension      Pancreatic cancer (H) 01/2017             Past Surgical History:     Past Surgical History:   Procedure Laterality Date     ENTEROSCOPY WITH OVERTUBE N/A 3/19/2018    Procedure: ENTEROSCOPY WITH OVERTUBE;  enteroscopy with biopsies;  Surgeon: Jared Cooney MD;  Location: [facility] OR     LAPAROSCOPY DIAGNOSTIC (GENERAL)  09/2017     PICC INSERTION Left 03/22/2018    4Fr - 50cm (2cm external), L medial brachial vein, SVC RA junction     PICC INSERTION Left 03/29/2018    4Fr - 45cm (3cm external), L medial brachial vein     WHIPPLE PROCEDURE  05/2017             Social History:     Social History     Social History     Marital status:      Spouse name: N/A     Number of children: N/A     Years of education: N/A     Occupational History     Not on file.      Social History Main Topics     Smoking status: Never Smoker     Smokeless tobacco: Never Used     Alcohol use No     Drug use: No     Sexual activity: Not on file     Other Topics Concern     Not on file     Social History Narrative             Family History:     Family History   Problem Relation Age of Onset     Coronary Artery Disease Early Onset Maternal Grandmother 36     Liver Disease No family hx of      Colon Cancer No family hx of                 Allergies:   . No Known Allergies          Medications:     Current Facility-Administered Medications   Medication     potassium chloride (KAYCIEL) solution 40 mEq     vancomycin (VANCOCIN) 1,250 mg in sodium chloride 0.9 % 250 mL intermittent infusion     sodium chloride (PF) 0.9% PF flush 10-20 mL     sodium chloride (PF) 0.9% PF flush 10 mL     fluconazole (DIFLUCAN) tablet 400 mg     heparin lock flush 10 UNIT/ML injection 2-5 mL     lidocaine (LMX4) kit     ondansetron (ZOFRAN-ODT) ODT tab 4 mg     amLODIPine (NORVASC) tablet 5 mg     naloxone (NARCAN) injection 0.1-0.4 mg     furosemide (LASIX) tablet 20 mg     spironolactone (ALDACTONE) tablet 50 mg     pantoprazole (PROTONIX) EC tablet 40 mg     sodium chloride (PF) 0.9% PF flush 10-20 mL     sodium chloride (PF) 0.9% PF flush 10 mL     ertapenem (INVanz) 1 g in 10 mL SWFI for IVP     sodium chloride (PF) 0.9% PF flush 10 mL     HYDROmorphone (DILAUDID) tablet 2-4 mg     vitamin D (ERGOCALCIFEROL) capsule 50,000 Units     multivitamin CF formula (DEKAs Plus) per capsule 1 capsule     ascorbic acid (vitamin C) chewable tablet 250 mg     sertraline (ZOLOFT) tablet 50 mg     glucose 40 % gel 15-30 g    Or     dextrose 50 % injection 25-50 mL    Or     glucagon injection 1 mg     amylase-lipase-protease (CREON 24) 07172-47315 UNITS per EC capsule 48,000 Units     simethicone (MYLICON) chewable tablet 80 mg     methadone (DOLOPHINE) tablet 5 mg     insulin aspart (NovoLOG) inj (RAPID ACTING)           Vital  "signs:  Temp: 97.6  F (36.4  C) Temp src: Oral BP: 126/80 Pulse: 64 Heart Rate: 59 Resp: 12 SpO2: 98 % O2 Device: None (Room air) Oxygen Delivery: 3 LPM Height: 182.9 cm (6' 0.01\") Weight: 68.9 kg (152 lb)  Estimated body mass index is 20.61 kg/(m^2) as calculated from the following:    Height as of this encounter: 1.829 m (6' 0.01\").    Weight as of this encounter: 68.9 kg (152 lb).    Physical exam:  Gen: Gaunt; no acute distress. Appropriate.    HEENT: Mucous membranes moist, no oral lesions  Neck: supple, symmetric  CVS: Regular Rate Rhythm, no murmurs. Well-healing scar where recent portacath was removed.   Resp: CTA, no increased WOB on room air  GI: Soft, non-tender; PC biliary drain in place  Extremities: no edema   Skin: no bruises or rashes on visualized areas  Neuro: AAOx4. Cranial nerves grossly intact.          Data:   Labs and imaging reviewed.    CT CHEST/ABD/PEL [facility] CON12/21/2017  [facility]   Result Impression   IMPRESSION:    1. Heterogeneous enhancement of the liver likely due to transient hepatic attenuation difference. There is a slight nodular contour to the liver suggestive of some early cirrhosis. There is extensive ascites with associated splenomegaly. No definite focal thrombus identified within the inferior vena cava or within the hepatic or portal veins. 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 The appearance of the IVC on the prior ultrasound is likely due to adjacent hypertrophied caudate lobe of the liver.  2. Postoperative changes status post Whipple procedure. The superior mesenteric vein at the postoperative bed near the body of the pancreas is thin and truncated. There is contrast seen within the main portal vein and intrahepatic portal veins. Splenic vein is also patent. A focal stricture of the superior mesenteric vein cannot be excluded.  3. Nodular densities in the right upper lobe and along the right " major fissure. These are indeterminate. Recommend short-term follow-up low-dose chest CT in 3-6 months.  4. Diffuse edema of the bowel. This could be due to underlying liver disease.  5. Mild esophageal varices. Collaterals seen within the mesentery along the anterior abdomen.

## 2018-03-30 NOTE — PLAN OF CARE
Problem: Pancreatitis, Acute/Chronic (Adult)  Goal: Signs and Symptoms of Listed Potential Problems Will be Absent, Minimized or Managed (Pancreatitis, Acute/Chronic)  Signs and symptoms of listed potential problems will be absent, minimized or managed by discharge/transition of care (reference Pancreatitis, Acute/Chronic (Adult) CPG).     3:30 pm-7 pm  Pt was off floor this afternoon, went to [facility] for diabetic education, then IR and then vascular access for PICC placement, pt came back to  at 7:10 pm. Pt is settled to bed, VSS, report given to nolberto RNLauren.

## 2018-03-30 NOTE — PLAN OF CARE
Problem: Patient Care Overview  Goal: Plan of Care/Patient Progress Review  Outcome: No Change  D/A/I: Patient A & O X 3, flat affect. VSS c/o abdominal pain manageable with PRN Dilaudid and scheduled medications. Patient 7.0 and received one unit PRBC, and tolerated well no reactions noted. Patient right biliary drain patent with 80 cc out put. Patient report void adequate and watery stool x 2. Patient appetite fair. Spouse at bedside continue monitor closely and update MD with change.

## 2018-03-30 NOTE — PLAN OF CARE
Problem: Patient Care Overview  Goal: Plan of Care/Patient Progress Review  Discharge Planner PT 5B   Patient plan for discharge: home with home therapy  Current status: Pt requires CGA to complete bed mobility, performs ambulation in room with Mod I with use of FWW. Completes toilet transfer and pericares independently. Ambulates x 450' with FWW and SBA, no LOB noted. Tolerates standing LE HEP exercises well.  Barriers to return to prior living situation: fatigue, falls risk, weakness  Recommendations for discharge: pt continues to decline rehab need; recommend d/c home with assist from family and HH PT  Rationale for recommendations: pt below baseline and will benefit from continued PT to improve functional strength.        Entered by: Lev Shepard 03/30/2018 3:25 PM

## 2018-03-30 NOTE — PLAN OF CARE
Problem: Patient Care Overview  Goal: Plan of Care/Patient Progress Review  Outcome: No Change  D/I: Pt is alert and oriented x4, vitally stable on room air. Today is POD#1 s/p cholangiogram, biliary biopsy, and biliary tube exchange done.Tolerated clear liquid and was advanced to Full liquid diet this shift and by 6am MD advanced to High calorie/High protein diet.  Pt c/o abdominal pain managed with prn po dilaudid. PICC in LUE, patent; PIV is saline locked. Pt up ad cristy in room.  and 172 and covered per SS. Pt had PLC diabetes yesterday and will need supplies for fingerstick ordered (needles and glucose strips) before DC.   P: Continue to monitor pain and GI status and follow POC.

## 2018-03-30 NOTE — PROGRESS NOTES
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 Presented with weakness and AMS. On imaging was found to have narrowing of the portal vein confluence concerning for liver abscess formation and ascending cholangitis s/p perc biliary drain by IR. Also found to have polymicrobial bacteremia and fungemia likely from GI source. Pt has remained afebrile and hemodynamically stable. Mentation has also improved.     Changes Today:  -required transfusion for hgb 7  -no signs GI bleed  -consult to heme onc  -obtained RUQ ultrasound liver and spleen size  -obtained coag labs per heme/onc--tried to obtain from AM labs, but AM labs did not have proper tube. Blood was then drawn earlier in AM but contaminated and lab cancelled test. Then labs were redrawn post-transfusion.   -HIT screen negative    ##Candidemia: from BCx on 3/21 and 3/20. Negative BC since 3/22/18.   Plan:  -IV micafungin D/C  -transitioned to PO micafungin  -pulled the PICC line and the port-a-cath 3/23 and replacing PICC on 3/29    ##E coli, C perfringens, Citrobacter, and enterococcus bacteremia obtained on OSH BCx 3/13  ##Probably biliary source given past hx abscess, current abscess on imaging, and poor drainage of liver   ##Hepatoportal sclerosis  Bacteremia, suspect GI source with possible ascending cholangitis and/or liver abscess. Pt has been afebrile and hemodynamically stable.  U/S showed possible abscess, with retrograde flow in the L portal vein, w/ c/f narrowing or occlusion of the splenoportal confluence.                           --cont ertapenem and vanc                          --culture if spikes                                                        --ERCP on 3/20--bx did not show malignancy    -- s/p IR drainage  with percutaneous biliary drain    -- s/p para in IR on 3/21    --pt to go for IR brushing of hepatic abscess on 3/29    --placing PICC on 3/29    --will F/U with Dr. Reddy (ID) in 4 weeks       ##Possible SBP  Pt has WBC 1093 with 79% PMNs. So far gram stain neg. Cx pending. Elevated WBC could also be sec to malignant effusion. However, cytology is negative.     Repeat paracentesis 3/26 showing 1200 WBC w/ 92% neutrophils -> worse than prior          --pt is covered with ertapenem+vanc+micafungin    -- Repeat diagnostic para 3/29 shows 269 WBC; 3L also removed; given 75 mg albumin                           #Pre-Renal KAT- Resolved with albumin.    --giving albumin challenge 150 g on 3/26, 75 g on 3/27 and 3/28   -- worsening kidney function after increase diuretics to lasix 40 and spironolactone 100-->so decrease back to lasix 20 and spironolactone 50 on 3/25   --pt underwent therapeutic para on 3/29 with 3L removed; pt given 75 g albumin to prevent recurrent KAT        ##Pancreatic Insufficiency  ##Chronic Diarrhea  ##Hypovitamin D  ##Pancreatic cancer  (T3N1) s/p whipple 9/2017 along with FOLFIRINOX and Gemzar/Xeloda c/b enterococcal liver abscess (treated) along with non-malignant ascites. Pt last took Gemzar and xeloda in Nov 2017.  Pt has had chronic diarrhea that occurs immediately after eating. Pt has been continuing CREON at home.                            --cont CREON with food     --fecal elastase 15 (low)--indicating diarrhea is not 2/2 malabsorption     --fecal calprotectin elevated, indicating possible ischemia-but according to GI, difficult to interpret in the setting of inflammation                           --GI aware,                           --nutrition consult placed                                                  --start Vit D2 50K units once weekly for 6-8 weeks                                                 --MV ADEK supplementation                            --per oncology recs: will f/u  as OP with oncology in 2-4 weeks; recommend repeat CT CAP 1-2 days prior to OP oncology visit    -- IR to obtain brushing bx from biliary stricture 3/29; fill f/u on pathology      ##Chronic cancer pain                           --cont dilaudid 2 mg q6hrs prn                           --cont methadone 5 mg TID         ##Normocytic Anemia - stably low  Iron panel shows low iron, low iron binding capacity, and low iron saturation index, and normal ferritin c/w ACD. Peripheral smear is normal, and hemolysis labs negative.    Pt received blood tx on 3/27 and 3/28; hemoglobin stable O/N 3/29  Plan:    --CTM              ##Thrombocytopenia - worsening  Patient possibly has sinusoidal obstructive syndrome which can cause low Plts along with the conjugated hyperbilirubinemia which the patient also has. 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                           --HIT screen neg    --peripheral smear shows no abnormality    --holding heparin     --obtained coag studies        ##New diagnosis Diabetes - could be sec to pancreatic endocrine insufficiency in setting of panc cancer s/p whipple. HgbA1c 9.1%  Pt does not have a hx of diabetes.                          --cont SSI                          --hypoglycemia protocol    --diabetes education consult placed              PPx:  ##DVT: SCDs for now given slow Hgb drop, AM team to consider pharmacologic ppx give high risk and also procedures  ##GI: PPI IV BID  ##Diet: Reg  Family updated      ##DNR/DNI     Pt was discussed and seen with Dr. Osman Vinson MD PhD   Internal Medicine, PGY 1  p   -----------------------------------------------------------------------------------------------------------------------------------------------      Interval History  Has less energy today. Pain is controlled ok.  No black or bloody stools. No nausea.     Review of Systems:   A 4 point review of systems was negative except as noted  "above.    OBJECTIVE:  /81 (BP Location: Right arm)  Pulse 69  Temp 95.8  F (35.4  C) (Oral)  Resp 18  Ht 1.829 m (6' 0.01\")  Wt 68.9 kg (152 lb)  SpO2 97%  BMI 20.61 kg/m2    GENERAL APPEARANCE: alert and no distress  HEENT: has scleral icterus, MMM  Pulmonary: CTAB  CV: regular rhythm, normal rate, no murmur, no rub   - JVP  not elevated    - Extremities without edema or swelling   Skin: has jaundice  GI: soft, tender to palpation, less distended, + ascites, no rebound, has umbilical hernia that is reducible; perc drain site c/d/i  NEURO: mentation intact and speech normal, equally move    Labs:   All labs reviewed by me  Electrolytes/Renal -   Recent Labs   Lab Test  03/30/18   0707 03/29/18 2150 03/29/18   0709  03/28/18 0737   03/14/18 0228   NA  144   --   143  142   < >  134   POTASSIUM  3.8  3.9  3.1*  3.1*   < >  5.0   CHLORIDE  110*   --   108  107   < >  101   CO2  25   --   27  26   < >  22   BUN  40*   --   40*  48*   < >  84*   CR  0.77   --   0.82  1.06   < >  1.38*   GLC  177*   --   127*  137*   < >  241*   CHIDI  8.1*   --   7.6*  7.5*   < >  8.0*   MAG  2.1   --   2.1  2.0   < >  2.4*   PHOS   --    --    --    --    --   3.1    < > = values in this interval not displayed.     CBC -   Recent Labs   Lab Test  03/30/18   0707  03/29/18   0709  03/28/18 2020 03/28/18   0737   WBC  5.2  7.6   --   7.8   HGB  7.0*  8.1*  8.3*  7.2*   PLT  57*  70*   --   92*     LFTs   Recent Labs   Lab Test  03/30/18   0707  03/29/18   0709  03/28/18   0737   ALKPHOS  577*  671*  743*   BILITOTAL  3.3*  3.5*  3.0*   ALT  43  43  44   AST  25  24  26   PROTTOTAL  6.1*  6.4*  6.2*   ALBUMIN  3.6  3.5  3.2*       Imaging:  See below    Current Medications:    potassium chloride  40 mEq Oral Daily     vancomycin (VANCOCIN) IV  1,250 mg Intravenous Q24H     sodium chloride (PF)  10 mL Intracatheter Q7 Days     fluconazole  400 mg Oral Daily     amLODIPine  5 mg Oral Daily     furosemide  20 mg Oral " Daily     spironolactone  50 mg Oral Daily     pantoprazole  40 mg Oral BID     sodium chloride (PF)  10 mL Intracatheter Q7 Days     ertapenem (INVanz) IV  1 g Intravenous Q24H     sodium chloride (PF)  10 mL Irrigation Q24H     vitamin D  50,000 Units Oral Q7 Days     multivitamin CF formula  1 capsule Oral Daily     ascorbic acid  250 mg Oral Daily     sertraline  50 mg Oral Daily     amylase-lipase-protease  2 capsule Oral TID w/meals     methadone  5 mg Oral TID     insulin aspart  1-6 Units Subcutaneous Q4H       Jeanna Vinson MD

## 2018-03-30 NOTE — CONSULTS
Sg and Yumi were seen for instructions on his biliary drain.  Yumi is familiar with site care from a previous drain and we talked about the steps in the process without any concerns or questions on her part. She was shown how to set up and administer a flush daily to the drainage site and she did this without any concerns. She had already been shown by the nursing staff on the unit. We also reviewed over s/s of infection and when to notify a health care provider with any other concerns or questions. She was given all the written material for the class.

## 2018-03-30 NOTE — PLAN OF CARE
Problem: Patient Care Overview  Goal: Plan of Care/Patient Progress Review  Edema 5B: Recommend continued use of compression stockings to B LEs for further management of edema. Generalized 1+ pitting remains distally. Compression stockings to be worn 23/24 hours per day and removed daily for skin cares however removed completely if causing pain/numbness or become soiled - see patient care order for details. Patient with no further inpatient edema needs therefore will sign off.

## 2018-03-30 NOTE — PROGRESS NOTES
Home Infusion  Naresh is expected to dc tomorrow and will be going home on IV Vancomycin and Ertapenem.  Naresh and SO have done IVP  home IV therapy before.   .Met with Naresh & Yumi at bedside and provided them with information about [facility] services. Explained about administration method, showed them the teaching materials and explained that a home nurse will provide additional teaching needed for self-administration. Informed them about supplies and delivery of supplies, storage of medication, dosing times, plan for SNV and 24/7 availability of I staff while on IV therapy.    Naresh and Yumi both verbalized understanding of all information given. They are willing and able to learn and manage home IV therapy. Questions answered.Will continue to follow until dc and update pt once final orders are determined.    Ramona Jordan, NUBIAN, MARS  [facility]  [phone]

## 2018-03-30 NOTE — PLAN OF CARE
Problem: Patient Care Overview  Goal: Plan of Care/Patient Progress Review  Outcome: Improving  Patient appeared tired and weak but able to ambulate to bathroom with SBA. C/o abdominal pain and was given Dilaudid which offered optimal relief. Had 1 samll BM this shift and reported that he is voiding well. Biliary drain in place, irrigated and with dark brown drainage. Was seen by Patient learning educator and education on Biliary drain care was given to patient and wife. On Vancomycin and Invanz for ABx. Ordered supper. TZ=899 with insulin coverage. Resting in bed. No other calls made.  P: Continue to monitor patient status and notify MD of any significant changes.

## 2018-03-31 PROBLEM — R78.81 BACTEREMIA: Status: ACTIVE | Noted: 2018-01-01

## 2018-03-31 NOTE — PLAN OF CARE
Problem: Patient Care Overview  Goal: Plan of Care/Patient Progress Review  Outcome: No Change  [id number]: Patient denies pain. Biliary drain intact. Up independently in room. Currently waiting for dinner to come. Continue to assist and follow plan of care.

## 2018-03-31 NOTE — PLAN OF CARE
Problem: Patient Care Overview  Goal: Plan of Care/Patient Progress Review  Outcome: Improving  D/I/A: Patient A & O X 4, VSS no respiratory distress noted , c/o nausea received Zofran 4mg po x 1 ,  Pos assessment with relief. Abdominal pain manageable with Dilaudid PRN. Patient has abd and chest CT done. Hgb 8.7 . Appetite fair. BS , 133, 161, and insulin given per range. Patient biliary drain intact 95 cc out put. Patient up in chair for 2hr and tolerated well. Up independently to bathroom per Pt report void adequate and had 2 loose stool. Call light in reach, and hourly round completed. Continue monitor closely and update MD with change.

## 2018-03-31 NOTE — PLAN OF CARE
Problem: Patient Care Overview  Goal: Plan of Care/Patient Progress Review  PT/5B: cancel. Attempted x2 AM, pt declined and requested to come back in 20 mins, upon returning patient to CT. Will try in PM as PT's schedule allows, otherwise encouraged patient to ambulate with nursing x2 times today.

## 2018-03-31 NOTE — PROVIDER NOTIFICATION
Paged Aleksey sebastian cover to find out if they would like to recheck hgb following transfusion of PRBCs this morning. Hgb before transfusion was 7.0. However, this author also noted that pt has CBC labs scheduled for routine 6am.

## 2018-03-31 NOTE — PLAN OF CARE
Problem: Patient Care Overview  Goal: Plan of Care/Patient Progress Review  Patient A/O, VSS but slightly elevated DBP of 92 at 0615. Pt up independently in room and to bathroom. PIV SL. Requested PRN dilaudid 2x for abdominal pain. Bili drain output moderate. Will continue to monitor and alert MDs to any changes.

## 2018-04-01 PROBLEM — K74.60 CIRRHOSIS OF LIVER WITH ASCITES, UNSPECIFIED HEPATIC CIRRHOSIS TYPE (H): Chronic | Status: ACTIVE | Noted: 2018-01-01

## 2018-04-01 PROBLEM — R18.8 CIRRHOSIS OF LIVER WITH ASCITES, UNSPECIFIED HEPATIC CIRRHOSIS TYPE (H): Chronic | Status: ACTIVE | Noted: 2018-01-01

## 2018-04-01 NOTE — PROGRESS NOTES
Care Coordinator- Discharge Planning     Admission Date/Time:  3/14/2018  Attending MD:  Macrina Urbina MD     Data  Date of initial CC assessment:    Chart reviewed, discussed with interdisciplinary team.   Patient was admitted for:   1. Weakness    2. Malignant neoplasm of pancreas, unspecified location of malignancy (H)    3. Candidemia (H)    4. Cirrhosis of liver with ascites, unspecified hepatic cirrhosis type (H)    5. Encounter for biliary drainage tube placement    6. Pancreatic insufficiency    7. Vitamin K deficiency    8. Vitamin D deficiency    9. Gastroesophageal reflux disease, esophagitis presence not specified    10. Bacteremia    11. Anemia, unspecified type         Assessment  Full assessment completed in previous note    Coordination of Care and Referrals: Notified by provider team that pt cleared to d/c today.  Updated Rosie JAY.  Rosie confirmed they will see pt tomorrow.  No further needs noted.       Plan  Anticipated Discharge Date:  4/1/18  Anticipated Discharge Plan:  home    CTS Handoff completed:  NO    Addendum 1315:  Notified by provider that pt will be discharged home tomorrow morning.  MISTY to see pt in the afternoon.  Pt will not receive his IV antibiotic doses prior to d/c on 4/2. Updated Rosie JAY  [facility] is requesting scripts today.  Reviewed with Aleksey Rodriguez attending who agreed to print and fax them to [phone].      Aubrie Gustafson RN BSN, PHN RN Care CoordinatorWeekend Coverage  [email or web address]  Pager [phone]  4/1/2018 10:00 AM

## 2018-04-01 NOTE — PROGRESS NOTES
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 Presented with weakness and AMS. On imaging was found to have narrowing of the portal vein confluence concerning for liver abscess formation and ascending cholangitis s/p perc biliary drain by IR. Also found to have polymicrobial bacteremia and fungemia likely from GI source. Pt has remained afebrile and hemodynamically stable. Mentation has also improved.     Changes Today:  -CT CAP with contrast for staging of pancreatic cancer  -obtaining factors 2, 5, 7, 8, 10 levels to determine if Vit K deficient  -obtain folate and B12 levels      ##Candidemia: from BCx on 3/21 and 3/20. Negative BC since 3/22/18.   Plan:  -IV micafungin D/C  -transitioned to PO micafungin  -pulled the PICC line and the port-a-cath 3/23 and replacing PICC on 3/29    ##E coli, C perfringens, Citrobacter, and enterococcus bacteremia obtained on OSH BCx 3/13  ##Probably biliary source given past hx abscess, current abscess on imaging, and poor drainage of liver   ##Hepatoportal sclerosis  Bacteremia, suspect GI source with possible ascending cholangitis and/or liver abscess. U/S showed possible abscess, with retrograde flow in the L portal vein, w/ c/f narrowing or occlusion of the splenoportal confluence.  Pt has now been afebrile and hemodynamically stable.                          --cont ertapenem and vanc                          --culture if spikes                                                        --ERCP on 3/20--bx did not show malignancy    -- s/p IR drainage with percutaneous biliary drain    --will F/U with Dr. Reddy (ID) in 4 weeks    #Ascites - diuretic refractory. Continue home diuretics at half dose for now. (as he will get contrast with CT scan  today.) Consider repeat therapeutic paracentesis prior to discharge.        ##Pancreatic Insufficiency  ##Chronic Diarrhea  ##Hypovitamin D  ##Pancreatic cancer  (T3N1) s/p whipple 9/2017 along with FOLFIRINOX and Gemzar/Xeloda c/b enterococcal liver abscess (treated) along with non-malignant ascites. Pt last took Gemzar and xeloda in Nov 2017.  Pt has had chronic diarrhea that occurs immediately after eating. Pt has been continuing CREON at home.                            --cont CREON with food     --fecal elastase 15 (low)--indicating diarrhea is not 2/2 malabsorption     --fecal calprotectin elevated, indicating possible ischemia-but according to GI, difficult to interpret in the setting of inflammation                           --nutrition consult placed                                                  --start Vit D2 50K units once weekly for 6-8 weeks                                                 --MV ADEK supplementation                            --per oncology recs: will f/u as OP with oncology 4/2; recommended repeat CT CAP 1-2 days prior to OP oncology visit (Getting it done today)    -- IR obtained brushing bx from biliary stricture 3/29 - came back as 'no evidence of malignancy'      ##Chronic cancer pain                           --cont dilaudid 2 mg q6hrs prn                           --cont methadone 5 mg TID         ##Normocytic Anemia - stably low  Iron panel shows low iron, low iron binding capacity, and low iron saturation index, and normal ferritin c/w ACD. Peripheral smear is normal, and hemolysis labs negative.    Pt received blood tx on 3/27 and 3/28; hemoglobin stable O/N 3/29  Plan:    --CTM              ##Thrombocytopenia - worsening  Patient possibly has sinusoidal obstructive syndrome which can cause low Plts along with the conjugated hyperbilirubinemia which the patient also has. 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                           --HIT screen  neg    --peripheral smear shows no abnormality    --holding heparin     --obtained coag studies        ##New diagnosis Diabetes - could be sec to pancreatic endocrine insufficiency in setting of panc cancer s/p whipple. HgbA1c 9.1%  Pt does not have a hx of diabetes.                          --cont SSI                          --hypoglycemia protocol    --diabetes education consult placed     Resolved conditions:       ##SBP  Pt has WBC 1093 with 79% PMNs. So far gram stain neg. Cx pending. Elevated WBC could also be sec to malignant effusion. However, cytology is negative.     Repeat paracentesis 3/26 showing 1200 WBC w/ 92% neutrophils -> worse than prior          --pt is covered with ertapenem+vanc+micafungin    -- Repeat diagnostic para 3/29 shows 269 WBC; 3L also removed; given 75 mg albumin    #Pre-Renal KAT- Resolved with albumin.    --giving albumin challenge 150 g on 3/26, 75 g on 3/27 and 3/28                                PPx:  ##DVT: SCDs for now given slow Hgb drop, AM team to consider pharmacologic ppx give high risk and also procedures  ##GI: PPI IV BID  ##Diet: Reg  Family updated      ##DNR/DNI     Pt was discussed and seen with Dr. Urbina.     Jeanna Vinson MD PhD   Internal Medicine, PGY 1  p     Physician Attestation  I, Macrina Urbina MD, saw this patient with the resident and agree with the resident s findings and plan of care as documented in the resident s note with my edits.     I personally reviewed vital signs, medications, labs and imaging.    Macrina Urbina MD  Date of Service (when I saw the patient): 3/31/18          -----------------------------------------------------------------------------------------------------------------------------------------------      Interval History  Energy improved today. Pain is controlled ok.  No black or bloody stools. No nausea.     Review of Systems:   A 4 point review of systems was negative except as noted above.    OBJECTIVE:  /82 (BP  "Location: Right arm)  Pulse 75  Temp 98  F (36.7  C) (Oral)  Resp 16  Ht 1.829 m (6' 0.01\")  Wt 70.9 kg (156 lb 3.2 oz)  SpO2 99%  BMI 21.18 kg/m2    GENERAL APPEARANCE: alert and no distress  HEENT: has scleral icterus, MMM  Pulmonary: CTAB  CV: regular rhythm, normal rate, no murmur, no rub   - JVP  not elevated    - Extremities without edema or swelling   Skin: has jaundice  GI: soft, tender to palpation, less distended, + ascites, no rebound, has umbilical hernia that is reducible; perc drain site c/d/i  NEURO: mentation intact and speech normal, equally move    Labs:   All labs reviewed by me  Electrolytes/Renal -   Recent Labs   Lab Test  03/31/18 0558 03/30/18 0707 03/29/18 2150 03/29/18   0709   03/14/18 0228   NA  143  144   --   143   < >  134   POTASSIUM  3.8  3.8  3.9  3.1*   < >  5.0   CHLORIDE  108  110*   --   108   < >  101   CO2  30  25   --   27   < >  22   BUN  31*  40*   --   40*   < >  84*   CR  0.75  0.77   --   0.82   < >  1.38*   GLC  133*  177*   --   127*   < >  241*   CHIDI  7.4*  8.1*   --   7.6*   < >  8.0*   MAG  1.7  2.1   --   2.1   < >  2.4*   PHOS   --    --    --    --    --   3.1    < > = values in this interval not displayed.     CBC -   Recent Labs   Lab Test  03/31/18 0558 03/30/18   0707  03/29/18   0709   WBC  7.7  5.2  7.6   HGB  8.7*  7.0*  8.1*   PLT  59*  57*  70*     LFTs   Recent Labs   Lab Test  03/31/18   0558 03/30/18   0707 03/29/18   0709   ALKPHOS  569*  577*  671*   BILITOTAL  3.6*  3.3*  3.5*   ALT  48  43  43   AST  20  25  24   PROTTOTAL  6.4*  6.1*  6.4*   ALBUMIN  3.4  3.6  3.5         "

## 2018-04-01 NOTE — PLAN OF CARE
Problem: Patient Care Overview  Goal: Plan of Care/Patient Progress Review  Outcome: No Change  Patient continues to c/o abd pain/distension. Pain meds given x2 with relief. Good appetite with meals. Up independently in room. Bilateral legs and feet continues with +2 edema. Comperm stockings on.  and 149. Biliary drain intact. Plan for bedside paracentesis this afternoon and discharge to home tomorrow morning.

## 2018-04-01 NOTE — PLAN OF CARE
Problem: Patient Care Overview  Goal: Plan of Care/Patient Progress Review  [id number]    Patient A/O, VSS. Bili drain to gravity, flushed per POC. Pt up independently in room and to bathroom. Requested dilaudid for generalized abdominal pain 1x, with relief. BG 92, 176, 168, coverage given as indicated. Will continue to monitor and alert MDs to any changes.

## 2018-04-01 NOTE — PROGRESS NOTES
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 Presented with weakness and AMS. On imaging was found to have narrowing of the portal vein confluence concerning for liver abscess formation and ascending cholangitis s/p perc biliary drain by IR. Also found to have polymicrobial bacteremia and fungemia likely from GI source. Pt has remained afebrile and hemodynamically stable. Mentation has also improved.     Changes Today:  -attempted para, but patient did not have a fluid pocket amenable  -D/C in AM    ##Candidemia: from BCx on 3/21 and 3/20. Negative BC since 3/22/18.   Plan:  -IV micafungin D/C  -transitioned to PO micafungin  -pulled the PICC line and the port-a-cath 3/23 and replacing PICC on 3/29    ##E coli, C perfringens, Citrobacter, and enterococcus bacteremia obtained on OSH BCx 3/13  ##Probably biliary source given past hx abscess, current abscess on imaging, and poor drainage of liver   ##Hepatoportal sclerosis  Bacteremia, suspect GI source with possible ascending cholangitis and/or liver abscess. U/S showed possible abscess, with retrograde flow in the L portal vein, w/ c/f narrowing or occlusion of the splenoportal confluence.  Pt has now been afebrile and hemodynamically stable.                          --cont ertapenem and vanc                          --culture if spikes                                                        --ERCP on 3/20--bx did not show malignancy    -- s/p IR drainage with percutaneous biliary drain    --will F/U with Dr. Reddy (ID) in 4 weeks    #Ascites - diuretic refractory. Continue home diuretics at half dose for now.   Attempted therapeutic paracentesis on 4/1/18, however, pocket not deep enough.  Pt to get therapeutic paracentesis as  outpatient.      ##Pancreatic Insufficiency  ##Chronic Diarrhea  ##Hypovitamin D  ##Pancreatic cancer  (T3N1) s/p whipple 9/2017 along with FOLFIRINOX and Gemzar/Xeloda c/b enterococcal liver abscess (treated) along with non-malignant ascites. Pt last took Gemzar and xeloda in Nov 2017.  Pt has had chronic diarrhea that occurs immediately after eating. Pt has been continuing CREON at home.                            --cont CREON with food     --fecal elastase 15 (low)--indicating diarrhea is not 2/2 malabsorption     --fecal calprotectin elevated, indicating possible ischemia-but according to GI, difficult to interpret in the setting of inflammation                           --nutrition consult placed                                                  --start Vit D2 50K units once weekly for 6-8 weeks                                                 --MV ADEK supplementation                            --per oncology recs: will f/u as OP with oncology 4/2; recommended repeat CT CAP 1-2 days prior to OP oncology visit (Getting it done today)    -- IR obtained brushing bx from biliary stricture 3/29 - came back as 'no evidence of malignancy'      ##Chronic cancer pain                           --cont dilaudid 2 mg q6hrs prn                           --cont methadone 5 mg TID     ##Normocytic Anemia - stably low  Iron panel shows low iron, low iron binding capacity, and low iron saturation index, and normal ferritin c/w ACD. Peripheral smear is normal, and hemolysis labs negative.    Pt received blood tx on 3/27 and 3/28; hemoglobin stable O/N 3/29  Plan:    --CTM              ##Thrombocytopenia - worsening  Patient possibly has sinusoidal obstructive syndrome which can cause low Plts along with the conjugated hyperbilirubinemia which the patient also has. 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                           --HIT screen neg    --peripheral smear shows no abnormality    --holding  "heparin     --obtained coag studies        ##New diagnosis Diabetes - could be sec to pancreatic endocrine insufficiency in setting of panc cancer s/p whipple. HgbA1c 9.1%  Pt does not have a hx of diabetes.                          --cont SSI                          --hypoglycemia protocol    --diabetes education consult placed     Resolved conditions:       ##SBP  Pt has WBC 1093 with 79% PMNs. So far gram stain neg. Cx pending. Elevated WBC could also be sec to malignant effusion. However, cytology is negative.     Repeat paracentesis 3/26 showing 1200 WBC w/ 92% neutrophils -> worse than prior          --pt is covered with ertapenem+vanc+micafungin    -- Repeat diagnostic para 3/29 shows 269 WBC; 3L also removed; given 75 mg albumin    #Pre-Renal KAT- Resolved with albumin.    --giving albumin challenge 150 g on 3/26, 75 g on 3/27 and 3/28     PPx:  ##DVT: SCDs for now given slow Hgb drop, AM team to consider pharmacologic ppx give high risk and also procedures  ##GI: PPI IV BID  ##Diet: Reg  Family updated      ##DNR/DNI     Pt was discussed and seen with Dr. Urbina.     Jeanna Vinson MD PhD   Internal Medicine, PGY 1  p     -----------------------------------------------------------------------------------------------------------------------------------------------      Interval History   Pain is controlled ok.  No black or bloody stools. No nausea. No F/C or CP, or SOB.     Review of Systems:   A 4 point review of systems was negative except as noted above.    OBJECTIVE:  /79 (BP Location: Right arm)  Pulse 73  Temp 96.6  F (35.9  C) (Oral)  Resp 16  Ht 1.829 m (6' 0.01\")  Wt 70.9 kg (156 lb 3.2 oz)  SpO2 97%  BMI 21.18 kg/m2    GENERAL APPEARANCE: alert and no distress  HEENT: has scleral icterus, MMM  Pulmonary: CTAB  CV: regular rhythm, normal rate, no murmur, no rub   - JVP  not elevated    - Extremities without edema or swelling   Skin: has jaundice  GI: abd distended, " nontender to palpation, + ascites, no rebound, has umbilical hernia that is reducible; perc drain site c/d/i  NEURO: mentation intact and speech normal, equally move    Labs:   All labs reviewed by me  Electrolytes/Renal -   Recent Labs   Lab Test  04/01/18   0705 03/31/18   0558  03/30/18   0707   03/14/18   0228   NA  141  143  144   < >  134   POTASSIUM  3.8  3.8  3.8   < >  5.0   CHLORIDE  107  108  110*   < >  101   CO2  28  30  25   < >  22   BUN  30  31*  40*   < >  84*   CR  0.77  0.75  0.77   < >  1.38*   GLC  126*  133*  177*   < >  241*   CHIDI  7.6*  7.4*  8.1*   < >  8.0*   MAG  1.8  1.7  2.1   < >  2.4*   PHOS   --    --    --    --   3.1    < > = values in this interval not displayed.     CBC -   Recent Labs   Lab Test  04/01/18   0705 03/31/18   0558  03/30/18   0707   WBC  8.6  7.7  5.2   HGB  8.7*  8.7*  7.0*   PLT  46*  59*  57*     LFTs   Recent Labs   Lab Test  04/01/18   0705 03/31/18   0558  03/30/18   0707   ALKPHOS  549*  569*  577*   BILITOTAL  3.0*  3.6*  3.3*   ALT  51  48  43   AST  22  20  25   PROTTOTAL  6.3*  6.4*  6.1*   ALBUMIN  3.3*  3.4  3.6

## 2018-04-01 NOTE — PLAN OF CARE
Problem: Patient Care Overview  Goal: Plan of Care/Patient Progress Review  Outcome: No Change  [id number]: Bedside paracentesis unable to be done due to not enough fluid to drain. Patient's biliary drain draining large amounts at incision site. Dressing changed x2. Wife and daughters came to visit and brought Easter dinner which patient will eat later. . Bilateral feet with increased edema at +3-+4. Currently elevated on pillows in bed. Plan to discharge tomorrow morning before patient's appt in PWB. Continue to assist and follow plan of care.

## 2018-04-01 NOTE — PROGRESS NOTES
I was asked to perform a focused abdominal ultrasound exclusively to determine whether paracentesis could be performed. I evaluated the areas safest for paracentesis, in which fluid would likely be present. My interpretation of the exam is that there was insufficient fluid for paracentesis in these locations. The team was notified.     Images have been saved in PACS. A full report is below.         Indication: Abdominal pain.      Views:     Hepatorenal/RUQ/RLQ: Adequate.     Perisplenic/LUQ/LLQ: Adequate.     Suprapubic:  View not obtained.      Findings:    Hepatorenal free fluid: Small.     Perisplenic free fluid: Small.    Suprapubic free fluid: View not obtained.      Interpretation: See above.    Lev Miguel M.D.  Attending Physician  [facility] Medicine  Bedside Procedures Service  [phone]

## 2018-04-02 NOTE — PROGRESS NOTES
This is a recent snapshot of the patient's [facility] medical record.  For current drug dose and complete information and questions, call [phone] or In Basket pool, [facility]v home infusion ([id number])  CSN Number:  [id number]

## 2018-04-02 NOTE — DISCHARGE SUMMARY
[facility], [place]    Internal Medicine Discharge Summary- [facility]    Date of Admission:  3/14/2018  Date of Discharge:  4/2/2018  Discharging Attending Provider: Macrina Urbina  Discharge Team: [facility]     Discharge Diagnoses    Candidemia  E coli, C perfringens, Citrobacter, and enterococcus bacteremia   Liver abscess  Hepatoportal sclerosis  Pancreatic cancer  Ascites  Chronic cancer pain  Normocytic Anemia   Thrombocytopenia   Vitamin K deficiency  New diagnosis Diabetes  SBP  Pre-Renal KAT- Resolved with albumin.         Hospital Course   Naresh Poole is a 47 year old male with a PMHx of pancreatic cancer (T3N1) s/p whipple 5/2017 along with FOLFIRINOX and Gemzar/Xeloda 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 Presented with weakness and AMS. On imaging was found to have narrowing of the portal vein confluence concerning for liver abscess formation and ascending cholangitis s/p perc biliary drain by IR. Also found to have polymicrobial bacteremia and fungemia likely from GI source. Pt has remained afebrile and hemodynamically stable. Mentation has also improved.         ##Candidemia: from BCx on 3/21 and 3/20. Negative BC since 3/22/18.   Plan:  -IV micafungin D/C  -transitioned to PO micafungin. Continue 4 weeks until seen by ID  -pulled the PICC line and the port-a-cath 3/23 and replacing PICC on 3/29     ##E coli, C perfringens, Citrobacter, and enterococcus bacteremia obtained on OSH BCx 3/13  ##Probably biliary source given past hx abscess, current abscess on imaging, and poor drainage of liver   ##Hepatoportal sclerosis  Bacteremia, suspect GI source with possible ascending cholangitis and/or liver abscess. U/S showed possible abscess, with retrograde flow in the L portal vein, w/ c/f narrowing or occlusion of the splenoportal confluence.  Pt has now been afebrile and hemodynamically stable.                          --cont ertapenem and vanc  until seen by ID in 4 weeks                          --culture if spikes                                                        --ERCP on 3/20--bx did not show malignancy                                                -- s/p IR drainage with percutaneous biliary drain                                                --will F/U with Dr. Reddy (ID) in 4 weeks     #Ascites - diuretic refractory. Continue home diuretics at half dose for now.   Attempted therapeutic paracentesis on 4/1/18, however, pocket not deep enough.  Pt to get therapeutic paracentesis as outpatient.     Pt is leaking ascites fluid from biliary drain. Skin is clean and intact, no signs of infection. Pt will place barrier creams and gauze and change as needed. Pt will get weekly paracentesis as outpatient to relieve pressure to decrease drainage.       ##Pancreatic Insufficiency  ##Chronic Diarrhea  ##Hypovitamin D  ##Pancreatic cancer  (T3N1) s/p whipple 9/2017 along with FOLFIRINOX and Gemzar/Xeloda c/b enterococcal liver abscess (treated) along with non-malignant ascites. Pt last took Gemzar and xeloda in Nov 2017.  Pt has had chronic diarrhea that occurs immediately after eating. Pt has been continuing CREON at home.                            --cont CREON with food                                                 --fecal elastase 15 (low)--indicating diarrhea is not 2/2 malabsorption                                                 --fecal calprotectin elevated, indicating possible ischemia-but according to GI, difficult to interpret in the setting of inflammation                           --nutrition consult placed                                                  --start Vit D2 50K units once weekly for 6-8 weeks                                                 --MV ADEK supplementation                            --per oncology recs: will f/u as OP with oncology 4/2; recommended repeat CT CAP 1-2 days prior to OP oncology visit. It shows concern for  recurrence                                                -- IR obtained brushing bx from biliary stricture 3/29 - came back as 'no evidence of malignancy'      ##Chronic cancer pain                           --cont dilaudid 2 mg q6hrs prn                           --cont methadone 5 mg TID      ##Normocytic Anemia   Iron panel shows low iron, low iron binding capacity, and low iron saturation index, and normal ferritin c/w ACD. Peripheral smear is normal, and hemolysis labs negative.     Pt received blood tx on 3/27 and 3/28; hemoglobin stable O/N 3/29  Plan:                                                --CTM          ##Thrombocytopenia   Patient possibly has sinusoidal obstructive syndrome which can cause low Plts along with the conjugated hyperbilirubinemia which the patient also has. 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                           --HIT screen neg                                                --peripheral smear shows no abnormality    # Vitamin K deficiency: Giving a dose of vit K. Checking coag factors  Heme/Onc to follow up.                                      ##New diagnosis Diabetes - could be sec to pancreatic endocrine insufficiency in setting of panc cancer s/p whipple. HgbA1c 9.1%  Pt does not have a hx of diabetes.                          --cont SSI                          --hypoglycemia protocol                                                --diabetes education consult placed      Resolved conditions:         ##SBP  Pt has WBC 1093 with 79% PMNs. So far gram stain neg. Cx pending. Elevated WBC could also be sec to malignant effusion. However, cytology is negative.     Repeat paracentesis 3/26 showing 1200 WBC w/ 92% neutrophils -> worse than prior                                                      --pt is covered with ertapenem+vanc+micafungin                                                -- Repeat diagnostic para 3/29 shows 269 WBC; 3L also  removed; given 75 mg albumin     #Pre-Renal KAT- Resolved with albumin.                          --giving albumin challenge 150 g on 3/26, 75 g on 3/27 and 3/28    # Discharge Pain Plan:   - During his hospitalization, Naresh experienced pain due to pancreatic cancer.  The pain plan for discharge was discussed with Naresh and the plan was created in a collaborative fashion.    - Chronic/continued opioids: dilaudid, methadone    Consultations This Hospital Stay   PHYSICAL THERAPY ADULT IP CONSULT  OCCUPATIONAL THERAPY ADULT IP CONSULT  GI HEPATOLOGY ADULT IP CONSULT  PHARMACY TO DOSE VANCO  MEDICATION HISTORY IP PHARMACY CONSULT  GI PANCREATICOBILIARY ADULT IP CONSULT  PHARMACY TO DOSE VANCO  PHARMACY TO DOSE VANCO  GI LUMINAL ADULT IP CONSULT  NUTRITION SERVICES ADULT IP CONSULT  INFECTIOUS DISEASE GENERAL ADULT IP CONSULT  PHYSICAL THERAPY ADULT IP CONSULT  NUTRITION SERVICES ADULT IP CONSULT  SURGICAL ONCOLOGY ADULT IP CONSULT  INTERNAL MEDICINE PROCEDURE TEAM ADULT IP CONSULT [facility]  NUTRITION SERVICES ADULT IP CONSULT  ONCOLOGY IP CONSULT  PHARMACY TO DOSE VANCO  INTERVENTIONAL RADIOLOGY ADULT/PEDS IP CONSULT  PHARMACY TO DOSE VANCO  VASCULAR ACCESS ADULT IP CONSULT  INTERVENTIONAL RADIOLOGY ADULT/PEDS IP CONSULT  PHARMACY TO DOSE VANCO  INTERNAL MEDICINE PROCEDURE TEAM ADULT IP CONSULT [facility]  LYMPHEDEMA THERAPY IP CONSULT  INTERVENTIONAL RADIOLOGY ADULT/PEDS IP CONSULT  VASCULAR ACCESS CARE ADULT IP CONSULT  VASCULAR ACCESS CARE ADULT IP CONSULT  VASCULAR ACCESS ADULT IP CONSULT  VASCULAR ACCESS ADULT IP CONSULT  INTERNAL MEDICINE PROCEDURE TEAM ADULT IP CONSULT [facility]  DIABETES EDUCATION IP CONSULT  PATIENT LEARNING CENTER IP CONSULT  HEMATOLOGY & ONCOLOGY IP CONSULT  INTERNAL MEDICINE PROCEDURE TEAM ADULT IP CONSULT [facility]  HEMATOLOGY & ONCOLOGY IP CONSULT    Code Status   DNR / DNI      ______________________________________________________________________    Physical Exam   Vital Signs: Temp: 96.8  F (36  C) Temp src: Oral BP: (!) 135/92 Pulse: 85 Heart Rate: 73 Resp: 16 SpO2: 97 % O2 Device: None (Room air)    Weight: 148 lbs 3.2 oz    GENERAL APPEARANCE: alert and no distress  HEENT: has scleral icterus, MMM  Pulmonary: CTAB  CV: regular rhythm, normal rate, no murmur, no rub   - JVP  not elevated    - Extremities without edema or swelling   Skin: has jaundice  GI: abd distended but not hard, nontender to palpation, + ascites, no rebound, has umbilical hernia that is reducible; perc drain site clean and intact, has small amount of drainage of ascites fluid; no infection seen  NEURO: mentation intact and speech normal, equally move  Extremities: muscle wasting present bilaterally       Discharge Disposition   Discharged to home  Condition at discharge: Stable    Discharge Orders     IR Biliary Tube Change     Comprehensive metabolic panel     CBC with platelets   Last Lab Result: Hemoglobin (g/dL)      Date                     Value                03/31/2018               8.7 (L)          ----------     Hepatic panel   LFT check for pt on fluconazole. Obtain weekly.     Home infusion referral     Home care nursing referral     Home Care PT Referral for Hospital Discharge     Home Care OT Referral for Hospital Discharge     Infectious Disease Referral     Physical Therapy Referral     Reason for your hospital stay   You had a bacterial and yeast infection in the blood and also infection (abscess) in the liver.     Activity   Your activity upon discharge: activity as tolerated     Adult [facility] Follow-up and recommended labs and tests   Follow up with primary care provider, Maci Iglesias, within 7 days to evaluate medication change and to evaluate treatment change.  The following labs/tests are recommended: CBC and CMP.      Followup with Oncology on April 2nd.     Please followup with   [facility] in 3 weeks with infectious diseases. They will contact you to make an appointment.     Please followup with gastroenterology. You have an appointment with them on April 23.    Please followup with Interventional Radiology. They will contact you for an appointment. You need to see them in 3 weeks to have your drain replaced with a new drain.    We will refer you for a paracentesis weekly as needed.    Appointments on [place] and/or [facility] (with [facility] or [facility] provider or service). Call [phone] if you haven't heard regarding these appointments within 7 days of discharge.     DNR/DNI     Update Provider     Diet   Follow this diet upon discharge: Orders Placed This Encounter     Room Service     Snacks/Supplements Adult: Ensure Plus (Adult); Between Meals     Calorie Counts     Room Service     High Kcal/High Protein Diet, ADULT       Discharge Medications   Current Discharge Medication List      START taking these medications    Details   naloxone (NARCAN) 0.4 MG/ML injection Inject 0.25-1 mLs (0.1-0.4 mg) into the vein once for 1 dose  Qty: 1 mL, Refills: 0    Associated Diagnoses: Malignant neoplasm of pancreas, unspecified location of malignancy (H)      fluconazole (DIFLUCAN) 200 MG tablet Take 2 tablets (400 mg) by mouth daily for 28 days  Qty: 56 tablet, Refills: 0    Associated Diagnoses: Candidemia (H)      sodium chloride, PF, 0.9% PF flush Irrigate with 10 mLs as directed every 24 hours  Qty: 300 mL, Refills: 1    Associated Diagnoses: Encounter for biliary drainage tube placement      multivitamin CF formula (DEKAS PLUS) CAPS per capsule Take 1 capsule by mouth daily  Qty: 30 capsule, Refills: 11    Associated Diagnoses: Pancreatic insufficiency      pantoprazole (PROTONIX) 40 MG EC tablet Take 1 tablet (40 mg) by mouth 2 times daily  Qty: 30 tablet, Refills: 11    Associated Diagnoses: Gastroesophageal reflux disease, esophagitis presence not specified      Ascorbic Acid 250 MG CHEW  Take 250 mg by mouth daily  Qty: 90 tablet, Refills: 0    Associated Diagnoses: Vitamin K deficiency      Ergocalciferol (DRISDOL) 21567 UNITS CAPS Take 50,000 Units by mouth every 7 days for 4 doses  Qty: 4 capsule, Refills: 0    Associated Diagnoses: Vitamin D deficiency      vancomycin 1,250 mg Inject 1,250 mg into the vein every 24 hours for 28 days  Qty: 72038 mg, Refills: 0    Associated Diagnoses: Bacteremia      ertapenem (INVANZ) 1 GM vial Inject 1 g into the vein every 24 hours for 28 days  Qty: 280 mL, Refills: 0    Associated Diagnoses: Bacteremia      !! insulin aspart (NOVOLOG PEN) 100 UNIT/ML injection Inject 1-7 Units Subcutaneous 4 times daily (with meals and nightly)  Qty: 9 mL, Refills: 0    Associated Diagnoses: Pancreatic insufficiency      !! insulin aspart (NOVOLOG PEN) 100 UNIT/ML injection Inject 1-5 Units Subcutaneous At Bedtime  Qty: 9 mL, Refills: 0    Associated Diagnoses: Pancreatic insufficiency       !! - Potential duplicate medications found. Please discuss with provider.      CONTINUE these medications which have CHANGED    Details   furosemide (LASIX) 20 MG tablet Take 2 tablets (40 mg) by mouth daily  Qty: 30 tablet, Refills: 0    Associated Diagnoses: Cirrhosis of liver with ascites, unspecified hepatic cirrhosis type (H)      spironolactone (ALDACTONE) 50 MG tablet Take 2 tablets (100 mg) by mouth daily  Qty: 30 tablet, Refills: 0    Associated Diagnoses: Cirrhosis of liver with ascites, unspecified hepatic cirrhosis type (H)      HYDROmorphone (DILAUDID) 2 MG tablet Take 2 tablets (4 mg) by mouth every 4 hours as needed for moderate to severe pain (take 2-4 mg as needed for pain every 4 hours)  Qty: 28 tablet, Refills: 0    Associated Diagnoses: Malignant neoplasm of pancreas, unspecified location of malignancy (H)      amLODIPine (NORVASC) 5 MG tablet Take 1 tablet (5 mg) by mouth daily  Qty: 30 tablet, Refills: 0    Associated Diagnoses: Benign essential hypertension          CONTINUE these medications which have NOT CHANGED    Details   alum & mag hydroxide-simethicone (MYLANTA) 200-200-20 MG/5ML SUSP suspension Take 15 mLs by mouth 2 times daily as needed for indigestion      amylase-lipase-protease (CREON 24) 31905-99817 UNITS CPEP per EC capsule Take 2 caps with meals and 1 cap with snacks.      methadone (DOLOPHINE) 5 MG tablet Take 5 mg by mouth 3 times daily      sertraline (ZOLOFT) 50 MG tablet Take 50 mg by mouth At Bedtime       simethicone (GAS-X) 80 MG chewable tablet Take 80 mg by mouth every 6 hours as needed for flatulence or cramping      ONDANSETRON PO Take 4-8 mg by mouth every 8 hours as needed for nausea      polyethylene glycol (MIRALAX/GLYCOLAX) Packet Take 17 g by mouth daily as needed for constipation      bisacodyl (DULCOLAX) 5 MG EC tablet Take 5 mg by mouth daily as needed for constipation           Allergies   No Known Allergies

## 2018-04-02 NOTE — MR AVS SNAPSHOT
After Visit Summary   4/2/2018    Naresh Poole    MRN: [id number]           Patient Information     Date Of Birth          1970        Visit Information        Provider Department      4/2/2018 10:45 AM Sujit Cuevas MD [facility]        Today's Diagnoses     Cirrhosis of liver with ascites, unspecified hepatic cirrhosis type (H)    -  1    Malignant neoplasm of head of pancreas (H)           Follow-ups after your visit        Additional Services     PALLIATIVE CARE REFERRAL       Your provider has referred you to Palliative Care Services.    To schedule an Outpatient Palliative Care Referral appointment, please call: [facility]: [facility] [phone]   [email or web address]    Please be aware that coverage of these services is subject to the terms and limitations of your health insurance plan.  Call member services at your health plan with any benefit or coverage questions.      Please bring the following with you to your appointment:    (1) Any X-Rays, CTs or MRIs which have been performed.  Contact the facility where they were done to arrange for  prior to your scheduled appointment.   (2) If you have recently seen a provider outside of the [facility], please bring your most recent clinic note and/or imaging results  (3) List of current medications - please bring ALL of the medications that you are currently taking (in their original bottles) to your appointment  (4) This referral request  (5) Any documents/labs given to you for this referral    Services Requested: Evaluate and treat symptoms (including writing prescriptions)    Please complete the following questions:  1. What is patient's life-limiting diagnosis? Pancreatic cancer, cirrhosis  2. What is the reason for the referral? Chronic pain related to pancreatic cancer and liver disease  3. What is the patient's prognosis? Difficult to say - he has no  definitive evidence of pancreatic occurrence. The liver disease is more serious.     Palliative Care Definition:    Palliative Care is specialized medical care for people with serious illness.  This type of care is focused on providing patients with relief from symptoms, pain and stresses of serious illness - whatever the diagnosis may be.  The goal of Palliative Care is to improve quality of life for both the patient and the family.  Palliative Care is provided by a team of doctors, nurses and other specialists who work with the patient's other doctors to provide an extra layer of support.  Palliative Care is appropriate at any age and at any stage in a serious illness, and can be provided together with curative treatment.                  Your next 10 appointments already scheduled     Apr 23, 2018  9:30 AM CDT   Lab with  LAB   [facility])    [address] [place] [address]  1st Floor  [facility] [address]   [phone]            Apr 23, 2018 10:30 AM CDT   (Arrive by 10:15 AM)   Return General Liver with Robe Bernal MD   [facility] ([facility])    [address] [place] [address]  [address]  [facility] [address]   [phone]            Apr 24, 2018 11:30 AM CDT   [facility] Lab Draw with  [facility] LAB DRAW   [facility])    [address] [place] [address]  [address]  [facility] [address]   [phone]            Apr 24, 2018 12:10 PM CDT   (Arrive by 11:55 AM)   Return Visit with Iliana Fitch PA-C   [facility] ([facility])    [address] [place] [address]  [address]  [facility] [address]   [phone]            May 02, 2018 12:45 PM CDT   (Arrive by 12:30 PM)   New Patient Visit with Lawrence Lynch MD   [facility] ([facility])    [address] [place] [address]  [address]  [facility] [address]    [phone]            Jul 02, 2018  9:30 AM CDT   [facility] Lab Draw with  [facility] LAB DRAW   [facility] Lab Draw ([facility])    [address]  [address]  [facility] [address]   [phone]            Jul 02, 2018 10:00 AM CDT   (Arrive by 9:45 AM)   CT CHEST/ABDOMEN/PELVIS W CONTRAST with UCCT1   [facility] CT ([facility])    [address]  1st Floor  [facility] [id number] [phone]           Please bring any scans or X-rays taken at other hospitals, if similar tests were done. Also bring a list of your medicines, including vitamins, minerals and over-the-counter drugs. It is safest to leave personal items at home.  Be sure to tell your doctor:   If you have any allergies.   If there s any chance you are pregnant.   If you are breastfeeding.  You may have contrast for this exam. To prepare:   Do not eat or drink for 2 hours before your exam. If you need to take medicine, you may take it with small sips of water. (We may ask you to take liquid medicine as well.)   The day before your exam, drink extra fluids at least six 8-ounce glasses (unless your doctor tells you to restrict your fluids).   You will be given instructions on how to drink the contrast.  Patients over 70 or patients with diabetes or kidney problems:   If you haven t had a blood test (creatinine test) within the last 30 days, the Cardiologist/Radiologist may require you to get this test prior to your exam.  If you have diabetes:   Continue to take your metformin medication on the day of your exam  Please wear loose clothing, such as a sweat suit or jogging clothes. Avoid snaps, zippers and other metal. We may ask you to undress and put on a hospital gown.  If you have any questions, please call the Imaging Department where you will have your exam.            Jul 09, 2018  8:45 AM CDT   (Arrive by 8:30 AM)   Return Visit with Sujit Cuevas MD   [facility]  "[facility] ([facility])    [address]  [address]  [facility] [address] [phone]              Future tests that were ordered for you today     Open Future Orders        Priority Expected Expires Ordered    CT Chest/Abdomen/Pelvis w Contrast Routine 7/2/2018 4/2/2019 4/2/2018    *CBC with platelets differential Routine 7/2/2018 4/2/2019 4/2/2018    Comprehensive metabolic panel Routine 7/2/2018 4/2/2019 4/2/2018            Who to contact     If you have questions or need follow up information about today's clinic visit or your schedule please contact [facility] directly at [phone].  Normal or non-critical lab and imaging results will be communicated to you by ShotSpotterhart, letter or phone within 4 business days after the clinic has received the results. If you do not hear from us within 7 days, please contact the clinic through Fjuult or phone. If you have a critical or abnormal lab result, we will notify you by phone as soon as possible.  Submit refill requests through Appfolio or call your pharmacy and they will forward the refill request to us. Please allow 3 business days for your refill to be completed.          Additional Information About Your Visit        Appfolio Information     Appfolio gives you secure access to your electronic health record. If you see a primary care provider, you can also send messages to your care team and make appointments. If you have questions, please call your primary care clinic.  If you do not have a primary care provider, please call [phone] and they will assist you.        Care EveryWhere ID     This is your Care EveryWhere ID. This could be used by other organizations to access your [facility] medical records  [id number]        Your Vitals Were     Pulse Temperature Respirations Height Pulse Oximetry BMI (Body Mass Index)    74 96.9  F (36.1  C) (Oral) 16 1.835 m (6' 0.25\") 93% 20.36 kg/m2       " Blood Pressure from Last 3 Encounters:   04/02/18 131/86   04/02/18 (!) 135/92   02/27/18 123/87    Weight from Last 3 Encounters:   04/02/18 68.6 kg (151 lb 3 oz)   04/01/18 67.2 kg (148 lb 3.2 oz)   02/21/18 65.3 kg (144 lb)              We Performed the Following     Cancer antigen 19-9     Glucose by meter     PALLIATIVE CARE REFERRAL        Primary Care Provider Office Phone # Fax #    Maci Chiunitin, ALESSANDRA [phone]       [facility] [address] [place] [address]        Equal Access to Services     [facility]: Hadii denita Austin, waaxda mari, qaybta kaalmada francisca, vashti elizabeth . So [facility] [phone].    ATENCIÓN: Si habla español, tiene a dotson disposición servicios gratuitos de asistencia lingüística. [facility] [phone].    We comply with applicable federal civil rights laws and [place] laws. We do not discriminate on the basis of race, color, national origin, age, disability, sex, sexual orientation, or gender identity.            Thank you!     Thank you for choosing [facility]  for your care. Our goal is always to provide you with excellent care. Hearing back from our patients is one way we can continue to improve our services. Please take a few minutes to complete the written survey that you may receive in the mail after your visit with us. Thank you!             Your Updated Medication List - Protect others around you: Learn how to safely use, store and throw away your medicines at www.disposemymeds.org.          This list is accurate as of 4/2/18 12:49 PM.  Always use your most recent med list.                   Brand Name Dispense Instructions for use Diagnosis    Alcohol Swabs Pads     120 each    4 each 4 times daily as needed    Diabetes mellitus due to underlying condition with complication, unspecified long term insulin use status (H)       amLODIPine 5 MG tablet    NORVASC    30 tablet    Take  1 tablet (5 mg) by mouth daily    Benign essential hypertension       amylase-lipase-protease 74986-65960 UNITS Cpep per EC capsule    CREON 24     Take 2 caps with meals and 1 cap with snacks.        Ascorbic Acid 250 MG Chew     90 tablet    Take 250 mg by mouth daily    Vitamin K deficiency       BD SHARPS CONTAINER HOME Misc     1 each    1 each every 30 days    Diabetes mellitus due to underlying condition with complication, unspecified long term insulin use status (H)       blood glucose monitoring lancets     100 each    Use to test blood sugars 4 times daily or as directed.    Diabetes mellitus due to underlying condition with complication, unspecified long term insulin use status (H)       blood glucose monitoring meter device kit    no brand specified    1 kit    Use to test blood sugar4 times daily or as directed.    Diabetes mellitus due to underlying condition with complication, unspecified long term insulin use status (H)       blood glucose monitoring test strip    no brand specified    100 strip    Use to test blood sugars 4 times daily or as directed.    Diabetes mellitus due to underlying condition with complication, unspecified long term insulin use status (H)       DRISDOL 29796 UNITS Caps   Start taking on:  4/5/2018     4 capsule    Take 50,000 Units by mouth every 7 days for 4 doses    Vitamin D deficiency       DULCOLAX 5 MG EC tablet   Generic drug:  bisacodyl      Take 5 mg by mouth daily as needed for constipation        ertapenem 1 GM vial    INVanz    280 mL    Inject 1 g into the vein every 24 hours for 28 days    Bacteremia       fluconazole 200 MG tablet    DIFLUCAN    56 tablet    Take 2 tablets (400 mg) by mouth daily for 28 days    Candidemia (H)       furosemide 20 MG tablet    LASIX    30 tablet    Take 2 tablets (40 mg) by mouth daily    Cirrhosis of liver with ascites, unspecified hepatic cirrhosis type (H)       GAS-X 80 MG chewable tablet   Generic drug:  simethicone       "Take 80 mg by mouth every 6 hours as needed for flatulence or cramping        gauze pads & dressings 4\"X4\" Pads     200 each    10 pads 4 times daily as needed    Candidiasis of skin       HYDROmorphone 2 MG tablet    DILAUDID    28 tablet    Take 2 tablets (4 mg) by mouth every 4 hours as needed for moderate to severe pain (take 2-4 mg as needed for pain every 4 hours)    Malignant neoplasm of pancreas, unspecified location of malignancy (H)       insulin aspart 100 UNIT/ML injection    NovoLOG PEN    9 mL    Inject 1-7 Units Subcutaneous 4 times daily (with meals and nightly)    Pancreatic insufficiency       insulin glargine 100 UNIT/ML injection    LANTUS    9 mL    Inject 1-5 Units Subcutaneous At Bedtime    Diabetes mellitus due to underlying condition with complication, unspecified long term insulin use status (H)       insulin pen needle 32G X 4 MM     100 each    Use insulin pen needles daily or as directed.    Diabetes mellitus due to underlying condition with complication, unspecified long term insulin use status (H)       methadone 5 MG tablet    DOLOPHINE     Take 5 mg by mouth 3 times daily        Miconazole Nitrate 2 % ointment    CRITIC-AID CLEAR AF    71 g    Apply topically 2 times daily    Candidiasis of skin       multivitamin CF formula Caps per capsule     30 capsule    Take 1 capsule by mouth daily    Pancreatic insufficiency       MYLANTA 200-200-20 MG/5ML Susp suspension   Generic drug:  alum & mag hydroxide-simethicone      Take 15 mLs by mouth 2 times daily as needed for indigestion        naloxone 0.4 MG/ML injection    NARCAN    1 mL    Inject 0.25-1 mLs (0.1-0.4 mg) into the vein once for 1 dose    Malignant neoplasm of pancreas, unspecified location of malignancy (H)       ONDANSETRON PO      Take 4-8 mg by mouth every 8 hours as needed for nausea        pantoprazole 40 MG EC tablet    PROTONIX    30 tablet    Take 1 tablet (40 mg) by mouth 2 times daily    Gastroesophageal reflux " disease, esophagitis presence not specified       polyethylene glycol Packet    MIRALAX/GLYCOLAX     Take 17 g by mouth daily as needed for constipation        sertraline 50 MG tablet    ZOLOFT     Take 50 mg by mouth At Bedtime        * sodium chloride (PF) 0.9% PF flush     300 mL    Irrigate with 10 mLs as directed every 24 hours    Encounter for biliary drainage tube placement       * sodium chloride (PF) 0.9% PF flush     3000 mL    10 mLs by Intracatheter route every 8 hours    Encounter for biliary drainage tube placement       spironolactone 50 MG tablet    ALDACTONE    30 tablet    Take 2 tablets (100 mg) by mouth daily    Cirrhosis of liver with ascites, unspecified hepatic cirrhosis type (H)       vancomycin 1,250 mg     73649 mg    Inject 1,250 mg into the vein every 24 hours for 28 days    Bacteremia       * Notice:  This list has 2 medication(s) that are the same as other medications prescribed for you. Read the directions carefully, and ask your doctor or other care provider to review them with you.

## 2018-04-02 NOTE — NURSING NOTE
"Oncology Rooming Note    April 2, 2018 11:16 AM   Naresh Poole is a 47 year old male who presents for:    Chief Complaint   Patient presents with     Oncology Clinic Visit     New Pt. Pancreatic CA     Initial Vitals: /86  Pulse 74  Temp 96.9  F (36.1  C) (Oral)  Resp 16  Ht 1.835 m (6' 0.25\")  Wt 68.6 kg (151 lb 3 oz)  SpO2 93%  BMI 20.36 kg/m2 Estimated body mass index is 20.36 kg/(m^2) as calculated from the following:    Height as of this encounter: 1.835 m (6' 0.25\").    Weight as of this encounter: 68.6 kg (151 lb 3 oz). Body surface area is 1.87 meters squared.  Moderate Pain (5) Comment: Data Unavailable   No LMP for male patient.  Allergies reviewed: Yes  Medications reviewed: Yes    Medications: Medication refills not needed today.  Pharmacy name entered into EPIC: [facility] - [place], [place] - [address] [place] [address]    Clinical concerns: new patient, just discharged from the hospital to make this appt. Has been in the hospital since March 13th.  Patient had CT scans on Saturday March 31st of his Abdomen and chest for this appt.  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  The Resident  Said that the Oncologist would have to read it further to know. Dr. Cuevas was NOT notified.    10 minutes for nursing intake (face to face time)     Neris De León CMA              "

## 2018-04-02 NOTE — PROGRESS NOTES
This is a recent snapshot of the patient's [facility] medical record.  For current drug dose and complete information and questions, call [phone] or In Basket pool, [facility] ([id number])  CSN Number:  [id number]

## 2018-04-02 NOTE — LETTER
4/2/2018       RE: Naresh Poole  [address] [facility] [id number]     Dear Colleague,    Thank you for referring your patient, Naresh Poole, to the [facility]. Please see a copy of my visit note below.    NEW PATIENT VISIT    NAME: Naresh Poole   DATE: Apr 2, 2018  MRN: [id number]    REFERRING PHYSICIAN: Macrina Urbina MD     CC/PATIENT ID: pancreatic cancer ([id number])    HPI: Naresh Poole is a 46 y/o man here for a second opinion for pancreatic cancer. This was diagnosed in January 2017, after presenting with jaundice and RUQ pain. He had a biopsy via EUS-FNA of a pancreatic head mass on 1/10/17, done at [facility]. He has followed with Dr. Soto Romano at [facility] since then. The initial clinical staging was T3N1M0, with the pancreatic mass abutting the portal vein, and an enlarged portal caval node. He received 2 months of neoadjuvant FOLFIRINOX. Restaging CT scans in March 2017 showed two lesions in the liver concerning for metastatic disease. These were biopsied on 3/27/17 and 4/12/17, and were negative for malignancy. He proceeded to Whipple on 5/9/17 and pathology showed a good response to neoadjuvant therapy (ypT3N0).      Post-operatively, his Ca 19-9 was elevated at 137. PET scan on 7/11/17 was concerning for metastatic disease in the liver, with FDG avid lesions in both right and left hepatic lobes. Liver biopsy on 7/20/17 was again negative for malignancy, instead showed enterococcus and he completed a course of antibiotics. He then developed ascites, again concerning for cancer recurrence. He actually had a diagnostic laparoscopy on 10/5/17 which did not show any evidence of peritoneal carcinomatosis. Cytology from paracentesis was negative (x2). He then started adjuvant gem/Xeloda in October 2017 and received this for only 2 cycles, when it was discontinued due to worsening liver function. Imaging at that point was suggestive of early  cirrhosis, splenomegaly, ascites, and esophageal varices.     He was just discharged today from our inpatient medicine service. He was admitted from 3/14/18-4/2/18 and was treated for candidemia, bacteremia, liver abscess, SBP, hepatoportal sclerosis, recurrent ascites, anemia, and thrombocytopenia. He was seen by one of our surgical oncologists Dr. Carter during the admission. His recurrent liver infections and cirrhosis are due to portal vein occlusion (not from thrombosis) and chronic biliary strictures. Per Dr. Carter, surgical intervention is not an option. A percutaneous biliary drain was placed on 3/21/18 as intervention via ERCP was unsuccessful. He was seen by the oncology consult service while inpatient and requested transferring his care to our clinic.       Sg is in clinic today with his wife. He just came over from the hospital after being discharged. He is quite weak, in a wheelchair and unable to get onto the exam table. He has a PICC line and external biliary drain. Chronic abdominal pain is well controlled. Swelling in the legs worsened over the last several weeks, but he is now back on diuretics so hopeful it will improve. Appetite is minimal. He has no n/v.     PAST MEDICAL HISTORY:  1. Pancreatic cancer as above  2. Cirrhosis and resultant complications as above  3. Recurrent liver abscesses   4. Diabetes     PAST SURGICAL HISTORY:  1. Whipple in May 2017  2. Exploratory laparoscopy in October 2017     FAMILY HISTORY: no h/o pancreatic cancer    Social history: . Lives in [place]. Non-smoker. No EtOH.     MEDS:    Current Outpatient Prescriptions:      furosemide (LASIX) 20 MG tablet, Take 2 tablets (40 mg) by mouth daily, Disp: 30 tablet, Rfl: 0     spironolactone (ALDACTONE) 50 MG tablet, Take 2 tablets (100 mg) by mouth daily, Disp: 30 tablet, Rfl: 0     Miconazole Nitrate (CRITIC-AID CLEAR AF) 2 % ointment, Apply topically 2 times daily, Disp: 71 g, Rfl: 11     gauze pads &  "dressings 4\"X4\" PADS, 10 pads 4 times daily as needed, Disp: 200 each, Rfl: 11     insulin pen needle 32G X 4 MM, Use insulin pen needles daily or as directed., Disp: 100 each, Rfl: 11     blood glucose monitoring (NO BRAND SPECIFIED) test strip, Use to test blood sugars 4 times daily or as directed., Disp: 100 strip, Rfl: 11     blood glucose monitoring (ONE TOUCH DELICA) lancets, Use to test blood sugars 4 times daily or as directed., Disp: 100 each, Rfl: 11     Alcohol Swabs PADS, 4 each 4 times daily as needed, Disp: 120 each, Rfl: 11     BD SHARPS CONTAINER HOME MISC, 1 each every 30 days, Disp: 1 each, Rfl: 11     insulin glargine (LANTUS) 100 UNIT/ML injection, Inject 1-5 Units Subcutaneous At Bedtime, Disp: 9 mL, Rfl: 3     sodium chloride, PF, (SODIUM CHLORIDE) 0.9% PF flush, 10 mLs by Intracatheter route every 8 hours, Disp: 3000 mL, Rfl: 3     HYDROmorphone (DILAUDID) 2 MG tablet, Take 2 tablets (4 mg) by mouth every 4 hours as needed for moderate to severe pain (take 2-4 mg as needed for pain every 4 hours), Disp: 28 tablet, Rfl: 0     fluconazole (DIFLUCAN) 200 MG tablet, Take 2 tablets (400 mg) by mouth daily for 28 days, Disp: 56 tablet, Rfl: 0     sodium chloride, PF, 0.9% PF flush, Irrigate with 10 mLs as directed every 24 hours, Disp: 300 mL, Rfl: 1     multivitamin CF formula (DEKAS PLUS) CAPS per capsule, Take 1 capsule by mouth daily, Disp: 30 capsule, Rfl: 11     pantoprazole (PROTONIX) 40 MG EC tablet, Take 1 tablet (40 mg) by mouth 2 times daily, Disp: 30 tablet, Rfl: 11     Ascorbic Acid 250 MG CHEW, Take 250 mg by mouth daily, Disp: 90 tablet, Rfl: 0     [START ON 4/5/2018] Ergocalciferol (DRISDOL) 54881 UNITS CAPS, Take 50,000 Units by mouth every 7 days for 4 doses, Disp: 4 capsule, Rfl: 0     amLODIPine (NORVASC) 5 MG tablet, Take 1 tablet (5 mg) by mouth daily, Disp: 30 tablet, Rfl: 0     vancomycin 1,250 mg, Inject 1,250 mg into the vein every 24 hours for 28 days, Disp: 09503 mg, Rfl: " 0     ertapenem (INVANZ) 1 GM vial, Inject 1 g into the vein every 24 hours for 28 days, Disp: 280 mL, Rfl: 0     insulin aspart (NOVOLOG PEN) 100 UNIT/ML injection, Inject 1-7 Units Subcutaneous 4 times daily (with meals and nightly), Disp: 9 mL, Rfl: 0     ONDANSETRON PO, Take 4-8 mg by mouth every 8 hours as needed for nausea, Disp: , Rfl:      alum & mag hydroxide-simethicone (MYLANTA) 200-200-20 MG/5ML SUSP suspension, Take 15 mLs by mouth 2 times daily as needed for indigestion, Disp: , Rfl:      amylase-lipase-protease (CREON 24) 28614-71228 UNITS CPEP per EC capsule, Take 2 caps with meals and 1 cap with snacks., Disp: , Rfl:      methadone (DOLOPHINE) 5 MG tablet, Take 5 mg by mouth 3 times daily, Disp: , Rfl:      polyethylene glycol (MIRALAX/GLYCOLAX) Packet, Take 17 g by mouth daily as needed for constipation, Disp: , Rfl:      sertraline (ZOLOFT) 50 MG tablet, Take 50 mg by mouth At Bedtime , Disp: , Rfl:      simethicone (GAS-X) 80 MG chewable tablet, Take 80 mg by mouth every 6 hours as needed for flatulence or cramping, Disp: , Rfl:      bisacodyl (DULCOLAX) 5 MG EC tablet, Take 5 mg by mouth daily as needed for constipation, Disp: , Rfl:      blood glucose monitoring (NO BRAND SPECIFIED) meter device kit, Use to test blood sugar4 times daily or as directed., Disp: 1 kit, Rfl: 0     naloxone (NARCAN) 0.4 MG/ML injection, Inject 0.25-1 mLs (0.1-0.4 mg) into the vein once for 1 dose, Disp: 1 mL, Rfl: 0     [DISCONTINUED] spironolactone (ALDACTONE) 50 MG tablet, Take 1 tablet (50 mg) by mouth daily, Disp: 30 tablet, Rfl: 0     [DISCONTINUED] furosemide (LASIX) 20 MG tablet, Take 1 tablet (20 mg) by mouth daily, Disp: 30 tablet, Rfl: 0     [DISCONTINUED] insulin aspart (NOVOLOG PEN) 100 UNIT/ML injection, Inject 1-5 Units Subcutaneous At Bedtime, Disp: 9 mL, Rfl: 0  No current facility-administered medications for this visit.     ALLERGIES: none    ROS: complete ROS reviewed and negative unless reported  "above. Pertinent symptoms reviewed above per HPI.    PHYSICAL EXAM:  /86  Pulse 74  Temp 96.9  F (36.1  C) (Oral)  Resp 16  Ht 1.835 m (6' 0.25\")  Wt 68.6 kg (151 lb 3 oz)  SpO2 93%  BMI 20.36 kg/m2  ECOG PS: 3-4  General: sitting in wheelchair, very thin, appears chronically ill  HEENT: PERRL, MMM, no oral lesions  Lungs: decreased BS at right base. Clear on the left.   Cardiovascular: RRR, no M/R/G. 2+ pitting edema bilaterally to knees.   Abdominal/Rectal: +BS, soft, minimally tender, slightly distended. Umbilical hernia. Biliary drain in RUQ.    Lymph: no cervical or supraclavicular adenopathy  MSK: decreased muscle tone  Skin: no rashes or petechaie  Neuro: A&O     Psych: flat affect       LABS REVIEWED ON DAY OF VISIT  CBC RESULTS:   Recent Labs   Lab Test  04/02/18   0634   WBC  10.7   RBC  3.38*   HGB  9.1*   HCT  29.0*   MCV  86   MCH  26.9   MCHC  31.4*   RDW  18.5*   PLT  61*     Recent Labs   Lab Test  04/02/18   0634  04/01/18   0705   NA  140  141   POTASSIUM  3.8  3.8   CHLORIDE  105  107   CO2  29  28   ANIONGAP  6  6   GLC  111*  126*   BUN  28  30   CR  0.70  0.77   CHIDI  7.5*  7.6*     Liver Function Studies -   Recent Labs   Lab Test  04/02/18   0634   PROTTOTAL  6.6*   ALBUMIN  3.4   BILITOTAL  3.1*   ALKPHOS  543*   AST  32   ALT  54       RADIOLOGY:  CT cap 3/31/18  1. Postsurgical changes of Whipple procedure with continued atrophy  and ductal dilation in the residual pancreas. Ill-defined soft tissue  in the resection bed is highly suspicious for recurrence, with  occlusion of the portal vein, upper SMV, and portal splenic  confluence.   2. No significant change in numerous cystic foci throughout the right  lobe of the liver. Well abscesses are suspected, this appearance  raises substantial concern for both abscesses and metastases.  3. No significant change in severe left-sided and moderate right-sided  intrahepatic biliary ductal dilatation with a new " percutaneous  internal/external biliary drainage catheter in place.  4. Large volume ascites and a large right-sided pleural effusion.  Severe anasarca. No definitive evidence of carcinomatosis.  5. Wall thickening of the descending and sigmoid colon is nonspecific  in the setting of ascites and hepatic disease but differential  includes infectious, inflammatory, and ischemic etiologies of colitis.    IMPRESSION/PLAN:  Naresh Poole is a 46 y/o man with pancreatic cancer, T3N1M0 at diagnosis in January 2017. He was treated at [facility] with neoadjuvant FOLFIRINOX, followed by Whipple resection in May 2017. The surgical pathology showed a good treatment response and the plan was to receive adjuvant gem/Xeloda per his oncologist Dr. Romano at [facility]. Unfortunately he began having liver complications in July 2017. This was initially concerning for recurrent disease, however liver biopsy grew enterococcus and he was treated with a prolonged course of antibiotics. In October 2017 he developed ascites, again concerning for recurrence. Exploratory laparoscopy was negative. He received two cycles of gem/Xeloda from October-November 2017 which was stopped early due to progressive cirrhosis. These records from [facility] were reviewed in care everywhere. Naresh and his wife would like to transfer his care here.     Naresh has not been received any chemotherapy since November 2017. He was just discharged from the inpatient internal medicine team today, after a 3 week hospitalization at [facility] for multiple infections and complications related to cirrhosis. During the hospitalization, cytology from ascites x2, pancreaticojejunostomy biopsy, and brush biopsy from the biliary drain were all negative for malignancy. CT cap on 3/31/18 shows soft tissue in the pancreatic resection bed that is concerning for recurrence, along with liver lesions that may be either infection or cancer. These imaging findings require close follow-up, but he has  no biopsy-proved recurrence of pancreatic cancer at this point. The CT findings certainly could be related to infection rather than cancer. With his severe liver disease, the elevated Ca 19-9 is of unclear significance.     He is significantly debilitated, with an ECOG PS of 3-4. He is on IV antibiotics and will be getting rehab at his home in [place]. He is not a candidate for further chemotherapy, and given that it has now been 11 months since his Whipple further adjuvant therapy would not likely be advisable even if he were in good enough shape for it. We therefore will proceed with surveillance imaging in 3 months. If further imaging is concerning for disease recurrence and he is recovering from the other medical problems, then we can discuss biopsy at that point to prove recurrence.     Plan:   - referral to palliative care   - f/u with Iliana Fitch in 3 weeks  - CT cap and labs (CBC and CMP) in 3 months. We will defer Ca 19-9 testing at that point given the elevation is of unclear significance.   - f/u with Dr. 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 He has an external biliary drain. Bilirubin level has improved with this but remains mildly elevated. He has f/u with hepatology later this month.     Bacteremia and liver abscesses: he is completing a course of IV abx. Has f/u with ID later this month.     Chronic pain: he was following with palliative care at [facility] (Dr. Soto Rangel). He would like to get all of his care here, so we will refer him to our palliative care colleagues     Patient seen and discussed with staff Dr. Faith Neff MD  Heme/Onc Fellow  Pager: [phone]          MEDICAL ONCOLOGY ATTENDING PHYSICIAN ADDENDUM:  I have seen and evaluated this patient with the medical oncology fellow, Dr. Neff. I have reviewed and edited his note, and agree with  the assessment and plan stated above. A complete review of systems was assessed, and pertinent positives/negatives are discussed in detail above and as follows.    Mr. Poole presents today with his wife, following a long and complicated hospitalization documented in detail above and in the discharge summary. He is in a wheelchair, cachectic, weak, but nonetheless optimistic and in relatively good spirits. He fully understands that he needs to focus on recovery from his morbidity resulting from systemic infections as well as cirrhosis; thus his cancer diagnosis is secondary in terms of priority at this time. He and his wife stated understanding that I do not recommend cancer-directed therapy at this time, most especially due to his extremely poor conditioning/performance status. The radiologic evaluation that shows no obvious evidence of current malignancy is also encouraging. He will prioritize rehabilitation and follow up with our [facility] team as well as Palliative Care in the coming weeks/months.    I spent > 60 minutes in consultation including history, full review of systems, and full physical exam; 50 minutes were spent discussion.  Over 50% of the time was spent counseling and coordinating care.  45 minutes were spent reviewing the patient s medical records, images, imaging reports, pathology review, and all other records and lab values prior to and during the appointment.    GABRIELLA SANDOVAL MD, PHD

## 2018-04-02 NOTE — PROGRESS NOTES
NEW PATIENT VISIT    NAME: Naresh Poole   DATE: Apr 2, 2018  MRN: [id number]    REFERRING PHYSICIAN: Macrina Urbina MD     CC/PATIENT ID: pancreatic cancer ([id number])    HPI: Naresh Poole is a 46 y/o man here for a second opinion for pancreatic cancer. This was diagnosed in January 2017, after presenting with jaundice and RUQ pain. He had a biopsy via EUS-FNA of a pancreatic head mass on 1/10/17, done at [facility]. He has followed with Dr. Soto Romano at [facility] since then. The initial clinical staging was T3N1M0, with the pancreatic mass abutting the portal vein, and an enlarged portal caval node. He received 2 months of neoadjuvant FOLFIRINOX. Restaging CT scans in March 2017 showed two lesions in the liver concerning for metastatic disease. These were biopsied on 3/27/17 and 4/12/17, and were negative for malignancy. He proceeded to Whipple on 5/9/17 and pathology showed a good response to neoadjuvant therapy (ypT3N0).      Post-operatively, his Ca 19-9 was elevated at 137. PET scan on 7/11/17 was concerning for metastatic disease in the liver, with FDG avid lesions in both right and left hepatic lobes. Liver biopsy on 7/20/17 was again negative for malignancy, instead showed enterococcus and he completed a course of antibiotics. He then developed ascites, again concerning for cancer recurrence. He actually had a diagnostic laparoscopy on 10/5/17 which did not show any evidence of peritoneal carcinomatosis. Cytology from paracentesis was negative (x2). He then started adjuvant gem/Xeloda in October 2017 and received this for only 2 cycles, when it was discontinued due to worsening liver function. Imaging at that point was suggestive of early cirrhosis, splenomegaly, ascites, and esophageal varices.     He was just discharged today from our inpatient medicine service. He was admitted from 3/14/18-4/2/18 and was treated for candidemia, bacteremia, liver abscess, SBP, hepatoportal sclerosis,  "recurrent ascites, anemia, and thrombocytopenia. He was seen by one of our surgical oncologists Dr. Carter during the admission. His recurrent liver infections and cirrhosis are due to portal vein occlusion (not from thrombosis) and chronic biliary strictures. Per Dr. Carter, surgical intervention is not an option. A percutaneous biliary drain was placed on 3/21/18 as intervention via ERCP was unsuccessful. He was seen by the oncology consult service while inpatient and requested transferring his care to our clinic.       Sg is in clinic today with his wife. He just came over from the hospital after being discharged. He is quite weak, in a wheelchair and unable to get onto the exam table. He has a PICC line and external biliary drain. Chronic abdominal pain is well controlled. Swelling in the legs worsened over the last several weeks, but he is now back on diuretics so hopeful it will improve. Appetite is minimal. He has no n/v.     PAST MEDICAL HISTORY:  1. Pancreatic cancer as above  2. Cirrhosis and resultant complications as above  3. Recurrent liver abscesses   4. Diabetes     PAST SURGICAL HISTORY:  1. Whipple in May 2017  2. Exploratory laparoscopy in October 2017     FAMILY HISTORY: no h/o pancreatic cancer    Social history: . Lives in [place]. Non-smoker. No EtOH.     MEDS:    Current Outpatient Prescriptions:      furosemide (LASIX) 20 MG tablet, Take 2 tablets (40 mg) by mouth daily, Disp: 30 tablet, Rfl: 0     spironolactone (ALDACTONE) 50 MG tablet, Take 2 tablets (100 mg) by mouth daily, Disp: 30 tablet, Rfl: 0     Miconazole Nitrate (CRITIC-AID CLEAR AF) 2 % ointment, Apply topically 2 times daily, Disp: 71 g, Rfl: 11     gauze pads & dressings 4\"X4\" PADS, 10 pads 4 times daily as needed, Disp: 200 each, Rfl: 11     insulin pen needle 32G X 4 MM, Use insulin pen needles daily or as directed., Disp: 100 each, Rfl: 11     blood glucose monitoring (NO BRAND SPECIFIED) test strip, Use to test " blood sugars 4 times daily or as directed., Disp: 100 strip, Rfl: 11     blood glucose monitoring (ONE TOUCH DELICA) lancets, Use to test blood sugars 4 times daily or as directed., Disp: 100 each, Rfl: 11     Alcohol Swabs PADS, 4 each 4 times daily as needed, Disp: 120 each, Rfl: 11     BD SHARPS CONTAINER HOME MISC, 1 each every 30 days, Disp: 1 each, Rfl: 11     insulin glargine (LANTUS) 100 UNIT/ML injection, Inject 1-5 Units Subcutaneous At Bedtime, Disp: 9 mL, Rfl: 3     sodium chloride, PF, (SODIUM CHLORIDE) 0.9% PF flush, 10 mLs by Intracatheter route every 8 hours, Disp: 3000 mL, Rfl: 3     HYDROmorphone (DILAUDID) 2 MG tablet, Take 2 tablets (4 mg) by mouth every 4 hours as needed for moderate to severe pain (take 2-4 mg as needed for pain every 4 hours), Disp: 28 tablet, Rfl: 0     fluconazole (DIFLUCAN) 200 MG tablet, Take 2 tablets (400 mg) by mouth daily for 28 days, Disp: 56 tablet, Rfl: 0     sodium chloride, PF, 0.9% PF flush, Irrigate with 10 mLs as directed every 24 hours, Disp: 300 mL, Rfl: 1     multivitamin CF formula (DEKAS PLUS) CAPS per capsule, Take 1 capsule by mouth daily, Disp: 30 capsule, Rfl: 11     pantoprazole (PROTONIX) 40 MG EC tablet, Take 1 tablet (40 mg) by mouth 2 times daily, Disp: 30 tablet, Rfl: 11     Ascorbic Acid 250 MG CHEW, Take 250 mg by mouth daily, Disp: 90 tablet, Rfl: 0     [START ON 4/5/2018] Ergocalciferol (DRISDOL) 73615 UNITS CAPS, Take 50,000 Units by mouth every 7 days for 4 doses, Disp: 4 capsule, Rfl: 0     amLODIPine (NORVASC) 5 MG tablet, Take 1 tablet (5 mg) by mouth daily, Disp: 30 tablet, Rfl: 0     vancomycin 1,250 mg, Inject 1,250 mg into the vein every 24 hours for 28 days, Disp: 69693 mg, Rfl: 0     ertapenem (INVANZ) 1 GM vial, Inject 1 g into the vein every 24 hours for 28 days, Disp: 280 mL, Rfl: 0     insulin aspart (NOVOLOG PEN) 100 UNIT/ML injection, Inject 1-7 Units Subcutaneous 4 times daily (with meals and nightly), Disp: 9 mL, Rfl: 0      "ONDANSETRON PO, Take 4-8 mg by mouth every 8 hours as needed for nausea, Disp: , Rfl:      alum & mag hydroxide-simethicone (MYLANTA) 200-200-20 MG/5ML SUSP suspension, Take 15 mLs by mouth 2 times daily as needed for indigestion, Disp: , Rfl:      amylase-lipase-protease (CREON 24) 95555-72341 UNITS CPEP per EC capsule, Take 2 caps with meals and 1 cap with snacks., Disp: , Rfl:      methadone (DOLOPHINE) 5 MG tablet, Take 5 mg by mouth 3 times daily, Disp: , Rfl:      polyethylene glycol (MIRALAX/GLYCOLAX) Packet, Take 17 g by mouth daily as needed for constipation, Disp: , Rfl:      sertraline (ZOLOFT) 50 MG tablet, Take 50 mg by mouth At Bedtime , Disp: , Rfl:      simethicone (GAS-X) 80 MG chewable tablet, Take 80 mg by mouth every 6 hours as needed for flatulence or cramping, Disp: , Rfl:      bisacodyl (DULCOLAX) 5 MG EC tablet, Take 5 mg by mouth daily as needed for constipation, Disp: , Rfl:      blood glucose monitoring (NO BRAND SPECIFIED) meter device kit, Use to test blood sugar4 times daily or as directed., Disp: 1 kit, Rfl: 0     naloxone (NARCAN) 0.4 MG/ML injection, Inject 0.25-1 mLs (0.1-0.4 mg) into the vein once for 1 dose, Disp: 1 mL, Rfl: 0     [DISCONTINUED] spironolactone (ALDACTONE) 50 MG tablet, Take 1 tablet (50 mg) by mouth daily, Disp: 30 tablet, Rfl: 0     [DISCONTINUED] furosemide (LASIX) 20 MG tablet, Take 1 tablet (20 mg) by mouth daily, Disp: 30 tablet, Rfl: 0     [DISCONTINUED] insulin aspart (NOVOLOG PEN) 100 UNIT/ML injection, Inject 1-5 Units Subcutaneous At Bedtime, Disp: 9 mL, Rfl: 0  No current facility-administered medications for this visit.     ALLERGIES: none    ROS: complete ROS reviewed and negative unless reported above. Pertinent symptoms reviewed above per HPI.    PHYSICAL EXAM:  /86  Pulse 74  Temp 96.9  F (36.1  C) (Oral)  Resp 16  Ht 1.835 m (6' 0.25\")  Wt 68.6 kg (151 lb 3 oz)  SpO2 93%  BMI 20.36 kg/m2  ECOG PS: 3-4  General: sitting in wheelchair, " very thin, appears chronically ill  HEENT: PERRL, MMM, no oral lesions  Lungs: decreased BS at right base. Clear on the left.   Cardiovascular: RRR, no M/R/G. 2+ pitting edema bilaterally to knees.   Abdominal/Rectal: +BS, soft, minimally tender, slightly distended. Umbilical hernia. Biliary drain in RUQ.    Lymph: no cervical or supraclavicular adenopathy  MSK: decreased muscle tone  Skin: no rashes or petechaie  Neuro: A&O     Psych: flat affect       LABS REVIEWED ON DAY OF VISIT  CBC RESULTS:   Recent Labs   Lab Test  04/02/18   0634   WBC  10.7   RBC  3.38*   HGB  9.1*   HCT  29.0*   MCV  86   MCH  26.9   MCHC  31.4*   RDW  18.5*   PLT  61*     Recent Labs   Lab Test  04/02/18   0634  04/01/18   0705   NA  140  141   POTASSIUM  3.8  3.8   CHLORIDE  105  107   CO2  29  28   ANIONGAP  6  6   GLC  111*  126*   BUN  28  30   CR  0.70  0.77   CHIDI  7.5*  7.6*     Liver Function Studies -   Recent Labs   Lab Test  04/02/18   0634   PROTTOTAL  6.6*   ALBUMIN  3.4   BILITOTAL  3.1*   ALKPHOS  543*   AST  32   ALT  54       RADIOLOGY:  CT cap 3/31/18  1. Postsurgical changes of Whipple procedure with continued atrophy  and ductal dilation in the residual pancreas. Ill-defined soft tissue  in the resection bed is highly suspicious for recurrence, with  occlusion of the portal vein, upper SMV, and portal splenic  confluence.   2. No significant change in numerous cystic foci throughout the right  lobe of the liver. Well abscesses are suspected, this appearance  raises substantial concern for both abscesses and metastases.  3. No significant change in severe left-sided and moderate right-sided  intrahepatic biliary ductal dilatation with a new percutaneous  internal/external biliary drainage catheter in place.  4. Large volume ascites and a large right-sided pleural effusion.  Severe anasarca. No definitive evidence of carcinomatosis.  5. Wall thickening of the descending and sigmoid colon is nonspecific  in the setting of  ascites and hepatic disease but differential  includes infectious, inflammatory, and ischemic etiologies of colitis.    IMPRESSION/PLAN:  Naresh Poole is a 46 y/o man with pancreatic cancer, T3N1M0 at diagnosis in January 2017. He was treated at [facility] with neoadjuvant FOLFIRINOX, followed by Whipple resection in May 2017. The surgical pathology showed a good treatment response and the plan was to receive adjuvant gem/Xeloda per his oncologist Dr. Romano at [facility]. Unfortunately he began having liver complications in July 2017. This was initially concerning for recurrent disease, however liver biopsy grew enterococcus and he was treated with a prolonged course of antibiotics. In October 2017 he developed ascites, again concerning for recurrence. Exploratory laparoscopy was negative. He received two cycles of gem/Xeloda from October-November 2017 which was stopped early due to progressive cirrhosis. These records from [facility] were reviewed in care everywhere. Naresh and his wife would like to transfer his care here.     Naresh has not been received any chemotherapy since November 2017. He was just discharged from the inpatient internal medicine team today, after a 3 week hospitalization at [facility] for multiple infections and complications related to cirrhosis. During the hospitalization, cytology from ascites x2, pancreaticojejunostomy biopsy, and brush biopsy from the biliary drain were all negative for malignancy. CT cap on 3/31/18 shows soft tissue in the pancreatic resection bed that is concerning for recurrence, along with liver lesions that may be either infection or cancer. These imaging findings require close follow-up, but he has no biopsy-proved recurrence of pancreatic cancer at this point. The CT findings certainly could be related to infection rather than cancer. With his severe liver disease, the elevated Ca 19-9 is of unclear significance.     He is significantly debilitated, with an ECOG PS of 3-4. He is  on IV antibiotics and will be getting rehab at his home in [place]. He is not a candidate for further chemotherapy, and given that it has now been 11 months since his Whipple further adjuvant therapy would not likely be advisable even if he were in good enough shape for it. We therefore will proceed with surveillance imaging in 3 months. If further imaging is concerning for disease recurrence and he is recovering from the other medical problems, then we can discuss biopsy at that point to prove recurrence.     Plan:   - referral to palliative care   - f/u with Iliana Fitch in 3 weeks  - CT cap and labs (CBC and CMP) in 3 months. We will defer Ca 19-9 testing at that point given the elevation is of unclear significance.   - f/u with Dr. 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 He has an external biliary drain. Bilirubin level has improved with this but remains mildly elevated. He has f/u with hepatology later this month.     Bacteremia and liver abscesses: he is completing a course of IV abx. Has f/u with ID later this month.     Chronic pain: he was following with palliative care at [facility] (Dr. Soto Rangel). He would like to get all of his care here, so we will refer him to our palliative care colleagues     Patient seen and discussed with staff Dr. Faith Neff MD  Heme/Onc Fellow  Pager: [phone]          MEDICAL ONCOLOGY ATTENDING PHYSICIAN ADDENDUM:  I have seen and evaluated this patient with the medical oncology fellow, Dr. Neff. I have reviewed and edited his note, and agree with the assessment and plan stated above. A complete review of systems was assessed, and pertinent positives/negatives are discussed in detail above and as follows.    Mr. Poole presents today with his wife, following a long and complicated hospitalization documented in detail above and  in the discharge summary. He is in a wheelchair, cachectic, weak, but nonetheless optimistic and in relatively good spirits. He fully understands that he needs to focus on recovery from his morbidity resulting from systemic infections as well as cirrhosis; thus his cancer diagnosis is secondary in terms of priority at this time. He and his wife stated understanding that I do not recommend cancer-directed therapy at this time, most especially due to his extremely poor conditioning/performance status. The radiologic evaluation that shows no obvious evidence of current malignancy is also encouraging. He will prioritize rehabilitation and follow up with our [facility] team as well as Palliative Care in the coming weeks/months.    I spent > 60 minutes in consultation including history, full review of systems, and full physical exam; 50 minutes were spent discussion.  Over 50% of the time was spent counseling and coordinating care.  45 minutes were spent reviewing the patient s medical records, images, imaging reports, pathology review, and all other records and lab values prior to and during the appointment.    GABRIELLA SANDOVAL MD, PHD

## 2018-04-02 NOTE — PLAN OF CARE
Problem: Patient Care Overview  Goal: Plan of Care/Patient Progress Review  Physical Therapy Discharge Summary    Reason for therapy discharge:    Discharged to home with home therapy.    Progress towards therapy goal(s). See goals on Care Plan in [facility] electronic health record for goal details.  Goals partially met.  Barriers to achieving goals:   discharge from facility.    Therapy recommendation(s):    Continued therapy is recommended.  Rationale/Recommendations:  pt will benefit from continued skilled PT intervention at home to address mobility deficits, improve strength & endurance.

## 2018-04-02 NOTE — PLAN OF CARE
Problem: Patient Care Overview  Goal: Plan of Care/Patient Progress Review  Outcome: Adequate for Discharge Date Met: 04/02/18  Pt had abdomen pain this morning 4 mg Dilaudid given. Pt's biliary drain is leaking serosanguinous fluid, MD aware, we are reinforcing the dressing with ABD pads and tape. Went over discharge paperwork with patient and Wife. Both patient and wife verbalized understanding and had no further questions. PIV removed, discharged home/MD appointment at [facility] by wheelchair at 1040.

## 2018-04-03 NOTE — PROGRESS NOTES
This is a recent snapshot of the patient's [facility] medical record.  For current drug dose and complete information and questions, call [phone] or In Basket pool, fv home infusion ([id number])  CSN Number:  [id number]

## 2018-04-03 NOTE — PROGRESS NOTES
Patient has clinic visit within 24-48 hours of Discharge so no post DC follow up call is needed        Apr 02, 2018 10:45 AM CDT   (Arrive by 10:30 AM)   New Patient Visit with Sujit Cuevas MD   [facility]     [address]  [address] [place] [id number] [phone]

## 2018-04-04 NOTE — TELEPHONE ENCOUNTER
Ana Rosa from  physical therapy called to get the following orders from Dr. Carrion.    1. PT 2x per week x4 weeks for strengthening gait & transfers.  2. OT to evaluate for ADL safety.  3.  Home exercise program.    Please call Ana Rosa back at [phone] when orders approved.

## 2018-04-04 NOTE — TELEPHONE ENCOUNTER
Called Ana Rosa from  physical therapy to inform her that patient will have to get order from patient's primary care provider.  Ana Rosa stated patient doesn't have primary care provider so will have to wait un 4/23 to get order from Oncologist.

## 2018-04-05 NOTE — PROGRESS NOTES
This is a recent snapshot of the patient's [facility] medical record.  For current drug dose and complete information and questions, call [phone] or In Basket pool, fv home infusion ([id number])  CSN Number:  [id number]

## 2018-04-05 NOTE — PROGRESS NOTES
This is a recent snapshot of the patient's [facility] medical record.  For current drug dose and complete information and questions, call [phone] or In Basket pool, fv home infusion ([id number])  CSN Number:  [id number]

## 2018-04-09 NOTE — PROGRESS NOTES
This is a recent snapshot of the patient's [facility] medical record.  For current drug dose and complete information and questions, call [phone] or In Basket pool, fv home infusion ([id number])  CSN Number:  [id number]

## 2018-04-10 NOTE — ED PROVIDER NOTES
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  The patient was hospitalized from 3/14-4/2 after presenting with weakness and altered mental status.  Concerns for from polymicrobial bacteremia and imaging found narrowing of the portal vein confluence concerning for liver abscess formation and ascending cholangitis.  ERCP was performed on 3/20 and biopsies did not show malignancy.  He had IR drainage with percutaneous biliary drain placed at that time.  He was discharged on ertapenem and vancomycin with plans to follow-up and with infectious disease in 4 weeks.  Today, the patient presents emergency department with blood coming from his biliary drain starting yesterday feeling generally unwell.  The patient reports he returned home after his hospitalization and has had in-home nursing and PT; however, the patient states that he feels like he has not been improving and specifically has been feeling very weak and fatigued over the past 2 days.  He denies any nausea or vomiting.  He states he has had diarrhea that began in the hospital and has continued since.  He denies any fevers.  He denies pain.  He states he feels like he did in the hospital when he was anemic and he did receive blood transfusion on 3/27 and 3/28.  His hemoglobin on discharge was 8.2.    Past Medical History:   Diagnosis Date     Hypertension      Pancreatic cancer (H) 01/2017       Past Surgical History:   Procedure Laterality Date     ENTEROSCOPY WITH OVERTUBE N/A 3/19/2018    Procedure: ENTEROSCOPY WITH OVERTUBE;  enteroscopy with biopsies;  Surgeon: Jared Cooney MD;  Location: [facility] OR     LAPAROSCOPY DIAGNOSTIC (GENERAL)  09/2017     PICC INSERTION Left 03/22/2018    4Fr - 50cm (2cm external), L medial brachial vein, SVC RA  junction     PICC INSERTION Left 03/29/2018    4Fr - 45cm (3cm external), L medial brachial vein     WHIPPLE PROCEDURE  05/2017       Family History   Problem Relation Age of Onset     Coronary Artery Disease Early Onset Maternal Grandmother 36     Liver Disease No family hx of      Colon Cancer No family hx of        Social History   Substance Use Topics     Smoking status: Never Smoker     Smokeless tobacco: Never Used     Alcohol use No       Current Facility-Administered Medications   Medication     alum & mag hydroxide-simethicone (MYLANTA/MAALOX) suspension 15 mL     amylase-lipase-protease (CREON 24) 22418-27564 units per EC capsule 48,000 Units     ascorbic acid (vitamin C) chewable tablet 250 mg     bisacodyl (DULCOLAX) EC tablet 5 mg     [START ON 4/13/2018] vitamin D (ERGOCALCIFEROL) capsule 50,000 Units     methadone (DOLOPHINE) tablet 5 mg     miconazole (MICATIN) 2 % cream     multivitamin CF formula (DEKAs Plus) per capsule 1 capsule     pantoprazole (PROTONIX) EC tablet 40 mg     polyethylene glycol (MIRALAX/GLYCOLAX) Packet 17 g     sertraline (ZOLOFT) tablet 50 mg     melatonin tablet 1 mg     senna-docusate (SENOKOT-S;PERICOLACE) 8.6-50 MG per tablet 1 tablet    Or     senna-docusate (SENOKOT-S;PERICOLACE) 8.6-50 MG per tablet 2 tablet     ondansetron (ZOFRAN-ODT) ODT tab 4 mg    Or     ondansetron (ZOFRAN) injection 4 mg     HYDROmorphone (DILAUDID) tablet 4 mg     fluconazole (DIFLUCAN) tablet 400 mg     ertapenem (INVanz) 1 g vial to attach to  mL bag     vancomycin (VANCOCIN) 1,500 mg in sodium chloride 0.9 % 250 mL intermittent infusion     dibucaine (NUPERCAINAL RE) 1 % rectal ointment     lactated ringers infusion      No Known Allergies      I have reviewed the Medications, Allergies, Past Medical and Surgical History, and Social History in the Epic system.    Review of Systems   Constitutional: Positive for fatigue. Negative for diaphoresis and fever.   HENT: Negative for  congestion.    Eyes: Negative for visual disturbance.   Respiratory: Negative for cough and shortness of breath.    Cardiovascular: Negative for chest pain.   Gastrointestinal: Positive for diarrhea. Negative for abdominal pain, nausea and vomiting.        Bleeding from biliary drain   Endocrine: Negative for polydipsia and polyuria.   Genitourinary: Negative for difficulty urinating and dysuria.   Musculoskeletal: Negative for back pain, neck pain and neck stiffness.   Skin: Negative for color change.   Allergic/Immunologic: Positive for immunocompromised state.   Neurological: Negative for light-headedness.   Hematological: Negative for adenopathy. Does not bruise/bleed easily.   Psychiatric/Behavioral: Negative for agitation and behavioral problems.   All other systems reviewed and are negative.      Physical Exam   BP: 92/70  Pulse: 79  Heart Rate: 67  Temp: 97.2  F (36.2  C)  Resp: 18  Height: 182.9 cm (6')  Weight: 68.6 kg (151 lb 3.8 oz)  SpO2: 98 %      Physical Exam   Constitutional: He is oriented to person, place, and time. No distress.   Cachectic appearing male sitting in bed in no acute distress.  He does appear fatigued.   HENT:   Head: Normocephalic and atraumatic.   Mouth/Throat: Oropharynx is clear and moist. No oropharyngeal exudate.   Eyes: Conjunctivae and EOM are normal. Pupils are equal, round, and reactive to light. No scleral icterus.   Neck: Normal range of motion.   Cardiovascular: Normal rate, normal heart sounds and intact distal pulses.    Pulmonary/Chest: Effort normal and breath sounds normal. No respiratory distress. He has no wheezes. He has no rales.   Abdominal: Soft. Bowel sounds are normal. There is no tenderness. There is no rebound and no guarding.   Reducible umbilical hernia.  Biliary drain in place with blood in the collecting bag.   Musculoskeletal: Normal range of motion. He exhibits edema ( 3 b/l LEs). He exhibits no tenderness.   Neurological: He is alert and oriented  to person, place, and time. No cranial nerve deficit. He exhibits normal muscle tone. Coordination normal.   Skin: Skin is warm. No rash noted. He is not diaphoretic.   Psychiatric: He has a normal mood and affect. His behavior is normal. Judgment and thought content normal.   Nursing note and vitals reviewed.      ED Course     ED Course     Procedures             Critical Care time:  none             Labs Ordered and Resulted from Time of ED Arrival Up to the Time of Departure from the ED   CBC WITH PLATELETS DIFFERENTIAL - Abnormal; Notable for the following:        Result Value    RBC Count 2.92 (*)     Hemoglobin 8.0 (*)     Hematocrit 24.5 (*)     RDW 18.4 (*)     Platelet Count 82 (*)     Absolute Neutrophil 9.6 (*)     All other components within normal limits   COMPREHENSIVE METABOLIC PANEL - Abnormal; Notable for the following:     Potassium 3.3 (*)     Glucose 114 (*)     Urea Nitrogen 36 (*)     Calcium 6.8 (*)     Bilirubin Total 2.4 (*)     Albumin 2.4 (*)     Protein Total 5.5 (*)     Alkaline Phosphatase 442 (*)     ALT 81 (*)     AST 63 (*)     All other components within normal limits   LIPASE - Abnormal; Notable for the following:     Lipase 28 (*)     All other components within normal limits   PERIPHERAL IV CATHETER            Assessments & Plan (with Medical Decision Making)   47-year-old male with history of pancreatic cancer status post Whipple and recent hospitalization with biliary drain placement   Presenting with fatigue and diarrhea.  Differential diagnosis dehydration, electrolyte abnormalities, pneumonia, bacteremia    After thorough history and physical exam, the patient appears to be in no acute distress.  He does appear rather fatigued and cachectic.  We will hydrate him with a bolus of intravenous lactated Ringer's and obtain laboratory studies for further diagnostic evaluation along with a chest x-ray.  He agrees with plan.    Laboratory studies returned with no leukocytosis, WBC  is normal at 11,000.  There is anemia with a hemoglobin of 8.0, however, this is the patient's baseline.  There is also thrombus cytopenia which is the patient's baseline.  Electrolytes show mild hypokalemia with a potassium of 3.3, creatinine is normal at 0.74.  There is evidence of mild transaminitis which is a new finding for him in comparison to laboratory studies done several days ago.  Potassium will be repleted with oral potassium chloride solution.  Lipase is normal.  I reviewed patient's chest x-ray and I read the radiology report; there is a new right-sided pleural effusion.  Patient has no respiratory symptoms whatsoever such as cough or shortness of breath.  He has no fevers either.  I highly doubt this is a pneumonia.  He will be admitted to the internal medicine service for further evaluation and management.  He agrees to the plan.  He was given an additional bolus of IV lactated Ringer's in the emergency department followed by an infusion.      I have reviewed the nursing notes.    I have reviewed the findings, diagnosis, plan and need for follow up with the patient.  This part of the document was transcribed by Cali Giang, Medical Scribe.     Current Discharge Medication List          Final diagnoses:   Fatigue, unspecified type   Transaminitis   Pleural effusion   Diarrhea, unspecified type   I, Matthew Dawkins, am serving as a trained medical scribe to document services personally performed by Godwin Cronin MD, based on the provider's statements to me.      I, Godwin Cronin MD, was physically present and have reviewed and verified the accuracy of this note documented by Matthew Dawkins.       4/10/2018   [facility], [place], EMERGENCY DEPARTMENT     Reed Cronin MD  04/11/18 0052

## 2018-04-10 NOTE — ED NOTES
HX pancreatic cancer  blockage in bile duct - surgery March 29th and discharge April 2nd  increased weakness last couple days, drain has turned to bright red blood   Blood glucose 147

## 2018-04-10 NOTE — PROGRESS NOTES
This is a recent snapshot of the patient's [facility] medical record.  For current drug dose and complete information and questions, call [phone] or In Basket pool, [facility] home infusion ([id number])  CSN Number:  [id number]

## 2018-04-11 NOTE — PHARMACY-VANCOMYCIN DOSING SERVICE
Pharmacy Vancomycin Initial Note  Date of Service 2018  Patient's  1970  47 year old, male    Indication: Bacteremia    Current estimated CrCl = Estimated Creatinine Clearance: 118.3 mL/min (based on Cr of 0.74).    Creatinine for last 3 days  4/10/2018:  4:23 PM Creatinine 0.74 mg/dL    Recent Vancomycin Level(s) for last 3 days  No results found for requested labs within last 72 hours.      Vancomycin IV Administrations (past 72 hours)                   vancomycin (VANCOCIN) 1,500 mg in sodium chloride 0.9 % 250 mL intermittent infusion (mg) 1,500 mg New Bag 04/10/18 2357                Nephrotoxins and other renal medications (Future)    Start     Dose/Rate Route Frequency Ordered Stop    04/10/18 2130  vancomycin (VANCOCIN) 1,500 mg in sodium chloride 0.9 % 250 mL intermittent infusion      1,500 mg  over 90 Minutes Intravenous EVERY 24 HOURS 04/10/18 212            Contrast Orders - past 72 hours     None                Plan:  1.  Continue vancomycin  1500 mg IV q24h. - Started vanco therapy on 3/15/18  2.  Goal Trough Level: 15-20 mg/L   3.  Pharmacy will check trough levels as appropriate in 1-3 Days.    4. Serum creatinine levels will be ordered daily for the first week of therapy and at least twice weekly for subsequent weeks.    5. Rudolph method utilized to dose vancomycin therapy: Continuation of previous therapy    Sg Salmeron

## 2018-04-11 NOTE — PROGRESS NOTES
CLINICAL NUTRITION SERVICES - ASSESSMENT NOTE     Nutrition Prescription    RECOMMENDATIONS FOR MDs/PROVIDERS TO ORDER:  - Diet advancement per team. When ready to adv diet, order high kcal / high protein diet to allow pt to order additional items on tray.    - Recommend Creon 63064 increase to 3 capsules with each snack (5-6x per day) rather than 2 capsules 3x per day. Pt does not eat 3 meals/day, he eats 5-6 snacks throughout the day (eats every 1-1.5 hrs). New regimen would provide 1125 units lipase/kg/snack (reference range 500-2500 units lipase/kg/meal for oral intake). If pt takes 3 capsules Creon 07254 5x per day, he will still not exceed the limit of 10,000 units lipase/kg/day (he will be at 5625 units lipase/kg/day).     Malnutrition Status:    Severe malnutrition in the context of chronic illness.    Recommendations already ordered by Registered Dietitian (RD):  - Supplements: Boost Breeze BID & Gelatein between meals - pt may start drinking once diet adv    Future/Additional Recommendations:  - May need to order Zinc Sulfate 220 mg daily x 10 days pending [facility] assessment/ staging of pressure ulcer.  - If pt appetite and PO intake declines and pt consistently eating <75% of meals/snacks, order calorie counts to quantify PO intake.   - Monitor stools and record if stools continue to be sludgy  - Monitor weights closely     REASON FOR ASSESSMENT  Naresh Poole is a/an 47 year old male assessed by the dietitian for Admission Nutrition Risk Screen for stageable pressure injuries or large/non-healing wound or burn.    NUTRITION HISTORY  PMHx of pancreatic cancer (T3N1) s/p whipple 5/2017 along with FOLFIRINOX and Gemzar/Xeloda 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 who is presenting for FTT and as well as c/f bloody biliary drain output. Pt recently from 3/14-4/2 for AMS and weakness found to have polymicrobial bacteremia and multiple abscesses of the liver s/p perc bili drain.  "Wife notes pt has fallen twice in last few days and increasingly reliant on cares.     Skin: awaiting WOC assessment for pressure ulcer - pt receiving wound healing vitamins (vit C, MVI ANGÉLICA).     GI: Per pt, having 4-5 loose stools/day and stools are sludgy (even when eating low fat foods). Pt had one loose, watery, bloody stool and 1 soft bloody incontinent episode yesterday.    Intake PTA: Per pt and wife, pt tries to eat every 1-1/12 hours and snacks. Eats Special K protein bars, protein shakes ([place] Instant Breakfast 1-2x/day), cream of wheat, ice cream. Wife brought some protein bars and plans to bring in [place] Instant Breakfast for pt.     CURRENT NUTRITION ORDERS  Diet: NPO  Intake/Tolerance: Per RN flowsheet documentation, pt ate 50% one meal yesterday with fair appetite (was on regular diet until this morning).    LABS  Phos 2.1 (L)  Bilirubin 2.4 (H) - trending down  K+ 3.4 (WNL) - trending up from yesterday 3.3 (L)    MEDICATIONS  - Creon 78436 2 caps 3x daily w/meals (provides 700 units lipase/kg/meal - approp range 500-2500 units lipase/kg/meal)  - Vitamin C 250 mg 1x daily  - Multivitamin (ANGÉLICA Plus CF MVI) 1x daily (provides 22054 IU Vitamin A, 1500 IU Vitamin D, 200 IU - Vitamin E, 1000 mcg Vitamin K)  - Vitamin D 50,000 units every 7 days PO    ANTHROPOMETRICS  Height: 182.9 cm (6' 0\")  Most Recent Weight: 64.1 kg (141 lb 1.6 oz)  (4/11 - today)  IBW: 80.9 kg  BMI: Normal BMI (low end of normal)  Weight History: Pt wt down 10 lbs (~7%) in about a week. Overall still down 20 lbs (12%) in 4 months.   Wt Readings from Last 10 Encounters:   04/11/18 64.1 kg (141 lb 4.8 oz)   04/02/18 68.6 kg (151 lb 3 oz)   04/01/18 67.2 kg (148 lb 3.2 oz)   02/21/18 65.3 kg (144 lb)   12/12/2017 77.6 kg (171 lb)  10/30/2017 68.5 kg (151 lb)  09/29/2017 82.1 kg (181 lb)  09/09/2017 78.3 kg (172 lb)    Dosing Weight: 64 kg (actual) - based on lowest documented wt so far this adm (64.1 kg on 4/11) and IBW of " 80.9 kg.     ASSESSED NUTRITION NEEDS  Estimated Energy Needs: 3122-5107 kcals/day (30 - 35 kcals/kg )  Justification: Repletion (wt loss)  Estimated Protein Needs: 77-96 grams protein/day (1.2 - 1.5 grams of pro/kg)  Justification: Repletion (wt loss)  Estimated Fluid Needs: 1 mL/kcal  Justification: Maintenance + GI losses & Per provider pending fluid status    PHYSICAL FINDINGS  See malnutrition section below.    MALNUTRITION  % Intake: Unable to assess - unable to quantify whether snacks meet nutrition needs  % Weight Loss: > 10% in 6 months (severe)  Subcutaneous Fat Loss: Facial region and Upper arm:  Severe  Muscle Loss: Temporal, Facial & jaw region, Thoracic region (clavicle, acromium bone, deltoid, trapezius, pectoral), Upper arm (bicep, tricep), Lower arm  (forearm) and Dorsal hand:  Severe  Fluid Accumulation/Edema: None noted  Malnutrition Diagnosis: Severe malnutrition in the context of chronic illness.     NUTRITION DIAGNOSIS  Inadequate protein-energy intake related to decreased PO intake and early satiety r/t ascites as evidenced by pt report of only able to eat 5-6 small snacks per day and pt with wt loss 10 lbs (~7%) in about a week and overall wt down 20 lbs (12%) in 4 months.     INTERVENTIONS  Implementation  1. Nutrition Education: Discussed role of RD in nutrition POC. Encouraged pt to avoid high fat/fried foods and include foods high in protein in diet. Also encouraged small frequent meals (5-6 snacks/meals per day). Provided the following handouts: Tips to Increase Protein in Your Diet; High Calorie, High Protein Meal Plan; Recipes for High Calorie High Protein Diet booklet. Also provided full sheet of supplements and snacks offered.   2. Collaboration with other providers - pgd team regarding enzyme recommendations (see above)   3. Medical food supplement therapy     Goals  1. Patient to consume % of nutritionally adequate meal trays 5-6 snacks/meals per day.  2. Pt weight to not  fall below 64 kg (141 lbs)     Monitoring/Evaluation  Progress toward goals will be monitored and evaluated per protocol.    Ninfa Acharya RD, LD  7A/6D pgr: [id number]

## 2018-04-11 NOTE — PROVIDER NOTIFICATION
Patient had 500cc bloody stool after being assisted to the commode. Dr.Jessica Coronado contacted and immediately came to assess. Patient denies feeling light headed or dizzy, states having bloody stools for days tho not this much. Vitals are stable and unchanged, 103/83, HR 69, stat hemoglobin stable at 7.7 from 7.5. Plan for 1U FFP, Vitamin K, Q6 hour HGB checks and placed on telemetry. Protonix 40 mg IV X1.  His wife Yumi here and updated by the team on current status and plan of care. GI team consulted. Patient had breakfast at 0900 and is NPO for possible procedure to evaluate source of bleeding. Right drain bag leaking at the site, changed X, brown with bloody drainage. Placed on enteric precautions for CDiff, which needs to be sent with next stool. Patient is hypothermic at times, warm blankets/hot pad applied to the patient. Octreotide drip to start after PIV placed. HGB re-check at 1500. No urine output since mixed urine/stool this morning, Dr Coronado updated if IVF's need to be started.

## 2018-04-11 NOTE — PROGRESS NOTES
PROGRESS NOTE    04/11/18    Naresh Poole ([id number]) admitted on 4/10/2018    Changes:  -Hgb Q4hrs  -GI consulted - plan for endoscopy in AM  -Clear liquid diet - NPO MN  -1U RBCs overnight  -1U FFP and 10mg vitamin K   -c.diff ordered  -octreotide and PP gtt    Assessment & Plan:  Naresh Poole is a 47 year old male with a PMHx of pancreatic cancer (T3N1) s/p whipple 5/2017 along with FOLFIRINOX and Gemzar/Xeloda 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 who is presenting for FTT and as well as c/f bloody biliary drain output    #GI bleed  #Acute blood loss anemia  #Elevated INR   Noted to have maroon liquid stool; patient states he has had bloody stools since discharge but had not reported this previously. Hgb decreased overnight (8 -->6.6); received 1U PRBCs. Noted to have down trending hgb after transfusion (7.7 -->7.0). 1U FFP and 10mg vitamin K this. GI consulted and aware of ongoing hgb drop; recommend medical management with transfusion PRN and plan for endoscopy tomorrow. Unclear etiology, possibly upper GI source due to color.   -Hgb Q4hrs overnight  -PIVx2  -GI following; appreciate recs   -NPO MN - Clear liquids today  -1U FFP and 10mg vitamin K this AM  -Octreotide gtt  -PPI gtt  -Low threshold for transfer to higher level of care    #Hemobilia in setting of percutaneous biliary drain  #Recent cholangitis and polymicrobial bacteremia   #Hx enterococcal liver abscess  #Recent candidemia  Discharged (03/14-04/02) after admission for polymicrobial bacteremia (E coli, C perfringens, Citrobacter, and enterococcus) and candidemia due to cholangitis and liver abscess. Discharged on vancomycin, fluconazole, ertapenem with plan for ID follow up at 4 weeks. Presenting with evidence of hemobilia and GI bleed (see above). GI consulted.   -BC 04/10 NGTD  -Cont vancomycin , ertapenem and fluconazole.  -No evidence of progression of infection; will discuss with GI and place ID  consult if concern for infection    #FTT  #Protein/caloric malnutrition  Patient cachectic in setting of multiple complications following whipple for pancreatic Ca. Per chart, no evidence of recurrance on recent imaging. Appears to have lost 5-10 lbs in past week (?). Poor PO intake per report, though patient reports feeling hungry. Will need nutrition eval, PT and OT once acute issues stable (see above). Likely need TCU at discharge due to failure at home and increased needs exceeding family ability to care for him.   -PT, OT and nutrition once acute issues resolved.    #Hx Pancreatic Cancer (T3N1) s/p Whipple (09/2017)  #Pancreatic insufficiency - chronic diarrhea  #Iatrogenic diabetes 2/2 panceratectomy  Initially diagnosed in 1/2017. He underwent EUS/ERCP with stent placement. Underwent chemotherapy with FOLFIRINOX from 1/2017 to 3/2017; noted to have a liver lesion on imaging in 3/2017 and underwent biopsy which showed abscess/necrosis but no evidence of cancer. He underwent whipple in 5/2017 and repeat liver biopsy intraoperatively demonstrated necrotic areas and inflammations. He then had chemotherapy with gemcitabine and capecitabine between 10/2017 to 11/2017. Developed liver abscess and complicated by enterococcus bacteremia and completed antibiotics in 11/2017. Last seen in oncology clinic 04/02; no longer candidate for further chemo.   -Hold Creon and vit D while NPO  -MDISS  - hypoglycemia orderset  -cont PTA dilaudid and methadone    #HTN  Holding BP meds in setting of GI bleed     #Depression  Cont sertraline    FEN:CLD -NPO MN  Prophylaxis: mechanical  Disposition: Pending work up for GI bleed and PT/OT recommendation; expect 2-3 more days  Code Status: DNR/DNI    Patient was seen and plan of care discussed with MD Karen Reid MD   Internal Medicine PGY3    Subjective:  Denies pain, states he is hungry. Denies abdominal pain.     Objective:  Most recent vital signs:  /78   Pulse 62  Temp 94.5  F (34.7  C) (Axillary)  Resp 10  Ht 1.829 m (6')  Wt 64.1 kg (141 lb 4.8 oz)  SpO2 97%  BMI 19.16 kg/m2  Temp:  [93.1  F (33.9  C)-97.4  F (36.3  C)] 94.5  F (34.7  C)  Pulse:  [62-75] 62  Heart Rate:  [59-97] 59  Resp:  [10-18] 10  BP: ()/(70-90) 104/78  SpO2:  [92 %-100 %] 97 %  Wt Readings from Last 2 Encounters:   04/11/18 64.1 kg (141 lb 4.8 oz)   04/02/18 68.6 kg (151 lb 3 oz)       Intake/Output Summary (Last 24 hours) at 04/11/18 1647  Last data filed at 04/11/18 1300   Gross per 24 hour   Intake             1516 ml   Output              760 ml   Net              756 ml       Physical exam:  General: Cachectic, Patient lying comfortably in bed, NAD  HEENT: no scleral icterus or injection, MMM  Cardiac: RRR, no m/r/g appreciated.   Respiratory: CTAB, no wheezes, rhonchi or crackles appreciated.  GI:RUQ drain with red/brown fluid. Mild discomfort to palpation  Extremities: No LE edema  Skin: No acute lesions appreciated  Neuro: AOx3,moving all extremities. Strength 3-4/5 bilateral LEs.       Labs (Past three days):  CBC  Recent Labs  Lab 04/11/18  1445 04/11/18  1048 04/11/18  0705 04/11/18  0153 04/10/18  1623   WBC  --   --  8.6  --  11.0   RBC  --   --  2.75*  --  2.92*   HGB 7.0* 7.7* 7.5* 6.6* 8.0*   HCT  --   --  23.4*  --  24.5*   MCV  --   --  85  --  84   MCH  --   --  27.3  --  27.4   MCHC  --   --  32.1  --  32.7   RDW  --   --  18.3*  --  18.4*   PLT  --   --  61*  --  82*     BMP  Recent Labs  Lab 04/11/18  0705 04/10/18  1623 04/07/18  1530    144  --    POTASSIUM 3.4 3.3*  --    CHLORIDE 108 108  --    CO2 30 27  --    ANIONGAP 5 10  --    * 114*  --    BUN 31* 36*  --    CR 0.61* 0.74 0.66   GFRESTIMATED >90 >90 >90   GFRESTBLACK >90 >90 >90   CHIDI 6.4* 6.8*  --    MAG 1.6  --   --    PHOS 2.1*  --   --      Troponins:     INR  Recent Labs  Lab 04/11/18  1048   INR 2.10*     Liver panel  Recent Labs  Lab 04/10/18  1623   PROTTOTAL 5.5*   ALBUMIN 2.4*    BILITOTAL 2.4*   ALKPHOS 442*   AST 63*   ALT 81*       Imaging/procedure results:  Reviewed

## 2018-04-11 NOTE — PHARMACY-ADMISSION MEDICATION HISTORY
Admission medication history interview status for the 4/10/2018 admission is complete. See Epic admission navigator for allergy information, pharmacy, prior to admission medications and immunization status.     Medication history interview sources:  Patient's spouse, Chart Review      Changes made to PTA medication list (reason)    Added:   -Dilaudid    Deleted: n/a    Changed:   -vanco dose changed from 1250 mg to 1500 mg daily  -added additional information on sliding scale for Novolog      Additional medication history information (including reliability of information, actions taken by pharmacist):  -Patient's spouse helps manage patient's medications and was a reliable historian. Patient's spouse brought in copies of patient's current medication list.  -Patient was previously on lisinopril 20 mg daily, which was just put on hold today by provider due to patient's low blood pressure in clinic.  -Patient has received only morning doses of his medications so far today.   -Patient's vanco dose was changed after blood draw on Tuesday, 4/3/2018; patient typically gets blood drawn each Tuesday to assess vanco dosing, per patient's spouse.   -Therapy with ertapenem, fluconazole, and vanco started on 4/1/2018 and is scheduled for 28 days.  -Patient has not had to use Lantus so far; patient's spouse reported that they were also given a sliding scale for administering Lantus in addition to Novolog sliding scale. She also noted that patient does not have to use Novolog very frequently.  -Ergocalciferol is typically taken on Friday evenings.        Prior to Admission medications    Medication Sig Last Dose Taking? Auth Provider   HYDROmorphone (DILAUDID) 2 MG tablet Take 4 mg by mouth every 4 hours as needed for severe pain 4/10/2018 at 1100 Yes Unknown, Entered By History   Vancomycin HCl (VANCOCIN HCL IV) Inject 1,500 mg into the vein daily 4/9/2018 at PM Yes Unknown, Entered By History   insulin aspart (NOVOLOG FLEXPEN)  "100 UNIT/ML injection Inject 1-7 Units Subcutaneous 4 times daily (with meals and nightly) Sliding scale:  -189 mg/dl: 1 unit  -239 mg/dl: 2 units  -289 mg/dl: 3 units  -339 mg/dl: 4 units 4/9/2018 at Unknown time Yes Unknown, Entered By History   insulin aspart (NOVOLOG FLEXPEN) 100 UNIT/ML injection Inject 1-5 Units Subcutaneous At Bedtime Sliding scale:  -249 mg/dl: 1 unit  -299 mg/dl: 2 units  -349 mg/dl: 3 units  -399 mg/dl: 4 units   mg/dl or greater: 5 units 4/9/2018 at Unknown time Yes Unknown, Entered By History   furosemide (LASIX) 20 MG tablet Take 2 tablets (40 mg) by mouth daily 4/10/2018 at AM Yes Macrina Urbina MD   spironolactone (ALDACTONE) 50 MG tablet Take 2 tablets (100 mg) by mouth daily 4/10/2018 at Unknown time Yes Macrina Urbina MD   Miconazole Nitrate (CRITIC-AID CLEAR AF) 2 % ointment Apply topically 2 times daily 4/10/2018 at AM Yes Jeanna Vinson MD   gauze pads & dressings 4\"X4\" PADS 10 pads 4 times daily as needed Past Week at Unknown time Yes Jeanna Vinson MD   insulin pen needle 32G X 4 MM Use insulin pen needles daily or as directed. 4/10/2018 at Unknown time Yes Jeanna Vinson MD   blood glucose monitoring (NO BRAND SPECIFIED) test strip Use to test blood sugars 4 times daily or as directed. 4/10/2018 at Unknown time Yes Jeanna Vinson MD   blood glucose monitoring (ONE TOUCH DELICA) lancets Use to test blood sugars 4 times daily or as directed. 4/10/2018 at Unknown time Yes Jeanna Vinson MD   BD SHARPS CONTAINER HOME MISC 1 each every 30 days 4/10/2018 at Unknown time Yes Jeanna Vinson MD   insulin glargine (LANTUS) 100 UNIT/ML injection Inject 1-5 Units Subcutaneous At Bedtime HAS NOT USED Yes Jeanna Vinson MD   fluconazole (DIFLUCAN) 200 MG tablet Take 2 tablets (400 mg) by mouth daily for 28 days 4/10/2018 at AM Yes Jeanna Vinson MD   sodium " chloride, PF, 0.9% PF flush Irrigate with 10 mLs as directed every 24 hours 4/9/2018 at 2000 Yes Jeanna Vinson MD   multivitamin CF formula (DEKAS PLUS) CAPS per capsule Take 1 capsule by mouth daily 4/10/2018 at AM Yes Jeanna Vinson MD   pantoprazole (PROTONIX) 40 MG EC tablet Take 1 tablet (40 mg) by mouth 2 times daily 4/10/2018 at AM Yes Jeanna Vinson MD   Ascorbic Acid 250 MG CHEW Take 250 mg by mouth daily 4/10/2018 at AM Yes Jeanna Vinson MD   Ergocalciferol (DRISDOL) 59520 UNITS CAPS Take 50,000 Units by mouth every 7 days for 4 doses 4/6/2018 at Unknown time Yes Jeanna Vinson MD   amLODIPine (NORVASC) 5 MG tablet Take 1 tablet (5 mg) by mouth daily 4/10/2018 at AM Yes Jeanna Vinson MD   ertapenem (INVANZ) 1 GM vial Inject 1 g into the vein every 24 hours for 28 days 4/9/2018 at 1700 Yes Jeanna Vinson MD   ONDANSETRON PO Take 4-8 mg by mouth every 8 hours as needed for nausea Past Week at Unknown time Yes Unknown, Entered By History   alum & mag hydroxide-simethicone (MYLANTA) 200-200-20 MG/5ML SUSP suspension Take 15 mLs by mouth 2 times daily as needed for indigestion 4/10/2018 at Unknown time Yes Unknown, Entered By History   amylase-lipase-protease (CREON 24) 46293-21068 UNITS CPEP per EC capsule Take 2 caps with meals and 1 cap with snacks. 4/10/2018 at AM Yes Reported, Patient   methadone (DOLOPHINE) 5 MG tablet Take 5 mg by mouth 3 times daily 4/10/2018 at AM Yes Reported, Patient   sertraline (ZOLOFT) 50 MG tablet Take 50 mg by mouth At Bedtime  4/10/2018 at Unknown time Yes Reported, Patient   simethicone (GAS-X) 80 MG chewable tablet Take 80 mg by mouth every 6 hours as needed for flatulence or cramping Past Week at Unknown time Yes Reported, Patient   bisacodyl (DULCOLAX) 5 MG EC tablet Take 5 mg by mouth daily as needed for constipation 4/10/2018 at Unknown time Yes Reported, Patient   Alcohol Swabs PADS 4 each 4  times daily as needed   Jeanna Vinson MD   blood glucose monitoring (NO BRAND SPECIFIED) meter device kit Use to test blood sugar4 times daily or as directed.   Jeanna Vinson MD   naloxone (NARCAN) 0.4 MG/ML injection Inject 0.25-1 mLs (0.1-0.4 mg) into the vein once for 1 dose   Jeanna Vinson MD   polyethylene glycol (MIRALAX/GLYCOLAX) Packet Take 17 g by mouth daily as needed for constipation Unknown at Unknown time  Reported, Patient         Medication history completed by: Karen Alarcon, PharmD student

## 2018-04-11 NOTE — PROGRESS NOTES
This is a recent snapshot of the patient's [facility] medical record.  For current drug dose and complete information and questions, call [phone] or In Basket pool, [facility] home [facility] ([id number])  CSN Number:  [id number]

## 2018-04-11 NOTE — PLAN OF CARE
Problem: Patient Care Overview  Goal: Plan of Care/Patient Progress Review  Outcome: No Change  [id number]: A&O x 4, Lethargic; Hypothermic, Tmin 93.9 axillary, OVSS on RA; BGs 200 overnight, did not cover due to pen not available; Hgb 6.6, administered 1 unit of PRBcs; No other significant labs; Denies pain, nausea and SOB; Combo regular diet; L.PICC single lumen infusing PRBcs; Biliary drain with 260cc of brown/sanguinous; 1 large loose, watery, bloody stool and 1 small soft bloody incontinent episode; Voiding into urinal; Up with heavy assist of 2; Blood cultures to be drawn for ID workup, Enteric panel to be collected; WOC consult placed for today; Will continue to monitor and update [facility] service with any significant changes.

## 2018-04-11 NOTE — PROGRESS NOTES
Social Work: Assessment with Discharge Plan    Patient Name:  Naresh Poole  :  1970  Age:  47 year old  MRN:  [id number]  Risk/Complexity Score:  Filed Complexity Screen Score: 12  Completed assessment with:  Pt and wife.    Presenting Information   Reason for Referral:  Discharge plan  Date of Intake:  2018  Referral Source:  Chart Review  Decision Maker:  Pt is his own decision maker.  Alternate Decision Maker:  NOK wife.  Health Care Directive: Yes, on file.  Living Situation: Pt resides with his wife and three children.  Previous Functional Status: Prior to hospitalization pts wife was assisting him with ADL's.  Patient and family understanding of hospitalization:  Blood coming from biliary drain.  Cultural/Language/Spiritual Considerations:  Pt is a  male, english speaking.  Adjustment to Illness:  Appropriate. Pts wife understandable overwhelmed with managing pt at home since he is so weak. Pt was pleasant but not able to engage much with SW.    Physical Health  Reason for Admission:    1. Fatigue, unspecified type    2. Transaminitis    3. Pleural effusion    4. Diarrhea, unspecified type      Services Needed/Recommended: Pt will likely need TCU.    Mental Health/Chemical Dependency  Diagnosis:  Denied.  Support/Services in Place:  None.  Services Needed/Recommended:  None.    Support System  Significant relationship at present time:  Wife- Yumi.  Family of origin is available for support:  Yes.  Other support available:  Children.  Gaps in support system:  None.  Patient is caregiver to:  None     Provider Information   Primary Care Physician:  Maci Iglesias   [phone]   Clinic:  [place] [address] [place] [address] [facility] [address]      :  None.    Financial   Income Source:  Per facesheet pt works at [employer].   Financial Concerns:  None.  Insurance:    Payor/Plan Subscriber Name Rel Member # Group #   MEDICA - MEDICA CHOICE  ZEKE MERAZ*  [id number]       [id number]       Discharge Plan   Patient and family discharge goal:  TCU- [facility] in [place].  Provided education on discharge plan:  YES  Patient agreeable to discharge plan:  YES  A list of Medicare Certified Facilities was provided to the patient and/or family to encourage patient choice. Patient's choices for facility are:  SW provided medicare compare list to pt and wife. They would ideally like pt to go to [facility]CU since it is near their home.  Will NH provide Skilled rehabilitation or complex medical:  YES  General information regarding anticipated insurance coverage and possible out of pocket cost was discussed. Patient and patient's family are aware patient may incur the cost of transportation to the facility, pending insurance payment: YES  Barriers to discharge:  Medical stability.    Discharge Recommendations   Anticipated Disposition: TCU, PT/OT to assess.  Transportation Needs: Unsure.  Name of Transportation Company and Phone: To be determined.    Additional comments   Pt is a 48 yo male with a history of pancreatic cancer. Was admitted for a few weeks in March and went home and became increasingly more fatigued and requiring extra assistance from wife. Pt and wife feel he will need TCU and are agreeable due to pts level of deconditioning and falls recently. SW provided a list of facilities. Also awaiting PT/OT to see pt which they are unable to do due to his active GI bleed.    [facility] will continue to follow.    SERAFIN Maciel, [facility]  7A   Ph: [phone], Pager: [phone]

## 2018-04-11 NOTE — PROGRESS NOTES
[facility]  Patient is currently open to home care services with [facility].  The patient is currently receiving PT/OT services.  [facility]  and team have been notified of patient admission.  [facility] liaison will continue to follow patient during stay.  If appropriate provide orders to resume home care at time of discharge.    Thank you  Marta Bartlett RN, BSN  [facility] Liaison  [phone]

## 2018-04-11 NOTE — H&P
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    Of note, the patient was recently hospitalized her from 3/14-4/2 for AMS and weakness, found to have polymicrobial bacteremia (E coli, C perfringens, Citrobacter, and enterococcus) and multiple abscesses of the liver s/p perc bili drain. He was discharged on PO micafungin, vanc, and ertapenem w/ the plan to cont until seen by ID in 4wks. At home, the wife notes that he has fallen twice and over the past couple of days has been increasingly reliant on her for cares. Two days ago she noticed drain output increasing and yesterday noticed that the output look red (normally is brown), which prompted her to bring the patient to the ED. The patient stated that his epigastric and LUQ is improving since his last hospitalization. He had seen a palliative care doctor outside of our system who started him on methadone and hydromorphone (plans to transfer palliative care services to the ). He denied dysuria, cough, fever/chills. He endorsed poor PO intake and appetite.     Review of Systems   The 10 point Review of Systems is negative other than noted in the HPI.    Past Medical History    I have reviewed this patient's medical history and updated it with pertinent information if needed.   Past Medical History:   Diagnosis Date     Hypertension      Pancreatic cancer (H) 01/2017        Past Surgical History   I have reviewed this patient's surgical history and updated it with  pertinent information if needed.  Past Surgical History:   Procedure Laterality Date     ENTEROSCOPY WITH OVERTUBE N/A 3/19/2018    Procedure: ENTEROSCOPY WITH OVERTUBE;  enteroscopy with biopsies;  Surgeon: Jared Cooney MD;  Location: [facility] OR     LAPAROSCOPY DIAGNOSTIC (GENERAL)  2017     PICC INSERTION Left 2018    4Fr - 50cm (2cm external), L medial brachial vein, SVC RA junction     PICC INSERTION Left 2018    4Fr - 45cm (3cm external), L medial brachial vein     WHIPPLE PROCEDURE  2017        Social History   Social History   Substance Use Topics     Smoking status: Never Smoker     Smokeless tobacco: Never Used     Alcohol use No       Family History   I have reviewed this patient's family history and updated it with pertinent information if needed.   Family History   Problem Relation Age of Onset     Coronary Artery Disease Early Onset Maternal Grandmother 36     Liver Disease No family hx of      Colon Cancer No family hx of        Prior to Admission Medications   Prior to Admission Medications   Prescriptions Last Dose Informant Patient Reported? Taking?   Alcohol Swabs PADS   No No   Si each 4 times daily as needed   Ascorbic Acid 250 MG CHEW 4/10/2018 at Unknown time  No Yes   Sig: Take 250 mg by mouth daily   BD SHARPS CONTAINER HOME MISC 4/10/2018 at Unknown time  No Yes   Si each every 30 days   Ergocalciferol (DRISDOL) 77049 UNITS CAPS 2018 at Unknown time  No Yes   Sig: Take 50,000 Units by mouth every 7 days for 4 doses   Miconazole Nitrate (CRITIC-AID CLEAR AF) 2 % ointment 4/10/2018 at Unknown time  No Yes   Sig: Apply topically 2 times daily   ONDANSETRON PO 4/10/2018 at Unknown time Spouse/Significant Other Yes Yes   Sig: Take 4-8 mg by mouth every 8 hours as needed for nausea   alum & mag hydroxide-simethicone (MYLANTA) 200-200-20 MG/5ML SUSP suspension 4/10/2018 at Unknown time Spouse/Significant Other Yes Yes   Sig: Take 15 mLs by mouth 2 times daily as  "needed for indigestion   amLODIPine (NORVASC) 5 MG tablet 4/10/2018  No Yes   Sig: Take 1 tablet (5 mg) by mouth daily   amylase-lipase-protease (CREON 24) 33987-36073 UNITS CPEP per EC capsule 4/10/2018 at Unknown time Spouse/Significant Other Yes Yes   Sig: Take 2 caps with meals and 1 cap with snacks.   bisacodyl (DULCOLAX) 5 MG EC tablet 4/10/2018 at Unknown time Spouse/Significant Other Yes Yes   Sig: Take 5 mg by mouth daily as needed for constipation   blood glucose monitoring (NO BRAND SPECIFIED) meter device kit   No No   Sig: Use to test blood sugar4 times daily or as directed.   blood glucose monitoring (NO BRAND SPECIFIED) test strip 4/10/2018 at Unknown time  No Yes   Sig: Use to test blood sugars 4 times daily or as directed.   blood glucose monitoring (ONE TOUCH DELICA) lancets 4/10/2018 at Unknown time  No Yes   Sig: Use to test blood sugars 4 times daily or as directed.   ertapenem (INVANZ) 1 GM vial 4/9/2018 at 1700  No Yes   Sig: Inject 1 g into the vein every 24 hours for 28 days   fluconazole (DIFLUCAN) 200 MG tablet 4/10/2018 at Unknown time  No Yes   Sig: Take 2 tablets (400 mg) by mouth daily for 28 days   furosemide (LASIX) 20 MG tablet 4/10/2018 at Unknown time  No Yes   Sig: Take 2 tablets (40 mg) by mouth daily   gauze pads & dressings 4\"X4\" PADS Past Week at Unknown time  No Yes   Sig: 10 pads 4 times daily as needed   insulin aspart (NOVOLOG PEN) 100 UNIT/ML injection 4/10/2018 at Unknown time  No Yes   Sig: Inject 1-7 Units Subcutaneous 4 times daily (with meals and nightly)   insulin glargine (LANTUS) 100 UNIT/ML injection 4/9/2018 at Unknown time  No Yes   Sig: Inject 1-5 Units Subcutaneous At Bedtime   insulin pen needle 32G X 4 MM 4/10/2018 at Unknown time  No Yes   Sig: Use insulin pen needles daily or as directed.   methadone (DOLOPHINE) 5 MG tablet 4/10/2018 at Unknown time Spouse/Significant Other Yes Yes   Sig: Take 5 mg by mouth 3 times daily   multivitamin CF formula (DEKAS " PLUS) CAPS per capsule 4/10/2018 at Unknown time  No Yes   Sig: Take 1 capsule by mouth daily   naloxone (NARCAN) 0.4 MG/ML injection   No No   Sig: Inject 0.25-1 mLs (0.1-0.4 mg) into the vein once for 1 dose   pantoprazole (PROTONIX) 40 MG EC tablet 4/10/2018 at Unknown time  No Yes   Sig: Take 1 tablet (40 mg) by mouth 2 times daily   polyethylene glycol (MIRALAX/GLYCOLAX) Packet Unknown at Unknown time Spouse/Significant Other Yes No   Sig: Take 17 g by mouth daily as needed for constipation   sertraline (ZOLOFT) 50 MG tablet 4/10/2018 at Unknown time Spouse/Significant Other Yes Yes   Sig: Take 50 mg by mouth At Bedtime    simethicone (GAS-X) 80 MG chewable tablet 4/10/2018 at Unknown time Spouse/Significant Other Yes Yes   Sig: Take 80 mg by mouth every 6 hours as needed for flatulence or cramping   sodium chloride, PF, (SODIUM CHLORIDE) 0.9% PF flush 4/10/2018 at Unknown time  No Yes   Sig: 10 mLs by Intracatheter route every 8 hours   sodium chloride, PF, 0.9% PF flush 4/10/2018 at Unknown time  No Yes   Sig: Irrigate with 10 mLs as directed every 24 hours   spironolactone (ALDACTONE) 50 MG tablet 4/10/2018 at Unknown time  No Yes   Sig: Take 2 tablets (100 mg) by mouth daily   vancomycin 1,250 mg 4/9/2018 at Unknown time  No Yes   Sig: Inject 1,250 mg into the vein every 24 hours for 28 days      Facility-Administered Medications: None     Allergies   No Known Allergies    Physical Exam   Vital Signs: Temp: 97.2  F (36.2  C) Temp src: Oral BP: 92/70 Pulse: 79   Resp: 18 SpO2: 98 % O2 Device: None (Room air)    Weight: 151 lbs 3.77 oz    General Appearance: cachectic, NAD  HEENT: icteric sclera; NC/AT; EOMI  Respiratory: LCTA, no wheezes or crackles  Cardiovascular: RRR, no m/r/g  GI: R sided drain w/ thin, reddish-brown liquid   Skin: jaundice, no obvious rashes or bruises  Musculoskeletal: no LE edema  Neurologic: alert and oriented, no focal deficits     Assessment & Plan   Naresh Poole is a 47  year old male with a PMHx of pancreatic cancer (T3N1) s/p whipple 5/2017 along with FOLFIRINOX and Gemzar/Xeloda 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 who is presenting for FTT and as well as c/f bloody biliary drain output    #Increased output from biliary drain and color change  #Recent hx of polymicrobial bacteremia and liver abscesses   Likely represents abscess drainage rather than blood; drain appears to be functioning properly; Hgb is stable  - Cont vanc, ertapenem, and fluconazole  - Repeat Hgb tonight and again in AM     #FTT  Wife reporting that the patient has fallen twice at home and she has not been able to assist him as much as he needs (needing assistance to ambulate, bathe, and use the bathroom); weakness worse over the past couple of days but even at his best she doesn't think she can take care of him at home  - PT/OT   - Consider goals of care discussion to see if hospice would be a viable option     #Pancreatic Insufficiency c/b DMII   #Chronic Diarrhea  #Hypovitamin D  #Pancreatic cancer diagnosed in Jan 2016 s/p Whipple   #Pancreatic cancer  (T3N1) s/p whipple 9/2017 along with FOLFIRINOX and Gemzar/Xeloda c/b enterococcal liver abscess (treated) along with non-malignant ascites. Pt last took Gemzar and xeloda in Nov 2017; last saw onc on 4/2, at which time they noted that his ECOG PS was 3-4 and that he is not a candidate for further chemo even if his cancer does recur. Their plan was to continue with surveillance imaging. He was referred to palliative care at that time   -  Cont Creon, vitamin D, pantoprazole BID   - Medium scale correction insulin   - Cont home Dilaudid and methadone     #Normocytic anemia   Required blood transfusions in the past; patient is currently HD stable but possible blood in biliary tube  - Recheck Hgb once overnight and in AM, transfuse if <7    #HTN  Hold Norvasc 5mg QD, spironolactone 50mg QD, and Lasix 20mg QD given soft BPs      #Depression: cont sertraline 50mg QPM    Diet:  regular diet   Fluids: none  DVT Prophylaxis:   Code Status: DNR / DNI    Disposition Plan   Expected discharge: 2 - 3 days; recommended to long term care pending goals of care discussion       Entered: Ester Stock 04/10/2018, 8:05 PM   Information in the above section will display in the discharge planner report.    The patient was discussed with Dr. Chava Stock  [facility]   Pager: [phone]  Please see sticky note for cross cover information      Data   Data     Recent Labs  Lab 04/10/18  1623 04/07/18  1530   WBC 11.0  --    HGB 8.0*  --    MCV 84  --    PLT 82*  --      --    POTASSIUM 3.3*  --    CHLORIDE 108  --    CO2 27  --    BUN 36*  --    CR 0.74 0.66   ANIONGAP 10  --    CHIDI 6.8*  --    *  --    ALBUMIN 2.4*  --    PROTTOTAL 5.5*  --    BILITOTAL 2.4*  --    ALKPHOS 442*  --    ALT 81*  --    AST 63*  --    LIPASE 28*  --      Recent Results (from the past 24 hour(s))   XR Chest 2 Views    Narrative    Exam: XR CHEST 2 VW, 4/10/2018 6:04 PM    Indication: fatigue;     Comparison: Radiograph 3/29/2018. CT 3/31/2018.    Findings:   AP and lateral views of the chest. Right-sided PICC with tip at the  low SVC. Moderate layering right-sided pleural effusion, new from  3/29/2018 though similar appearance on recent CT. Bibasilar and  retrocardiac opacities. The left lung is clear. No pneumothorax.  Mild degenerative changes of the thoracic spine.      Impression    Impression:   Moderate layering right-sided pleural effusion with overlying  atelectasis/consolidation in the right lung base.    I have personally reviewed the examination and initial interpretation  and I agree with the findings.    JUS ALDANA MD

## 2018-04-11 NOTE — PLAN OF CARE
Problem: Fall Risk (Adult)  Goal: Absence of Falls  Patient will demonstrate the desired outcomes by discharge/transition of care.  Post Fall Assessment Note    S: Patient had a(n): witnessed, assisted fall.    B: Patient's orientation/mental status at time of fall:  awake, alert and oriented.   Medications administered within 8 hours of fall:    PCA/Opiates:  Yes    Hypnotics:  No    Psychotropics: No    Antihypertensives:  No    Sedatives:  No   Location of fall:  Bathroom of patient room    A: Type of injury from fall:  Ruptured hemorrhoid when landing on sacrum   Patient status:  Alert, oriented x4. No loss of consciousness. Currently resting in bed, vitally stable.   Patient was lifted from fall event:  Staff members assisted   Interventions:  Walker and commode ordered. Patient supervised during activitiy. Fall band in place. Patient and family educated on use of call light and safety equipment. Chart updated and staff alerted to high fall risk status.   MD notified:  Intern Ester CARPENTER notified x2 of need of face to face assessment.   Family member/next of kin notified:  Yes -- assisted to floor during fall.    R: Falls assessment completed in Epic:  Yes   Care Plan updated:  Yes

## 2018-04-11 NOTE — PLAN OF CARE
Problem: Patient Care Overview  Goal: Plan of Care/Patient Progress Review  Outcome: No Change  Axillary temp 94.8, otherwise VSS on RA. Pt admitted to 7A from ED at 2100. Up with assist x2. Pt fell on sacrum in the bathroom with wife assisting, MD paged, see note. Pt A&O x4. C/o generalized pain in R abdomen, tx with scheduled methadone x1 and PRN dilaudid x1. Serosanguineous, weepy pressure sore on sacrum, slightly larger than quarter-sized, bacitracin and mepilex applied. WOC consult ordered. Biliary drain on R side with dark brown/red output. Dressing changed in ED, drain not emptied in case of order for culture. Will continue to monitor and with POC.

## 2018-04-11 NOTE — ED NOTES
[facility], [place]   ED Nurse to Floor Handoff     Naresh Poole is a 47 year old male who speaks English and lives with a spouse,  in a home  They arrived in the ED by ambulance from home    ED Chief Complaint: Post-op Problem    ED Dx;   Final diagnoses:   Fatigue, unspecified type   Transaminitis   Pleural effusion   Diarrhea, unspecified type         Needed?: No    Allergies: No Known Allergies.  Past Medical Hx:   Past Medical History:   Diagnosis Date     Hypertension      Pancreatic cancer (H) 01/2017      Baseline Mental status: WDL  Current Mental Status changes: at basesline    Infection present or suspected this encounter: no  Sepsis suspected: No  Isolation type: No active isolations     Activity level - Baseline/Home:  Stand with Assist  Activity Level - Current:   Stand with Assist    Bariatric equipment needed?: No    In the ED these meds were given:   Medications   lactated ringers BOLUS 1,000 mL (1,000 mLs Intravenous New Bag 4/10/18 1947)   lactated ringers infusion (not administered)   lactated ringers BOLUS 1,000 mL (0 mLs Intravenous Stopped 4/10/18 1947)   potassium chloride (KLOR-CON) Packet 40 mEq (40 mEq Oral Given 4/10/18 1859)       Drips running?  No    Home pump  Yes    Current LDAs  PICC Single Lumen 03/29/18 Left Brachial vein medial (Active)   Number of days:12       Biliary Drain (Active)   Site Description UTV 4/10/2018  4:29 PM   Dressing Status Normal: Clean, Dry & Intact 4/10/2018  4:29 PM   Drainage Appearance Other (Comment) 4/10/2018  4:29 PM   Number of days:12       Incision/Surgical Site 03/21/18 Right;Lateral Abdomen (Active)   Number of days:20       Incision/Surgical Site 03/23/18 Right;Upper Chest (Active)   Number of days:18       Labs results:   Labs Ordered and Resulted from Time of ED Arrival Up to the Time of Departure from the ED   CBC WITH PLATELETS DIFFERENTIAL - Abnormal; Notable for the following:        Result  Value    RBC Count 2.92 (*)     Hemoglobin 8.0 (*)     Hematocrit 24.5 (*)     RDW 18.4 (*)     Platelet Count 82 (*)     Absolute Neutrophil 9.6 (*)     All other components within normal limits   COMPREHENSIVE METABOLIC PANEL - Abnormal; Notable for the following:     Potassium 3.3 (*)     Glucose 114 (*)     Urea Nitrogen 36 (*)     Calcium 6.8 (*)     Bilirubin Total 2.4 (*)     Albumin 2.4 (*)     Protein Total 5.5 (*)     Alkaline Phosphatase 442 (*)     ALT 81 (*)     AST 63 (*)     All other components within normal limits   LIPASE - Abnormal; Notable for the following:     Lipase 28 (*)     All other components within normal limits   ROUTINE [facility] WITH MICROSCOPIC   PERIPHERAL IV CATHETER       Imaging Studies:   Recent Results (from the past 24 hour(s))   XR Chest 2 Views    Narrative    PRELIMINARY REPORT - The following report is a preliminary  interpretation.   Moderate layering right-sided pleural effusion with overlying  atelectasis/consolidation in the right lung base.       Recent vital signs:   BP 92/70  Pulse 79  Temp 97.2  F (36.2  C) (Oral)  Resp 18  Ht 1.829 m (6')  Wt 68.6 kg (151 lb 3.8 oz)  SpO2 98%  BMI 20.51 kg/m2    Cardiac Rhythm: Normal Sinus  Pt needs tele? No  Skin/wound Issues: pt has skin breakdown to coccyx, pt very weak, anorexic in appearance    Code Status: DNR / DNI    Pain control: good    Nausea control: good    Abnormal labs/tests/findings requiring intervention: potassium 3.3, recent discharge with increased fatigue and blood output per biliary drain    Family present during ED course? Yes   Family Comments/Social Situation comments: wife at bedside    Tasks needing completion: UA collection    Flaquita George, RN  ascom-- [phone] [place] [facility]  [phone] [facility] ED

## 2018-04-11 NOTE — PROGRESS NOTES
[facility] Nurse Inpatient Pressure Injury Assessment     Initial Assessment  Reason for consultation: Evaluate and treat acral Pressure injury      ASSESSMENT    Pressure Injury: on sacral , present on admission ,   Pressure Injury is Stage Unstageable   Contributing factor of the pressure injury: pressure, microclimate and moisture  Status: initial assessment     TREATMENT PLAN    Plan of care for wound located on the sacral Cleanse with MicroKlenz moisten gauze   Pat dry. Apply no sting barrier film to the oz wound skin allow to dry   Cover with  4 x4  Mepilex dressing  Change every other day and as needed      Orders Written  WO Nurse follow-up plan:weekly  Nursing to notify the Provider(s) and re-consult the WO Nurse if wound(s) deteriorates or new skin concern.    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     Of note, the patient was recently hospitalized her from 3/14-4/2 for AMS and weakness, found to have polymicrobial bacteremia (E coli, C perfringens, Citrobacter, and enterococcus) and multiple abscesses of the liver s/p perc bili drain. He was discharged on PO micafungin, vanc, and ertapenem w/ the plan to cont until seen by ID in 4wks. At home, the wife notes that he has fallen twice and over the past couple of days has been increasingly reliant on her for cares. Two days ago she noticed drain output increasing and yesterday noticed that the output look red (normally is brown), which prompted her to bring the patient to the ED. The patient stated that his epigastric and LUQ is improving since his last hospitalization. He had seen a palliative care doctor outside of our system who started him on methadone and hydromorphone  (plans to transfer palliative care services to the ). He denied dysuria, cough, fever/chills. He endorsed poor PO intake and appetite.   Objective Data   Containment of urine/stool: Diaper    Current Diet/ Nutrition:    Active Diet Order      NPO for Medical/Clinical Reasons Except for: Meds, Ice Chips    Output:   I/O last 3 completed shifts:  In: 1115 [P.O.:500]  Out: 260 [Drains:260]    Risk Assessment:   Sensory Perception: 3-->slightly limited  Moisture: 3-->occasionally moist  Activity: 3-->walks occasionally  Mobility: 2-->very limited  Nutrition: 2-->probably inadequate  Friction and Shear: 1-->problem  Luis Score: 14      Labs:   Recent Labs  Lab 04/11/18  1048 04/11/18  0705   HGB 7.7* 7.5*   INR 2.10*  --    WBC  --  8.6         Recent Labs  Lab 04/11/18  0705   CULT PENDING       Physical Exam  Skin assessment:     Wound Location:  Sacrum   Date of last Photo 4/11/18        Sacrum   Wound History: patient admitted with an unstageable pressure injury on the sacrum  Measurements (length x width x depth, in cm) 5 cm x 3 cm  x  0.5 cm   Wound Base:  75 % slough and 25 % of clean wound base   Tunneling N/A  Undermining N/A  Palpation of the wound bed: normal   Periwound skin: hyperemia  Color: red  Temperature: normal   Drainage:, moderate  Description of drainage: serosanguinous  Odor: none  Pain: , aching    Interventions  Current support surface: Standard  Atmos Air mattress    Current off-loading measures: Foam padding and pillows for repositioning   Visual inspection of wound(s) completed   Wound Care: was done per plan of care.  Supplies: ordered: mepilex  Education provided to: patient   Discussed importance of:repositioning every 2 hours, off-loading pressure to wound, their role in pressure injury prevention, dressing change frequency, head elevation <30 degrees, nutrition on wound healing and moisture management    Discussed plan of care with Patient    Face to face time: 30 minutes  Nancy Nguyen  ROMIE, BSN, RN, BA

## 2018-04-11 NOTE — CONSULTS
GASTROENTEROLOGY CONSULTATION      Date of Admission:  4/10/2018          ASSESSMENT AND RECOMMENDATIONS:   Assessment:  47 year old male with a history of pancreatic cancer (T3/N1), s/p neoadjuvant chemotherapy FOLFIRINOX (1/2017-3/2017), s/p pylorus-preserving whipple on 5/2017, and adjuvant chemotherapy gemcitabin/capecitabin (10/2017-11/2017), complicated by enterococcal liver abscess, non malignancy ascites, and large esophageal varices (no h/o bleeding). He was recently admitted 3/14-4/2/18 for generalized weakness and AMS, with CT finding suggest narrowing of portal vein confluence concerning for liver abscess formation and ascending cholangitis. His course was complicated by polymicrobial bacteremia (E coli, C perfringens, Citrobacter, and enterococcus) and fungemia suspected from GI source. He underwent ERCP 3/19 revealed a synchronous stenosis of the pancreaticobiliary limb with one suspected to involve the hepaticojejunostomy and showing abnormal mucosa biopsied. Could not demonstrate the hepaticojejunostomy despite numerous maneuvers. Patient had PTC with internal/external biliary drain placement on 3/21/18. Had brush biopsy of biliary duct distal to stricture on 3/29/18, and biopsy and brushing both negative for malignancy. CA 19-9 elevated to 1402 on 3/22/2018.     He was discharged on 4/2/18, and is now readmitted 4/10/18 for worsening fatigue with frequent falls. Primary team noted hematochezia and bloody drainage from PTC today, GI consult is placed for evaluation.       # Hematochezia: differentials include intermittent esophageal variceal bleeding, hemobilia from liver abscess bleeding, PUD, anastomotic ulcer bleeding, hemorrhoidal bleeding, rectal ulcer bleeding, AVMs.      RECOMMENDATIONS:  - Keep on PPI gtt and Octreotide gtt   - Continue antibiotics and anti-fungal regimen  - Keep NPO after midnight, and plan for EGD tomorrow.   - Trend CBC, BMP and LFTs daily  - FFP and IV vitamin K to  correct INR. INR goal < 1.5  - Nutrition following    - GI will continue to follow, please page if any questions.       Thank you for involving us in this patient's care. Please do not hesitate to contact the GI service with any questions or concerns.     Pt care plan discussed with Dr. Haro, GI staff physician.    Nalini KARLO Jessica  -------------------------------------------------------------------------------------------------------------------          Chief Complaint:   We were asked by Dr. Roy of internal medicine to evaluate this patient with hematochezia     History is obtained from the patient and the medical record.          History of Present Illness:   Naresh Poole is a 47 year old male with a history of pancreatic cancer (T3/N1), s/p neoadjuvant chemotherapy FOLFIRINOX (1/2017-3/2017), s/p pylorus-preserving whipple on 5/2017, and adjuvant chemotherapy gemcitabin/capecitabin (10/2017-11/2017), complicated by enterococcal liver abscess, non malignancy ascites, and large esophageal varices (no h/o bleeding). He was recently admitted 3/14-4/2/18 for generalized weakness and AMS, with CT finding suggest narrowing of portal vein confluence concerning for liver abscess formation and ascending cholangitis. His course was complicated by polymicrobial bacteremia (E coli, C perfringens, Citrobacter, and enterococcus) and fungemia suspected from GI source. He underwent ERCP 3/19 revealed a synchronous stenosis of the pancreaticobiliary limb with one suspected to involve the hepaticojejunostomy and showing abnormal mucosa biopsied. Could not demonstrate the hepaticojejunostomy despite numerous maneuvers. Patient had PTC with internal/external biliary drain placement on 3/21/18. Had brush biopsy of biliary duct distal to stricture on 3/29/18, and biopsy and brushing both negative for malignancy. CA 19-9 elevated to 1402 on 3/22/2018.     He was discharged on 4/2/18, and is now readmitted 4/10/18 for worsening  fatigue with frequent falls. Primary team noted hematochezia and bloody drainage from PTC today, GI consult is placed for evaluation.     Patient reports hematochezia with maroon stool 3-4 times daily since day 2 after being discharged (4/4). Also noted bloody PTC drainage 3 days after being discharged (4/5). He denied nausea, vomiting, hematemesis, melena, fever or chills. Has been progressively weak and fell twice at home.     Of note, pancreatic cancer was initially diagnosed in 1/2017. He underwent EUS/ERCP with stent placement. Underwent chemotherapy with FOLFIRINOX from 1/2017 to 3/2017; noted to have a liver lesion on imaging in 3/2017 and underwent biopsy which showed abscess/necrosis but no evidence of cancer. He underwent whipple in 5/2017 and repeat liver biopsy intraoperatively demonstrated necrotic areas and inflammations. He then had chemotherapy with gemcitabine and capecitabine between 10/2017 to 11/2017. Developed liver abscess and complicated by enterococcus bacteremia and completed antibiotics in 11/2017.     He was referred to Gastroenterology on 2/21/2018 for evaluation of new onset of ascites that was being managed with low dose diuretics. Ascites and lower extremity edema first developed in 9/2017. Paracentesis 12/2017 and 9/2017 negative for malignancy. Had SAAG >1.1 and total protein of 0.4. There was a concern that ascites and elevation of LFTs was related to hepatic necrosis vs. Portal hypertension from sinusoidal obstruction syndrome 2/2 Oxaliplatin. 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 No evidence of fibrosis or sinusoidal obstructive syndrome seen.             Past Medical History:   Reviewed and edited as appropriate  Past Medical History:   Diagnosis Date     Hypertension      Pancreatic cancer (H) 01/2017            Past Surgical History:   Reviewed and edited as appropriate   Past Surgical  History:   Procedure Laterality Date     ENTEROSCOPY WITH OVERTUBE N/A 3/19/2018    Procedure: ENTEROSCOPY WITH OVERTUBE;  enteroscopy with biopsies;  Surgeon: Jared Cooney MD;  Location: [facility] OR     LAPAROSCOPY DIAGNOSTIC (GENERAL)  09/2017     PICC INSERTION Left 03/22/2018    4Fr - 50cm (2cm external), L medial brachial vein, SVC RA junction     PICC INSERTION Left 03/29/2018    4Fr - 45cm (3cm external), L medial brachial vein     WHIPPLE PROCEDURE  05/2017          Social History:   Reviewed and edited as appropriate    Social History     Marital status:      Spouse name: N/A     Number of children: N/A     Years of education: N/A     Social History Main Topics     Smoking status: Never Smoker     Smokeless tobacco: Never Used     Alcohol use No     Drug use: No     Sexual activity: Not on file          Family History:   Reviewed and edited as appropriate  Family History   Problem Relation Age of Onset     Coronary Artery Disease Early Onset Maternal Grandmother 36     Liver Disease No family hx of      Colon Cancer No family hx of      No known history of colorectal cancer, liver disease, or inflammatory bowel disease.       Allergies:   Reviewed and edited as appropriate   No Known Allergies         Medications:     Current Facility-Administered Medications   Medication     potassium chloride SA (K-DUR/KLOR-CON M) CR tablet 20-40 mEq     potassium chloride (KLOR-CON) Packet 20-40 mEq     potassium chloride 10 mEq in 100 mL sterile water intermittent infusion (premix)     potassium chloride 10 mEq in 100 mL intermittent infusion with 10 mg lidocaine     potassium chloride 20 mEq in 50 mL intermittent infusion     magnesium sulfate 2 g in NS intermittent infusion ([facility]ar[facility] or FV Cmpd)     magnesium sulfate 4 g in 100 mL sterile water (premade)     potassium phosphate 10 mmol in D5W 250 mL intermittent infusion     potassium phosphate 15 mmol in D5W 250 mL intermittent infusion     potassium  phosphate 20 mmol in D5W 500 mL intermittent infusion     potassium phosphate 20 mmol in D5W 250 mL intermittent infusion     potassium phosphate 25 mmol in D5W 500 mL intermittent infusion     glucose gel 15-30 g    Or     dextrose 50 % injection 25-50 mL    Or     glucagon injection 1 mg     insulin aspart (NovoLOG) inj (RAPID ACTING)     insulin aspart (NovoLOG) inj (RAPID ACTING)     sodium chloride (PF) 0.9% PF flush 10-20 mL     pantoprazole (PROTONIX) 40 mg IV push injection     octreotide (sandoSTATIN) 1,250 mcg in sodium chloride 0.9 % 250 mL     alum & mag hydroxide-simethicone (MYLANTA/MAALOX) suspension 15 mL     amylase-lipase-protease (CREON 24) 20146-21761 units per EC capsule 48,000 Units     ascorbic acid (vitamin C) chewable tablet 250 mg     bisacodyl (DULCOLAX) EC tablet 5 mg     [START ON 4/13/2018] vitamin D (ERGOCALCIFEROL) capsule 50,000 Units     methadone (DOLOPHINE) tablet 5 mg     miconazole (MICATIN) 2 % cream     multivitamin CF formula (DEKAs Plus) per capsule 1 capsule     polyethylene glycol (MIRALAX/GLYCOLAX) Packet 17 g     sertraline (ZOLOFT) tablet 50 mg     melatonin tablet 1 mg     senna-docusate (SENOKOT-S;PERICOLACE) 8.6-50 MG per tablet 1 tablet    Or     senna-docusate (SENOKOT-S;PERICOLACE) 8.6-50 MG per tablet 2 tablet     ondansetron (ZOFRAN-ODT) ODT tab 4 mg    Or     ondansetron (ZOFRAN) injection 4 mg     HYDROmorphone (DILAUDID) tablet 4 mg     fluconazole (DIFLUCAN) tablet 400 mg     ertapenem (INVanz) 1 g vial to attach to  mL bag     vancomycin (VANCOCIN) 1,500 mg in sodium chloride 0.9 % 250 mL intermittent infusion     dibucaine (NUPERCAINAL RE) 1 % rectal ointment             Review of Systems:   A complete review of systems was performed and is negative except as noted in the HPI           Physical Exam:   /78  Pulse 62  Temp 94.5  F (34.7  C) (Axillary)  Resp 10  Ht 6' (1.829 m)  Wt 141 lb 4.8 oz (64.1 kg)  SpO2 97%  BMI 19.16 kg/m2  Wt:    Wt Readings from Last 2 Encounters:   04/11/18 141 lb 4.8 oz (64.1 kg)   04/02/18 151 lb 3 oz (68.6 kg)      Constitutional: fatigue, no acute distress   Eyes: bilateral sclera icterus   Ears/nose/mouth/throat: Normal oropharynx without ulcers or exudate, mucus membranes moist, hearing intact  Neck: supple, thyroid normal size  CV: RRR, no murmur   Respiratory: Unlabored breathing  Lymph: No axillary, submandibular, supraclavicular or inguinal lymphadenopathy  Abd: Nondistended, +bs, no hepatosplenomegaly, nontender, no peritoneal signs. Percutaneous biliary drain putting out bloody output   Skin: warm, perfused, +ve jaundice  Neuro: AAO x 3, No asterixis  MSK:1 + pitting edema          Data:   Labs and imaging below were independently reviewed and interpreted    BMP  Recent Labs  Lab 04/11/18  0705 04/10/18  1623 04/07/18  1530    144  --    POTASSIUM 3.4 3.3*  --    CHLORIDE 108 108  --    CHIDI 6.4* 6.8*  --    CO2 30 27  --    BUN 31* 36*  --    CR 0.61* 0.74 0.66   * 114*  --      CBC  Recent Labs  Lab 04/11/18  1445  04/11/18  0705  04/10/18  1623   WBC  --   --  8.6  --  11.0   RBC  --   --  2.75*  --  2.92*   HGB 7.0*  < > 7.5*  < > 8.0*   HCT  --   --  23.4*  --  24.5*   MCV  --   --  85  --  84   MCH  --   --  27.3  --  27.4   MCHC  --   --  32.1  --  32.7   RDW  --   --  18.3*  --  18.4*   PLT  --   --  61*  --  82*   < > = values in this interval not displayed.  INR  Recent Labs  Lab 04/11/18  1048   INR 2.10*     LFTs  Recent Labs  Lab 04/10/18  1623   ALKPHOS 442*   AST 63*   ALT 81*   BILITOTAL 2.4*   PROTTOTAL 5.5*   ALBUMIN 2.4*      PANC  Recent Labs  Lab 04/10/18  1623   LIPASE 28*

## 2018-04-11 NOTE — PROGRESS NOTES
Care Coordinator Progress Note     Admission Date/Time:  4/10/2018  Attending MD:  Madonna Roy DO     Data  Chart reviewed, discussed with interdisciplinary team.   Patient was admitted for:    Fatigue, unspecified type  Transaminitis  Pleural effusion  Diarrhea, unspecified type.    Concerns with insurance coverage for discharge needs: None.  Current Living Situation: Patient lives with family.  Support System: Supportive and Involved  Services Involved: Home Care and Home Infusion  Transportation: Family or Friend will provide  Barriers to Discharge: medical clearance       Assessment  Pt is a 47 year old male with PMH significant for pancreatic cancer s/p whipple and FOLFIRINOX and Gemzar/Xeloda c/b enterococcal liver abscess and non-malignant ascites who presented with failure to thrive and bloody biliary drain output. Per chart review, pt recently hospitalized 3/14-4/2 and discharged to home with [facility] of IV antibiotics and [facility] for RN, PT, and OT services. Met with pt and wife, Yumi, to introduce RNCC role and discuss discharge planning. Pt and Yumi stated that things were not going well at home and pt needs more care than Yumi can give him. Pt fell multiple times at home and both Yumi and pt feel that pt needs to discharge to TCU when medically stable. Yumi stated that they would like pt to go to a TCU close to home, [facility] would be pt and Yumi's first preference. Updated Payton Garcia, 7A SW, on conversation with pt and Yumi. Will continue to follow plan of care.                                                                                                                                                                                                                                                                                                                                                                       Plan  Anticipated Discharge  Date:  3-4 days  Anticipated Discharge Plan:  Discharge to TCU when medically stable    Emperatriz Aaron RN

## 2018-04-12 NOTE — PROGRESS NOTES
Transfer  Transferred from: 7A    Via: Bed  Reason for transfer: Pt appropriate for 6B- worsened patient condition, close monitoring of blood levels.  Family: Aware of transfer  Belongings: Received with pt  Chart: Received with pt  Medications: Meds received from old unit with pt  2 RN Skin Assessment Completed By: Kusum CEJA RN and Cecilia CARRERA RN.  Report received from: EMILE RN  Pt status: VSS, orientated to room and call light. RBC's transfusing. Octreotide gtt.

## 2018-04-12 NOTE — IR NOTE
Consulted for GI bleed .    Imaging, labs, and clinical course reviewed. Given blood at hepaticojejunostomy, the underlying source is unclear. This may originate from hepatic artery, vein, or portal system. Until there is clear evidence of the source of bleed, embolization is not indicated.     If there is continued clinical concern for significant bleeding, recommend obtaining a CTA to evaluate for active extravasation. If there is active extravasation on exam, consult IR for possible embolization. If CTA is negative, there remains no target for embolization, and conventional angiography will be of no further benefit.  Philly Cui IR [facility]  [phone] Call pager  [phone] pager

## 2018-04-12 NOTE — ANESTHESIA CARE TRANSFER NOTE
Patient: Naresh Poole    Procedure(s):  EGD   - Wound Class: II-Clean Contaminated    Diagnosis: hematochezia  Diagnosis Additional Information: No value filed.    Anesthesia Type:   MAC     Note:  Airway :Nasal Cannula  Patient transferred to:PACU  Comments: Pt one to one monitoring due to low respiratory rate and unable to follow commands at this time.  Pt placed on monitors and ETCo2 in place.  's.  Blood gas sent.  Detailed report given to RNRichy Plunkett at bedside.Handoff Report: Identifed the Patient, Identified the Reponsible Provider, Reviewed the pertinent medical history, Discussed the surgical course, Reviewed Intra-OP anesthesia mangement and issues during anesthesia, Set expectations for post-procedure period and Allowed opportunity for questions and acknowledgement of understanding      Vitals: (Last set prior to Anesthesia Care Transfer)    CRNA VITALS  4/12/2018 1303 - 4/12/2018 1342      4/12/2018             NIBP: (!)  85/69    Pulse: 98    NIBP Mean: 74    SpO2: 98 %    Resp Rate (observed): (!)  4    EKG: Sinus rhythm                Electronically Signed By: KASHIF Navarro CRNA  April 12, 2018  1:42 PM

## 2018-04-12 NOTE — ANESTHESIA POSTPROCEDURE EVALUATION
Patient: Naresh Poole    Procedure(s):  EGD   - Wound Class: II-Clean Contaminated    Diagnosis:hematochezia  Diagnosis Additional Information: No value filed.    Anesthesia Type:  MAC    Note:  Anesthesia Post Evaluation    Patient location during evaluation: PACU  Patient participation: Able to fully participate in evaluation  Level of consciousness: responsive to light touch, responsive to verbal stimuli and sleepy but conscious  Pain management: adequate  Cardiovascular status: acceptable  Respiratory status: acceptable  Hydration status: acceptable  PONV: none     Anesthetic complications: None    Comments: Slow to wake up from anesthetic per his wife he typically take a longer time to recover.    Will transfuse one unit of blood for Hg 6.8        Last vitals:  Vitals:    04/12/18 1445 04/12/18 1500 04/12/18 1515   BP: 90/72 (!) 89/73 (!) 84/72   Pulse:      Resp: 12 14 14   Temp:      SpO2:            Electronically Signed By: Mag Plunkett MD  April 12, 2018  3:26 PM

## 2018-04-12 NOTE — OR NURSING
Pt tolerated EGD under MAC well. No interventions were done. See Md note for specific details and plan.

## 2018-04-12 NOTE — PHARMACY-VANCOMYCIN DOSING SERVICE
Pharmacy Vancomycin Note  Date of Service 2018  Patient's  1970   47 year old, male    Indication: Bacteremia  Goal Trough Level: 15-20 mg/L  Day of Therapy: Therapy started 3/15/18  Current Vancomycin regimen:  1500 mg IV q24h    Current estimated CrCl = Estimated Creatinine Clearance: 134.3 mL/min (based on Cr of 0.61).    Creatinine for last 3 days  4/10/2018:  4:23 PM Creatinine 0.74 mg/dL  2018:  7:05 AM Creatinine 0.61 mg/dL    Recent Vancomycin Levels (past 3 days)  2018: 11:30 PM Vancomycin Level 14.8 mg/L    Vancomycin IV Administrations (past 72 hours)                   vancomycin (VANCOCIN) 1,500 mg in sodium chloride 0.9 % 250 mL intermittent infusion (mg) 1,500 mg New Bag 04/10/18 235                Nephrotoxins and other renal medications (Future)    Start     Dose/Rate Route Frequency Ordered Stop    04/10/18 2130  vancomycin (VANCOCIN) 1,500 mg in sodium chloride 0.9 % 250 mL intermittent infusion      1,500 mg  over 90 Minutes Intravenous EVERY 24 HOURS 04/10/18 212               Contrast Orders - past 72 hours     None          Interpretation of levels and current regimen:  Trough level is  Subtherapeutic - but just mildly so. Also unclear if any doses were missed prior to admission.  Has serum creatinine changed > 50% in last 72 hours: No    Renal Function: Stable    Plan:  1.  Continue Current Dose  2.  Pharmacy will check trough levels as appropriate in 3-5 Days.    3. Serum creatinine levels will be ordered a minimum of twice weekly.      Sg Salmeron        .

## 2018-04-12 NOTE — PROGRESS NOTES
DAILY ROUNDS CHECKLIST:     RN and Provider saw patient together: YES  Checklist was reviewed with Madonna Roy       Status/Level of care:    - IMC:  continue with     Tethers:   - Telemetry: continue   - Urinary Catheter:Not present   - PICC/CVC/Central line:  continue    Anticoagulation for VTE prophylaxis:    - Reviewed with provider: NO    Rehab:   - PT/OT not indicated       Time until discharge:    - 4 - 7 days once hemoglobin stable and SIRS/Sepsis treated

## 2018-04-12 NOTE — CONSULTS
[facility] SERVICE CONSULTATION     Patient:  Naresh Poole   Date of birth 1970, Medical record number [id number]  Date of Visit:  04/12/2018  Date of Admission: 4/10/2018  Consult Requester:Madonna Roy DO            Assessment and Recommendations:   PROBLEM LIST:  1. Hepatic abscesses   2. Polymicrobial bacteremia from previous admission (3/14-4/2/2018) with E coli, Citrobacter, Clostridium, and Enterococcus faecalis   3. Candidemia with Candida albicans from blood culture on 3/21 on Fluconazole   4. Biliary drain placed on 3/21  5. Enterococcus Faecium (VRE) bacteremia 4/11    RECOMMENDATION:  1. Continue Ertapenem   2. Continue Fluconazole   3. Agree with discontinuing Vancomycin   4. Agree with starting Linezolid. Does not have to be IV. Can give 600 mg PO Bid.   5. Left arm PICC line will need to be removed and reinserted when blood cultures are confirmed negative     ASSESSMENT:  Mr. Poole is a 48 y/o man with a history of pancreatic cancer (T3/N1), s/p neoadjuvant chemotherapy FOLFIRINOX (1/2017-3/2017), s/p pylorus-preserving whipple on 5/2017, and adjuvant chemotherapy gemcitabin/capecitabin (10/2017-11/2017), complicated by enterococcal liver abscess, non malignancy ascites, and large esophageal varices (no h/o bleeding). He was recently admitted on 3/14-4/02 with polymicrobial bacteremia and candidemia 2/2 to liver abscess and ascending cholangitis. He was discharged on Vancomycin, Ertapenem, and Fluconazole. He presents with failure to thrived and found to have VRE faecium bacteremia. Will appreciate GI input regarding possible biliary blockage leading to VRE bacteremia.       Patient was seen and discussed with Dr. Frankie Dodd, Infectious Diseases Fellow   [phone]  ________________________________________________________________    Consult Question:.  Admission Diagnosis: Pleural effusion [J90]  Transaminitis [R74.0]  Fatigue, unspecified type [R53.83]  Diarrhea,  unspecified type [R19.7]  GI bleed [K92.2]         History of Present Illness:     Mr. Poole is a 48 y/o man with a history of pancreatic cancer (T3N1) s/p whipple 5/2017 ( tx with FOLFIRINOX and Gemzar/Xeloda). Post-operative course has been complicated by enterococcal liver abscess. He was recently hospitalized from 3/14-4/2 for polymicrobial bacteremia with E coli, Citrobacter and enterococcus faecalis at [facility] and Clostridium perfringens from an OSH as well as candida albicans. He was discharged on Vancomycin, Ertapenem, and Fluconazole with plans to follow up with ID in 4 weeks. Per wife, he was initially doing well at home until a few days prior to admission when he started feeling lethargic, worsening fatigue, and falling. His wife noted that there was blood in the biliary drain as well as blood in the stool. He had not been having fevers, chills, or rigors while at home. He was not confused or altered. No cough, shortness of breath or cough.     Recent culture results include:  Culture Micro   Date Value Ref Range Status   04/11/2018 (A)  Preliminary    Cultured on the 1st day of incubation:  Gram positive cocci in pairs and chains     04/11/2018   Preliminary    Critical Value/Significant Value, preliminary result only, called to and read back by  JONAS HOUSTON RN 4/12/18 0258. JAIME     04/11/2018   Preliminary    (Note)  POSITIVE for VANCOMYCIN-RESISTANT ENTEROCOCCUS FAECIUM (VRE) - Ralph  gene POSITIVE by [employer] multiplex nucleic acid test. Final  identification and antimicrobial susceptibility testing will be  verified by standard methods.    Specimen tested with Verigene multiplex, gram-positive blood culture  nucleic acid test for the following targets: Staph aureus, Staph  epidermidis, Staph lugdunensis, other Staph species, Enterococcus  faecalis, Enterococcus faecium, Streptococcus species, S. agalactiae,  S. anginosus grp., S. pneumoniae, S. pyogenes, Listeria sp., mecA  (methicillin  resistance) and Ralph/B (vancomycin resistance).    Critical Value/Significant Value called to and read back by Coretta Leon RN from [facility]. GR.     04/11/2018 No growth after 21 hours  Preliminary   03/29/2018 No growth  Final   03/29/2018   Final    No anaerobes isolated  Since this specimen was not transported in the proper anaerobic transport media, the   absence of anaerobes in this culture does not rule out the presence of anaerobes in this   specimen.     03/29/2018 Canceled, Test credited  Final   03/29/2018 Duplicate request  Final   03/29/2018 Culture negative after 1 week  Preliminary   03/29/2018 Canceled, Test credited  Final   03/29/2018 Duplicate request  Final          Review of Systems:   CONSTITUTIONAL:  No fevers or chills, +lethargy   EYES: + for icterus  ENT:  negative for hearing loss, tinnitus and sore throat  RESPIRATORY:  negative for cough with sputum and dyspnea  CARDIOVASCULAR:  negative for chest pain, dyspnea  GASTROINTESTINAL:  negative for nausea, vomiting, diarrhea and constipation  GENITOURINARY:  negative for dysuria  HEME:  No easy bruising  INTEGUMENT:  negative for rash and pruritus  NEURO:  Negative for headache         Past Medical History:     Past Medical History:   Diagnosis Date     Hypertension      Pancreatic cancer (H) 01/2017            Past Surgical History:     Past Surgical History:   Procedure Laterality Date     ENTEROSCOPY WITH OVERTUBE N/A 3/19/2018    Procedure: ENTEROSCOPY WITH OVERTUBE;  enteroscopy with biopsies;  Surgeon: Jared Cooney MD;  Location: [facility] OR     LAPAROSCOPY DIAGNOSTIC (GENERAL)  09/2017     PICC INSERTION Left 03/22/2018    4Fr - 50cm (2cm external), L medial brachial vein, SVC RA junction     PICC INSERTION Left 03/29/2018    4Fr - 45cm (3cm external), L medial brachial vein     WHIPPLE PROCEDURE  05/2017            Family History:     Family History   Problem Relation Age of Onset     Coronary Artery Disease Early Onset Maternal  Grandmother 36     Liver Disease No family hx of      Colon Cancer No family hx of             Social History:     Social History   Substance Use Topics     Smoking status: Never Smoker     Smokeless tobacco: Never Used     Alcohol use No     History   Sexual Activity     Sexual activity: Not on file            Current Medications (antimicrobials listed in bold):       linezolid  600 mg Intravenous Q12H     insulin aspart  1-7 Units Subcutaneous TID AC     insulin aspart  1-5 Units Subcutaneous At Bedtime     amylase-lipase-protease  2 capsule Oral TID w/meals     ascorbic acid  250 mg Oral Daily     [START ON 4/13/2018] vitamin D  50,000 Units Oral Q7 Days     methadone  5 mg Oral TID     miconazole   Topical BID     multivitamin CF formula  1 capsule Oral Daily     polyethylene glycol  17 g Oral Daily     fluconazole  400 mg Oral Daily     ertapenem  1 g Intravenous Q24H            Allergies:   No Known Allergies         Physical Exam:   Vitals were reviewed  Ranges for his vital signs:  Temp:  [94.5  F (34.7  C)-97.8  F (36.6  C)] 97.6  F (36.4  C)  Pulse:  [62] 62  Heart Rate:  [59-84] 78  Resp:  [10-18] 16  BP: ()/(68-96) 112/84  SpO2:  [96 %-100 %] 97 %    Physical Examination:  GENERAL:  Chronically ill appearing, cachectic    HEENT:  Head is normocephalic, atraumatic   EYES:  Icteric sclerae    ENT:  Oropharynx is moist without exudates or ulcers. Tongue is midline  NECK:  Supple. No  Cervical lymphadenopathy  LUNGS:  Clear to auscultation bilateral.   CARDIOVASCULAR:  Regular rate and rhythm with no murmurs, gallops or rubs.  ABDOMEN:  Biliary drain on the right side, abdomen distended, non-tender   SKIN:  PICC line in place on the left arm (clean, without erythema)   NEUROLOGIC: Grossly nonfocal. Active x4 extremities         Laboratory Data:     Inflammatory Markers    Recent Labs   Lab Test  04/03/18   1705  03/14/18   1742  09/13/17   1810   SED  6   --    --    CRP  85.9*  160.0*  5.8      Hematology Studies    Recent Labs   Lab Test  04/12/18   0503  04/11/18   2330  04/11/18   1445  04/11/18   1048  04/11/18   0705  04/11/18   0153  04/10/18   1623  04/03/18   1705  04/02/18   0634  04/01/18   0705   03/27/18   1153   03/15/18   0658   03/14/18   0642   09/13/17   1810   WBC  9.8   --    --    --   8.6   --   11.0  8.7  10.7  8.6   < >  10.3   < >  10.4   --   13.9*   < >  3.4*   ANEU   --    --    --    --    --    --   9.6*  7.8   --    --    --   9.2*   --   8.9*   --   12.4*   --   2.0   AEOS   --    --    --    --    --    --   0.0  0.0   --    --    --   0.0   --   0.0   --   0.0   --   0.1   HGB  7.5*  7.5*  7.0*  7.7*  7.5*  6.6*  8.0*  8.2*  9.1*  8.7*   < >  7.0*   < >  8.7*   < >  9.1*   < >  10.9*   MCV  84   --    --    --   85   --   84  85  86  85   < >  83   < >  80   --   79   < >  77*   PLT  60*   --    --    --   61*   --   82*  55*  61*  46*   < >  142*   < >  34*   --   48*   < >  155    < > = values in this interval not displayed.     Metabolic Studies     Recent Labs   Lab Test  04/12/18   0522  04/11/18   0705  04/10/18   1623  04/07/18   1530  04/03/18   1705  04/02/18   0634  04/01/18   0705   NA  144  143  144   --    --   140  141   POTASSIUM  3.8  3.4  3.3*   --    --   3.8  3.8   CHLORIDE  108  108  108   --    --   105  107   CO2  27  30  27   --    --   29  28   BUN  26  31*  36*   --   34*  28  30   CR  0.56*  0.61*  0.74  0.66  0.68  0.70  0.77   GFRESTIMATED  >90  >90  >90  >90  >90  >90  >90       Hepatic Studies    Recent Labs   Lab Test  04/12/18   0522  04/10/18   1623  04/02/18   0634  04/01/18   0705  03/31/18   0558  03/30/18   0707   BILITOTAL  2.3*  2.4*  3.1*  3.0*  3.6*  3.3*   ALKPHOS  454*  442*  543*  549*  569*  577*   ALBUMIN  2.2*  2.4*  3.4  3.3*  3.4  3.6   AST  64*  63*  32  22  20  25   ALT  76*  81*  54  51  48  43       Thyroid Studies    Recent Labs   Lab Test  03/14/18   1742   TSH  1.33       Microbiology:  Culture Micro   Date Value  Ref Range Status   04/11/2018 (A)  Preliminary    Cultured on the 1st day of incubation:  Gram positive cocci in pairs and chains     04/11/2018   Preliminary    Critical Value/Significant Value, preliminary result only, called to and read back by  CORETTA HOUSTON RN 4/12/18 0258. JAIME     04/11/2018   Preliminary    (Note)  POSITIVE for VANCOMYCIN-RESISTANT ENTEROCOCCUS FAECIUM (VRE) - Ralph  gene POSITIVE by [employer] multiplex nucleic acid test. Final  identification and antimicrobial susceptibility testing will be  verified by standard methods.    Specimen tested with [employer] multiplex, gram-positive blood culture  nucleic acid test for the following targets: Staph aureus, Staph  epidermidis, Staph lugdunensis, other Staph species, Enterococcus  faecalis, Enterococcus faecium, Streptococcus species, S. agalactiae,  S. anginosus grp., S. pneumoniae, S. pyogenes, Listeria sp., mecA  (methicillin resistance) and Ralph/B (vancomycin resistance).    Critical Value/Significant Value called to and read back by Coretta Houston RN from [facility]. GR.     04/11/2018 No growth after 21 hours  Preliminary   03/29/2018 No growth  Final   03/29/2018   Final    No anaerobes isolated  Since this specimen was not transported in the proper anaerobic transport media, the   absence of anaerobes in this culture does not rule out the presence of anaerobes in this   specimen.     03/29/2018 Canceled, Test credited  Final   03/29/2018 Duplicate request  Final   03/29/2018 Culture negative after 1 week  Preliminary   03/29/2018 Canceled, Test credited  Final   03/29/2018 Duplicate request  Final   03/28/2018 No growth  Final   03/28/2018 No growth  Final   03/27/2018 No growth  Final   03/27/2018 No growth  Final   03/26/2018 Culture negative after 2 weeks  Preliminary   03/26/2018 No growth  Final   03/26/2018 No growth  Final   03/25/2018 No growth  Final   03/25/2018 No growth  Final   03/24/2018 No growth  Final   03/24/2018 No growth   Final   03/23/2018 No growth  Final   03/23/2018 No growth  Final   03/23/2018 No growth  Final   03/23/2018 No growth  Final   03/23/2018 No anaerobes isolated  Final   03/23/2018 No yeast isolated  Final   03/22/2018 No growth  Final   03/22/2018 No growth  Final   03/21/2018 (A)  Final    Cultured on the 2nd day of incubation:  Candida albicans     03/21/2018   Final    Critical Value/Significant Value, preliminary result only, called to and read back by  Marielos Starr RN on 3.22.2018 at 1753. KVO     03/21/2018 No growth  Final   03/20/2018 (A)  Final    Cultured on the 4th day of incubation:  Candida albicans  Susceptibility testing done on previous specimen     03/20/2018   Final    Critical Value/Significant Value, preliminary result only, called to and read back by  Sabi GODWIN RN on 5B on 3/24/2018 @1352, tk     03/20/2018 No growth  Final   03/19/2018 No growth  Final   03/19/2018 No growth  Final   03/19/2018   Final    Canceled, Test credited  Duplicate request  [facility]     03/19/2018   Final    Canceled, Test credited  Duplicate request  [facility]     03/18/2018 Canceled, Test credited  Final   03/18/2018 Cancelled by lab - Specimen never received.  Final   03/18/2018 Canceled, Test credited  Final   03/18/2018 Cancelled by lab - Specimen never received.  Final   03/18/2018 No growth  Final   03/18/2018 No growth  Final   03/17/2018 (A)  Final    Cultured on the 1st day of incubation:  Enterococcus faecalis  Susceptibility testing done on previous specimen     03/17/2018   Final    Critical Value/Significant Value, preliminary result only, called to and read back by  Mingo Davis RN [facility] 0043 03.18.18 CF     03/17/2018 (A)  Final    Cultured on the 1st day of incubation:  Citrobacter freundii complex  Susceptibility testing done on previous specimen     03/17/2018 (A)  Final    Cultured on the 2nd day of incubation:  Enterococcus faecalis  Susceptibility testing done on previous specimen      03/17/2018   Final    Critical Value/Significant Value, preliminary result only, called to and read back by  JEREMY MONET RN (5B).  03.19.18 0452 GJS     03/17/2018 (A)  Final    Cultured on the 2nd day of incubation:  Citrobacter freundii complex  Susceptibility testing done on previous specimen     03/17/2018   Final    Called to  Jayna Brady RN at 1415 on 3/20/17 by KLARISSA.     03/17/2018   Final    (Note)  POSITIVE for ENTEROCOCCUS FAECALIS and NEGATIVE for Ralph/vanB genes  by [employer] multiplex nucleic acid test. Final identification and  antimicrobial susceptibility testing will be verified by standard  methods.    Specimen tested with [employer] multiplex, gram-positive blood culture  nucleic acid test for the following targets: Staph aureus, Staph  epidermidis, Staph lugdunensis, other Staph species, Enterococcus  faecalis, Enterococcus faecium, Streptococcus species, S. agalactiae,  S. anginosus grp., S. pneumoniae, S. pyogenes, Listeria sp., mecA  (methicillin resistance) and Ralph/B (vancomycin resistance).    Critical Value/Significant Value called to and read back by Jayna Brady RN 5A, on 3/19/2018 @0739, tk     03/17/2018 Canceled, Test credited  Final   03/17/2018 Duplicate request  Final   03/17/2018 Canceled, Test credited  Final   03/17/2018 Duplicate request  Final   03/16/2018 No growth  Final   03/16/2018 No anaerobes isolated  Final   03/16/2018   Final    Canceled, Test credited  Cancelled by lab - Specimen never received.     03/16/2018   Final    Canceled, Test credited  Cancelled by lab - Specimen never received.     03/16/2018 No growth  Final   03/16/2018 (A)  Final    Cultured on the 1st day of incubation:  Escherichia coli  Susceptibility testing done on previous specimen     03/16/2018 (A)  Final    Cultured on the 1st day of incubation:  Enterococcus faecalis  Susceptibility testing done on previous specimen     03/16/2018   Final    Critical Value/Significant Value, preliminary  result only, called to and read back by  JESSICA LUTHER RN 3.16.18 2105. JAIME     03/15/2018 (A)  Final    Cultured on the 1st day of incubation:  Citrobacter freundii  Susceptibility testing done on previous specimen     03/15/2018   Final    Critical Value/Significant Value, preliminary result only, called to and read back by  GAUTAM KING RN @0512 3/16/18. SCG     03/15/2018 No growth  Final   03/14/2018 No growth  Final   03/14/2018 (A)  Final    Cultured on the 1st day of incubation:  Citrobacter freundii     03/14/2018   Final    Critical Value/Significant Value, preliminary result only, called to and read back by  Trini Garcia RN 0702 3/15/18. MS     03/14/2018 (A)  Final    Cultured on the 1st day of incubation:  Escherichia coli     03/14/2018   Final    Critical Value/Significant Value called to and read back by  Jayna Brady RN 5B @ 1021.cg 03/17/18 03/14/2018 Susceptibility testing done on previous specimen  Final   03/14/2018 (A)  Final    Cultured on the 1st day of incubation:  Citrobacter freundii     03/14/2018   Final    Critical Value/Significant Value, preliminary result only, called to and read back by  Socorro Edouard RN 0600 3/15/18. MS     03/14/2018 (A)  Final    Cultured on the 1st day of incubation:  Escherichia coli     03/14/2018   Final    Critical Value/Significant Value called to and read back by  Jayna Brady 5B @ 1216.cg 03/17/18 03/14/2018 Susceptibility testing done on previous specimen  Final   03/14/2018 (A)  Final    Cultured on the 1st day of incubation:  Escherichia coli  Susceptibility testing done on previous specimen     03/14/2018 (A)  Final    Cultured on the 1st day of incubation:  Enterococcus faecalis     03/14/2018   Final    Critical Value/Significant Value, preliminary result only, called to and read back by  Allison Vallejo RN 0139 3/15/18. MS     03/14/2018   Final    (Note)  NEGATIVE for the following: Staphylococcus spp., Staph aureus, Staph  epidermidis,  Staph lugdunensis, Streptococcus spp., Strep pneumoniae,  Strep pyogenes, Strep agalactiae, Strep anginosus group, Enterococcus  faecalis, Enterococcus faecium, and Listeria spp. by [employer]  multiplex nucleic acid test. Final identification and antimicrobial  susceptibility testing will be verified by standard methods.    Critical Value/Significant Value called to and read back by Trini Garcia RN 0417 3/15/18. MS     03/14/2018 (A)  Final    Cultured on the 1st day of incubation:  Escherichia coli     03/14/2018   Final    Critical Value/Significant Value, preliminary result only, called to and read back by  Radha Sims RN, @2031 03/14/18..     03/14/2018 (A)  Final    Cultured on the 1st day of incubation:  Citrobacter freundii complex     03/14/2018   Final    Critical Value/Significant Value called to and read back by  Dr. Yamile Reddy @ 1227. 03/16/18 03/14/2018   Final    (Note)  POSITIVE for E.COLI by [employer] multiplex nucleic acid test. Final  identification and antimicrobial susceptibility testing will be  verified by standard methods. [employer] test will not distinguish  E.coli from Shigella species including S.dysenteriae, S.flexneri,  S.boydii, and S.sonnei. Specimens containing Shigella species or  E.coli will be reported as Positive for E.coli.    Specimen tested with [employer] multiplex, gram-negative blood culture  nucleic acid test for the following targets: Acinetobacter sp.,  Citrobacter sp., Enterobacter sp., Proteus sp., E. coli, K.  pneumoniae/oxytoca, P. aeruginosa, and the following resistance  markers: CTXM, KPC, NDM, VIM, IMP and OXA.    Critical Value/Significant Value called to and read back by Radha Sims RN, @2237 03/14/18..     03/14/2018 (A)  Final    Cultured on the 1st day of incubation:  Escherichia coli  Susceptibility testing done on previous specimen     03/14/2018   Final    Critical Value/Significant Value, preliminary result only, called to and read back  by  Radha Sims RN, @ 2236 03/14/18..       Urine Studies    Recent Labs   Lab Test  04/10/18   2300  03/26/18   1800  03/14/18   2144   LEUKEST  Negative  Negative  Negative   WBCU  1  0  1     Vancomycin Levels    Recent Labs   Lab Test  04/11/18   2330  04/07/18   1530  04/03/18   1705   VANCOMYCIN  14.8  16.2  11.8

## 2018-04-12 NOTE — PROVIDER NOTIFICATION
-------------------CRITICAL LAB VALUE-------------------    Lab Value: Blood culture positive for gram positive cocci in pairs and chains per microbiology lab.  Time of notification: 4:53 AM  MD notified: Danielle Farias MD   Patient status: Afebrile, vitally stable on room air. Already receiving vancomycin, fluconazole, and ertapenem.   Temp:  [94.5  F (34.7  C)-97.8  F (36.6  C)] 97.4  F (36.3  C)  Pulse:  [62] 62  Heart Rate:  [59-97] 77  Resp:  [10-18] 16  BP: ()/(68-96) 113/88  SpO2:  [96 %-100 %] 97 %  Orders received: None.    Addendum: Microbiology lab notified RN at 0545 that organism has been identified as Enterococcus Faecium and that it is a VRE. Danielle Farias MD updated via telephone and will notify day team in sign out. Contact isolation for VRE ordered.

## 2018-04-12 NOTE — ANESTHESIA PREPROCEDURE EVALUATION
Anesthesia Evaluation     . Pt has had prior anesthetic. Type: General    No history of anesthetic complications          ROS/MED HX    ENT/Pulmonary:      (-) tobacco use, asthma and COPD   Neurologic:      (-) CVA, TIA and Neuropathy   Cardiovascular:     (+) hypertension----. : . . . :. .      (-) CAD, irregular heartbeat/palpitations and stent   METS/Exercise Tolerance:     Hematologic:        (-) anemia   Musculoskeletal:         GI/Hepatic: Comment: Pancreatic cancer       (-) GERD and liver disease   Renal/Genitourinary:      (-) renal disease   Endo:      (-) Type I DM, Type II DM and thyroid disease   Psychiatric:         Infectious Disease:  - neg infectious disease ROS       Malignancy:   (+) Malignancy History of GI  GI CA status post Surgery,         Other:                     Physical Exam  Normal systems: cardiovascular, pulmonary and dental    Airway   Mallampati: I  TM distance: >3 FB  Neck ROM: full    Dental     Cardiovascular   Rhythm and rate: regular and normal      Pulmonary    breath sounds clear to auscultation                    Anesthesia Plan      History & Physical Review  History and physical reviewed and following examination; no interval change.    ASA Status:  3 .    NPO Status:  > 8 hours    Plan for MAC with Intravenous and Propofol induction. Maintenance will be TIVA.  Reason for MAC:  Deep or markedly invasive procedure (G8)  PONV prophylaxis:  Ondansetron (or other 5HT-3) and Scopolamine patch  Discussed rescinding DNR/DNI status during oz-procedure phase and patient agreeable      Postoperative Care  Postoperative pain management:  IV analgesics.      Consents  Anesthetic plan, risks, benefits and alternatives discussed with:  Patient.  Use of blood products discussed: Yes.   Use of blood products discussed with Patient and Spouse.  Consented to blood products.  .

## 2018-04-12 NOTE — PROGRESS NOTES
Interventional Radiology    April 12, 2018    GI bleed consult followup    CTA reviewed. No active extravasation though there is some irregularity of the proper hepatic artery which can been seen secondary to hemorrhage. Given the patient's overall clinical picture with precarious liver function, we would prefer to proceed with conservative management at this time with aggressive correction of coagulation profile with product/platelet replacement and RBC transfusion as needed. To this point he has remained hemodynamically stable and responds to transfusion as needed. Any hepatic embolization in this patient would come with a high risk of precipitating further liver failure and managing his bleeding conservatively would minimize this risk.      /84  Pulse 88  Temp 97.3  F (36.3  C) (Oral)  Resp (!) 4  Ht 6'  Wt 141 lb 4.8 oz  SpO2 97%  BMI 19.16 kg/m2    Hemoglobin   Date Value Ref Range Status   04/12/2018 6.8 (LL) 13.3 - 17.7 g/dL Final     Comment:     Critical Value called to and read back by  RUTHY CARR MD ON 4/12/2018 AT 1345 BY ANM     04/12/2018 7.5 (L) 13.3 - 17.7 g/dL Final   04/11/2018 7.5 (L) 13.3 - 17.7 g/dL Final   04/11/2018 7.0 (L) 13.3 - 17.7 g/dL Final   04/11/2018 7.7 (L) 13.3 - 17.7 g/dL Final     Recommendations:  - Would defer angiography at this time   - Recommend aggressive correction of coagulation profile and transfusion as needed  - Given underlying liver dysfunction with occluded portal vein, any hepatic embolization has a very high risk of precipitating further liver failure  - If patient demonstrates hemodynamic decompensation, contact IR and emergency angiography can be considered  - IR will contact team in the morning. Goals of care should be discussed.    Discussed with Dr. Coronado of the medicine service and Dr Christian of IR    Eladio Tinajero MD  Fellow, Interventional Radiology    [phone]  [phone] after hours/weekend

## 2018-04-12 NOTE — PROVIDER NOTIFICATION
Time of notification: 12:30 AM  Provider notified: Danielle Farias MD  Patient status: Hgb 7.5 after receiving 1 unit PRBCs.    Temp:  [93.1  F (33.9  C)-97.8  F (36.6  C)] 97.6  F (36.4  C)  Pulse:  [62] 62  Heart Rate:  [59-97] 73  Resp:  [10-18] 16  BP: ()/(68-96) 118/89  SpO2:  [92 %-100 %] 98 %    Orders received: Recheck CBC with AM labs.

## 2018-04-12 NOTE — OR NURSING
Pt in Endo recovery for a short time. Watched 1:1 with nursing and MD was present at all times as well. Pt placed on bipap. Slow to wake from sedation. Transferred by float RN, this RN and MD to pacu for further recovery. Transported on monitor.

## 2018-04-12 NOTE — PLAN OF CARE
Problem: Patient Care Overview  Goal: Plan of Care/Patient Progress Review  Outcome: No Change  Neuro: Lethargic, but awakes easily and oriented x4.   Cardiac: Afebrile. SR with HR in 70s. VSS.   Respiratory: Sating >95% on RA.  GI/: Incontinent of urine and stool x1. 1 large loose, bloody BM overnight.   Diet/appetite: NPO since midnight. Hypoglycemic with AM labs. BG= 54 at 0645, increased to 116 after 25 mL of D50.  Activity:  Repositioned Q2 in bed.  Pain: At acceptable level on current regimen.   Skin: Pressure ulcer on coccyx with mepilex dressing.   LDA's: Single lumen PICC and PIV x2 with protonix infusing at 8mg/hr and octreotide infusing at 50 mcg/hr.     Plan: Continue with POC. Notify primary team with changes. Plan for possible EGD today.

## 2018-04-12 NOTE — OR NURSING
Care taken over at 1530. Pt not tolerating being off bipap. Flumazenil given as ordered and patient immediatlty woke up and stayed alert and awake not requiring 02 or bipap. Taken to CT and then to floor. Report given to Rhina Gomez.

## 2018-04-12 NOTE — PROGRESS NOTES
At approx 1435 patient arrived PACU accompanied by Dr ALFREDO Plunkett and endoscopy nurse .  Patient unresponsive and placed on Bibpa of 10/5 with rate of 12.  At 1500 Dt ALFREDO Plunkett at bedside with Dr Lawrence .  Patient was arousable to repeated stimulation. He nodded no to pain or nausea . They decided to not reverse him at that time as patient slowly is becoming more arousable.  Dr Plunkett at bedside again at 1525.  He stated patient to be more arousable and following commands before removing the bipap.  He stated patient needed to receive a unit of PRBCs for Hgb of 6.8  Handoff to Silke Butts

## 2018-04-12 NOTE — PLAN OF CARE
Problem: Patient Care Overview  Goal: Plan of Care/Patient Progress Review  Outcome: No Change  Neuro: A&Ox4.   Cardiac: SR. VSS.   Respiratory: Sating <92% on RA.  GI/: Adequate urine output. BM X1, watery, maroon stool.   Diet/appetite: Tolerating clear diet, NPO at midnight for possible EGD tomorrow.  Activity:  Assist of 2 w/transfers, up to bedside commode.  Pain: PRN Dilaudid given x1 with relief.   Skin: Sacral dressing to coccyx.   LDA's: (L) single PICC, (L) PIV, (R) PIV, biliary drains. Protonix and Octreotide gtts.    Plan: Potassium replaced. Phosphorus infusing, rechecks in AM. Vanco level pending. Continue with POC. Notify primary team with changes.

## 2018-04-13 NOTE — PROVIDER NOTIFICATION
04/12/18 2000   Call Information   Date of Call 04/12/18   Time of Call 1956   Name of person requesting the team Jaja QUIROS   Title of person requesting team RN   RRT Arrival time 2000   Time RRT ended 2038   Reason for call   Type of RRT Adult   Primary reason for call Staff concerned;Sudden or unanticipated change in patient condition   Was patient transferred from the ED, ICU, or PACU within last 24 hours prior to RRT call? No   SBAR   Situation Resp-5, given Flumazenil 0.5 mg x2, narcan 0.4 x1, pt unawareness developed, unable to answer question, shaking tremors of body   Background S/P Endo today, H/O pancreatic CA, s/p whipple,   Notable History/Conditions Cancer;Diabetes   Assessment Change in mentation and ability to express self improved as time passed.   Interventions Other (describe)  (Continued observation, for return of unawareness, and tremor)   Adjustments to Recommend [place] observation.   Patient Outcome   Patient Outcome Stabilized on unit   RRT Team   Attending/Primary/Covering Physician Dr Roy   Date Attending Physician notified 04/12/18   Physician(s) Dr Gonzalez   Lead FERN Barrientos   Post RRT Intervention Assessment   Post RRT Assessment Stable/Improved   Date Follow Up Done 04/12/18   Time Follow Up Done 6768

## 2018-04-13 NOTE — PLAN OF CARE
Problem: Patient Care Overview  Goal: Plan of Care/Patient Progress Review  Outcome: Declining  Neuro: Lethargic initially and oriented x3, but at 0230 pt A/O x4.   Cardiac: Afebrile, SR with HR in 70s. VSS.    Respiratory: Sating >95% on RA. RR 8-18. On capno with ETCO2 in 30s, and IPI 8-10.  GI/: Adequate urine output. Incontinent x2 of large bloody BMs with clots (MD notified).  Diet/appetite: NPO. BG checked Q 4 hrs, in 120s overnight.   Activity:  Assist of 2 to reposition in bed.  Pain: At acceptable level on current regimen. Given scheduled methadone late at 0230 when pt alert and oriented, provided adequate relief.  Skin: Pressure ulcer on coccyx, mepilex in place.   LDA's: Bili drain with minimal brown/maroon/blood tinged output. Protonix infusing at 8 mg/hr, octreotide infusing at 50 mcg/hr, and D5 infusing at 125 mL/hr.     Received 1 unit of platelets overnight. Plan to recheck CBC with AM labs.    Plan: Continue with POC. Notify primary team with changes.

## 2018-04-13 NOTE — BRIEF OP NOTE
Interventional Radiology Brief Post Procedure Note    Procedure: Internal biliary stent placement and internal/external biliary drain exchange    Proceduralist: Hernesto Cisneros MD    Assistant: Eladio Tinajero MD    Time Out: Prior to the start of the procedure and with procedural staff participation, I verbally confirmed the patient s identity using two indicators, relevant allergies, that the procedure was appropriate and matched the consent or emergent situation, and that the correct equipment/implants were available. Immediately prior to starting the procedure I conducted the Time Out with the procedural staff and re-confirmed the patient s name, procedure, and site/side. (The Joint Commission universal protocol was followed.)  Yes        Sedation: None. Local Anesthestic used    Findings: Placement of 66q02lb Viabil stent and replacement of internal/external biliary drain    Estimated Blood Loss: Minimal    Fluoroscopy Time:  minute(s)    SPECIMENS: None    Complications: 1. None     Condition: Stable    Plan: Patient to floor for continued cares. Bedrest for 1 hour. Resume previous drain cares.    Comments: See dictated procedure note for full details.    Eladio Tinajero MD

## 2018-04-13 NOTE — PROGRESS NOTES
PROGRESS NOTE    04/13/18    Naresh Poole ([id number]) admitted on 4/10/2018    Changes today  -IR stent placement  -diagnostic para - cell count c/w secondary bacterial peritonitis; culture results pending  -IR consult for abdominal drain placement - IR aware   -d/c linezolid - start daptomycin per ID recs  -Weekly CK  -BID PPI  -1U FFP and 1U plts today  -Cont Q6hr CBC  -D/C PICC - 2 large PIVs prior to PICC removal  -BCx2 today  -Failed attempt to place FT by radiology  -Unable to start TPN due to removal of central access - nutrition/pharmacy to evaluate PPN  -Palliative care consult  -Ca gluconate 2g - recheck in PM    Assessment & Plan:  Naresh Poole is a 47 year old male with a PMHx of pancreatic cancer (T3N1) s/p whipple 5/2017 along with FOLFIRINOX and Gemzar/Xeloda 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 who is presenting for FTT and as well as c/f bloody biliary drain output      #GI bleed  #Acute blood loss anemia  #Hemobilia in setting of percutaneous biliary drain  #Elevated INR   Noted to have maroon liquid stool; patient states he has had bloody stools since discharge but had not reported this previously. Noted to have down trending hgb despite transfusions. EGD 04/12 with evidence of bleeding from hepato-jejunostomy site. Case discussed with IR due to concern for hepatic source; CT angiogram ordered with no evidence of larbe hemorrhage. Today IR and GI discussed case, decision made to procede with IR stent placement to attempt to stop bleed. Underwent procedure and tolerated well (see IR note for details).    -Hgb Q6hrs overnight  -PIVx2  -IR stent placed 04/13  -GI following; appreciate recs   -Transfusions today: 1U FFP and 1U plts  -Vitamin K 10mg   -Octreotide gtt  -PPI changed to BID     #VRE bacteremia  #Secondary bacterial peritonitis   #Recent cholangitis and polymicrobial bacteremia   #Hx enterococcal liver abscess  #Recent candidemia  Discharged  (03/14-04/02) after admission for polymicrobial bacteremia (E coli, C perfringens, Citrobacter, and enterococcus) and candidemia due to cholangitis and liver abscess. Discharged on vancomycin, fluconazole, ertapenem with plan for ID follow up at 4 weeks. Presenting with evidence of hemobilia and GI bleed (see above). GI consulted.   -BC 04/10 - growing VRE  -Cont  ertapenem and fluconazole.  -D/c vancomycin 04/12  - linezolid (04/12-->04/13) --> chnaged to daptomycin (04/13) due to low plts  -ID consult   -Diagnostic para 04/13 - WBC >400 with ~80% PMN --> will need IR abdominal drain placed  -Ascites fluid cultures pending  -Daily BC until negative x48hrs      #FTT  #Severe Protein/caloric malnutrition  Patient cachectic in setting of multiple complications following whipple for pancreatic Ca. Per chart, no evidence of recurrance on recent imaging. Appears to have lost 5-10 lbs in past week (?). Poor PO intake per report, though patient reports feeling hungry. Will need nutrition eval, PT and OT once acute issues stable (see above). Likely need TCU at discharge due to failure at home and increased needs exceeding family ability to care for him.   -PT, OT and nutrition once acute issues resolved.  -Radiology unable to place FT --> would need endoscopically placed   -Unable to start TPN due to removal of central access - nutrition/pharmacy to evaluate PPN      #Hx Pancreatic Cancer (T3N1) s/p Whipple (09/2017)  #Pancreatic insufficiency - chronic diarrhea  #Iatrogenic diabetes 2/2 panceratectomy  Initially diagnosed in 1/2017. He underwent EUS/ERCP with stent placement. Underwent chemotherapy with FOLFIRINOX from 1/2017 to 3/2017; noted to have a liver lesion on imaging in 3/2017 and underwent biopsy which showed abscess/necrosis but no evidence of cancer. He underwent whipple in 5/2017 and repeat liver biopsy intraoperatively demonstrated necrotic areas and inflammations. He then had chemotherapy with gemcitabine  and capecitabine between 10/2017 to 11/2017. Developed liver abscess and complicated by enterococcus bacteremia and completed antibiotics in 11/2017. Last seen in oncology clinic 04/02; no longer candidate for further chemo.   -Hold Creon and vit D while NPO - resume once eating  -cont PTA dilaudid and methadone     #Hypoglycemia  Due to poor liver function and NPO status. On D5.  -Hypoglycemia protocol  -D/c ISS       #HTN  Holding BP meds in setting of GI bleed       #Depression  Cont sertraline  -Palliative care consulted - will see him over weekend    #Hypocalcemia  Likely due to poor PO intake and acute illness.   -2g Ca gluconate  -BMP in AM      FEN: Regular diet -NPO MN  Prophylaxis: mechanical  Disposition: Pending management of blood loss; TCU likely at discharge  Code Status: DNR/DNI      Patient was seen and plan of care discussed with MD Karen Reid MD   Internal Medicine PGY3    Subjective:  Feels tired this AM. Slept well. Does not recall events last night    Objective:  Most recent vital signs:  BP (!) 119/91  Pulse 73  Temp 97.5  F (36.4  C) (Oral)  Resp 16  Ht 1.829 m (6')  Wt 64.1 kg (141 lb 4.8 oz)  SpO2 99%  BMI 19.16 kg/m2  Temp:  [95.6  F (35.3  C)-97.5  F (36.4  C)] 97.5  F (36.4  C)  Pulse:  [73] 73  Heart Rate:  [65-76] 67  Resp:  [4-24] 16  BP: (107-138)/() 119/91  SpO2:  [93 %-100 %] 99 %  Wt Readings from Last 2 Encounters:   04/11/18 64.1 kg (141 lb 4.8 oz)   04/02/18 68.6 kg (151 lb 3 oz)       Intake/Output Summary (Last 24 hours) at 04/13/18 1846  Last data filed at 04/13/18 1700   Gross per 24 hour   Intake          3311.75 ml   Output              715 ml   Net          2596.75 ml       Physical exam:  General: Cachectic, Patient lying comfortably in bed, NAD  HEENT: no scleral icterus or injection, MMM  Cardiac: RRR, no m/r/g appreciated.   Respiratory: CTAB, no wheezes, rhonchi or crackles appreciated.  GI:RUQ drain with brown fluid. Mild  discomfort to palpation  Extremities: No LE edema  Skin: No acute lesions appreciated  Neuro: AOx3,moving all extremities.    Labs (Past three days):  CBC  Recent Labs  Lab 04/13/18  1815 04/13/18  1221 04/13/18  0545 04/13/18  0500   WBC 15.3* 10.7 11.4* 13.3*   RBC 2.68* 2.72* 2.85* 2.96*   HGB 7.4* 7.6* 7.9*  Duplicate request 8.3*   HCT 22.0* 22.7* 23.9* 24.8*   MCV 82 84 84 84   MCH 27.6 27.9 28.1 28.0   MCHC 33.6 33.5 33.2 33.5   RDW 18.0* 18.0* 17.8* 17.7*   PLT 66* 59* 52* 59*     BMP  Recent Labs  Lab 04/13/18  0545 04/12/18  1330 04/12/18  0522 04/11/18  0705 04/10/18  1623    134 144 143 144   POTASSIUM 3.8 3.9 3.8 3.4 3.3*   CHLORIDE 101  --  108 108 108   CO2 28  --  27 30 27   ANIONGAP 6  --  9 5 10   * 154* 64* 158* 114*   BUN 28  --  26 31* 36*   CR 0.61*  --  0.56* 0.61* 0.74   GFRESTIMATED >90  --  >90 >90 >90   GFRESTBLACK >90  --  >90 >90 >90   CHIDI 6.2*  --  6.4* 6.4* 6.8*   MAG 1.6  --  1.7 1.6  --    PHOS 3.0  --  2.7 2.1*  --      Troponins:     INR  Recent Labs  Lab 04/13/18  0545 04/12/18  0522 04/11/18  1048   INR 1.91* 1.98* 2.10*     Liver panel  Recent Labs  Lab 04/13/18  0545 04/12/18  0522 04/10/18  1623   PROTTOTAL 4.8* 4.9* 5.5*   ALBUMIN 2.2* 2.2* 2.4*   BILITOTAL 2.5* 2.3* 2.4*   ALKPHOS 397* 454* 442*   AST 63* 64* 63*   ALT 74* 76* 81*

## 2018-04-13 NOTE — PROGRESS NOTES
Oncologic Surgery Progress Note  Naresh Poole  [id number]    Subjective  No acute overnight events. Hgb improving after transfusion. Drain output less bloody. Continues to have bloody BMs.    Objective  Temp:  [95.6  F (35.3  C)-98.9  F (37.2  C)] 96.5  F (35.8  C)  Pulse:  [73-88] 73  Heart Rate:  [64-90] 69  Resp:  [0-24] 14  BP: ()/() 115/88  MAP:  [12 mmHg-16 mmHg] 16 mmHg  FiO2 (%):  [40 %] 40 %  SpO2:  [93 %-100 %] 98 %    Intake/Output Summary (Last 24 hours) at 04/13/18 0733  Last data filed at 04/13/18 0539   Gross per 24 hour   Intake          3856.42 ml   Output              640 ml   Net          3216.42 ml     Physical Exam:  Gen: NAD, resting comfortably  Chest: RRR, non-labored breathing on RA  Abd: soft, minor distention, non tender    Results:  BMP  Recent Labs  Lab 04/13/18  0545 04/12/18  1330 04/12/18  0522 04/11/18  0705 04/10/18  1623    134 144 143 144   POTASSIUM 3.8 3.9 3.8 3.4 3.3*   CHLORIDE 101  --  108 108 108   CO2 28  --  27 30 27   BUN 28  --  26 31* 36*   CR 0.61*  --  0.56* 0.61* 0.74   * 154* 64* 158* 114*     CBC  Recent Labs  Lab 04/13/18  0545 04/13/18  0500 04/13/18  0010 04/12/18  1834  04/12/18  0503  04/11/18  0705   WBC  --  13.3* 8.9  --   --  9.8  --  8.6   HGB Duplicate request 8.3* 8.1* 7.2*  < > 7.5*  < > 7.5*   PLT  --  59* 38*  --   --  60*  --  61*   < > = values in this interval not displayed.  LFT  Recent Labs  Lab 04/13/18  0545 04/12/18  0522 04/11/18  1048 04/10/18  1623   AST 63* 64*  --  63*   ALT 74* 76*  --  81*   ALKPHOS 397* 454*  --  442*   BILITOTAL 2.5* 2.3*  --  2.4*   ALBUMIN 2.2* 2.2*  --  2.4*   INR 1.91* 1.98* 2.10*  --        Recent Labs  Lab 04/13/18  0545 04/13/18  0410 04/13/18  0052 04/12/18  2059 04/12/18  1758 04/12/18  1338 04/12/18  1330 04/12/18  1010  04/12/18  0522  04/11/18  0705  04/10/18  1623   *  --   --   --   --   --  154*  --   --  64*  --  158*  --  114*   BGM  --  129* 123* 114* 126* 139*   --  85  < >  --   < >  --   < >  --    < > = values in this interval not displayed.    Assessment & Plan  47 year old man with history of whipple in Jan 2017 with slow decline in overall health, worsening hepatic function, and ongoing hepatic infection now here with bleeding apparently from inside his liver.  Unfortunately there is absolutely no surgical option for this patient.  His surgical risk is prohibitive.      - Improving clinically  - Recommend correction of coagulopathy  - Transfuse PRBC as needed    Jey Mccauley MD   Surgery PGY-2

## 2018-04-13 NOTE — PLAN OF CARE
Problem: Patient Care Overview  Goal: Plan of Care/Patient Progress Review  Outcome: No Change  /88  Pulse 88  Temp 97.2  F (36.2  C) (Oral)  Resp 8  Ht 1.829 m (6')  Wt 64.1 kg (141 lb 4.8 oz)  SpO2 98%  BMI 19.16 kg/m2    Patient taken to endoscopy at 1030 this morning, returned at 1800.  AOx4 this morning, VSS, on room air.  On scheduled methadone and PRN Dilaudid for abdominal pain.  Biliary drain in place with brown/red drainage.  Voiding per urinal, incontinent of stool x 1, stool bloody and loose.  Most recent Hgb 7.2, received 1 unit for Hgb 6.8 in PACU, will need another unit tonight.  Currently has FFP infusing, still needs 1 unit platelets, vitamin K given.  On Protonix and Octreotide gtts, D5 infusing at 125 mL/hr for hypoglycemia this morning, blood glucose stable since starting D5 infusion. Potassium, magnesium and phosphorus replaced, recheck with morning labs.  Wife at bedside, updated on plan of care.

## 2018-04-13 NOTE — PROVIDER NOTIFICATION
Time of notification: 1:00 AM  Provider notified: Danielle Farias MD  Patient status: Platelets=38.    Temp:  [95.6  F (35.3  C)-98.9  F (37.2  C)] 96.5  F (35.8  C)  Pulse:  [73-88] 73  Heart Rate:  [64-90] 69  Resp:  [0-24] 14  BP: ()/() 115/88  MAP:  [12 mmHg-16 mmHg] 16 mmHg  FiO2 (%):  [40 %] 40 %  SpO2:  [93 %-100 %] 98 %  Orders received: Transfuse 1 unit of platelets and recheck CBC with AM labs.

## 2018-04-13 NOTE — PROVIDER NOTIFICATION
Dr. Farias and Dr. Carvajal notified that patient received Narcan and flumazenil and is now awake but not talking and has new tardive dyskinesia like movements, rapid response called.  Will come see patient.

## 2018-04-13 NOTE — PROGRESS NOTES
Social Work Services Progress Note    Hospital Day: 4  Date of Initial Social Work Evaluation:  4/11/18  Collaborated with:  Chart review    Data:  Pt has a bili drain in place and a Protonix drip.  It is likely that pt will require rehab placement upon discharge from acute hospitalization.  Physical and Occupational Therapy are not currently following.  In the event that rehab placement is needed, [facility] in [place] would be the preferred location.      Intervention:  SW reviewed progress notes.    Assessment:  Pt is not medically ready for discharge.    Plan:    Anticipated Disposition:  Rehab placement is likely    Barriers to d/c plan:  Protonix drip and bili drain.    Follow Up:  SW will continue to follow.    MORGAN Jackson  Social Work, 6A  Phone:  [phone]  Pager:  [phone]  4/13/2018

## 2018-04-13 NOTE — CONSULTS
Patient is on IR schedule 4/13/2018 for a biliary tube change and biliary stent placement.   Labs WNL for procedure.   Orders for NPO have been entered.  Consent will be done prior to procedure.     Please contact the IR charge RN at [phone] for estimated time of procedure.     Case discussed with Dr. Cisneros from IR, Dr. Rutledge, Dr. Mederos and Dr. Roy.    Adrianne Dale, JOSEFINA, APRN  Interventional Radiology  Phone: [phone]  Pager: [phone]

## 2018-04-13 NOTE — PROGRESS NOTES
Procedure Note  Procedure: Paracentesis  Physician(s): Madonna Roy DO  Indication: Diagnostic paracentesis  Anesthesia: 1% Lidocaine    ? Ultrasound guidance was used to locate and alfonzo an ascitic pocket    A time-out was completed, verifying correct patient, procedure, site, positioning, and implant(s) or special equipment if applicable. Ultrasound was used to locate the site of needle insertion.  Site marked: Right lateral lower abdoman.  Patient was positioned, prepped, and draped in the usual sterile fashion.  1% Lidocaine was used to anesthetize the area. A standard paracentesis kit needle was introduced into the peritoneal space and 3 ml clear yellow fluid was removed and sent for cell counts, gram stain, bacterial culture, cytology, and albumin levels.  Complications: None  Blood loss: None

## 2018-04-13 NOTE — PROGRESS NOTES
CLINICAL NUTRITION SERVICES - REASSESSMENT NOTE     Nutrition Prescription    RECOMMENDATIONS FOR MDs/PROVIDERS TO ORDER:  A} RD to recommend:   1. Begin TF with Peptamen 1.5 @ 10 ml/hr and adv by 10ml Q8 ( ONLY advance if K+/mg++ WNL and Phos >1.9) to goal @ 65 ml/hr.       - Peptamen 1.5 @ goal 65 ml/hr (1560 ml/day) to provide 2340 kcals (36 kcal/kg/day), 106 g PRO (1.7 g/kg/day), 1201 ml free H2O, 87 g Fat (70% from MCTs), 293 g CHO and no Fiber daily.      2. Monitor K+/Mg++/Phos daily with TF start and advancement to goal infusion to evaluate for refeeding risk, replace per protocol       3. Order free water flushes 30 ml every 4 hours for tube patency.      4. Recommend Certavite (15 ml/day via FT) to ensure adequate micronutrient needs being met.    6. Order H2O flushes of 30 mL q 4 hrs (180 mL + TF = 1381 mL/day) for tube patency.  Note this does not meet pt's full hydration needs. (If TF to meet full hydration needs flush with 110 mL q3hrs or 70 mL q2hrs pending tolerance).      B} Once begin TFs, begin the following pancreatic enzyme regimen and recommend order each of the following:   (please copy and paste administration instructions into each order)  A) Sodium Bicarb tablet (325 mg), 1 tablet q 4 hrs via Jtube. Administration Instructions: Crush 1 tablet and mix into 15 ml of warm water and use this solution to mix with Creon pancreatic enzymes. DO NOT administer directly into Jtube (to be mixed into TF formula with Creon enzyme - see Creon enzyme order)   B) Creon 24, 1- 2 capsules q 4 hrs via Jtube. Administration Instructions:   --If TF rate is running @ 10-30 ml/hr, administer 1 capsule q 4 hrs;   --If TF rate is running @ 40-65 ml/hr, increase to 2 capsules q 4 hrs.    **Open capsule and empty contents into 15 ml sodium bicarb solution (see sodium bicarb order), let dissolve for about 20-30 minutes and then add this solution to the amount of TF formula hung in TF bag every 4 hrs (i.e., once TF @  goal infusion 65 ml/hr will mix 2 capsules into 260 ml of TF formula every 4 hrs).   *Note: this enzyme regimen with TF @ goal infusion will provide approx 3310 units of lipase/gram of total Fat daily and approp dosing initially for pancreatic insufficiency with more elemental TF formula.     Malnutrition Status:    Severe malnutrition in the context of chronic illness.     Recommendations already ordered by Registered Dietitian (RD):  None at this time    Future/Additional Recommendations:  Monitor for pts GOC meeting.      Consult: Registered Dietitian to Assess and Recommend TF. Provider is responsible for entering the TF orders. - Karen Coronado MD    EVALUATION OF THE PROGRESS TOWARD GOALS   Diet: NPO  Nutrition Support: none at this time  Supplements: boost breeze and gelatein b/t meals  Intake: Pt has been mainly NPO since admit with one meal where pt consumed 50%.      NEW FINDINGS   Weight: ~6-7% wt loss in the past 2 weeks  Labs: Cr: .61 (L), elevated LFTs, fecal elastase on 3/16/18: < 15 (L)  Meds: oral Creon 24, 2 capsules TID, ANGÉLICA, Vitamin D   GI: BM+ noted, Incontinent x2 of large bloody BMs with clots   Skin: Pressure Injury: on sacral , present on admission, Pressure Injury is Stage Unstageable     Dosing Weight: 64 kg (actual) - based on lowest documented wt so far this adm (64.1 kg on 4/11) and IBW of 80.9 kg.     REASSESSED NUTRITION NEEDS  Estimated Energy Needs: 2240 - 2560 kcals/day (35  - 40 kcals/kg )  Justification: Increased needs and Underweight, higher end for malabsorption / wt restoration   Estimated Protein Needs: 96 - 128  grams protein/day (1.5 - 2 grams of pro/kg)  Justification: Hypercatabolism with acute illness and Repletion  Estimated Fluid Needs:(1 mL/kcal)   Justification: Maintenance + GI losses    MALNUTRITION  % Intake: Decreased intake does not meet criteria  % Weight Loss: > 10% in 6 months (severe)  Subcutaneous Fat Loss: Facial region and Upper arm:   Severe  Muscle Loss: Temporal, Facial & jaw region, Thoracic region (clavicle, acromium bone, deltoid, trapezius, pectoral), Upper arm (bicep, tricep), Lower arm  (forearm) and Dorsal hand:  Severe  Fluid Accumulation/Edema: None noted  Malnutrition Diagnosis: Severe malnutrition in the context of chronic illness.     Previous Goals   1. Patient to consume % of nutritionally adequate meal trays 5-6 snacks/meals per day.- not met  2. Pt weight to not fall below 64 kg (141 lbs)- met  Evaluation: Not met    Previous Nutrition Diagnosis  Inadequate protein-energy intake related to decreased PO intake and early satiety r/t ascites as evidenced by pt report of only able to eat 5-6 small snacks per day and pt with wt loss 10 lbs (~7%) in about a week and overall wt down 20 lbs (12%) in 4 months.   Evaluation: No change    CURRENT NUTRITION DIAGNOSIS  Inadequate protein-energy intake related to decreased PO intake and early satiety r/t ascites as evidenced by pt report of only able to eat 5-6 small snacks per day and pt with wt loss 10 lbs (~7%) in about a week and overall wt down 20 lbs (12%) in 4 months.     INTERVENTIONS  Implementation  Collaboration with other providers    Goals  1. Patient to consume % of nutritionally adequate meal trays 5-6 snacks/meals per day.  2. Pt weight to not fall below 64 kg (141 lbs)    Monitoring/Evaluation  Progress toward goals will be monitored and evaluated per protocol.    Cailin Cam, RD, MS, LD  6B- Pager: [phone]

## 2018-04-13 NOTE — PROVIDER NOTIFICATION
Dr. Farias, [facility]oon cross-cover, notified that patient received a dose of flumazenil around 1900 for RR 4.  Patient's RR is still only 4-5 while asleep and awake.  No other change in VS.  Will speak with attending and come see patient.      1945: Dr. Farias and Alena at bedside to assess patient, order received to give 0.4mg Narcan and 0.5mg flumazenil.

## 2018-04-13 NOTE — PROVIDER NOTIFICATION
Dr. Stock, [facility], notified that patient's RR is 4 when he falls asleep, arouses easily, no other change in VS.  Patient received 1 dose of flumazenil in PACU.  Will place order for another dose.

## 2018-04-13 NOTE — CONSULTS
"[facility] [place]  Palliative Care Consultation Note    Patient: Naresh Poole  Date of Admission:  4/10/2018    Requesting provider/team: Aleksey Bradley  Reason for consult:  Goals of care     Patient and family support    Recommendations:  -Continue the excellent care and support of Sg and his family during the difficult time. Would continue conversations regarding benefit and burden of treatment, as well as framing conversations around Sg now being in a different place (i.e. sicker and weaker, without chance of meaningful recovery).       Thank you for the opportunity to participate in the care of this patient and family. Our team will follow. Please feel free to contact the on-call Palliative provider with any urgent needs.     KASHIF Mcghee CNP  Palliative Care Consult Team  Pager: [phone]    [facility] Inpatient Team Consult pager [phone] (M-F 8-4:30)  After-hours Answering Service [phone]   Palliative Clinic: [phone]     120 minutes spent caring for patient, with >50% counseling and in care coordination.  Discussing goals for treatment with patient's wife and him from [phone]    Assessment:  Naresh Poole is a 47 year old male with a past medical history of pancreatic cancer s/p whipple (5/2017) and chemotherapy, now with GI bleed and limited treatment options due to frailty.    Extensive goals of care discussions today. Sg remains committed to \"continue to fight\" and Yumi was expressing concerns that he is suffering for \"no reason,\" as she sees him rapidly declining despite attempts to make him better. We explored those feelings and discussed the impact on the entire family.     Social:         Living situation: Lives at home with wife and daughters (5, 6, 12)       Support system: lots of family and  colleagues        Functional status: Declining. Admitted for failure to thrive and wife struggling to care for patient at home. " "Patient has had many recent falls.        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 Last chemotherapy was Nov 2017. Admitted in March 2017 with bacteremia and liver abscesses. Percutaneous biliary tube was placed during that hospitalizations and biopsies at that time showed no recurrence of disease. Now admitted with bleeding out of biliary tube. Was originally given 6 month prognosis.    Advance Care Planning:               Decision making capacity: patient does not have capacity to make decisions alone at this point, but is able to participate in discussions.        Goals of Care: patient wishes to proceed with any procedure that might prolong his life, his wife wishes to focus more on comfort, as she sees him declining despite restorative efforts and does not want him to suffer anymore.         Health care directive: on file        Health care agent: Yumi Poole (spouse)       Code Status:  DNR / DNI       no POLST (Physician orders for life-sustaining treatment) form on file     History of Present Illness   Sources of History: patient, electronic health record and patient's spouse    Naresh CarbajalSgLea Poole is a 47 year old male with history of pancreatic cancer diagnosed in January 2017 and now s/p chemotherapy and whipple procedure in 5/2017 previously followed at [facility]. Oncology note from 4/2/18 has excellent outline of cancer history. In brief, patient had 2 months of FOLFIRINOX after diagnosis, whipple procedure 5/2017, at [facility] in October 2017. He has not had chemotherapy since November of 2017, and although he has had concerning symptoms of liver failure that would indicate recurrence, no recurrence of malignancy was ever found.    He was " admitted on 3/14/18 for bacteremia, liver abscess, SBP, and worsening symptoms of chronic portal vein thrombosis and biliary strictures. A percutaneous biliary train was placed following an unsuccessful ERCP.  Biopsies taken at that time remained negative for malignancy. He was discharged with continuing IV antibiotics. He was seen in by [facility] oncology on 4/2 for a second opinion of additional options however it was deemed he was not a candidate for further chemotherapy given no confirmed recurrence and his overall debilitation.    He was then readmitted on 4/10/18 for bloody drainage from biliary tube and overall failure to thrive at home. Wife noted multiple falls since discharge and increasing caretaker needs. During this admission the bleeding from the biliary tube was found to be from liver. Upper endoscopy on 4/12 found hemobilia from biliary drain. Bleeding continued on 04/16 after IR placement of stent within previous stent and replacement of biliary drain. He underwent ERCP on 04/17 and was noted to have slow bleeding from pigtail side holes of internal external drain and cholangiography suggested bleeding source is from biliary system.     Overnight required continuous 100% bipap. Currently declining to wear bipap mask, and on HFNC. Thoracentesis and ERCP was cancelled today due to respiratory status.     ROS:  Palliative Symptom Review (0=no symptom/no concern, 1=mild, 2=moderate, 3=severe):  Patient declined to complete ROS       Past Medical History:   Past Medical History:   Diagnosis Date     Hypertension      Pancreatic cancer (H) 01/2017          Past Surgical History:   Past Surgical History:   Procedure Laterality Date     ENTEROSCOPY WITH OVERTUBE N/A 3/19/2018    Procedure: ENTEROSCOPY WITH OVERTUBE;  enteroscopy with biopsies;  Surgeon: Jared Cooney MD;  Location: [facility] OR     ESOPHAGOSCOPY, GASTROSCOPY, DUODENOSCOPY (EGD), COMBINED N/A 4/12/2018    Procedure: COMBINED ESOPHAGOSCOPY,  GASTROSCOPY, DUODENOSCOPY (EGD);  EGD  ;  Surgeon: Elroy Haro MD;  Location: [facility] GI     LAPAROSCOPY DIAGNOSTIC (GENERAL)  09/2017     PICC INSERTION Left 03/22/2018    4Fr - 50cm (2cm external), L medial brachial vein, SVC RA junction     PICC INSERTION Left 03/29/2018    4Fr - 45cm (3cm external), L medial brachial vein     WHIPPLE PROCEDURE  05/2017             Family History:   Family History   Problem Relation Age of Onset     Coronary Artery Disease Early Onset Maternal Grandmother 36     Liver Disease No family hx of      Colon Cancer No family hx of           Allergies:   No Known Allergies         Medications:   I have reviewed this patient's medication profile and medications given in the past 24 hours.         Physical Exam:   Vital Signs: Temp: 96.5  F (35.8  C) Temp src: Axillary BP: 107/87 Pulse: 73 Heart Rate: 68 Resp: 11 SpO2: 93 % O2 Device: None (Room air) Oxygen Delivery: 3 LPM  Weight: 141 lbs 4.8 oz    Constitutional: Severely Cachectic male, seen in hospital bed. Frail appearing, but in no acute distress.  Head: Temporal wasting. Atraumatic. Face symmetrical. EOMI. MMM.   Extremities: Warm and well perfused.   Pulm: Slightly-labored breathing, on HFNC.   Musculoskeletal: PULIDO.   Skin: No concerning rashes or lesions on exposed areas.   Neuropsych: Alert. Intermittently confused.      Data reviewed:     CMP  Recent Labs  Lab 04/20/18  0458 04/19/18  1719 04/19/18  0503 04/18/18  0624 04/17/18  1553 04/17/18  0350   *  --  142 140 134 139   POTASSIUM 3.0*  --  3.5 4.0 5.1 3.6   CHLORIDE 110*  --  108 106 98 101   CO2 27  --  25 25 24 25   ANIONGAP 8  --  8 9 13 12   *  --  169* 44* 694* 278*   BUN 41*  --  42* 42* 39* 36*   CR 0.58*  --  0.57* 0.56* 0.62* 0.60*   GFRESTIMATED >90  --  >90 >90 >90 >90   GFRESTBLACK >90  --  >90 >90 >90 >90   CHIDI 6.8*  --  6.5* 7.3* 7.4* 7.9*   MAG 2.1 1.8 1.6 1.9 2.4* 2.3   PHOS 2.7  --  2.3* 2.2* 4.7* 3.1   PROTTOTAL 4.5*  --  4.1* 4.9*  --   4.2*   ALBUMIN 2.2*  --  2.1* 2.7*  --  2.4*   BILITOTAL 2.5*  --  2.5* 3.5*  --  3.3*   ALKPHOS 274*  --  278* 358*  --  349*   *  --  162* 148*  --  74*   *  --  106* 94*  --  74*     CBC  Recent Labs  Lab 04/20/18  1150 04/20/18  0749 04/20/18  0458 04/19/18  2203   WBC 12.0* 12.5* 12.5* 11.2*   RBC 2.62* 2.76* 2.76* 2.80*   HGB 7.7* 8.2* 8.2* 8.4*   HCT 22.8* 24.0* 24.5* 24.8*   MCV 87 87 89 89   MCH 29.4 29.7 29.7 30.0   MCHC 33.8 34.2 33.5 33.9   RDW 15.7* 15.6* 15.9* 15.8*   PLT 53* 52* 62* 54*     INR  Recent Labs  Lab 04/20/18  0458 04/19/18  2202 04/19/18  1720 04/19/18  0503   INR 1.98* 1.98* 1.94* 2.16*     Arterial Blood Gas  Recent Labs  Lab 04/20/18  1259 04/19/18  1952 04/19/18  0915   PH 7.51* 7.37 7.43   PCO2 36 47* 39   PO2 74* 56* 66*   HCO3 29* 27 26   O2PER 80.0 100 9L

## 2018-04-13 NOTE — PROGRESS NOTES
Aleksey Cross-Cover Note    Was notified ~7pm that patient's RR was 4-5 both when sleep and awake. Otherwise, vitals stable. Discuss with the team and went to see patient at bedside. Ordered 0.4mg Narcan and 0.5mg flumazenil. Got total of 1.5 mg flumazenil and 0.4 mg of narcan.    Soon after he got both narcan and flumazenil. He was awake but not talking. He turned his head mostly to the left side. Had unpurposed movement of his arms. RRT was called. Aleksey team was at bedside. Vitals stable. He was oriented x3 and able to follow command. Exam noted pupil 4 mm, equal and reactive to light bilaterally, no cogwheel rigidity, no focal deficit. After observed his symptoms ~15-20 min, he started to return back to normal. Will continue to monitor.    Danielle Farias MD  PGY-1 Internal Medicine  Pager [phone]

## 2018-04-13 NOTE — PROGRESS NOTES
Patient Name: Naresh Poole  Medical Record Number: [id number]  Today's Date: 4/13/2018    Procedure: Replacement of itermal/external biliary drain and deployment of a Barrington Viabil  Biliary endoprosthesis.  Proceduralist:  Dr Tinajero & Dr ISHAAN Cisneros    Sedation medications administered: None    Procedure start time: 1445  Puncture time: No puncture  Procedure end time: 1545    Report given to: FERN OQUENDO  : No    Other Notes: Pt arrived to IR room 1 from .  Consent obtained by Dr Tinajero with all patient questions answered. Pt positioned supine, prepped, and monitored per protocol.  Barrington Viabil biliary endoprosthesis, SN # [id number], deployed by Dr Tinajero.  Pt tolerated procedure well without any sedation or any noted complications. Pt transferred back to .

## 2018-04-13 NOTE — PROGRESS NOTES
PROGRESS NOTE    04/12/18    Naresh Poole ([id number]) admitted on 4/10/2018    Changes  -D5 125cc/hr   -NPO tonight  -CT angiogram   -EGD today  -Transfusions today: 2U RBCs, 1U FFP, 1U platelets  -Vitamin K 10mg  -d/c vancomycin - start linezolid for VRE bactetremia  -Code status verified with patient and spouse; confirmed DNR/DNI. Nightfloat updated    Assessment & Plan:  Naresh Poole is a 47 year old male with a PMHx of pancreatic cancer (T3N1) s/p whipple 5/2017 along with FOLFIRINOX and Gemzar/Xeloda 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 who is presenting for FTT and as well as c/f bloody biliary drain output     #GI bleed  #Acute blood loss anemia  #Hemobilia in setting of percutaneous biliary drain  #Elevated INR   Noted to have maroon liquid stool; patient states he has had bloody stools since discharge but had not reported this previously. Noted to have down trending hgb despite transfusions. EGD 04/12 with evidence of bleeding from hepato-jejunostomy site. Case discussed with IR due to concern for hepatic source; CT angiogram ordered STAT. Patient with high risk of permanent liver injury if embolization required. At this point will manage conservatively overnight and contact IR if evidence of ongoing bleeding. Discussed with family.    -Somnolent post procedure; required flumazenil --> end tidal CO2 ordered  -Hgb Q4hrs overnight  -PIVx2 + PICC  -GI following; appreciate recs   -NPO   -CT angiogram today --> no evidence of active bleeding  -EGD today  -Transfusions today: 2U RBCs, 1U FFP, 1U platelets  -Vitamin K 10mg  -Octreotide gtt  -PPI gtt  -Low threshold for transfer to higher level of care    #VRE bacteremia  #Recent cholangitis and polymicrobial bacteremia   #Hx enterococcal liver abscess  #Recent candidemia  Discharged (03/14-04/02) after admission for polymicrobial bacteremia (E coli, C perfringens, Citrobacter, and enterococcus) and candidemia due to  Speech Therapy:    Orders received s/p right crani with evac. Pt remains intubated at this time. Orders and assessment placed on HOLD. New orders required once pt extubated and appropriate.    cholangitis and liver abscess. Discharged on vancomycin, fluconazole, ertapenem with plan for ID follow up at 4 weeks. Presenting with evidence of hemobilia and GI bleed (see above). GI consulted.   -BC 04/10 - growing VRE  -Cont  ertapenem and fluconazole.  -D/c vancomycin - start linezolid (04/12-->)  -ID consult placed     #FTT  #Severe Protein/caloric malnutrition  Patient cachectic in setting of multiple complications following whipple for pancreatic Ca. Per chart, no evidence of recurrance on recent imaging. Appears to have lost 5-10 lbs in past week (?). Poor PO intake per report, though patient reports feeling hungry. Will need nutrition eval, PT and OT once acute issues stable (see above). Likely need TCU at discharge due to failure at home and increased needs exceeding family ability to care for him.   -PT, OT and nutrition once acute issues resolved.  -Briefly discussed TFs as poor PO intake -- ongoing discussion as currently NPO     #Hx Pancreatic Cancer (T3N1) s/p Whipple (09/2017)  #Pancreatic insufficiency - chronic diarrhea  #Iatrogenic diabetes 2/2 panceratectomy  Initially diagnosed in 1/2017. He underwent EUS/ERCP with stent placement. Underwent chemotherapy with FOLFIRINOX from 1/2017 to 3/2017; noted to have a liver lesion on imaging in 3/2017 and underwent biopsy which showed abscess/necrosis but no evidence of cancer. He underwent whipple in 5/2017 and repeat liver biopsy intraoperatively demonstrated necrotic areas and inflammations. He then had chemotherapy with gemcitabine and capecitabine between 10/2017 to 11/2017. Developed liver abscess and complicated by enterococcus bacteremia and completed antibiotics in 11/2017. Last seen in oncology clinic 04/02; no longer candidate for further chemo.   -Hold Creon and vit D while NPO  -MDISS  - hypoglycemia orderset  -cont PTA dilaudid and methadone      #HTN  Holding BP meds in setting of GI bleed      #Depression  Cont sertraline     FEN:CLD  -NPO MN  Prophylaxis: mechanical  Disposition: Pending management of blood loss; TCU likely at discharge  Code Status: DNR/DNI     Patient was seen and plan of care discussed with MD Karen Reid MD   Internal Medicine PGY3    Subjective:  Feels sleepy after procedure, denies pain    Objective:  Most recent vital signs:  /88  Pulse 88  Temp 96.2  F (35.7  C) (Tympanic)  Resp (!) 4  Ht 1.829 m (6')  Wt 64.1 kg (141 lb 4.8 oz)  SpO2 98%  BMI 19.16 kg/m2  Temp:  [96  F (35.6  C)-98.9  F (37.2  C)] 96.2  F (35.7  C)  Pulse:  [88] 88  Heart Rate:  [64-90] 68  Resp:  [0-16] 4  BP: ()/(66-96) 116/88  MAP:  [12 mmHg-16 mmHg] 16 mmHg  FiO2 (%):  [40 %] 40 %  SpO2:  [93 %-100 %] 98 %  Wt Readings from Last 2 Encounters:   04/11/18 64.1 kg (141 lb 4.8 oz)   04/02/18 68.6 kg (151 lb 3 oz)       Intake/Output Summary (Last 24 hours) at 04/12/18 1907  Last data filed at 04/12/18 1830   Gross per 24 hour   Intake             3593 ml   Output             1270 ml   Net             2323 ml       Physical exam:  General: Cachectic, Patient lying comfortably in bed, NAD  HEENT: no scleral icterus or injection, MMM  Cardiac: RRR, no m/r/g appreciated.   Respiratory: CTAB, no wheezes, rhonchi or crackles appreciated.  GI:RUQ drain with red/brown fluid. Mild discomfort to palpation  Extremities: No LE edema  Skin: No acute lesions appreciated  Neuro: AOx3,moving all extremities. Strength 3-4/5 bilateral LEs.       Labs (Past three days):  CBC  Recent Labs  Lab 04/12/18  1330 04/12/18  0503 04/11/18  2330 04/11/18  1445  04/11/18  0705  04/10/18  1623   WBC  --  9.8  --   --   --  8.6  --  11.0   RBC  --  2.73*  --   --   --  2.75*  --  2.92*   HGB 6.8* 7.5* 7.5* 7.0*  < > 7.5*  < > 8.0*   HCT  --  22.9*  --   --   --  23.4*  --  24.5*   MCV  --  84  --   --   --  85  --  84   MCH  --  27.5  --   --   --  27.3  --  27.4   MCHC  --  32.8  --   --   --  32.1  --  32.7   RDW  --  17.3*  --   --   --   18.3*  --  18.4*   PLT  --  60*  --   --   --  61*  --  82*   < > = values in this interval not displayed.  BMP  Recent Labs  Lab 04/12/18  1330 04/12/18  0522 04/11/18  0705 04/10/18  1623 04/07/18  1530    144 143 144  --    POTASSIUM 3.9 3.8 3.4 3.3*  --    CHLORIDE  --  108 108 108  --    CO2  --  27 30 27  --    ANIONGAP  --  9 5 10  --    * 64* 158* 114*  --    BUN  --  26 31* 36*  --    CR  --  0.56* 0.61* 0.74 0.66   GFRESTIMATED  --  >90 >90 >90 >90   GFRESTBLACK  --  >90 >90 >90 >90   CHIDI  --  6.4* 6.4* 6.8*  --    MAG  --  1.7 1.6  --   --    PHOS  --  2.7 2.1*  --   --      Troponins:     INR  Recent Labs  Lab 04/12/18  0522 04/11/18  1048   INR 1.98* 2.10*     Liver panel  Recent Labs  Lab 04/12/18  0522 04/10/18  1623   PROTTOTAL 4.9* 5.5*   ALBUMIN 2.2* 2.4*   BILITOTAL 2.3* 2.4*   ALKPHOS 454* 442*   AST 64* 63*   ALT 76* 81*       Imaging/procedure results:  CTA        IMPRESSION:  1. No active extravasation source demonstrated.     2. Narrowed and irregular appearance of the replaced hepatic artery  from the superior mesenteric artery may suggest arteriospasm but it is  not thought to be significantly different from 3/31/2018.     3. New small bubbles of free intraperitoneal air of uncertain  significance. Air may have arisen around the internal/external biliary  drain from the pneumobilia. Infected ascites cannot be excluded.     4. Large right pleural effusion with worsening right lower lobe  consolidation. Persistent small right pneumothorax. New right chest  wall subcutaneous emphysema.     5. Given patient's compromised hepatic arterial and portal venous  supplies, any hepatic embolization would worsen the patient's hepatic  function and could possibly lead to complete hepatic failure.

## 2018-04-13 NOTE — PLAN OF CARE
Problem: Patient Care Overview  Goal: Plan of Care/Patient Progress Review  Outcome: Declining  /86 (BP Location: Right arm)  Pulse 88  Temp 95.9  F (35.5  C) (Tympanic)  Resp 12  Ht 1.829 m (6')  Wt 64.1 kg (141 lb 4.8 oz)  SpO2 96%  BMI 19.16 kg/m2  Neuro: Lethargic, disoriented to place and situation. Some tardive dyskinesia like movements noted, but much less exaggerated than previously.  Cardiac: SR. VSS.       Respiratory: Sating >92% on RA.  GI/: Large dark red BM, MD notified.   Diet/appetite: NPO.  Activity:  Assist of 2, repositioned q2hrs.  Pain: Pt c/o abdominal and back pain. Heat packs in place.     Skin: No new deficits noted.   Octreatide gtt and protonix gtt infusing. D5 infusing. 1 unit platelets and 1 unit RBCs given. Hgb recheck due when blood products are done infusing.     R: Continue with POC. Notify primary team with changes.

## 2018-04-13 NOTE — PROGRESS NOTES
[facility]    Pancreaticobiliary Service Follow-up Note    CC: Hematochezia    Dx: Hemobilia    24 hour events: Assumed care from the luminal service overnight.  Ongoing hematochezia overnight; last bloody bowel movement was 4:30 this AM.  Hemoglobin 8.3, drifted to 7.6 after 2 U.  CT angio without active bleeding, see IR note for complete details.  Surgical oncology called with no vibale surgical options at this time. Overnight with hypotension 70 systolic and hypoventilation - rapid response called and the patient was given narcan and flumazenil with improvement.  Made DNR/DNI.    Subjective:  Patient was not seen despite multiple visits to the bedside. Was in procedures at the time of my evaluation.      Medications  Current Facility-Administered Medications   Medication Dose Route Frequency     fluconazole  400 mg Intravenous Q24H     barium sulfate   Oral Once     DAPTOmycin (CUBICIN) intermittent infusion  6 mg/kg Intravenous Q24H     iopamidol  50 mL Tube Once     iodixanol  100 mL Tube Once     amylase-lipase-protease  2 capsule Oral TID w/meals     ascorbic acid  250 mg Oral Daily     vitamin D  50,000 Units Oral Q7 Days     miconazole   Topical BID     multivitamin CF formula  1 capsule Oral Daily     polyethylene glycol  17 g Oral Daily     ertapenem  1 g Intravenous Q24H       Review of systems  A 10-point review of systems was negative.    Examination  /86 (BP Location: Right arm)  Pulse 73  Temp 96.8  F (36  C) (Axillary)  Resp 12  Ht 6' (1.829 m)  Wt 141 lb 4.8 oz (64.1 kg)  SpO2 94%  BMI 19.16 kg/m2    Intake/Output Summary (Last 24 hours) at 04/13/18 1436  Last data filed at 04/13/18 1200   Gross per 24 hour   Intake          3971.42 ml   Output              735 ml   Net          3236.42 ml     Not examined as the patient was at multiple procedures today.      Laboratory  Lab Results   Component Value Date     04/13/2018    POTASSIUM 3.8 04/13/2018     CHLORIDE 101 04/13/2018    CO2 28 04/13/2018    BUN 28 04/13/2018    CR 0.61 04/13/2018       Lab Results   Component Value Date    BILITOTAL 2.5 04/13/2018    ALT 74 04/13/2018    AST 63 04/13/2018    ALKPHOS 397 04/13/2018       Lab Results   Component Value Date    WBC 10.7 04/13/2018    HGB 7.6 04/13/2018    MCV 84 04/13/2018    PLT 59 04/13/2018       Lab Results   Component Value Date    INR 1.91 04/13/2018       Radiology  CTA:  1. No active extravasation source demonstrated.     2. Narrowed and irregular appearance of the replaced hepatic artery from the superior mesenteric artery may suggest arteriospasm but it is not thought to be significantly different from 3/31/2018.     3. New small bubbles of free intraperitoneal air of uncertain  significance. Air may have arisen around the internal/external biliary drain from the pneumobilia. Infected ascites cannot be excluded.     4. Large right pleural effusion with worsening right lower lobe  consolidation. Persistent small right pneumothorax. New right chest wall subcutaneous emphysema.     5. Given patient's compromised hepatic arterial and portal venous supplies, any hepatic embolization would worsen the patient's hepatic function and could possibly lead to complete hepatic failure.    EGD:  Impression:    - Grade II esophageal varices. No active bleeding.                        - Normal stomach.                        - Widely patent pylorus preserving Whipple's,                        characterized by healthy appearing mucosa and an intact                        staple line was found.                        - Noted large blood clot at hepaticojejunostomy site,                        and oozing from hepaticojejunostomy. Finding suggest                        hemobilia, likely bleeding from hepatic abscess                        - Transhepatic stent noted through hepaticojejunostomy                        in place.                        - No specimens  collected.   Assessment  47 year old male with history of pancreatic cancer T3N0 status post neoadjuvant FOLFIRINOX, pylorus sparing whipple in 5/2017 complicated by intraoperative portal vein injury, subsequently with hepatic abscess and polymicrobial bacteremia/fungemia.  Recently admitted for cholangitis s/p attempted retrograde ERCP for management of biliary stricture though not successful, ultimately treated with PTC.     Now presents with hematochezia which started shortly after IR guided biliary procedure 3/29.  Found to have hemobilia on upper endoscopy.  Subsequent CTA negative for active bleeding.  Suspect bleeding from portal biliopathy/portalvarices in the setting of recent biliary stricture biopsy and brush.  Patient with known large esophageal varices and portal vein occlusion.  Case discussed extensively this AM with IR and multiple GI staff.  Recommend IR placement of fully covered metal stent to the CBD (viable 6 mm).  GI would be happy to assist with rendezvous procedure as needed.      Also with pneumoperitoneum.  Differential includes infected ascites vs air related to the biliary drain/manipulation.  Recommend diagnostic paracentesis.  If shows infection, would place peritoneal drain.      Recommendations  -IR to place fully covered biliary stent to tamponade bleeding.  Will call GI if assistance needed  -Diagnostic paracentesis.  Place peritoneal drain as indicated  -Aggressive correction of coagulopathy, transfuse to give INR less than 1.5, platelets greater than 50, hemoglobin greater than 7  -Continue with octreotide infusion; PPI BID  -Appreciate multiple input from multiple specialty services.      Plan discussed with IR, the patient's spouse and the primary team.      Case discussed with Dr. Mederos who is in agreement with plan of care.  Please page with ?    Lluvia Merino MD  Pancreaticobiliary Fellow  [phone]

## 2018-04-13 NOTE — CONSULTS
Surgical Oncology Consult     Naresh Poole MRN# [id number]   YOB: 1970 Age: 47 year old      Date of Admission:  4/10/2018        Reason for Consult:   GI bleeding         History of Present Illness:   Naresh Poole is a 47 year old man with a history of T3N0 pancreatic cancer /p neoadjuvant Folfirinox, and Whipple in 5/2017 complicated by intra-operative portal vein injury and post-operative infection/hepatic abscess.  Patient is known to the surgical oncology service.  He has had failure to thrive and hepatic dysfunction since his surgery.  He was recently admitted for several weeks with bacteremia from hepatic abscesses secondary to biliary obstruction eventually managed with PTC drain and broad antibiotic coverage.  His wife states that yesterday she noticed blood coming out of his PTC drain and later he began to have bleeding per rectum.  He presented to ED and was admitted to medicine service.  Today he had an EGD and clots were seen in the hepaticojejunostomy limb presumably coming from the biliary system.  CTA was done which did not show an active blush.  IR consulted and do not see a target and even if they did, there is significant concern for significantly exacerbating his liver dysfunction if any embolization were attempted.  Tonight he has continued to bleed and surgical oncology consulted to evaluate for possible surgical options.      Past Medical History:  Past Medical History:   Diagnosis Date     Hypertension      Pancreatic cancer (H) 01/2017       Past Surgical History:  Past Surgical History:   Procedure Laterality Date     ENTEROSCOPY WITH OVERTUBE N/A 3/19/2018    Procedure: ENTEROSCOPY WITH OVERTUBE;  enteroscopy with biopsies;  Surgeon: Jared Cooney MD;  Location: [facility] OR     LAPAROSCOPY DIAGNOSTIC (GENERAL)  09/2017     PICC INSERTION Left 03/22/2018    4Fr - 50cm (2cm external), L medial brachial vein, SVC RA junction     PICC  "INSERTION Left 03/29/2018    4Fr - 45cm (3cm external), L medial brachial vein     WHIPPLE PROCEDURE  05/2017       Allergies:   No Known Allergies    Medications:    No current facility-administered medications on file prior to encounter.   Current Outpatient Prescriptions on File Prior to Encounter:  furosemide (LASIX) 20 MG tablet Take 2 tablets (40 mg) by mouth daily   spironolactone (ALDACTONE) 50 MG tablet Take 2 tablets (100 mg) by mouth daily   Miconazole Nitrate (CRITIC-AID CLEAR AF) 2 % ointment Apply topically 2 times daily   gauze pads & dressings 4\"X4\" PADS 10 pads 4 times daily as needed   insulin pen needle 32G X 4 MM Use insulin pen needles daily or as directed.   blood glucose monitoring (NO BRAND SPECIFIED) test strip Use to test blood sugars 4 times daily or as directed.   blood glucose monitoring (ONE TOUCH DELICA) lancets Use to test blood sugars 4 times daily or as directed.   BD SHARPS CONTAINER HOME MISC 1 each every 30 days   insulin glargine (LANTUS) 100 UNIT/ML injection Inject 1-5 Units Subcutaneous At Bedtime   fluconazole (DIFLUCAN) 200 MG tablet Take 2 tablets (400 mg) by mouth daily for 28 days   sodium chloride, PF, 0.9% PF flush Irrigate with 10 mLs as directed every 24 hours   multivitamin CF formula (DEKAS PLUS) CAPS per capsule Take 1 capsule by mouth daily   pantoprazole (PROTONIX) 40 MG EC tablet Take 1 tablet (40 mg) by mouth 2 times daily   Ascorbic Acid 250 MG CHEW Take 250 mg by mouth daily   Ergocalciferol (DRISDOL) 55324 UNITS CAPS Take 50,000 Units by mouth every 7 days for 4 doses   amLODIPine (NORVASC) 5 MG tablet Take 1 tablet (5 mg) by mouth daily   ertapenem (INVANZ) 1 GM vial Inject 1 g into the vein every 24 hours for 28 days   ONDANSETRON PO Take 4-8 mg by mouth every 8 hours as needed for nausea   alum & mag hydroxide-simethicone (MYLANTA) 200-200-20 MG/5ML SUSP suspension Take 15 mLs by mouth 2 times daily as needed for indigestion   amylase-lipase-protease " (CREON 24) 33149-43725 UNITS CPEP per EC capsule Take 2 caps with meals and 1 cap with snacks.   methadone (DOLOPHINE) 5 MG tablet Take 5 mg by mouth 3 times daily   simethicone (GAS-X) 80 MG chewable tablet Take 80 mg by mouth every 6 hours as needed for flatulence or cramping   bisacodyl (DULCOLAX) 5 MG EC tablet Take 5 mg by mouth daily as needed for constipation   Alcohol Swabs PADS 4 each 4 times daily as needed   blood glucose monitoring (NO BRAND SPECIFIED) meter device kit Use to test blood sugar4 times daily or as directed.   naloxone (NARCAN) 0.4 MG/ML injection Inject 0.25-1 mLs (0.1-0.4 mg) into the vein once for 1 dose   polyethylene glycol (MIRALAX/GLYCOLAX) Packet Take 17 g by mouth daily as needed for constipation       Social History:  Social History     Social History     Marital status:      Spouse name: N/A     Number of children: N/A     Years of education: N/A     Occupational History     Not on file.     Social History Main Topics     Smoking status: Never Smoker     Smokeless tobacco: Never Used     Alcohol use No     Drug use: No     Sexual activity: Not on file     Other Topics Concern     Not on file     Social History Narrative       Family History:  Family History   Problem Relation Age of Onset     Coronary Artery Disease Early Onset Maternal Grandmother 36     Liver Disease No family hx of      Colon Cancer No family hx of        ROS:  The remainder of the complete ROS was negative unless noted in the HPI.    Exam:  /86 (BP Location: Right arm)  Pulse 88  Temp 95.9  F (35.5  C) (Tympanic)  Resp 12  Ht 1.829 m (6')  Wt 64.1 kg (141 lb 4.8 oz)  SpO2 96%  BMI 19.16 kg/m2  General: Frail appearing man laying in bed in NAD.  Quite drowsy,.    Resp: Non-labored breathing  Cardiac: regular rate and rhythm  Abdomen: soft and somewhat distended.  Some tenderness to palpation in center abdomen but no peritoneal signs.  PTC drain in place with blood and bile in the  bag    Labs:  Hgb: 7.2  INR: 1.98 this morning  Plt: 60    Imaging:  Reveiwed    Assessment/ Plan:  47 year old man with history of whipple in Jan 2017 with slow decline in overall health, worsening hepatic function, and ongoing hepatic infection now here with bleeding apparently from inside his liver.  Unfortunately there is absolutely no surgical option for this patient.  His surgical risk is prohibitive.      - Aggressive correction of coagulopathy  - Transfuse PRBC as needed  - We will follow along peripherally.     Discussed with Dr. Lizbeth Boggs  General Surgery PGY-3  [phone]

## 2018-04-13 NOTE — PROGRESS NOTES
[facility] SERVICE PROGRESS NOTE    Patient:  Naresh Poole, Date of birth 1970, Medical record number [id number]  Date of Visit:  04/13/2018         Assessment and Recommendations:   PROBLEM LIST:  1. Hepatic abscesses   2. Polymicrobial bacteremia from previous admission (3/14-4/2/2018) with E coli, Citrobacter, Clostridium, and Enterococcus faecalis   3. Candidemia with Candida albicans from blood culture on 3/21 on Fluconazole   4. Biliary drain placed on 3/21  5. Enterococcus Faecium (VRE) bacteremia 4/11, 4/12     RECOMMENDATION:  1) Discontinue Linezolid. Would not be a good option given his decline in plts over the last month (287 on 3/26)   2) Would start Daptomycin 6 mg/kg IV Q24. Will need weekly CPK monitoring while on therapy. If exceeds 10x normal level or having symptoms of myopathy then would need to discontinue   3) Please remove PICC line and do not replace until blood cultures are negative   4) Daily blood cultures until negative x 48 hours.     ASSESSMENT:  Mr. Poole is a 48 y/o man with a history of pancreatic cancer (T3/N1), s/p neoadjuvant chemotherapy FOLFIRINOX (1/2017-3/2017), s/p pylorus-preserving whipple on 5/2017, and adjuvant chemotherapy gemcitabin/capecitabin (10/2017-11/2017), complicated by enterococcal liver abscess, non malignancy ascites, and large esophageal varices (no h/o bleeding). He was recently admitted on 3/14-4/02 with polymicrobial bacteremia and candidemia 2/2 to liver abscess and ascending cholangitis. He was discharged on Vancomycin, Ertapenem, and Fluconazole. He presents with failure to thrived and found to have VRE faecium bacteremia. EGD done on 4/12 demonstrated bleeding at the hepaticojejunostomy concerning of hemobilia and bleeding from the hepatic abscess. He is planned to have a biliary tube change and biliary stent placement by IR today.     Infectious Disease will continue to follow with you.    Patient was discussed with   Frankie Dodd, Infectious Diseases Fellow   [phone]        Interval History:     Underwent an EGD yesterday. Had a prolonged stay in the PACU due to being unresponsive and requiring BiPAP. He was given Flumazenil x 2 and reacted badly to the second dose. Overnight, he remained mostly hypothermic with temperatures in the 96s. This morning he was awake, alert, and oriented. He was not in any significant pain or discomfort. He did have a bloody bowel movement this morning and did require transfusion with both plts and pRBC. No nausea or vomiting.     Review of Systems:  All other ROS negative          Current Medications & Allergies:       calcium gluconate  2 g Intravenous Once     linezolid  600 mg Intravenous Q12H     amylase-lipase-protease  2 capsule Oral TID w/meals     ascorbic acid  250 mg Oral Daily     vitamin D  50,000 Units Oral Q7 Days     miconazole   Topical BID     multivitamin CF formula  1 capsule Oral Daily     polyethylene glycol  17 g Oral Daily     fluconazole  400 mg Oral Daily     ertapenem  1 g Intravenous Q24H       Infusions/Drips:    D5W 125 mL/hr at 04/13/18 0639     octreotide (sandoSTATIN) infusion ADULT 50 mcg/hr (04/12/18 1936)     pantoprazole (PROTONIX) infusion ADULT/PEDS GREATER than or EQUAL to 45 kg 8 mg/hr (04/13/18 0445)     No Known Allergies         Physical Exam:   Vitals were reviewed.    Temp:  [95.6  F (35.3  C)-98.9  F (37.2  C)] 96.5  F (35.8  C)  Pulse:  [73-88] 73  Heart Rate:  [64-90] 68  Resp:  [0-24] 11  BP: ()/() 107/87  MAP:  [12 mmHg-16 mmHg] 16 mmHg  FiO2 (%):  [40 %] 40 %  SpO2:  [93 %-100 %] 93 %  Vitals:    04/10/18 1606 04/10/18 2119 04/11/18 1004   Weight: 68.6 kg (151 lb 3.8 oz) 68.5 kg (151 lb 1.6 oz) 64.1 kg (141 lb 4.8 oz)     Physical Examination:  GENERAL:  Chronically ill appearing, cachectic, awake, alert, oriented, no distress     HEENT:  Head is normocephalic, atraumatic   EYES:  Icteric sclerae    ENT:  Oropharynx is moist  without exudates or ulcers. Tongue is midline  NECK:  Supple. No  Cervical lymphadenopathy  LUNGS:  Clear to auscultation bilateral.   CARDIOVASCULAR:  Regular rate and rhythm with no murmurs, gallops or rubs.  ABDOMEN:  Biliary drain on the right side with blood in it, abdomen distended, non-tender, no significant rebound   SKIN:  PICC line in place on the left arm (clean, without erythema)   NEUROLOGIC: Grossly nonfocal. Active x4 extremities  +2 pitting edema in B/l ext         Laboratory Data:     Inflammatory Markers    Recent Labs   Lab Test  04/03/18   1705  03/14/18   1742  09/13/17   1810   SED  6   --    --    CRP  85.9*  160.0*  5.8     Metabolic Studies       Recent Labs   Lab Test  04/13/18   0545  04/12/18   1330  04/12/18   0522   03/26/18   0841   03/22/18   0658   09/13/17   1810   NA  136  134  144   < >   --    < >  138   < >   --    POTASSIUM  3.8  3.9  3.8   < >   --    < >  4.5   < >   --    CHLORIDE  101   --   108   < >   --    < >  108   < >   --    CO2  28   --   27   < >   --    < >  22   < >   --    ANIONGAP  6   --   9   < >   --    < >  8   < >   --    BUN  28   --   26   < >   --    < >  38*   < >   --    CR  0.61*   --   0.56*   < >   --    < >  1.22   < >  0.74   GFRESTIMATED  >90   --   >90   < >   --    < >  63   < >  >90   GLC  121*  154*  64*   < >   --    < >  129*   < >   --    A1C   --    --    --    --    --    --   9.7*   --    --    CHIDI  6.2*   --   6.4*   < >   --    < >  7.6*   < >   --    PHOS  3.0   --   2.7   < >   --    --    --    < >   --    MAG  1.6   --   1.7   < >   --    < >  2.2   < >   --    FGTL   --    --    --    --   47   --    --    --    --    CKT   --    --    --    --    --    --    --    --   55    < > = values in this interval not displayed.     Hepatic Studies    Recent Labs   Lab Test  04/13/18   0545  04/12/18   0522  04/10/18   1623   03/30/18   0707   03/27/18   0743   03/14/18   0228   BILITOTAL  2.5*  2.3*  2.4*   < >  3.3*   < >  2.3*   < >   6.0*   DBIL   --    --    --    --   1.1*   --   1.4*   < >   --    ALKPHOS  397*  454*  442*   < >  577*   < >  802*   < >  696*   PROTTOTAL  4.8*  4.9*  5.5*   < >  6.1*   < >  6.0*   < >  5.8*   ALBUMIN  2.2*  2.2*  2.4*   < >  3.6   < >  3.0*   < >  2.1*   AST  63*  64*  63*   < >  25   < >  19   < >  57*   ALT  74*  76*  81*   < >  43   < >  42   < >  92*   LDH   --    --    --    --    --    --   149   --   124    < > = values in this interval not displayed.     Hematology Studies      Recent Labs   Lab Test  04/13/18   0545  04/13/18   0500  04/13/18   0010   04/12/18   0503   04/11/18   0705   04/10/18   1623  04/03/18   1705   WBC  11.4*  13.3*  8.9   --   9.8   --   8.6   --   11.0  8.7   ANEU   --    --    --    --    --    --    --    --   9.6*  7.8   ALYM   --    --    --    --    --    --    --    --   0.9  0.5*   DI   --    --    --    --    --    --    --    --   0.3  0.4   AEOS   --    --    --    --    --    --    --    --   0.0  0.0   HGB  7.9*  Duplicate request  8.3*  8.1*   < >  7.5*   < >  7.5*   < >  8.0*  8.2*   HCT  23.9*  24.8*  24.2*   --   22.9*   --   23.4*   --   24.5*  25.9*   PLT  52*  59*  38*   --   60*   --   61*   --   82*  55*    < > = values in this interval not displayed.     Microbiology:    4/12: Blood culture: 1/2 VRE  4/13: Blood culture x 2: Pending           Culture Micro   Date Value Ref Range Status   04/11/2018 (A)   Preliminary     Cultured on the 1st day of incubation:  Gram positive cocci in pairs and chains   04/11/2018     Preliminary     Critical Value/Significant Value, preliminary result only, called to and read back by  JONAS HOUSTON RN 4/12/18 0258. JAIME   04/11/2018     Preliminary     (Note)  POSITIVE for VANCOMYCIN-RESISTANT ENTEROCOCCUS FAECIUM (VRE) - Ralph  gene POSITIVE by [employer] multiplex nucleic acid test. Final  identification and antimicrobial susceptibility testing will be  verified by standard methods.     Specimen tested with [employer]  multiplex, gram-positive blood culture  nucleic acid test for the following targets: Staph aureus, Staph  epidermidis, Staph lugdunensis, other Staph species, Enterococcus  faecalis, Enterococcus faecium, Streptococcus species, S. agalactiae,  S. anginosus grp., S. pneumoniae, S. pyogenes, Listeria sp., mecA  (methicillin resistance) and Ralph/B (vancomycin resistance).     Critical Value/Significant Value called to and read back by Coretta Leon RN from [facility]. GR.   04/11/2018 No growth after 21 hours   Preliminary   03/29/2018 No growth   Final   03/29/2018     Final     No anaerobes isolated  Since this specimen was not transported in the proper anaerobic transport media, the   absence of anaerobes in this culture does not rule out the presence of anaerobes in this   specimen.   03/29/2018 Canceled, Test credited   Final   03/29/2018 Duplicate request   Final   03/29/2018 Culture negative after 1 week   Preliminary   03/29/2018 Canceled, Test credited   Final   03/29/2018 Duplicate request   Final   03/28/2018 No growth   Final   03/28/2018 No growth   Final   03/27/2018 No growth   Final   03/27/2018 No growth   Final   03/26/2018 Culture negative after 2 weeks   Preliminary   03/26/2018 No growth   Final   03/26/2018 No growth   Final   03/25/2018 No growth   Final   03/25/2018 No growth   Final   03/24/2018 No growth   Final   03/24/2018 No growth   Final   03/23/2018 No growth   Final   03/23/2018 No growth   Final   03/23/2018 No growth   Final   03/23/2018 No growth   Final   03/23/2018 No anaerobes isolated   Final   03/23/2018 No yeast isolated   Final   03/22/2018 No growth   Final   03/22/2018 No growth   Final   03/21/2018 (A)   Final     Cultured on the 2nd day of incubation:  Candida albicans   03/21/2018     Final     Critical Value/Significant Value, preliminary result only, called to and read back by  Marielos Starr RN on 3.22.2018 at 1753. KVO   03/21/2018 No growth   Final   03/20/2018 (A)   Final      Cultured on the 4th day of incubation:  Candida albicans  Susceptibility testing done on previous specimen   03/20/2018     Final     Critical Value/Significant Value, preliminary result only, called to and read back by  Sabi GODWIN RN on 5B on 3/24/2018 @1352, tk   03/20/2018 No growth   Final   03/19/2018 No growth   Final   03/19/2018 No growth   Final   03/19/2018     Final     Canceled, Test credited  Duplicate request  [facility]   03/19/2018     Final     Canceled, Test credited  Duplicate request  [facility]   03/18/2018 Canceled, Test credited   Final   03/18/2018 Cancelled by lab - Specimen never received.   Final   03/18/2018 Canceled, Test credited   Final   03/18/2018 Cancelled by lab - Specimen never received.   Final   03/18/2018 No growth   Final   03/18/2018 No growth   Final   03/17/2018 (A)   Final     Cultured on the 1st day of incubation:  Enterococcus faecalis  Susceptibility testing done on previous specimen   03/17/2018     Final     Critical Value/Significant Value, preliminary result only, called to and read back by  Mingo Davis RN U5B 0043 03.18.18 CF   03/17/2018 (A)   Final     Cultured on the 1st day of incubation:  Citrobacter freundii complex  Susceptibility testing done on previous specimen   03/17/2018 (A)   Final     Cultured on the 2nd day of incubation:  Enterococcus faecalis  Susceptibility testing done on previous specimen   03/17/2018     Final     Critical Value/Significant Value, preliminary result only, called to and read back by  JEREMY MONET RN (5B).  03.19.18 0452 GJS   03/17/2018 (A)   Final     Cultured on the 2nd day of incubation:  Citrobacter freundii complex  Susceptibility testing done on previous specimen   03/17/2018     Final     Called to  Jayna Brady RN at 1415 on 3/20/17 by KLARISSA.   03/17/2018     Final     (Note)  POSITIVE for ENTEROCOCCUS FAECALIS and NEGATIVE for Ralph/vanB genes  by [employer] multiplex nucleic acid test. Final identification  and  antimicrobial susceptibility testing will be verified by standard  methods.     Specimen tested with [employer] multiplex, gram-positive blood culture  nucleic acid test for the following targets: Staph aureus, Staph  epidermidis, Staph lugdunensis, other Staph species, Enterococcus  faecalis, Enterococcus faecium, Streptococcus species, S. agalactiae,  S. anginosus grp., S. pneumoniae, S. pyogenes, Listeria sp., mecA  (methicillin resistance) and Ralph/B (vancomycin resistance).     Critical Value/Significant Value called to and read back by Jayna Brady RN 5A, on 3/19/2018 @0739, tk   03/17/2018 Canceled, Test credited   Final   03/17/2018 Duplicate request   Final   03/17/2018 Canceled, Test credited   Final   03/17/2018 Duplicate request   Final   03/16/2018 No growth   Final   03/16/2018 No anaerobes isolated   Final   03/16/2018     Final     Canceled, Test credited  Cancelled by lab - Specimen never received.   03/16/2018     Final     Canceled, Test credited  Cancelled by lab - Specimen never received.   03/16/2018 No growth   Final   03/16/2018 (A)   Final     Cultured on the 1st day of incubation:  Escherichia coli  Susceptibility testing done on previous specimen   03/16/2018 (A)   Final     Cultured on the 1st day of incubation:  Enterococcus faecalis  Susceptibility testing done on previous specimen   03/16/2018     Final     Critical Value/Significant Value, preliminary result only, called to and read back by  JESSICA LUTHER RN 3.16.18 2105. JAIME   03/15/2018 (A)   Final     Cultured on the 1st day of incubation:  Citrobacter freundii  Susceptibility testing done on previous specimen   03/15/2018     Final     Critical Value/Significant Value, preliminary result only, called to and read back by  GAUTAM KING RN @0512 3/16/18. SCG   03/15/2018 No growth   Final   03/14/2018 No growth   Final   03/14/2018 (A)   Final     Cultured on the 1st day of incubation:  Citrobacter freundii   03/14/2018      Final     Critical Value/Significant Value, preliminary result only, called to and read back by  Trini Garcia RN 0702 3/15/18. MS   03/14/2018 (A)   Final     Cultured on the 1st day of incubation:  Escherichia coli   03/14/2018     Final     Critical Value/Significant Value called to and read back by  Jayna Brady RN 5B @ 1021. 03/17/18 03/14/2018 Susceptibility testing done on previous specimen   Final   03/14/2018 (A)   Final     Cultured on the 1st day of incubation:  Citrobacter freundii   03/14/2018     Final     Critical Value/Significant Value, preliminary result only, called to and read back by  Socorro Edouard RN 0600 3/15/18. MS   03/14/2018 (A)   Final     Cultured on the 1st day of incubation:  Escherichia coli   03/14/2018     Final     Critical Value/Significant Value called to and read back by  Jayna Brady 5B @ 1216. 03/17/18 03/14/2018 Susceptibility testing done on previous specimen   Final   03/14/2018 (A)   Final     Cultured on the 1st day of incubation:  Escherichia coli  Susceptibility testing done on previous specimen   03/14/2018 (A)   Final     Cultured on the 1st day of incubation:  Enterococcus faecalis   03/14/2018     Final     Critical Value/Significant Value, preliminary result only, called to and read back by  Allison Vallejo RN 0139 3/15/18. MS   03/14/2018     Final     (Note)  NEGATIVE for the following: Staphylococcus spp., Staph aureus, Staph  epidermidis, Staph lugdunensis, Streptococcus spp., Strep pneumoniae,  Strep pyogenes, Strep agalactiae, Strep anginosus group, Enterococcus  faecalis, Enterococcus faecium, and Listeria spp. by [employer]  multiplex nucleic acid test. Final identification and antimicrobial  susceptibility testing will be verified by standard methods.     Critical Value/Significant Value called to and read back by Trini Garcia RN 0417 3/15/18. MS   03/14/2018 (A)   Final     Cultured on the 1st day of incubation:  Escherichia coli   03/14/2018      Final     Critical Value/Significant Value, preliminary result only, called to and read back by  Radha Sims RN, @2031 03/14/18..   03/14/2018 (A)   Final     Cultured on the 1st day of incubation:  Citrobacter freundii complex   03/14/2018     Final     Critical Value/Significant Value called to and read back by  Dr. Yamile Reddy @ 1227. 03/16/18 03/14/2018     Final     (Note)  POSITIVE for E.COLI by [employer] multiplex nucleic acid test. Final  identification and antimicrobial susceptibility testing will be  verified by standard methods. [employer] test will not distinguish  E.coli from Shigella species including S.dysenteriae, S.flexneri,  S.boydii, and S.sonnei. Specimens containing Shigella species or  E.coli will be reported as Positive for E.coli.     Specimen tested with [employer] multiplex, gram-negative blood culture  nucleic acid test for the following targets: Acinetobacter sp.,  Citrobacter sp., Enterobacter sp., Proteus sp., E. coli, K.  pneumoniae/oxytoca, P. aeruginosa, and the following resistance  markers: CTXM, KPC, NDM, VIM, IMP and OXA.     Critical Value/Significant Value called to and read back by Radha Sims RN, @2237 03/14/18..   03/14/2018 (A)   Final     Cultured on the 1st day of incubation:  Escherichia coli  Susceptibility testing done on previous specimen   03/14/2018     Final     Critical Value/Significant Value, preliminary result only, called to and read back by  Radha Sims RN, @ 2236 03/14/18..     Radiology:     4/13/2018 EGD:  Findings:        Grade II varices were found in the lower third of the esophagus, no        active bleeding        The entire examined stomach was normal.        There was evidence of a widely patent pylorus preserving Whipple's in        the duodenal bulb. This was characterized by healthy appearing mucosa        and an intact staple line.        Noted large amount of blood clot in the pancreaticobiliary limb and        blood appeared to  come out of the hepaticojejunostomy. There is no brisk        bleeding from hepaticojejunostomy site, however noted small amount of        oozing with irrigation, and recurrent blood clot formation at the        hepaticojejunostomy site. the Efferent limb appeared to be normal and no        bleeding.                                                                                     Impression:          - Grade II esophageal varices. No active bleeding.                        - Normal stomach.                        - Widely patent pylorus preserving Whipple's,                        characterized by healthy appearing mucosa and an intact                        staple line was found.                        - Noted large blood clot at hepaticojejunostomy site,                        and oozing from hepaticojejunostomy. Finding suggest                        hemobilia, likely bleeding from hepatic abscess                        - Transhepatic stent noted through hepaticojejunostomy                        in place.                        - No specimens collected.

## 2018-04-13 NOTE — PROGRESS NOTES
Interventional Radiology Pre-Procedure Sedation Assessment   Time of Assessment: 2:23 PM    Expected Level: Moderate Sedation    Indication: Sedation is required for the following type of Procedure: Biliary    Sedation and procedural consent: Risks, benefits and alternatives were discussed with Patient    PO Intake: Appropriately NPO for procedure    ASA Class: Class 3 - SEVERE SYSTEMIC DISEASE, DEFINITE FUNCTIONAL LIMITATIONS.    Mallampati: Grade 1:  Soft palate, uvula, tonsillar pillars, and posterior pharyngeal wall visible    Lungs: Lungs Clear with good breath sounds bilaterally    Heart: Normal heart sounds and rate    History and physical reviewed and no updates needed. I have reviewed the lab findings, diagnostic data, medications, and the plan for sedation. I have determined this patient to be an appropriate candidate for the planned sedation and procedure and have reassessed the patient IMMEDIATELY PRIOR to sedation and procedure.    Eladio Tinajero MD

## 2018-04-14 NOTE — PROCEDURES
Procedure Note:    Procedure Name: Diagnostic Therapeutic paracentesis  Proceduralist (primary): Dr. Lani Howard  Pre-procedure diagnosis: ascites  Post-procedure diagnosis: ascites  Indication: abdominal distention, peritonitis    Consent was obtained from the patient previously and placed in the chart.   Ultrasound used to locate ascites; optimal pocket found to be in RLQ, and marked.  POCUS images permanently saved in the EMR.  Universal protocol performed with nursing.   10ml of 1% lidocaine used anesthetize with 25 gauge needle.   Yueh catheter used to obtain fluid, which was yellow in appearance, and was sent to lab.   1.3L total fluid obtained.      Patient tolerated without any apparent complications.  Primary team notified.    Lani Howard MD  [phone]

## 2018-04-14 NOTE — PLAN OF CARE
Problem: Patient Care Overview  Goal: Plan of Care/Patient Progress Review  Outcome: Declining  D: 48 y/o male bacteremia, gi bleed  I/A: Patient remains a&o. Room air. Hemodynamically stable with HR's in the upper 70's. HR slowing increasing over night. 4 large, everett bloody stools. 2 plasma, 1 RBC, 4g calc gluc. Given. AM hbg 6.7. Mg 1.8. L Lower iv leaking and removed.   P: Give 2 rbc, 10 mmol phos. Obtain more IV access. Continue to monitor vitals and output. Follow plan of care and report to lizet Bradley with changes in status.

## 2018-04-14 NOTE — PLAN OF CARE
Problem: Patient Care Overview  Goal: Plan of Care/Patient Progress Review  Outcome: No Change  BP (!) 123/97  Pulse 73  Temp 97.4  F (36.3  C) (Oral)  Resp 14  Ht 1.829 m (6')  Wt 64.1 kg (141 lb 4.8 oz)  SpO2 97%  BMI 19.16 kg/m2  Neuro: A&Ox4.   Cardiac: SR. VSS.       Respiratory: Sating >95% on RA.  GI/: Adequate urine output per urinal, incontinent at times. No BM this shift.   Diet/appetite: Tolerating regular diet. NPO at 0000.  Activity:  Assist of 2, with turns and repo.  Pain: At acceptable level on current regimen.    Skin: Coccyx dressing in place. No new deficits noted.  R bili drain exchange and stent placement done this afternoon, drain capped at this time. NJ tube placement unsuccessful today, plan for possible PEG tube placement tomorrow. One unit plasma given this shift, needs one more. Mag and K replaced, rechecks in am.    R: Continue with POC. Notify primary team with changes.

## 2018-04-14 NOTE — PROVIDER NOTIFICATION
Notified Dr. Roy that positive culture of ascites fluid, growing gram positive cocci. No new orders at this time.    1812: Notified Aleksey ervin MD of pt's large bloody stool with clots and recent BG of 75. MD to order D5 infusion until diet reordered. CBC scheduled for 1800.

## 2018-04-14 NOTE — PROVIDER NOTIFICATION
Paged Aleksey sebastian cover MD regarding pt's critical Hgb of 6.2. MD ordered STAT CBC to recheck results. MD to order RBCs pending results. Will continue to monitor.     1918: Spoke with Dr. Roy at this time. MD ordered 2 units RBCs for Hgb drop. Also, MD stated there is no need for pt to be NPO tonight. Will continue to update Dr. Roy with pt status.

## 2018-04-14 NOTE — PROGRESS NOTES
D: Pt having pain during red tagged cell study (see flowsheet for pain charting).   I: MD notified and ordered an extra dose of dilaudid. Dilaudid given.   A: Pt's back/ abdominal pain improved and pt was able to tolerate the rest of the scan.   P: Test completed and pt will go back to 6B.

## 2018-04-14 NOTE — PROGRESS NOTES
GI Progress Note  4/14/2018     Patient seen and examined.  Endorses ongoing hematochezia.  Did not respond to 2 units packed red blood cells this morning.  Tagged red blood cell scan demonstrated active bleeding from biliary tree and potential second bleeding source at GJ anastomosis.  This anastomosis was evaluated endoscopically 2 days ago without any evidence of abnormalities, and is therefore felt unlikely to represent active bleeding.  Unfortunately the patient has not responded to the 10 mm ×40 mm covered Viabil stent that was placed yesterday.    Therapeutic options are limited this complex patient. We favor supportive care with transfusion and correction of coagulation abnormalities.  If he were to develop hemodynamically significant bleeding, we will recommend placing a 10mm x40mm Wallflex stent within the Viable stent, which will hopefully provide more radial force and tamponade bleeding.  In addition, he should receive IV albumin today and tomorrow for bacterial peritonitis (likely secondary) and a repeat paracentesis for drainage.    Discussed with attending, Dr. Mederos.    Mikhail Galo MD  GI Fellow  [phone]

## 2018-04-14 NOTE — CONSULTS
Interventional radiology note  4/14/2018 12:01 PM    Naresh Poole  [id number]    Mr. Poole is a 47 year old male with a history of pancreatic cancer status post Whipple in 2017, who presented with concern for biliary and/or hepatic bleed. IR has previously placed a biliary stent. Patient is also noted to have large volume ascites, and paracentesis has been requested.  I have reviewed the notes from this admission, the patient's medical and surgical history, medications, allergies, and recent imaging and laboratory values in EPIC. Pertinent lab values:    CBC RESULTS:   Recent Labs   Lab Test  04/14/18   0558   WBC  10.5   RBC  2.37*   HGB  6.7*   HCT  19.9*   MCV  84   MCH  28.3   MCHC  33.7   RDW  17.1*   PLT  52*     Recent Labs   Lab Test  04/14/18   0558  04/13/18   0545   NA  132*  136   POTASSIUM  4.2  3.8   CHLORIDE  98  101   CO2  27  28   ANIONGAP  8  6   GLC  224*  121*   BUN  29  28   CR  0.58*  0.61*   CHIDI  6.8*  6.2*     INR 1.9    CT 4/12/2018 noted for large volume ascites.    A/P: Mr. Poole is a 47 year old male with a history of pancreatic cancer s/p Whipple, who presented with concern of hepatic and/or biliary bleed s/p stent placement, who is noted to have large volume ascites. IR consulted for therapeutic paracentesis.    - Lab values satisfactory  - If patient is not consentable, please have family member or legal decision maker available for consent.  - Patient may be a good candidate for CAPS hospitalist team paracentesis. If there is difficulty, please do not hesitate to contact IR for attempt to be done by us  - Regarding further work-up of the bleed: suspect nuclear medicine tagged red blood cell scan to have low yield unless there is an active brisk bleed, but given limited options, this is a reasonable next step.  Repeat CTA also likely to be of low yield unless there is active brisk bleed.    Discussed with IR Staff Dr. Moise.    Didier Maciel MD  PGY3 Radiology

## 2018-04-14 NOTE — PROGRESS NOTES
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 who is presenting for FTT and as well as c/f bloody biliary drain output      Goals of care:  Patient at this time is technically cancer free.  His current illness is potentially reversible.  He at this time wants all interventions possible.  He is ok with blood product, pressors, ICU, BiPAP.  He does not want intubation or CPR.    #GI bleed  #Acute blood loss anemia  #Hemobilia in setting of percutaneous biliary drain  #Coagulopathy  EGD 04/12 with evidence of bleeding from hepato-jejunostomy site. Case discussed with IR due to concern for hepatic source; CT angiogram ordered with no evidence of   hemorrhage. If bleeding is localized to the liver the patient is not likely a candidate for embolization as this may place him into fulminant liver failure.  4/13/18  IR internal biliary stent placement  to attempt to stop bleed. Also had internal external biliary drain exchanged.   -Hgb Q6hrs   -PIVx2, Midline placed 4/14 need access  -GI & IR following; appreciate recs   -Transfusions today: 1U FFP and 2 U PRBC thus far.     -Vitamin K 10mg daily  -Octreotide gtt  -PPI changed to BID  - Surgical oncology evaluated patient and there are no surgical options to manage this bleed  - Can consider giving factor 7 (Nova7) if bleeding recurs and is hemodynamically significant.    - Discussed case with hematology regarding any other potential interventions to reverse coagulopathy.  Per Heme can consider Kcentra.  Will consider this as a last resort if he should become hemodynamically unstable or no further options are possible via IR and he continues to bleed.  Kcentra has been used  for reversal of acquired non warfarin coagulopathy in trauma and surgical cases.  See published study citations at end of this note.     - NM tagged red blood scan indicated area of uptake along the biliary drain and at level of hepatojejunostomy.  Second area at gastrojejunostomy site.    If the patient bleeds overnight will call GI stat for consideration of endoscopic intervention.  Would also consider factor 7 vs kcentra.    Secondary bacterial peritonitis -Diagnostic para 04/13 - WBC >400 with ~80% PMN    - cultures pending  - discussed case with IR/GI.  Plan will be for serial paracentesis to drain the ascitic fluid instead of peritoneal drain placement given risk of catheter infection with drain.  - Procedure service consulted for paracentesis.  - cytology from 4/13 pending  - On linezolid and ertapenem for other infections which should be adequate coverage  - Albumin 1.5gm/kg today and 1gm/kg on day 3.    #VRE bacteremia  #Recent cholangitis and polymicrobial bacteremia   #Hx enterococcal liver abscess  #Recent candidemia  Discharged (03/14-04/02) after admission for polymicrobial bacteremia (E coli, C perfringens, Citrobacter, and enterococcus) and candidemia due to cholangitis and liver abscess. Discharged on vancomycin, fluconazole, ertapenem with plan for ID follow up at 4 weeks. Presenting with evidence of hemobilia and GI bleed (see above). GI consulted.   -BC 04/10, 4/11, 4/12 - growing VRE --> 4/13 negative to date  -Cont  ertapenem and fluconazole.  -D/c vancomycin 0/12  - linezolid (04/12-->04/13) --> chnaged to daptomycin (04/13) due to low plts  -ID consult   -Daily BC until negative x48hrs      #FTT  #Severe Protein/caloric malnutrition  Patient cachectic in setting of multiple complications following whipple for pancreatic Ca. Per chart, no evidence of recurrance on recent imaging. Appears to have lost 5-10 lbs in past week (?). Poor PO intake per report, though patient reports feeling hungry.  Will need nutrition eval, PT and OT once acute issues stable (see above). Likely need TCU at discharge due to failure at home and increased needs exceeding family ability to care for him.   -PT, OT and nutrition once acute issues resolved.  -Radiology unable to place FT --> would need endoscopically placed   -Unable to start TPN due to removal of central access - nutrition/pharmacy to evaluate PPN      #Hx Pancreatic Cancer (T3N1) s/p Whipple (09/2017)  #Pancreatic insufficiency - chronic diarrhea  #Iatrogenic diabetes 2/2 panceratectomy  Initially diagnosed in 1/2017. He underwent EUS/ERCP with stent placement. Underwent chemotherapy with FOLFIRINOX from 1/2017 to 3/2017; noted to have a liver lesion on imaging in 3/2017 and underwent biopsy which showed abscess/necrosis but no evidence of cancer. He underwent whipple in 5/2017 and repeat liver biopsy intraoperatively demonstrated necrotic areas and inflammations. He then had chemotherapy with gemcitabine and capecitabine between 10/2017 to 11/2017. Developed liver abscess and complicated by enterococcus bacteremia and completed antibiotics in 11/2017. Last seen in oncology clinic 04/02; no longer candidate for further chemo.   -Hold Creon and vit D while NPO - resume once eating  -cont PTA dilaudid and methadone     #Hypoglycemia  Due to poor liver function and NPO status. On D5.  -Hypoglycemia protocol  -D/c ISS       #HTN  Holding BP meds in setting of GI bleed       #Depression  Cont sertraline  -Palliative care consulted - will see him over weekend    #Hypocalcemia  Likely due to poor PO intake and acute illness.   -2g Ca gluconate  -BMP in AM      FEN: Regular diet -NPO MN  Prophylaxis: mechanical  Disposition: Pending management of blood loss; TCU likely at discharge  Code Status: DNR/DNI      Madonna Roy DO  Medicine Staff      Subjective:  Tired this morning.  Did not get much sleep.  Having some abdominal pain intermittently.  Bowel movements bloody  overnight.     Objective:  Most recent vital signs:  BP (!) 131/98  Pulse 73  Temp 97.9  F (36.6  C) (Tympanic)  Resp 18  Ht 1.829 m (6')  Wt 64.1 kg (141 lb 4.8 oz)  SpO2 96%  BMI 19.16 kg/m2  Temp:  [96.4  F (35.8  C)-98.6  F (37  C)] 97.9  F (36.6  C)  Heart Rate:  [66-98] 77  Resp:  [12-18] 18  BP: (111-141)/() 131/98  SpO2:  [95 %-99 %] 96 %  Wt Readings from Last 2 Encounters:   04/11/18 64.1 kg (141 lb 4.8 oz)   04/02/18 68.6 kg (151 lb 3 oz)       Intake/Output Summary (Last 24 hours) at 04/13/18 1846  Last data filed at 04/13/18 1700   Gross per 24 hour   Intake          3311.75 ml   Output              715 ml   Net          2596.75 ml       Physical exam:  General: cachectic male not distressed  HEENT: no scleral icterus  Cardiac: RRR   Respiratory: CTAB   GI:RUQ with drain capped.  abd is distended but soft, no tenderness, bowel sounds active  Extremities: no edema  Neuro: alert and oriented x4    Labs (Past three days):  CBC    Recent Labs  Lab 04/14/18  1345 04/14/18  0558 04/13/18  2207 04/13/18  1815   WBC 14.1* 10.5 9.4 15.3*   RBC 3.29* 2.37* 2.22* 2.68*   HGB 9.6* 6.7* 6.2* 7.4*   HCT 28.2* 19.9* 18.6* 22.0*   MCV 86 84 84 82   MCH 29.2 28.3 27.9 27.6   MCHC 34.0 33.7 33.3 33.6   RDW 15.7* 17.1* 18.1* 18.0*   PLT 50* 52* 50* 66*     BMP    Recent Labs  Lab 04/14/18  0558 04/13/18  0545 04/12/18  1330 04/12/18  0522 04/11/18  0705   * 136 134 144 143   POTASSIUM 4.2 3.8 3.9 3.8 3.4   CHLORIDE 98 101  --  108 108   CO2 27 28  --  27 30   ANIONGAP 8 6  --  9 5   * 121* 154* 64* 158*   BUN 29 28  --  26 31*   CR 0.58* 0.61*  --  0.56* 0.61*   GFRESTIMATED >90 >90  --  >90 >90   GFRESTBLACK >90 >90  --  >90 >90   CHIDI 6.8* 6.2*  --  6.4* 6.4*   MAG 1.7 1.6  --  1.7 1.6   PHOS 2.5 3.0  --  2.7 2.1*     Troponins:     INR    Recent Labs  Lab 04/14/18  0558 04/14/18  0007 04/13/18  0545 04/12/18  0522   INR 1.90* 1.98* 1.91* 1.98*     Liver panel    Recent Labs  Lab 04/14/18  0558  04/13/18  0545 04/12/18  0522 04/10/18  1623   PROTTOTAL 4.8* 4.8* 4.9* 5.5*   ALBUMIN 2.2* 2.2* 2.2* 2.4*   BILITOTAL 2.2* 2.5* 2.3* 2.4*   ALKPHOS 329* 397* 454* 442*   AST 56* 63* 64* 63*   ALT 68 74* 76* 81*       Francis H, Maulik U, Hamzah G, Ankia W, Thomas C, Hector G, Diana S, Mazin C. Goal-directed coagulation management of major trauma patients using thromboelastometry (MARIEL)-guided administration of fibrinogen concentrate and prothrombin complex concentrate. Crit Care. 2010;14:R55. doi: 10.1186/jt4476. [PMC free article]       Gorlinger K, Zabrina LANDIN, Ben CRUZ, Parish QUIROS, Rodrick MACKEY, Tianna QUIROS, Mitchell H, Mal AVERY. First-line therapy with coagulation factor concentrates combined with point-of-care coagulation testing is associated with decreased allogeneic blood transfusion in cardiovascular surgery: a retrospective, single-center cohort study. Anesthesiology. 2011;115:4929-1916.     Sandy CF, Gorlinger K, Lazara D, Abelardo E, Ingris T, Moritz A, Francisco LH, Kim K. Point-of-care testing: a prospective, randomized clinical trial of efficacy in coagulopathic cardiac surgery patients. Anesthesiology. 2012;117:531-547. doi: 10.1097/ALN.[id number].    Wendy AVERY, Raymundo P, Bao F, Bennett QUIROS, Pierre QUIROS, Andriy U, Santy BENJAMIN, Valeria LANDIN. Transfusion of allogeneic blood products in proximal aortic surgery with hypothermic circulatory arrest: effect of thromboelastometry-guided transfusion management. J Cardiothorac Vasc Anesth. 2013;27:0157-0006. doi: 10.1053/j.jvca.2013.02.009.

## 2018-04-15 NOTE — PROVIDER NOTIFICATION
04/14/18 2300   Call Information   Date of Call 04/14/18   Time of Call 2304   Name of person requesting the team Ervin   Title of person requesting team RN   RRT Arrival time 2308   Time RRT ended 0050   Reason for call   Type of RRT Adult   Primary reason for call Uncontrolled excessive bleeding   Was patient transferred from the ED, ICU, or PACU within last 24 hours prior to RRT call? No   SBAR   Situation Rectal bleeding   Background S/P Endo on the 12th pancreatic CA, s/p whipple,   Notable History/Conditions Cancer;Diabetes;Hypertension   Assessment Bleeding thru the rectum, getting blood, alert, bp high   Interventions Fluid bolus   Patient Outcome   Patient Outcome Transferred to  (4C for nursing care, still under 6B )   RRT Team   Physician(s) Dr Robert Carvajal   Lead RN Namrata Eric

## 2018-04-15 NOTE — PROGRESS NOTES
We were called to bedside by nursing staff for a bright red blood per rectum.  The patient remained hemodynamically stable with a normal heart rate.  A bowel movement of 300 cc of bright red blood and clots was seen on his diaper.  The patient denies new pain, nausea, or vomiting.     Interventional radiology was called to discuss the patient.  Their recommendation was a CT angiography if the patient becomes hemodynamically unstable and/or has a new brisk bleed noted.  However, at this time there is no intervention possible.    The patient will continue his 2nd unit of pRBC and follow with his unit of platelets.     We will check a CBC and ionized calcium at this time.  We will replace if required.    Deniz Carvajal MD  Internal Medicine PGY 2

## 2018-04-15 NOTE — PROGRESS NOTES
Brief GI Note  4/15/2018     Continues to have hematochezia and is requiring transfusions. Endoscopic options are very limited. Our recommendation is still consideration of additional biliary stent placed percutaneously.     Discussed with attending, Dr Mederos.     Mikhail Galo MD  GI Fellow  [phone]

## 2018-04-15 NOTE — PROGRESS NOTES
PROGRESS NOTE    04/15/18    Naresh Poole ([id number]) admitted on 4/10/2018    Changes  -Q6hr hgb  -4g Ca gluconate overnight - re check Ca in PM  -Discuss if any intervention options available with GI and IR   -F/U BC 04/14 - repeat BC today  -Check fibrinogen - transfuse cryo if <200  -Plan to use Kcentra for non warfarin INR reversal in setting of life threatening bleed if hemodynamic instability    Assessment & Plan:  Naresh Poole is a 47 year old male with a PMHx of pancreatic cancer (T3N1) s/p whipple 5/2017 along with FOLFIRINOX and Gemzar/Xeloda 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 who is presenting for FTT and as well as c/f bloody biliary drain output      #GI bleed  #Acute blood loss anemia  #Hemobilia in setting of percutaneous biliary drain  #Elevated INR   Noted to have maroon liquid stool; patient states he has had bloody stools since discharge but had not reported this previously. Noted to have down trending hgb despite transfusions. EGD 04/12 with evidence of bleeding from hepato-jejunostomy site. Due to ongoing bleeding underwent IR stent placement 04/13 to attempt to stop bleed. Noted to have ongoing bleeding and requiring multiple blood products. NM tagged RBC revealed uptake along biliary drain and at gastrojejunostomy site. Unclear if further invasive options available at this time. Discussed possibility of TIPS with GI due t portal HTN and possible contribution; discussion ongoing, on octreotide, holding off propranolol due to concern for hemodynamic instability.   -Hgb Q6hrs  -PIVx2  -IR stent placed 04/13  -GI following; appreciate recs   -Transfusions today: 1U FFP and 1U plts  -Vitamin K 10mg   -Octreotide gtt  -PPI IV  -Discuss if any intervention options available with GI and IR   -Check fibrinogen - transfuse cryo if <200  -Plan to use Kcentra for non warfarin INR reversal in setting of life threatening bleed if hemodynamic instability      #VRE  bacteremia  #Secondary bacterial peritonitis   #Recent cholangitis and polymicrobial bacteremia   #Hx enterococcal liver abscess  #Recent candidemia  Discharged (03/14-04/02) after admission for polymicrobial bacteremia (E coli, C perfringens, Citrobacter, and enterococcus) and candidemia due to cholangitis and liver abscess. Discharged on vancomycin, fluconazole, ertapenem with plan for ID follow up at 4 weeks. Presenting with evidence of hemobilia and GI bleed (see above). GI consulted. BC growing VRE; ID consulted and currently on daptomycin.   -BC 04/10, 04/11, 04/12, 04/13 - growing VRE  -BC 04/14 NGTD - repeat today  -Cont  ertapenem and fluconazole.  -D/c vancomycin 04/12  - linezolid (04/12-->04/13) --> chnaged to daptomycin (04/13-->) due to low plts  -ID consult   -Diagnostic para 04/13 - GPCs --> will plan on intermittent therapeutic para for source control  -Therapeutic para 04/14 culture NGTD  -Daily BC until negative x48hrs      #FTT  #Severe Protein/caloric malnutrition  Patient cachectic in setting of multiple complications following whipple for pancreatic Ca. Per chart, no evidence of recurrance on recent imaging. Appears to have lost 5-10 lbs in past week (?). Poor PO intake per report, though patient reports feeling hungry. Will need nutrition eval, PT and OT once acute issues stable (see above). Likely need TCU at discharge due to failure at home and increased needs exceeding family ability to care for him.   -PT, OT and nutrition once acute issues resolved.  -Radiology unable to place FT --> would need endoscopically placed   -Unable to start TPN due to removal of central access - nutrition/pharmacy to evaluate PPN      #Hx Pancreatic Cancer (T3N1) s/p Whipple (09/2017)  #Pancreatic insufficiency - chronic diarrhea  #Iatrogenic diabetes 2/2 panceratectomy  Initially diagnosed in 1/2017. He underwent EUS/ERCP with stent placement. Underwent chemotherapy with FOLFIRINOX from 1/2017 to 3/2017;  noted to have a liver lesion on imaging in 3/2017 and underwent biopsy which showed abscess/necrosis but no evidence of cancer. He underwent whipple in 5/2017 and repeat liver biopsy intraoperatively demonstrated necrotic areas and inflammations. He then had chemotherapy with gemcitabine and capecitabine between 10/2017 to 11/2017. Developed liver abscess and complicated by enterococcus bacteremia and completed antibiotics in 11/2017. Last seen in oncology clinic 04/02; no longer candidate for further chemo.   -Hold Creon and vit D while NPO - resume once eating  -cont PTA dilaudid and methadone      #Hypoglycemia  Due to poor liver function and NPO status. On D5.  -Hypoglycemia protocol  -D/c ISS       #HTN  Holding BP meds in setting of GI bleed       #Depression  Cont sertraline  -Palliative care consulted - will see him over weekend     #Hypocalcemia  Likely due to poor PO intake, acute illness and repeat blood transfusions. Replacing aggressively.   -BID iCa      FEN: Regular diet -NPO   Prophylaxis: mechanical  Disposition: Pending management of blood loss; prognosis guarded.   Code Status: DNR/DNI      Patient was seen and plan of care discussed with MD Karen Reid MD   Internal Medicine PGY3    Subjective:  Denies pain. Reports recurrent bloody BM this AM. Has no new complaints. Waiting for his family to visit today.     Objective:  Most recent vital signs:  BP (!) 129/97 (BP Location: Left arm)  Pulse 92  Temp 96  F (35.6  C) (Tympanic)  Resp 16  Ht 1.829 m (6')  Wt 73.5 kg (162 lb 0.6 oz)  SpO2 95%  BMI 21.98 kg/m2  Temp:  [95.8  F (35.4  C)-98  F (36.7  C)] 96  F (35.6  C)  Pulse:  [92] 92  Heart Rate:  [70-98] 75  Resp:  [12-24] 16  BP: (121-160)/() 129/97  SpO2:  [94 %-100 %] 95 %  Wt Readings from Last 2 Encounters:   04/15/18 73.5 kg (162 lb 0.6 oz)   04/02/18 68.6 kg (151 lb 3 oz)       Intake/Output Summary (Last 24 hours) at 04/15/18 4574  Last data filed at  04/15/18 0800   Gross per 24 hour   Intake             3750 ml   Output             2125 ml   Net             1625 ml       Physical exam:  General: Cachectic, Patient lying comfortably in bed, NAD  HEENT: no scleral icterus or injection, MMM  Cardiac: RRR, no m/r/g appreciated.   Respiratory: CTAB, no wheezes, rhonchi or crackles appreciated.  GI:RUQ drain . Mild discomfort to palpation  Extremities: 2+ edema  Skin: No acute lesions appreciated  Neuro: AOx3,moving all extremities.    Labs (Past three days):  CBC  Recent Labs  Lab 04/15/18  0455 04/15/18  0011 04/14/18  1833 04/14/18  1345   WBC 10.0 6.6 5.0 14.1*   RBC 2.89* 2.86* 2.12* 3.29*   HGB 8.4* 8.3* 6.2*  6.2* 9.6*   HCT 24.5* 24.8* 18.2* 28.2*   MCV 85 87 86 86   MCH 29.1 29.0 29.2 29.2   MCHC 34.3 33.5 34.1 34.0   RDW 15.1* 15.3* 15.9* 15.7*   PLT 43* 17* 23* 50*     BMP  Recent Labs  Lab 04/15/18  0455 04/14/18  0558 04/13/18  0545 04/12/18  1330 04/12/18  0522    132* 136 134 144   POTASSIUM 3.6 4.2 3.8 3.9 3.8   CHLORIDE 97 98 101  --  108   CO2 26 27 28  --  27   ANIONGAP 11 8 6  --  9   * 224* 121* 154* 64*   BUN 26 29 28  --  26   CR 0.54* 0.58* 0.61*  --  0.56*   GFRESTIMATED >90 >90 >90  --  >90   GFRESTBLACK >90 >90 >90  --  >90   CHIDI 7.2* 6.8* 6.2*  --  6.4*   MAG 1.6 1.7 1.6  --  1.7   PHOS 3.1 2.5 3.0  --  2.7     Troponins:     INR  Recent Labs  Lab 04/14/18  0558 04/14/18  0007 04/13/18  0545 04/12/18  0522   INR 1.90* 1.98* 1.91* 1.98*     Liver panel  Recent Labs  Lab 04/15/18  0455 04/14/18  0558 04/13/18  0545 04/12/18  0522   PROTTOTAL 5.2* 4.8* 4.8* 4.9*   ALBUMIN 3.1* 2.2* 2.2* 2.2*   BILITOTAL 3.2* 2.2* 2.5* 2.3*   ALKPHOS 334* 329* 397* 454*   AST 79* 56* 63* 64*   ALT 64 68 74* 76*       Imaging/procedure results:  IMPRESSION:  1. Radiotracer accumulation in the liver, along the internal biliary  stent and along the internal/external biliary chain to the level of  the hepaticojejunostomy. This may represent  hemorrhage in the liver  versus reflux through the biliary drain into adjacent bilomas.  2. Radiotracer accumulation in the expected region of the  gastrojejunostomy, concerning for additional focus of hemorrhage. This  corresponds with focal area of hyperdensity seen on 4/12/2018,  presumed products of hemorrhage. Endoscopy may be useful in further  delineation.

## 2018-04-15 NOTE — PROVIDER NOTIFICATION
04/14/18 2300   Post RRT Intervention Assessment   Post RRT Assessment Other (see comment)  (Platelets went from 23-17)   Date Follow Up Done 04/15/18   Time Follow Up Done 0310

## 2018-04-15 NOTE — PLAN OF CARE
Problem: Patient Care Overview  Goal: Plan of Care/Patient Progress Review  Outcome: No Change  Pt trasnferred from 6B around 0000.   D: Afebrile. Temp decreased from 97 to 95.8 - bear hugger applied. VSS. Hypertensive SBP 130s-150s/100s, MAP 100s all shift. HR 70s-80s, NSR.   A/I: 3 U plts replaced on shift, recheck plts 43. Rashidon said to recheck at 8am since last 1 U plts infusing while labs were drawn. Pt 1 large BM, clots present, red tarry stool. MD kaiser notified. Hgb drawn after BM resulting at 8.4, per Aleksey BENJAMIN said to recheck at 8 or 9am again. No changes in HR/BP, MD said to monitor for drop in MAP to 70-80, tachycardia, and bloody BMs. Continent of urine - uses urinal appropriately, but incontinent of stools, occasionally will ask for bedpan with no BM results. A&Ox4, call light appropraite. awake most of night. Bgls 110s while on D5 1/2 NS K gtt. Pt NPO, but per MD Kaiser W. ok for K+ replacement orally. Pt pressure ulcer protected with sacral meplex. 4 g Ca replaced recheck form 3.7 to 4 - another 4 g Ca scheduled - awaiting from pharmacy. 2 g Mg replaced for Mg 1.6.   P: Continue to assess and monitor and plan of care. F/u with CBC with plts and hgb recheck.       Problem: Gastrointestinal Bleeding (Adult)  Goal: Signs and Symptoms of Listed Potential Problems Will be Absent, Minimized or Managed (Gastrointestinal Bleeding)  Signs and symptoms of listed potential problems will be absent, minimized or managed by discharge/transition of care (reference Gastrointestinal Bleeding (Adult) CPG).   Outcome: Declining  Pt 1x large bloody, clots present, BM. No drop in Hgb with 5am labs, Hgb 8.5 continue to monitor. Recheck at 8 per MD.     Problem: Gastrointestinal Condition Comorbidity  Goal: Gastrointestinal Condition  Patient comorbidity will be monitored for signs and symptoms of Gastrointestinal condition.  Problems will be absent, minimized or managed by discharge/transition of care.  Outcome: No  Change  Pt still complains of GI pain, relief with dilaudid, still blood stools.

## 2018-04-15 NOTE — PROGRESS NOTES
Transferred to   Via bed for close monitor and high level of nursing care .  Rapid respond called for passing large clot and bright red stool . Report given to Karen SHIRLEY prior the transfer . Hemodynamically stable. Got 2 units of PRBC . One unit of Cryo . Needs one unit of platelets . Team will update family about the transfer . Chart. Medication and belonging sent with patient .

## 2018-04-15 NOTE — PROVIDER NOTIFICATION
Patient had a CBC that added to 6 pm sample that resulted with hemoglobin 6.2 . Platelets came . Plan to get one unit of Platelets and patient is getting the second unit of PRBC .   Plan to recheck his cbc after all blood products transfused .

## 2018-04-16 NOTE — PROGRESS NOTES
PROGRESS NOTE    04/16/18    Naresh Poole ([id number]) admitted on 4/10/2018    Changes  -IR stent placement  -IR thoracentesis right  -Cont Q4 CBC, fibrinogen checks - if stable after procedure will change to q6h  -PICC - plan to start TPN today  -Patient declined palliative care consult today; pall care team updated  -Electrolyte replacement due to risk re feeding sx  -Lasix 20mg IV if needing more blood products    Assessment & Plan:  Naresh Poole is a 47 year old male with a PMHx of pancreatic cancer (T3N1) s/p whipple 5/2017 along with FOLFIRINOX and Gemzar/Xeloda 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 who is presenting for FTT and as well as c/f bloody biliary drain output      #GI bleed  #Acute blood loss anemia  #Hemobilia in setting of percutaneous biliary drain  #Elevated INR   Noted to have maroon liquid stool; patient states he has had bloody stools since discharge but had not reported this previously. Noted to have down trending hgb despite transfusions. EGD 04/12 with evidence of bleeding from hepato-jejunostomy site. Due to ongoing bleeding underwent IR stent placement 04/13 to attempt to stop bleed. Noted to have ongoing bleeding and requiring multiple blood products. NM tagged RBC revealed uptake along biliary drain and at gastrojejunostomy site. Unclear if further invasive options available at this time. Discussed possibility of TIPS with GI due t portal HTN and possible contribution; discussion ongoing, on octreotide. IR following; plan for stent placement today. Likely that after stent no other viable options available; patient and family aware of this.    -Hgb Q6hrs  -PIVx2 + PICC  -IR stent placed 04/13 - plan for repeat procedure today  -GI following; appreciate recs   -Transfuse to goal hgb >8, fibrinogen >150, platelets >50  -Vitamin K 10mg   -Octreotide gtt  -PPI IV  -Plan to use Kcentra for non warfarin INR reversal in setting of life threatening  bleed if hemodynamic instability as last option      #VRE bacteremia  #Secondary bacterial peritonitis   #Recent cholangitis and polymicrobial bacteremia   #Hx enterococcal liver abscess  #Recent candidemia  Discharged (03/14-04/02) after admission for polymicrobial bacteremia (E coli, C perfringens, Citrobacter, and enterococcus) and candidemia due to cholangitis and liver abscess. Discharged on vancomycin, fluconazole, ertapenem with plan for ID follow up at 4 weeks. Presenting with evidence of hemobilia and GI bleed (see above). GI consulted. BC growing VRE; ID consulted and currently on daptomycin.   -BC 04/10, 04/11, 04/12, 04/13 - growing VRE  -BC 04/14, 04/15 NGTD   -Cont  ertapenem and fluconazole.  -D/c vancomycin 04/12  - linezolid (04/12-->04/13) --> chnaged to daptomycin (04/13-->) due to low plts  -ID consult   -Diagnostic para 04/13 - culture growing VRE  -Therapeutic para 04/14 culture NGTD  -Daily BC until negative x48hrs      #FTT  #Severe Protein/caloric malnutrition  Patient cachectic in setting of multiple complications following whipple for pancreatic Ca. Per chart, no evidence of recurrance on recent imaging. Appears to have lost 5-10 lbs in past week (?). Poor PO intake per report, though patient reports feeling hungry. Will need nutrition eval, PT and OT once acute issues stable (see above). Likely need TCU at discharge due to failure at home and increased needs exceeding family ability to care for him.   -PT, OT and nutrition once acute issues resolved.  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 He underwent EUS/ERCP with stent  placement. Underwent chemotherapy with FOLFIRINOX from 1/2017 to 3/2017; noted to have a liver lesion on imaging in 3/2017 and underwent biopsy which showed abscess/necrosis but no evidence of cancer. He underwent whipple in 5/2017 and repeat liver biopsy intraoperatively demonstrated necrotic areas and inflammations. He then had chemotherapy with gemcitabine and capecitabine between 10/2017 to 11/2017. Developed liver abscess and complicated by enterococcus bacteremia and completed antibiotics in 11/2017. Last seen in oncology clinic 04/02; no longer candidate for further chemo.   -Hold Creon and vit D while NPO - resume once eating  -cont PTA dilaudid and methadone       #Hypoglycemia  Due to poor liver function and NPO status. On D5.  -Hypoglycemia protocol      #HTN  Holding BP meds in setting of GI bleed diuresing PRN      #Depression  Cont sertraline  -Palliative care consulted - declined today      #Hypocalcemia  Likely due to poor PO intake, acute illness and repeat blood transfusions. Replacing aggressively.   -BID iCa      FEN: Regular diet -NPO   Prophylaxis: mechanical  Disposition: Pending management of blood loss; prognosis guarded.   Code Status: DNR/DNI      Patient was seen and plan of care discussed with MD Karen Reid MD   Internal Medicine PGY3    Subjective:  Feels ok this AM. Is glad to hear he can have PICC and start TPN. Reported to staff MD that he would like to continue to pursue medical cares despite limited options at this time.     Objective:  Most recent vital signs:  BP (!) 123/99 (BP Location: Left arm)  Pulse 92  Temp 97.4  F (36.3  C) (Oral)  Resp 16  Ht 1.829 m (6')  Wt 75.1 kg (165 lb 9.1 oz)  SpO2 97%  BMI 22.45 kg/m2  Temp:  [97.1  F (36.2  C)-98  F (36.7  C)] 97.4  F (36.3  C)  Heart Rate:  [] 85  Resp:  [12-42] 16  BP: (119-167)/() 123/99  SpO2:  [85 %-99 %] 97 %  Wt Readings from Last 2 Encounters:   04/16/18 75.1 kg (165 lb 9.1 oz)    04/02/18 68.6 kg (151 lb 3 oz)       Intake/Output Summary (Last 24 hours) at 04/16/18 1313  Last data filed at 04/16/18 1246   Gross per 24 hour   Intake          4329.83 ml   Output             4625 ml   Net          -295.17 ml       Physical exam:  General: Cachectic, Patient lying comfortably in bed, NAD  HEENT: no scleral icterus or injection, MMM  Cardiac: RRR, no m/r/g appreciated.   Respiratory: Poor air entry R hemithorax  GI:RUQ drain . Mild discomfort to palpation  Extremities: 2+ edema  Skin: No acute lesions appreciated  Neuro: AOx3,moving all extremities    Labs (Past three days):  CBC  Recent Labs  Lab 04/16/18  1121 04/16/18  0325 04/16/18  0014 04/15/18  1713   WBC 13.6* 12.9* 14.5* 11.3*   RBC 3.04* 2.68* 2.96* 2.52*   HGB 9.0* 7.9* 8.7* 7.3*   HCT 26.4* 23.0* 25.2* 21.3*   MCV 87 86 85 85   MCH 29.6 29.5 29.4 29.0   MCHC 34.1 34.3 34.5 34.3   RDW 15.0 14.9 14.9 15.2*   PLT 81* 63* 88* 54*     BMP  Recent Labs  Lab 04/16/18  1121 04/16/18  0325 04/16/18  0014 04/15/18  1110 04/15/18  0455 04/14/18  0558   NA  --  138 135  --  134 132*   POTASSIUM 3.6 3.3* 3.3*  --  3.6 4.2   CHLORIDE  --  98 98  --  97 98   CO2  --  30 32  --  26 27   ANIONGAP  --  9 5  --  11 8   GLC  --  96 102*  --  164* 224*   BUN  --  27 28  --  26 29   CR  --  0.63* 0.54*  --  0.54* 0.58*   GFRESTIMATED  --  >90 >90  --  >90 >90   GFRESTBLACK  --  >90 >90  --  >90 >90   CHIDI  --  7.6* 7.1*  --  7.2* 6.8*   MAG 1.8 2.3 1.6 2.1 1.6 1.7   PHOS 3.3  --   --  3.0 3.1 2.5     Troponins:     INR  Recent Labs  Lab 04/16/18  1121 04/16/18  0325 04/16/18  0014 04/15/18  1713   INR 2.09* 2.02* 2.06* 2.10*     Liver panel  Recent Labs  Lab 04/16/18  0325 04/15/18  0455 04/14/18  0558 04/13/18  0545   PROTTOTAL 4.9* 5.2* 4.8* 4.8*   ALBUMIN 2.8* 3.1* 2.2* 2.2*   BILITOTAL 3.2* 3.2* 2.2* 2.5*   ALKPHOS 370* 334* 329* 397*   AST 81* 79* 56* 63*   ALT 70 64 68 74*

## 2018-04-16 NOTE — PROGRESS NOTES
CLINICAL NUTRITION SERVICES - REASSESSMENT NOTE     Nutrition Prescription    RECOMMENDATIONS FOR MDs/PROVIDERS TO ORDER:  Monitor lytes, replace PRN if able. Pt is at high risk for refeeding syndrome.    Malnutrition Status:    Unable to assess (pt is in IR)    Recommendations already ordered by Registered Dietitian (RD):  Ordered PN regimen of 1320 mL of 165 g dextrose, 128 g AA (2 g PRO/kg) and daily IV lipids. Dosing wt is 64 kg. Goal dextrose is 265 g/day to fully meet kcal needs (would = 1913 kcal/day or 30 kcal/kg).     Future/Additional Recommendations:  Start TF once able to obtain safe enteral nutrition plan. See RD note from 4/13/18 for TF and enzyme recommendations.       EVALUATION OF THE PROGRESS TOWARD GOALS   Diet: Regular with TID oral supplements.  Nutrition Support: none  Intake: Minimal PO intake       NEW FINDINGS   Pt is in IR for a procedure, unable to interview or do physical assessment. Spoke with MD and PharmD earlier today re: nutrition POC. Note that pt has declined a palliative care consult. Pt is to have line placed for PN initiation; he is at high risk for refeeding syndrome. Multiple attempts have been made for nasoenteral FT placement without success (per chart, pt will eventually need a FT placed endoscopically).     Estimated needs via PN:  25-30+ kcal/kg = 1518-7568 kcal/day  1.5-2 g PRO/kg =  g/day    MALNUTRITION  % Intake: </= 50% for >/= 5 days  % Weight Loss: weight is up  Subcutaneous Fat Loss: Unable to assess  Muscle Loss: Unable to assess  Fluid Accumulation/Edema: Unable to assess  Malnutrition Diagnosis: Unable to determine due to pt is in IR    Previous Goals   1. Patient to consume % of nutritionally adequate meal trays 5-6 snacks/meals per day.  2. Pt weight to not fall below 64 kg (141 lbs)  Evaluation: Goal #1 not met. Goal #2 met (likely volume-related)    Previous Nutrition Diagnosis  Inadequate protein-energy intake  Evaluation: Declining    CURRENT  NUTRITION DIAGNOSIS  Inadequate protein-energy intake related to poor appetite, inability to obtain adequate enteral access as evidenced by pt eating minimally and has increased needs for multiple medical issues, including malabsorption.      INTERVENTIONS  Implementation  Collaboration with other providers  Parenteral Nutrition/IV Fluids - Initiate    Goals  Total avg nutritional intake to meet a minimum of 25 kcal/kg and 1.2 g PRO/kg daily (per dosing wt 64 kg).    Monitoring/Evaluation  Progress toward goals will be monitored and evaluated per protocol.    Mandie Lama, MELINA, LD  ([facility] dietitian, pgr- [phone])

## 2018-04-16 NOTE — PLAN OF CARE
Problem: Patient Care Overview  Goal: Plan of Care/Patient Progress Review    PT 4C: Cancel- PT orders received. Patient in IR for thoracentesis and stent placement, will reschedule PT eval.

## 2018-04-16 NOTE — CONSULTS
Patient is on IR schedule 4/16/2018 for a 1) right sided thoracentesis 2) biliary tube change with additional stent placement.   Labs WNL for procedure.   Patient was made NPO this AM, procedure does not have to be done with sedation as patient tolerated same procedure last Friday without sedation.  Consent will be done prior to procedure.     Please [place] the [place] RN [place] [phone] for estimated time of procedure.     Case discussed with Dr. Cisneros from IR, Dr. Rutledge from GI and Dr. Coronado.    Adrianne Dale, JOSEFINA, APRN  Interventional Radiology  Phone: [phone]  Pager: [phone]

## 2018-04-16 NOTE — BRIEF OP NOTE
Interventional Radiology Brief Post Procedure Note    Procedure: Internal biliary stent placement, internal/exernal biliary stent exchange.    Proceduralist: Hernesto Cisneros MD    Assistant: Eladio Tinajero MD    Time Out: Prior to the start of the procedure and with procedural staff participation, I verbally confirmed the patient s identity using two indicators, relevant allergies, that the procedure was appropriate and matched the consent or emergent situation, and that the correct equipment/implants were available. Immediately prior to starting the procedure I conducted the Time Out with the procedural staff and re-confirmed the patient s name, procedure, and site/side. (The Joint Commission universal protocol was followed.)  Yes        Sedation: None. Local Anesthestic used    Findings: Placement of 11.x29mm Round O Viabahn stent graft as internal biliary stent within Viabil stent. Replacement of internal/external biliary stent.    Estimated Blood Loss: None    Fluoroscopy Time:  minute(s)    SPECIMENS: None    Complications: 1. None     Condition: Stable    Plan: Patient to floor for continued cares. Resume previous tube cares.    Comments: See dictated procedure note for full details.    Eladio Tinajero MD

## 2018-04-16 NOTE — PLAN OF CARE
Problem: Patient Care Overview  Goal: Plan of Care/Patient Progress Review  Outcome: No Change  D: Afebrile, tympanic 97 F. HR 90s up to 100s with exertion. NSR. BP MAPs 100s.   A/I: 1x episode of desaturation to 85% SpO2 with repositioning in bed. Pt felt SOB, tachypnea - to 30s, Oxymask and nasal cannula applied at flush and pt gradually recovered to low SpO2 90s. HR increased to 110s and BP HTN SBPs in 150s-160s, pt stated that he felt anxious, MAPs remain in 100s and pt had 1 large BM at the time. Afebrile at 97.3 F at time. Blood infusing at time stopped. MD Vasquez notified, came to room, and ordered CX and lasix, agreed to continue to hold blood. CX revealed increased R plural effusion, pt lung sound increasingly diminished on R side, crackles at bottom.  40 mg lasix given. SpO2 gradually increased to 98% and able to wean down oxygen to 4 LPM on oxymask, SpO2 sating at 93%. HR decreased to 90s, and SBP decreased to 120s, MAPs still 100s. Asked MD vasquez to observe abdominal region d/t firmness around lateral abdomin and pt complaining of back pain with repositon, MD said she would continue to monitor but no interventions. Restarted blood products at slower rate. 1 additional unit RBCs given for hgb decreased from 8.7 to 7.9. 2 g Mg replaced, Mg increased from 1.6 to 2.3. 2x 10 mEQ K+ IV given x2 for K 3.3 unchanged from 3.3 after 40 mEq given orally, MD vasquez said could be problem with absorption orally. 1x additional dose lasix given with second PRBCs. Lung sounds on R sound increasingly clear and good urine output from lasix doses. Pt also states able to breath easier and SpO2 97% on room air at end of shift. MD vasquez said not to replace fibrinogen of 191 to decrease fluids given to pt, said close enough to goal of 200. 2 large blood BMs overnight. Pt refused wt overnight.   P: Continue to assess and monitor and plan of care. F/u with palliative care today.              Problem:  Gastrointestinal Bleeding (Adult)  Goal: Signs and Symptoms of Listed Potential Problems Will be Absent, Minimized or Managed (Gastrointestinal Bleeding)  Signs and symptoms of listed potential problems will be absent, minimized or managed by discharge/transition of care (reference Gastrointestinal Bleeding (Adult) CPG).   Outcome: No Change  Electrolytes replaced - refer to patient care overview note.     Problem: Gastrointestinal Condition Comorbidity  Goal: Gastrointestinal Condition  Patient comorbidity will be monitored for signs and symptoms of Gastrointestinal condition.  Problems will be absent, minimized or managed by discharge/transition of care.   Outcome: No Change  2x BMs bloody overnight. Refer to Patient care overview.

## 2018-04-16 NOTE — PROGRESS NOTES
Patient Name: Naresh Poole  Medical Record Number: [id number]  Today's Date: 4/16/2018    Procedure: 1) Internal biliary stent placement, replacement of biliary tube, thoracentesis                       2) Right Thoracentesis  Proceduralist: Dr. Cisneros    Sedation start time: no sedation    Procedure start time: 1330 (biliary tube change); 1425 thoracentesis right  Puncture time: 1333; 1428  Procedure end time: 1403 for biliary tube change;     Report given to: 4A RN    Other Notes: Pt arrived to IR room 4 from . Pt denies any questions or concerns regarding procedure. Pt positioned supine and monitored per protocol.  Pt prepped for procedure.  Report given to Yolis SHIRLEY. Pt tolerated new stent placement and exchange of biliary drain;  1550 ml tong right pleural fluid drained, gram stain and culture of fluid sent to lab, pt tolerated procedure well.  Left pleural space examined, ____.    Joanna Craig RN    Pt tolerated procedure without any noted complications. Pt transferred back to .

## 2018-04-16 NOTE — PLAN OF CARE
Problem: Patient Care Overview  Goal: Plan of Care/Patient Progress Review  OT/4C - Cancel. OT orders received and appreciated. Pt in IR and unavailable for therapy. Will reschedule as appropriate.

## 2018-04-16 NOTE — PROCEDURES
Interventional Radiology Brief Post Procedure Note    Procedure: IR THORACENTESIS    Proceduralist: Eric Leroy PA-C    Assistant: None    Time Out: Prior to the start of the procedure and with procedural staff participation, I verbally confirmed the patient s identity using two indicators, relevant allergies, that the procedure was appropriate and matched the consent or emergent situation, and that the correct equipment/implants were available. Immediately prior to starting the procedure I conducted the Time Out with the procedural staff and re-confirmed the patient s name, procedure, and site/side. (The Joint Commission universal protocol was followed.)  Yes    Sedation: None. Local Anesthestic used    Findings: Completed ultrasound-guided right diagnostic and therapeutic thoracentesis. A total of 1500 mL clear yellow fluid drained from the right pleural space. A sample of pleural fluid was sent to lab for analysis as requested. No immediate complication. No fluid in left pleural space. Mild ascites under diaphragm on right and complex loculated ascites under diaphragm on left.    Estimated Blood Loss: None    Fluoroscopy Time:  None    Specimens: Fluid and/or tissue for laboratory analysis and culture    Complications: 1. None     Condition: Stable    Plan: Follow up per primary team. Return to IR as needed.    Comments: See dictated procedure note for full details.    Eric Leroy PA-C  Interventional Radiology  [phone]

## 2018-04-16 NOTE — PLAN OF CARE
Problem: Patient Care Overview  Goal: Plan of Care/Patient Progress Review  D: Patient with GI bleed. Sepsis  I/A: Patient awake, alert and oriented, making needs known. Very lethargic and hypoactive. On RA lung sounds clear but diminished. BP hypertensive in the 140s/90s, SR with no ectopy. C/o of belly pain and tightness, abdomen is tight but unchanged from this morning, Bili drain still clamped due to MD d/c order of drainage. RN called and confrimed this with moonlighter this evening. Still having blood loose stool, slightly lighter maroon in color. Placed a condom catheter on for moisture. Gave cryo and platelets. Gave dilaudid for pain x1. On a regular diet, however does not have appetite.   P: talk to GI, team and IR to see if there is anyway to fix bleeding. Watch bleeding and treat as needed.

## 2018-04-17 NOTE — OR NURSING
when rechecked in preop. Also, critical lab value for hemoglobin 6.8. Page out to Dr. Coronado to update on values.

## 2018-04-17 NOTE — PLAN OF CARE
Problem: Patient Care Overview  Goal: Plan of Care/Patient Progress Review  Outcome: No Change  D: Remains in the MICU awaiting a 6B bed.  IA: Glucose has continued to rise despite subcutaneous insulin coverage and now is > 600. MD notified and insulin drip is ordered.   P: Continuous insulin drip. Assess and monitor closely.

## 2018-04-17 NOTE — PROGRESS NOTES
[facility] GENER[facility] INFECTIOUS DISEASES : PROGRESS NOTE  Naresh Poole : 1970 Sex: male:   Medical record number [id number] Attending Physician: Jayme Melchor*  Date of Service: 2018    ASSESSMENT :  Mr. Poole is a 46 y/o man with a history of pancreatic cancer (T3/N1), s/p neoadjuvant chemotherapy FOLFIRINOX (2017-3/2017), s/p pylorus-preserving whipple on 2017, and adjuvant chemotherapy gemcitabin/capecitabin (10/2017-2017), complicated by enterococcal liver abscess, non malignancy ascites, and large esophageal varices (no h/o bleeding). He was recently admitted on 3/14- with polymicrobial bacteremia and candidemia  to liver abscess and ascending cholangitis. He was discharged on Vancomycin, Ertapenem, and Fluconazole. He presents with failure to thrived and found to have VRE faecium bacteremia. EGD done on  demonstrated bleeding at the hepaticojejunostomy concerning of hemobilia and bleeding from the hepatic abscess    1. Hepatic abscesses   2. Polymicrobial bacteremia from previous admission (3/) with E coli, Citrobacter, Clostridium, and Enterococcus faecalis   3. Candidemia with Candida albicans from blood culture on 3/21 on Fluconazole   4. Biliary drain placed on 3/21  5. Enterococcus Faecium (VRE) bacteremia ,   6. Bacteremia - GNR 18  7. PICC placed on       RECOMMENDATION:  1)  Would start Daptomycin 6 mg/kg IV Q24. Will need weekly CPK monitoring while on therapy. If exceeds 10x normal level or having symptoms of myopathy then would need to discontinue   2) Please remove PICC line and do not replace until blood cultures are negative x 72 hrs  3. Change antibiotic to Meropenem and repeat blood cultures after PICC line is removed and after meropenem     ID will continue to follow. Plan discussed with Primary team     Jose Cam MD, M.Med.Sc.  Staff, Infectious Diseases  Pager: [phone]     SUBJECTIVE:   Feels  cold, denies pain.      ROS:  A five-point review of systems was obtained and was negative with the exception of that which is described above.  Allergies   Allergen Reactions     Versed [Midazolam] Other (See Comments)     Patient had respiratory depression in PACU after getting one mg of versed in OR.  Patient required flumazenil for reversal.  Would use versed cautiously if absolutely needed.   CURRENT ANTI-INFECTIVES:   Daptomycin  Ertapenem   Fluconazole      EXAMINATION: (Recommend ? 5 systems)   Vital Signs: BP (!) 113/94  Pulse 92  Temp 97.5  F (36.4  C) (Oral)  Resp 24  Ht 1.829 m (6')  Wt 72.2 kg (159 lb 2.8 oz)  SpO2 98%  BMI 21.59 kg/m2   GENERAL:  Looks more lethargic, rigors +   HEENT:  Head is normocephalic, atraumatic   EYES:  Eyes have anicteric sclerae without conjunctival injection   ENT:  Oropharynx is moist without exudates or ulcers. Tongue is midline  NECK:  Supple. No  Cervical lymphadenopathy  LUNGS:  Decreased breath sounds in bases  CARDIOVASCULAR:  Regular rate and rhythm with no murmurs  ABDOMEN:  BS+, distended, non tender drain +   SKIN:  No acute rashes.  Line(s) are in place without any surrounding erythema or exudate.   NEUROLOGIC:  Alert, oriented, lethargic   CURRENT LINES:  PICC    NEW DATA/RESULTS:  Culture Micro   Date Value Ref Range Status   04/16/2018 No growth after 15 hours  Preliminary   04/16/2018 Light growth  Gram negative rods   (A)  Preliminary   04/16/2018   Preliminary    Critical Value/Significant Value, preliminary result only, called to and read back by  Angelique Peralta RN on UU4C at 1201 4/17/18 SRQ     04/16/2018 Gram negative rods (A)  Preliminary   04/16/2018   Preliminary    Critical Value/Significant Value, preliminary result only, called to and read back by  Angelique Peralta RN (4C) on 4/17/2018 @1334, tk         Recent Labs   Lab Test  04/03/18   1705  03/14/18   1742  09/13/17   1810   CRP  85.9*  160.0*  5.8     Recent Labs   Lab Test  04/17/18    0350  04/16/18   1813  04/16/18   1121  04/16/18   0325  04/16/18   0014  04/15/18   1713   WBC  12.4*  12.3*  13.6*  12.9*  14.5*  11.3*     Recent Labs   Lab Test  04/17/18   0350  04/16/18   2146  04/16/18 1813 04/16/18   0325   CR  0.60*  0.65*  0.63*  0.63*   GFRESTIMATED  >90  >90  >90  >90     Hematology Studies  Recent Labs   Lab Test  04/17/18   1044  04/17/18   0350  04/16/18   1813  04/16/18   1121  04/16/18   0325  04/16/18   0014  04/15/18   1713   04/14/18   0558   04/10/18   1623  04/03/18   1705   03/27/18   1153   03/15/18   0658  03/14/18   0642   WBC   --   12.4*  12.3*  13.6*  12.9*  14.5*  11.3*   < >  10.5   < >  11.0  8.7   < >  10.3   < >  10.4  13.9*   ANEU   --    --    --    --    --    --    --    --   9.2*   --   9.6*  7.8   --   9.2*   --   8.9*  12.4*   AEOS   --    --    --    --    --    --    --    --   0.0   --   0.0  0.0   --   0.0   --   0.0  0.0   HCT   --   23.6*  22.9*  26.4*  23.0*  25.2*  21.3*   < >  19.9*   < >  24.5*  25.9*   < >  22.0*   < >  26.8*  27.4*   PLT  62*  54*  70*  81*  63*  88*  54*   < >  52*   < >  82*  55*   < >  142*   < >  34*  48*    < > = values in this interval not displayed.     Metabolic  Recent Labs   Lab Test  04/17/18   0350  04/16/18 2146 04/16/18   1813   NA  139  141  139   BUN  36*  34*  33*   CO2  25  30  28   CR  0.60*  0.65*  0.63*   GFRESTIMATED  >90  >90  >90     Hepatic Studies  Recent Labs   Lab Test  04/17/18   0350  04/16/18   0325  04/15/18   0455   BILITOTAL  3.3*  3.2*  3.2*   ALKPHOS  349*  370*  334*   ALBUMIN  2.4*  2.8*  3.1*   AST  74*  81*  79*   ALT  74*  70  64     Immunologlobulins  Recent Labs   Lab Test  04/03/18   1705   SED  6

## 2018-04-17 NOTE — PROGRESS NOTES
PROGRESS NOTE    04/17/18    Naresh Poole ([id number]) admitted on 4/10/2018    Changes  -Q6 hgb, plts, fibrinogen  -BID iCa  -Discuss with IR/GI  - possible ERCP stent placement if ongoing bleeding - NPO for now      Assessment & Plan:  Naresh Poole is a 47 year old male with a PMHx of pancreatic cancer (T3N1) s/p whipple 5/2017 along with FOLFIRINOX and Gemzar/Xeloda 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 who is presenting for FTT and as well as c/f bloody biliary drain output      #GI bleed   #Hemobilia in setting of percutaneous biliary drain s/p IR stent placement s/p stent-in-stent placement  #Acute blood loss anemia  #Elevated INR   Noted to have maroon liquid stool on admission; patient states he has had bloody stools since discharge but had not reported this previously. Noted to have down trending hgb despite transfusions. EGD 04/12 with evidence of bleeding from hepato-jejunostomy site. Due to ongoing bleeding underwent IR stent placement 04/13 to attempt to stop bleed. Noted to have ongoing bleeding and requiring multiple blood products. NM tagged RBC revealed uptake along biliary drain and at gastrojejunostomy site. Attempted stent-in-stent placement 04/16; continues to have ongoing bleeding.  Discussed possibility of TIPS with GI due t portal HTN and possible contribution; discussion ongoing, on octreotide. Limited interventions/options available; patient and family aware.  - -Hgb, plts and fibrinogen Q6hrs - Transfuse to goal hgb >7-8, fibrinogen >150, platelets >50  -PIVx2 + PICC  -IR stent placed 04/13 & stent in stent 04/16  -GI following; appreciate recs   -IR following; appreciate recs  -Vitamin K 10mg IV  -Octreotide gtt  -PPI IV  -Plan to use Kcentra for non warfarin INR reversal in setting of life threatening bleed if hemodynamic instability as last option      #VRE bacteremia  #Secondary bacterial peritonitis - VRE  #Recent cholangitis and polymicrobial  bacteremia   #Hx enterococcal liver abscess  #Recent candidemia  Discharged (03/14-04/02) after admission for polymicrobial bacteremia (E coli, C perfringens, Citrobacter, and enterococcus) and candidemia due to cholangitis and liver abscess. Discharged on vancomycin, fluconazole, ertapenem with plan for ID follow up at 4 weeks. Presenting with evidence of hemobilia and GI bleed (see above). GI consulted. BC and ascites growing VRE; ID consulted and currently on daptomycin.   -BC 04/10, 04/11, 04/12, 04/13 - growing VRE  -BC 04/14, 04/15 NGTD   -Cont  ertapenem and fluconazole.  -D/c vancomycin 04/12  - linezolid (04/12-->04/13) --> chnaged to daptomycin (04/13-->) due to low plts  -ID consult   -Diagnostic para 04/13 - culture growing VRE  -Therapeutic para 04/14 culture NGTD  -Daily BC until negative x48hrs      #FTT  #Severe Protein/caloric malnutrition  Patient cachectic in setting of multiple complications following whipple for pancreatic Ca. Per chart, no evidence of recurrance on recent imaging. Appears to have lost 5-10 lbs in past week (?). Poor PO intake per report, though patient reports feeling hungry.  Likely need TCU at discharge due to failure at home and increased needs exceeding family ability to care for him.   -PT, OT and nutrition once acute issues resolved.  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 He underwent EUS/ERCP with stent placement. Underwent chemotherapy with FOLFIRINOX from 1/2017 to 3/2017; noted to have a liver lesion on imaging in 3/2017 and underwent biopsy which showed abscess/necrosis but no evidence of cancer. He  underwent whipple in 5/2017 and repeat liver biopsy intraoperatively demonstrated necrotic areas and inflammations. He then had chemotherapy with gemcitabine and capecitabine between 10/2017 to 11/2017. Developed liver abscess and complicated by enterococcus bacteremia and completed antibiotics in 11/2017. Last seen in oncology clinic 04/02; no longer candidate for further chemo.   -Hold Creon and vit D while NPO - resume once eating  -cont PTA dilaudid and methadone       #Hypoglycemia  Due to poor liver function and NPO status. On D5.  -Hypoglycemia protocol      #HTN  Holding BP meds in setting of GI bleed diuresing PRN      #Depression  Cont sertraline  -Palliative care consulted - declined today      #Hypocalcemia  Likely due to poor PO intake, acute illness and repeat blood transfusions. Replacing aggressively.   -BID iCa      FEN: Regular diet -NPO   Prophylaxis: mechanical  Disposition: Pending management of blood loss; prognosis guarded.   Code Status: DNR/DNI      Patient was seen and plan of care discussed with MD Karen Morales MD   Internal Medicine PGY3    Subjective:  Feels ok today, has no new complaints. Feels breathing improved after thora. Waiting to hear if there will be a procedure today    Objective:  Most recent vital signs:  BP (!) 126/98  Pulse 92  Temp 97.9  F (36.6  C) (Oral)  Resp 21  Ht 1.829 m (6')  Wt 72.2 kg (159 lb 2.8 oz)  SpO2 94%  BMI 21.59 kg/m2  Temp:  [96.5  F (35.8  C)-98  F (36.7  C)] 97.9  F (36.6  C)  Heart Rate:  [] 101  Resp:  [13-42] 21  BP: (110-136)/() 126/98  SpO2:  [92 %-99 %] 94 %  Wt Readings from Last 2 Encounters:   04/17/18 72.2 kg (159 lb 2.8 oz)   04/02/18 68.6 kg (151 lb 3 oz)       Intake/Output Summary (Last 24 hours) at 04/17/18 0813  Last data filed at 04/17/18 0700   Gross per 24 hour   Intake          3291.37 ml   Output             3375 ml   Net           -83.63 ml       Physical exam:  General: Cachectic,  Patient lying comfortably in bed, NAD  HEENT: no scleral icterus or injection, MMM  Cardiac: RRR, no m/r/g appreciated.   Respiratory: Poor air entry R hemithorax  GI:RUQ drain . Mild discomfort to palpation  Extremities: 2+ edema  Skin: No acute lesions appreciated  Neuro: AOx3,moving all extremities    Labs (Past three days):  CBC  Recent Labs  Lab 04/17/18  0350 04/16/18  2146 04/16/18  1813 04/16/18  1121 04/16/18  0325   WBC 12.4*  --  12.3* 13.6* 12.9*   RBC 2.70*  --  2.66* 3.04* 2.68*   HGB 7.9* 6.9* 8.0* 9.0* 7.9*   HCT 23.6*  --  22.9* 26.4* 23.0*   MCV 87  --  86 87 86   MCH 29.3  --  30.1 29.6 29.5   MCHC 33.5  --  34.9 34.1 34.3   RDW 15.1*  --  14.9 15.0 14.9   PLT 54*  --  70* 81* 63*     BMP  Recent Labs  Lab 04/17/18  0350 04/16/18  2146 04/16/18  1813 04/16/18  1121 04/16/18  0325  04/15/18  1110    141 139  --  138  < >  --    POTASSIUM 3.6 3.5 4.1 3.6 3.3*  < >  --    CHLORIDE 101 102 101  --  98  < >  --    CO2 25 30 28  --  30  < >  --    ANIONGAP 12 9 9  --  9  < >  --    * 172* 91  --  96  < >  --    BUN 36* 34* 33*  --  27  < >  --    CR 0.60* 0.65* 0.63*  --  0.63*  < >  --    GFRESTIMATED >90 >90 >90  --  >90  < >  --    GFRESTBLACK >90 >90 >90  --  >90  < >  --    CHIDI 7.9* 7.1* 7.4*  --  7.6*  < >  --    MAG 2.3 2.0  --  1.8 2.3  < > 2.1   PHOS 3.1  --  3.3 3.3  --   --  3.0   < > = values in this interval not displayed.  Troponins:     INR  Recent Labs  Lab 04/17/18  0350 04/16/18  1813 04/16/18  1121 04/16/18  0325   INR 2.34* 2.15* 2.09* 2.02*     Liver panel  Recent Labs  Lab 04/17/18  0350 04/16/18  0325 04/15/18  0455 04/14/18  0558   PROTTOTAL 4.2* 4.9* 5.2* 4.8*   ALBUMIN 2.4* 2.8* 3.1* 2.2*   BILITOTAL 3.3* 3.2* 3.2* 2.2*   ALKPHOS 349* 370* 334* 329*   AST 74* 81* 79* 56*   ALT 74* 70 64 68       Imaging/procedure results:  TRACY mariano 04/16  IMPRESSION: Completed ultrasound-guided right diagnostic and  therapeutic thoracentesis. A total of 1500 mL clear yellow  fluid  drained from the right pleural space, drainage stopped due to  patient's comfort and risk of reexpansion pulmonary edema - moderate  effusion remains. A sample of pleural fluid was sent to lab for  analysis as requested. No immediate complication. No fluid in left  pleural space on limited thoracic ultrasound. Mild ascites under  diaphragm on right and complex loculated ascites under diaphragm on  Left.    IR drain exchange/stent placement 04/16  Procedures 4/16/2018:     1. Antegrade cholangiogram.  2. Placement of balloon expandable stent within previously placed  common bile duct Viabil stent.  3. Replacement of percutaneous transhepatic internal/external biliary  drain.

## 2018-04-17 NOTE — PLAN OF CARE
Problem: Patient Care Overview  Goal: Plan of Care/Patient Progress Review  PT 4C: Cancel- patient at OR for ERCP. Will reschedule.

## 2018-04-17 NOTE — PROGRESS NOTES
Aleksey Cross-Cover Note    Patient's hemoglobin dropped from 8 to 6.9 within 4 hours.  Vital signs were stable. However, still had hematochezia. Ordered 1 unit of packed red cells.  Spoke with GI on-call regarding the drop in hemoglobin and his hematochezia, GI recommended transfusing him for the hemoglobin at least 7 and continue to monitor.  If his vitals become unstable, will notify GI.    Danielle Farias MD  PGY-1 Internal Medicine  Pager [phone]

## 2018-04-17 NOTE — PROGRESS NOTES
GASTROENTEROLOGY PROGRESS NOTE    ASSESSMENT:  47 year old male with history of pancreatic cancer T3N0 status post neoadjuvant FOLFIRINOX, pylorus sparing whipple in 5/2017 complicated by intraoperative portal vein injury, subsequently with hepatic abscess and polymicrobial bacteremia/fungemia.  Recently admitted for liver abscess and cholangitis s/p attempted retrograde ERCP for management of biliary stricture though not successful, ultimately treated with PTC 3/21/18, had biliary biopsy via PTC on 3/29/18 (biopsy negative for malignancy). Now admitted 4/10/18 for worsening fatigue with hematochezia. Upper endoscopy 4/12 found to have hemobilia from biliary drain, no anastomotic ulcer or bleeding. Subsequent CTA negative for active bleeding.      Suspect bleeding from portal biliopathy/biliary varices in the setting of recent biliary stricture biopsy and brush.  Patient with known large esophageal varices and portal vein occlusion.  Pt was also noted to have pneumoperitoneum, differentials include infected ascites vs. Air related to biliary drain/manipulation. S/p Right sided paracentesis, and placement of 11x29 mm Win[place] Viabahn stent graft as internal biliary stent within viabil stent and replacement of internal/external biliary stent placement 4/16. Continue having bloody output since procedure.       RECOMMENDATIONS:  - ERCP today with stent placement  - If fail to control hemobilia with stent placement, may need to discuss with IR to see if able to open the occluded portal vein via IR procedure  - Follow up culture from paracentesis   - Continue monitor for stool color/consistency/output   - Trend Hgb and transfuse PRN per primary team  - Trend LFts and monitor for fever curve  - Follow up ERCP reports for final recs for today.     GI team will continue to follow, please page if any questions.     The patient was discussed and plan agreed upon with GI staff, Dr. Gatito Lopez  GI Fellow  Pager [phone]  ______________________________________________________________  S: patient continue having bloody stool output overnight. Required 1 u pRBC, more tachycardic and SOB this morning.       O:  Blood pressure (!) 124/95, pulse 92, temperature 97.5  F (36.4  C), temperature source Oral, resp. rate 20, height 6' (1.829 m), weight 159 lb 2.8 oz (72.2 kg), SpO2 100 %.    Gen: no acute distress, fatigue.   HEENT: atraumatic, mild sclera icterus   CV: RRR, no murmur   Lungs: CLA b/l, no wheezing or crackles   Abd: distended, soft, non tender, normal active BS  Skin: mild jaundice   MS: no skeletal pain   Neuro: no asterixis, A&Ox3, no focal neurological deficit  Psych: normal mood      LABS:  BMP    Recent Labs  Lab 04/17/18  1553 04/17/18  0350 04/16/18  2146 04/16/18  1813    139 141 139   POTASSIUM 5.1 3.6 3.5 4.1   CHLORIDE 98 101 102 101   CHIDI 7.4* 7.9* 7.1* 7.4*   CO2 24 25 30 28   BUN 39* 36* 34* 33*   CR 0.62* 0.60* 0.65* 0.63*   * 278* 172* 91     CBC  Recent Labs  Lab 04/17/18  1553 04/17/18  1044 04/17/18  0350  04/16/18  1813 04/16/18  1121 04/16/18  0325   WBC  --   --  12.4*  --  12.3* 13.6* 12.9*   RBC  --   --  2.70*  --  2.66* 3.04* 2.68*   HGB 6.8* 7.7* 7.9*  < > 8.0* 9.0* 7.9*   HCT  --   --  23.6*  --  22.9* 26.4* 23.0*   MCV  --   --  87  --  86 87 86   MCH  --   --  29.3  --  30.1 29.6 29.5   MCHC  --   --  33.5  --  34.9 34.1 34.3   RDW  --   --  15.1*  --  14.9 15.0 14.9   PLT 51* 62* 54*  --  70* 81* 63*   < > = values in this interval not displayed.  INR    Recent Labs  Lab 04/17/18  1553 04/17/18  1044 04/17/18  0350 04/16/18  1813   INR 2.43* 2.39* 2.34* 2.15*     LFTs    Recent Labs  Lab 04/17/18  0350 04/16/18  0325 04/15/18  0455 04/14/18  0558   ALKPHOS 349* 370* 334* 329*   AST 74* 81* 79* 56*   ALT 74* 70 64 68   BILITOTAL 3.3* 3.2* 3.2* 2.2*   PROTTOTAL 4.2* 4.9* 5.2* 4.8*   ALBUMIN 2.4* 2.8* 3.1* 2.2*      PANCNo lab results found in last 7 days.

## 2018-04-17 NOTE — PROGRESS NOTES
GASTROENTEROLOGY PROGRESS NOTE    ASSESSMENT:  47 year old male with history of pancreatic cancer T3N0 status post neoadjuvant FOLFIRINOX, pylorus sparing whipple in 5/2017 complicated by intraoperative portal vein injury, subsequently with hepatic abscess and polymicrobial bacteremia/fungemia.  Recently admitted for liver abscess and cholangitis s/p attempted retrograde ERCP for management of biliary stricture though not successful, ultimately treated with PTC 3/21/18, had biliary biopsy via PTC on 3/29/18 (biopsy negative for malignancy). Now admitted 4/10/18 for worsening fatigue with hematochezia. Upper endoscopy 4/12 found to have hemobilia from biliary drain, no anastomotic ulcer or bleeding. Subsequent CTA negative for active bleeding.      Suspect bleeding from portal biliopathy/portalvarices in the setting of recent biliary stricture biopsy and brush.  Patient with known large esophageal varices and portal vein occlusion.  Pt was also noted to have pneumoperitoneum, differentials include infected ascites vs. Air related to biliary drain/manipulation. S/p Right sided paracentesis, and placement of 11x29 mm [place] Viabahn stent graft as internal biliary stent within viabil stent and replacement of internal/external biliary stent placement 4/16.       RECOMMENDATIONS:  - Follow up culture from paracentesis   - Continue monitor for stool color/consistency/output   - Trend Hgb and transfuse PRN per primary team  - If continue having hematochezia with Hgb drop, will need ERCP with stent placement.   - Trend LFts and monitor for fever curve    GI team will continue to follow, please page if any questions.     The patient was discussed and plan agreed upon with GI staff, Dr. Gatito Lopez  GI Fellow  Pager   ______________________________________________________________  S: patient had 6 u pRBC transfused over the weekend. Had 11x29 mm [place] Viabahn stent graft as internal biliary stent within  viabil stent and replacement of internal/external biliary stent placement today.       O:  Blood pressure (!) 126/92, pulse 92, temperature 96.5  F (35.8  C), temperature source Oral, resp. rate 20, height 6' (1.829 m), weight 165 lb 9.1 oz (75.1 kg), SpO2 98 %.    Gen: no acute distress  HEENT: atraumatic, no sclera icterus   CV: RRR, no murmur   Lungs: CLA b/l, no wheezing or crackles   Abd: soft, non tender, non distended, normal active BS  Skin: no jaundice   MS: no skeletal pain   Neuro: no asterixis, A&Ox3, no focal neurological deficit  Psych: normal mood      LABS:  BMP  Recent Labs  Lab 04/16/18 1813 04/16/18  1121 04/16/18 0325 04/16/18  0014 04/15/18  0455     --  138 135 134   POTASSIUM 4.1 3.6 3.3* 3.3* 3.6   CHLORIDE 101  --  98 98 97   CHIDI 7.4*  --  7.6* 7.1* 7.2*   CO2 28  --  30 32 26   BUN 33*  --  27 28 26   CR 0.63*  --  0.63* 0.54* 0.54*   GLC 91  --  96 102* 164*     CBC  Recent Labs  Lab 04/16/18 1813 04/16/18 1121 04/16/18 0325 04/16/18  0014   WBC 12.3* 13.6* 12.9* 14.5*   RBC 2.66* 3.04* 2.68* 2.96*   HGB 8.0* 9.0* 7.9* 8.7*   HCT 22.9* 26.4* 23.0* 25.2*   MCV 86 87 86 85   MCH 30.1 29.6 29.5 29.4   MCHC 34.9 34.1 34.3 34.5   RDW 14.9 15.0 14.9 14.9   PLT 70* 81* 63* 88*     INR  Recent Labs  Lab 04/16/18 1813 04/16/18  1121 04/16/18  0325 04/16/18  0014   INR 2.15* 2.09* 2.02* 2.06*     LFTs  Recent Labs  Lab 04/16/18  0325 04/15/18  0455 04/14/18  0558 04/13/18  0545   ALKPHOS 370* 334* 329* 397*   AST 81* 79* 56* 63*   ALT 70 64 68 74*   BILITOTAL 3.2* 3.2* 2.2* 2.5*   PROTTOTAL 4.9* 5.2* 4.8* 4.8*   ALBUMIN 2.8* 3.1* 2.2* 2.2*      PANC  Recent Labs  Lab 04/10/18  1623   LIPASE 28*

## 2018-04-17 NOTE — PLAN OF CARE
Problem: Patient Care Overview  Goal: Plan of Care/Patient Progress Review  OT/4C - Cancel. Pt with GI bleed, continued bloody stools, and requiring transfusions. Not medically appropriate for OT on this date. Will reschedule as appropriate.

## 2018-04-17 NOTE — PLAN OF CARE
Problem: Patient Care Overview  Goal: Plan of Care/Patient Progress Review  Outcome: No Change  D: Afebrile on RA w/ SPO2 >95, and VSS.  I/A:  HD stable no blood products given this12-hour shift.  2 large bloody stools post IR (exchanged Bilary tube w/ a sten)t; hemoglobin now 8 g/dl previous 9 g/dl.  Octreotide continue to infusing at 50 mcg.  P:  TPN tonight and notify MD of drop in hemoglobin.

## 2018-04-17 NOTE — PLAN OF CARE
Problem: Patient Care Overview  Goal: Plan of Care/Patient Progress Review  Outcome: No Change  D- Patient with GI bleed, continuing to have bloody stool.  I/A- A&Ox4. On RA, sats mid to high 90s. NSR with HR 90-100s. DBPs 90-100s, MD notified. Hgb trending q4h- drop from 8.0 to 6.9, 1 unit RBCs given. 20mEq k+ replaced overnight, 20mEq additional this AM. 4g calcium gluc given, 2 g mag. Started on TPN/lipids. Changed to NPO for gut rest d/t continuing GI bleed. One large dark red stool overnight. Dilaudid x1 for abdominal pain.  P- Continue to monitor for bleeding. Continue with plan of care.     Problem: Gastrointestinal Condition Comorbidity  Goal: Gastrointestinal Condition  Patient comorbidity will be monitored for signs and symptoms of Gastrointestinal condition.  Problems will be absent, minimized or managed by discharge/transition of care.   Monitoring for s/sx GI bleed overnight, updating team overnight with changes. 1 unit RBCs given.

## 2018-04-17 NOTE — BRIEF OP NOTE
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; intact stomach (pylorus sparing Whipple); healthy DJ; unremarkable proximal feeding limb; biliary limb with fresh blood; PTC seen extending through area of marked edema involving the region of the HJ; fresh blood from side holes on pigtail of PTC; no bleeding seen at HJ; distal end of a covered biliary stent was found extending just beyond the HJ; cholangioscopy unremarkable; uncomplicated placement of NJ with tip in feeding limb, deep insertion was not feasible though weighted tip and extra tube was left in the stomach    Summary interpretation: Bleeding is biliary and likely from upstream of the bifurcation, no endoscopic management feasible    Recommendation: Continue critical care regarding bleeding; case will be discussed again with IR; NJ may be used at low rate immediately    Complications: None.  Implants: None.

## 2018-04-17 NOTE — OR NURSING
BRANDON and Dr. Peguero updated on contaminated lab draws from PICC from ICU. Agree by MD to give previously order sliding scale insulin dose which would be 4 unit novolog.

## 2018-04-18 NOTE — ANESTHESIA CARE TRANSFER NOTE
Patient: Naresh Poole    Procedure(s):  Enteroscopy with Endoscopic Retrograde Cholangiopancreatogram and Nasojejunostomy Tube Placement - Wound Class: II-Clean Contaminated   - Wound Class: I-Clean    Diagnosis: Hemobilia   Diagnosis Additional Information: No value filed.    Anesthesia Type:   No value filed.     Note:  Airway :ETT  Patient transferred to:PACU  Comments: Patient transferred to PACU spontaneously breathing. Patient sommulent despite minimal medications given.  No response to commands. Good TVs(500-600 cc)and RR per vent. Report to RN. Handoff Report: Identifed the Patient, Identified the Reponsible Provider, Reviewed the pertinent medical history, Discussed the surgical course, Reviewed Intra-OP anesthesia mangement and issues during anesthesia, Set expectations for post-procedure period and Allowed opportunity for questions and acknowledgement of understanding      Vitals: (Last set prior to Anesthesia Care Transfer)    CRNA VITALS  4/17/2018 1822 - 4/17/2018 1909      4/17/2018             Resp Rate (observed): 8                Electronically Signed By: KASHIF Mccray CRNA  April 17, 2018  7:09 PM

## 2018-04-18 NOTE — ANESTHESIA PREPROCEDURE EVALUATION
"                      Anesthesia Plan      History & Physical Review  History and physical reviewed and following examination; no interval change.    ASA Status:  4 .    NPO Status:  > 8 hours    Plan for General with Intravenous induction. Maintenance will be Balanced.           Postoperative Care      Consents  Anesthetic plan, risks, benefits and alternatives discussed with:  Patient..                 Allergies   Allergen Reactions     Versed [Midazolam] Other (See Comments)     Patient had respiratory depression in PACU after getting one mg of versed in OR.  Patient required flumazenil for reversal.  Would use versed cautiously if absolutely needed.     Prescription Medications as of 4/17/2018             HYDROmorphone (DILAUDID) 2 MG tablet Take 4 mg by mouth every 4 hours as needed for severe pain    Vancomycin HCl (VANCOCIN HCL IV) Inject 1,500 mg into the vein daily    insulin aspart (NOVOLOG FLEXPEN) 100 UNIT/ML injection Inject 1-7 Units Subcutaneous 4 times daily (with meals and nightly) Sliding scale:  -189 mg/dl: 1 unit  -239 mg/dl: 2 units  -289 mg/dl: 3 units  -339 mg/dl: 4 units    insulin aspart (NOVOLOG FLEXPEN) 100 UNIT/ML injection Inject 1-5 Units Subcutaneous At Bedtime Sliding scale:  -249 mg/dl: 1 unit  -299 mg/dl: 2 units  -349 mg/dl: 3 units  -399 mg/dl: 4 units   mg/dl or greater: 5 units    furosemide (LASIX) 20 MG tablet Take 2 tablets (40 mg) by mouth daily    spironolactone (ALDACTONE) 50 MG tablet Take 2 tablets (100 mg) by mouth daily    Miconazole Nitrate (CRITIC-AID CLEAR AF) 2 % ointment Apply topically 2 times daily    gauze pads & dressings 4\"X4\" PADS 10 pads 4 times daily as needed    insulin pen needle 32G X 4 MM Use insulin pen needles daily or as directed.    blood glucose monitoring (NO BRAND SPECIFIED) test strip Use to test blood sugars 4 times daily or as directed.    blood glucose monitoring (ONE TOUCH DELICA) " lancets Use to test blood sugars 4 times daily or as directed.    Alcohol Swabs PADS 4 each 4 times daily as needed    BD SHARPS CONTAINER HOME MISC 1 each every 30 days    insulin glargine (LANTUS) 100 UNIT/ML injection Inject 1-5 Units Subcutaneous At Bedtime    blood glucose monitoring (NO BRAND SPECIFIED) meter device kit Use to test blood sugar4 times daily or as directed.    naloxone (NARCAN) 0.4 MG/ML injection Inject 0.25-1 mLs (0.1-0.4 mg) into the vein once for 1 dose    fluconazole (DIFLUCAN) 200 MG tablet Take 2 tablets (400 mg) by mouth daily for 28 days    sodium chloride, PF, 0.9% PF flush Irrigate with 10 mLs as directed every 24 hours    multivitamin CF formula (DEKAS PLUS) CAPS per capsule Take 1 capsule by mouth daily    pantoprazole (PROTONIX) 40 MG EC tablet Take 1 tablet (40 mg) by mouth 2 times daily    Ascorbic Acid 250 MG CHEW Take 250 mg by mouth daily    Ergocalciferol (DRISDOL) 51106 UNITS CAPS Take 50,000 Units by mouth every 7 days for 4 doses    amLODIPine (NORVASC) 5 MG tablet Take 1 tablet (5 mg) by mouth daily    ertapenem (INVANZ) 1 GM vial Inject 1 g into the vein every 24 hours for 28 days    ONDANSETRON PO Take 4-8 mg by mouth every 8 hours as needed for nausea    alum & mag hydroxide-simethicone (MYLANTA) 200-200-20 MG/5ML SUSP suspension Take 15 mLs by mouth 2 times daily as needed for indigestion    amylase-lipase-protease (CREON 24) 67241-03814 UNITS CPEP per EC capsule Take 2 caps with meals and 1 cap with snacks.    methadone (DOLOPHINE) 5 MG tablet Take 5 mg by mouth 3 times daily    polyethylene glycol (MIRALAX/GLYCOLAX) Packet Take 17 g by mouth daily as needed for constipation    simethicone (GAS-X) 80 MG chewable tablet Take 80 mg by mouth every 6 hours as needed for flatulence or cramping    bisacodyl (DULCOLAX) 5 MG EC tablet Take 5 mg by mouth daily as needed for constipation      Facility Administered Medications as of 4/17/2018             calcium gluconate 4 g  "in D5W 100 mL intermittent infusion Inject 4 g into the vein once    calcium gluconate 2 g in D5W 100 mL intermittent infusion Inject 2 g into the vein once    glucose gel 15-30 g (Auto Hold) ((Auto Hold) since 4/17/2018  4:14 PM) Take 15-30 g by mouth every 15 minutes as needed for low blood sugar    Linked Group 1:  \"Or\" Linked Group Details     dextrose 50 % injection 25-50 mL (Auto Hold) ((Auto Hold) since 4/17/2018  4:14 PM) Inject 25-50 mLs into the vein every 15 minutes as needed for low blood sugar    Linked Group 1:  \"Or\" Linked Group Details     glucagon injection 1 mg (Auto Hold) ((Auto Hold) since 4/17/2018  4:14 PM) Inject 1 mg Subcutaneous every 15 minutes as needed for low blood sugar (May repeat x 1 only)    Linked Group 1:  \"Or\" Linked Group Details     lidocaine 1 % 1 mL 1 mL by Other route every hour as needed (mild pain with VAD insertion or accessing implanted port)    lidocaine (LMX4) kit Apply topically every hour as needed for pain (with VAD insertion or accessing implanted port.)    sodium chloride (PF) 0.9% PF flush 3 mL 3 mLs by Intracatheter route every hour as needed for line flush (for peripheral IV flush post IV meds)    sodium chloride (PF) 0.9% PF flush 3 mL 3 mLs by Intracatheter route every 8 hours    May continue current IV fluids if patient has IV fluids infusing. continuous prn    sodium chloride (PF) 0.9% PF flush 3 mL Inject 3 mLs into the vein q1 min prn for line flush (after medication administration)    indomethacin (INDOCIN) 50 MG Suppository 100 mg Place 2 suppositories (100 mg) rectally once as needed for other (for pain prophylaxis)    sodium chloride 0.9% infusion Inject into the vein continuous    meropenem (MERREM) 1 g vial to attach to  mL bag (Auto Hold) ((Auto Hold) since 4/17/2018  4:14 PM) Inject 1,000 mg (1 g) into the vein every 8 hours    parenteral nutrition - ADULT compounded formula by CENTRAL LINE IV route TPN CONTINUOUS    dextrose 10 % 1,000 mL " "infusion Inject into the vein continuous prn (Give if on IV Insulin Infusion, and Parenteral or Enteral nutrition held or cycled off. )    insulin 1 unit/mL in saline (NovoLIN, HumuLIN Regular) drip - ADULT IV Infusion Inject 0-24 Units/hr into the vein continuous    simethicone 133mg/2mL oral suspension as needed    sterile water (bottle) irrigation as needed    iopamidol (ISOVUE-250) solution as needed    insulin aspart (NovoLOG) inj (RAPID ACTING) Inject 4 Units Subcutaneous once    insulin aspart (NovoLOG) inj (RAPID ACTING) Inject 1-12 Units Subcutaneous every 4 hours    albumin human 5 % injection 500 mL Inject 500 mLs into the vein once    potassium chloride 10 mEq in 100 mL sterile water intermittent infusion (premix) (Discontinued) Inject 100 mLs (10 mEq) into the vein once    atropine 1 MG/10ML injection (Discontinued)     insulin aspart (NovoLOG) inj (RAPID ACTING) (Discontinued) Inject 1-4 Units Subcutaneous every 4 hours    glucose gel 15-30 g (Discontinued) Take 15-30 g by mouth every 15 minutes as needed for low blood sugar    Linked Group 2:  \"Or\" Linked Group Details     dextrose 50 % injection 25-50 mL (Discontinued) Inject 25-50 mLs into the vein every 15 minutes as needed for low blood sugar    Linked Group 2:  \"Or\" Linked Group Details     glucagon injection 1 mg (Discontinued) Inject 1 mg Subcutaneous every 15 minutes as needed for low blood sugar (May repeat x 1 only)    Linked Group 2:  \"Or\" Linked Group Details     insulin aspart (NovoLOG) inj (RAPID ACTING) (Discontinued) Inject 1-6 Units Subcutaneous every 4 hours    glucose gel 15-30 g (Discontinued) Take 15-30 g by mouth every 15 minutes as needed for low blood sugar    Linked Group 3:  \"Or\" Linked Group Details     dextrose 50 % injection 25-50 mL (Discontinued) Inject 25-50 mLs into the vein every 15 minutes as needed for low blood sugar    Linked Group 3:  \"Or\" Linked Group Details     glucagon injection 1 mg (Discontinued) Inject 1 " "mg Subcutaneous every 15 minutes as needed for low blood sugar (May repeat x 1 only)    Linked Group 3:  \"Or\" Linked Group Details     sodium chloride 0.9% infusion (Discontinued) continuous prn    rocuronium (ZEMURON) injection (Discontinued) Inject into the vein as needed    propofol (DIPRIVAN) injection 10 mg/mL vial (Discontinued) Inject into the vein as needed    lidocaine injection 2% (MDV) (Discontinued) Inject into the vein as needed    phenylephrine (PUNEET-SYNEPHRINE) injection 1 mg (Discontinued) Inject 1 mg into the vein continuous prn    ePHEDrine injection (Discontinued) Inject into the vein as needed    ondansetron (ZOFRAN) injection (Discontinued) Inject into the vein as needed for nausea or vomiting    sugammadex (BRIDION) injection (Discontinued) as needed    potassium chloride SA (K-DUR/KLOR-CON M) CR tablet 20-40 mEq (Auto Hold) ((Auto Hold) since 4/17/2018  4:14 PM) Take 2-4 tablets (20-40 mEq) by mouth every 2 hours as needed for potassium supplementation    potassium chloride (KLOR-CON) Packet 20-40 mEq (Auto Hold) ((Auto Hold) since 4/17/2018  4:14 PM) 20-40 mEq by Oral or Feeding Tube route every 2 hours as needed for potassium supplementation    potassium chloride 10 mEq in 100 mL sterile water intermittent infusion (premix) (Auto Hold) ((Auto Hold) since 4/17/2018  4:14 PM) Inject 100 mLs (10 mEq) into the vein every hour as needed for potassium supplementation    potassium chloride 10 mEq in 100 mL intermittent infusion with 10 mg lidocaine (Auto Hold) ((Auto Hold) since 4/17/2018  4:14 PM) Inject 100 mLs (10 mEq) into the vein every hour as needed for potassium supplementation    potassium chloride 20 mEq in 50 mL intermittent infusion (Auto Hold) ((Auto Hold) since 4/17/2018  4:14 PM) Inject 50 mLs (20 mEq) into the vein every hour as needed for potassium supplementation    lidocaine (LMX4) kit (Auto Hold) ((Auto Hold) since 4/17/2018  4:14 PM) Apply topically once as needed for moderate " pain (for local anesthetic during PICC insertion)    sodium chloride (PF) 0.9% PF flush 5-50 mL (Auto Hold) ((Auto Hold) since 4/17/2018  4:14 PM) 5-50 mLs by Intracatheter route once as needed for line flush (to flush each lumen with line placement)    heparin lock flush 10 UNIT/ML injection 2-5 mL (Auto Hold) ((Auto Hold) since 4/17/2018  4:14 PM) 2-5 mLs by Intracatheter route once as needed for line flush (for locking each dormant lumen with line placement)    magnesium sulfate 2 g in NS intermittent infusion (PharMEDium or FV Cmpd) (Auto Hold) ((Auto Hold) since 4/17/2018  4:14 PM) Inject 50 mLs (2 g) into the vein daily as needed for magnesium supplementation    magnesium sulfate 4 g in 100 mL sterile water (premade) (Auto Hold) ((Auto Hold) since 4/17/2018  4:14 PM) Inject 100 mLs (4 g) into the vein every 4 hours as needed for magnesium supplementation    potassium phosphate 15 mmol in D5W 250 mL intermittent infusion (Auto Hold) ((Auto Hold) since 4/17/2018  4:14 PM) Inject 15 mmol into the vein daily as needed for phosphorous supplementation    potassium phosphate 20 mmol in D5W 500 mL intermittent infusion (Auto Hold) ((Auto Hold) since 4/17/2018  4:14 PM) Inject 20 mmol into the vein every 6 hours as needed for phosphorous supplementation    potassium phosphate 20 mmol in D5W 250 mL intermittent infusion (Auto Hold) ((Auto Hold) since 4/17/2018  4:14 PM) Inject 20 mmol into the vein every 6 hours as needed for phosphorous supplementation    potassium phosphate 25 mmol in D5W 500 mL intermittent infusion (Auto Hold) ((Auto Hold) since 4/17/2018  4:14 PM) Inject 25 mmol into the vein every 8 hours as needed for phosphorous supplementation    lipids (INTRALIPID) 20 % infusion 250 mL (Auto Hold) ((Auto Hold) since 4/17/2018  4:14 PM) Inject 250 mLs into the vein every 24 hours    dextrose 10 % 1,000 mL infusion Inject into the vein continuous prn ( )    parenteral nutrition - ADULT compounded formula by  CENTRAL LINE IV route TPN CONTINUOUS    lidocaine 1 % 1 mL (Auto Hold) ((Auto Hold) since 4/17/2018  4:14 PM) 1 mL by Other route every hour as needed (mild pain with VAD insertion or accessing implanted port)    lidocaine (LMX4) kit (Auto Hold) ((Auto Hold) since 4/17/2018  4:14 PM) Apply topically every hour as needed for moderate pain (with VAD insertion or accessing implanted port)    sodium chloride (PF) 0.9% PF flush 10-20 mL (Auto Hold) ((Auto Hold) since 4/17/2018  4:14 PM) 10-20 mLs by Intracatheter route every hour as needed for line flush or post meds or blood draw    heparin lock flush 10 UNIT/ML injection 5-10 mL (Auto Hold) ((Auto Hold) since 4/17/2018  4:14 PM) 5-10 mLs by Intracatheter route every 24 hours    heparin lock flush 10 UNIT/ML injection 5-10 mL (Auto Hold) ((Auto Hold) since 4/17/2018  4:14 PM) 5-10 mLs by Intracatheter route every hour as needed for other (to lock each CVC - Open Ended (Tunneled and Non-Tunneled) dormant lumen.)    pantoprazole (PROTONIX) 40 mg IV push injection (Auto Hold) ((Auto Hold) since 4/17/2018  4:14 PM) Inject 40 mg into the vein 2 times daily    HYDROmorphone (PF) (DILAUDID) injection 0.2 mg (Auto Hold) ((Auto Hold) since 4/17/2018  4:14 PM) Inject 0.2 mg into the vein every 3 hours as needed for moderate to severe pain    naloxone (NARCAN) injection 0.1-0.4 mg (Auto Hold) ((Auto Hold) since 4/17/2018  4:14 PM) Inject 0.25-1 mLs (0.1-0.4 mg) into the vein every 2 minutes as needed for opioid reversal    sodium chloride (PF) 0.9% PF flush 10-20 mL (Auto Hold) ((Auto Hold) since 4/17/2018  4:14 PM) 10-20 mLs by Intracatheter route every hour as needed for line flush or post meds or blood draw (to flush each peripheral midline IV catheter lumen)    sodium chloride (PF) 0.9% PF flush 10 mL (Auto Hold) ((Auto Hold) since 4/17/2018  4:14 PM) 10 mLs by Intracatheter route every 8 hours    heparin lock flush 10 UNIT/ML injection 5-10 mL (Auto Hold) ((Auto Hold) since  "4/17/2018  4:14 PM) 5-10 mLs by Intracatheter route every 24 hours    heparin lock flush 10 UNIT/ML injection 5-10 mL (Auto Hold) ((Auto Hold) since 4/17/2018  4:14 PM) 5-10 mLs by Intracatheter route every hour as needed for other (to lock each dormant lumen of peripheral midline IV catheter lumen. MAX: 5 mL per lumen.)    fluconazole (DIFLUCAN) intermittent infusion 400 mg in NaCl (Auto Hold) ((Auto Hold) since 4/17/2018  4:14 PM) Inject 200 mLs (400 mg) into the vein every 24 hours    barium sulfate (EZ PAQUE) oral suspension 96% (Auto Hold) ((Auto Hold) since 4/17/2018  4:14 PM) Take by mouth once    DAPTOmycin (CUBICIN) 400 mg in sodium chloride 0.9 % 100 mL intermittent infusion (Auto Hold) ((Auto Hold) since 4/17/2018  4:14 PM) Inject 400 mg into the vein every 24 hours    iopamidol (ISOVUE-250) 51% solution 50 mL (Auto Hold) ((Auto Hold) since 4/17/2018  4:14 PM) 50 mLs by Tube route once    sodium chloride (PF) 0.9% PF flush 10-20 mL (Auto Hold) ((Auto Hold) since 4/17/2018  4:14 PM) 10-20 mLs by Intracatheter route every hour as needed for line flush    octreotide (sandoSTATIN) 1,250 mcg in sodium chloride 0.9 % 250 mL Inject 50 mcg/hr into the vein continuous    glucose gel 15-30 g (Discontinued) Take 15-30 g by mouth every 15 minutes as needed for low blood sugar    Linked Group 4:  \"Or\" Linked Group Details     dextrose 50 % injection 25-50 mL (Discontinued) Inject 25-50 mLs into the vein every 15 minutes as needed for low blood sugar    Linked Group 4:  \"Or\" Linked Group Details     glucagon injection 1 mg (Discontinued) Inject 1 mg Subcutaneous every 15 minutes as needed for low blood sugar (May repeat x 1 only)    Linked Group 4:  \"Or\" Linked Group Details     amylase-lipase-protease (CREON 24) 13589-69370 units per EC capsule 48,000 Units (Auto Hold) ((Auto Hold) since 4/17/2018  4:14 PM) Take 2 capsules (48,000 Units) by mouth 3 times daily (with meals)    ascorbic acid (vitamin C) chewable tablet " "250 mg (Auto Hold) ((Auto Hold) since 4/17/2018  4:14 PM) Take 1 tablet (250 mg) by mouth daily    vitamin D (ERGOCALCIFEROL) capsule 50,000 Units (Auto Hold) ((Auto Hold) since 4/17/2018  4:14 PM) Take 1 capsule (50,000 Units) by mouth every 7 days    miconazole (MICATIN) 2 % cream (Auto Hold) ((Auto Hold) since 4/17/2018  4:14 PM) Apply topically 2 times daily    multivitamin CF formula (DEKAs Plus) per capsule 1 capsule (Auto Hold) ((Auto Hold) since 4/17/2018  4:14 PM) Take 1 capsule by mouth daily    ondansetron (ZOFRAN-ODT) ODT tab 4 mg (Auto Hold) ((Auto Hold) since 4/17/2018  4:14 PM) Take 1 tablet (4 mg) by mouth every 6 hours as needed for nausea or vomiting    Linked Group 5:  \"Or\" Linked Group Details     ondansetron (ZOFRAN) injection 4 mg (Auto Hold) ((Auto Hold) since 4/17/2018  4:14 PM) Inject 2 mLs (4 mg) into the vein every 6 hours as needed for nausea or vomiting    Linked Group 5:  \"Or\" Linked Group Details     dibucaine (NUPERCAINAL RE) 1 % rectal ointment (Auto Hold) ((Auto Hold) since 4/17/2018  4:14 PM) Place rectally 3 times daily as needed for hemorrhoids or irritation    ertapenem (INVanz) 1 g vial to attach to  mL bag (Discontinued) Inject 1 g into the vein every 24 hours      No results found for this or any previous visit (from the past 4320 hour(s)).  PAST MEDICAL HISTORY:   Past Medical History:   Diagnosis Date     Hypertension      Pancreatic cancer (H) 01/2017       PAST SURGICAL HISTORY:   Past Surgical History:   Procedure Laterality Date     ENTEROSCOPY WITH OVERTUBE N/A 3/19/2018    Procedure: ENTEROSCOPY WITH OVERTUBE;  enteroscopy with biopsies;  Surgeon: Jared Cooney MD;  Location: [facility] OR     ESOPHAGOSCOPY, GASTROSCOPY, DUODENOSCOPY (EGD), COMBINED N/A 4/12/2018    Procedure: COMBINED ESOPHAGOSCOPY, GASTROSCOPY, DUODENOSCOPY (EGD);  EGD  ;  Surgeon: Elroy Haro MD;  Location: [facility] GI     LAPAROSCOPY DIAGNOSTIC (GENERAL)  09/2017     PICC INSERTION Left " 03/22/2018    4Fr - 50cm (2cm external), L medial brachial vein, SVC RA junction     PICC INSERTION Left 03/29/2018    4Fr - 45cm (3cm external), L medial brachial vein     WHIPPLE PROCEDURE  05/2017       FAMILY HISTORY:   Family History   Problem Relation Age of Onset     Coronary Artery Disease Early Onset Maternal Grandmother 36     Liver Disease No family hx of      Colon Cancer No family hx of        SOCIAL HISTORY:   Social History   Substance Use Topics     Smoking status: Never Smoker     Smokeless tobacco: Never Used     Alcohol use No     Lab Results   Component Value Date    WBC 12.4 (H) 04/17/2018    HGB 6.8 (LL) 04/17/2018    HCT 23.6 (L) 04/17/2018    PLT 51 (L) 04/17/2018    ALT 74 (H) 04/17/2018    AST 74 (H) 04/17/2018     04/17/2018    BUN 39 (H) 04/17/2018    CO2 24 04/17/2018    TSH 1.33 03/14/2018    INR 2.43 (H) 04/17/2018     Prescriptions Prior to Admission   Medication Sig Dispense Refill Last Dose     HYDROmorphone (DILAUDID) 2 MG tablet Take 4 mg by mouth every 4 hours as needed for severe pain   4/10/2018 at 1100     Vancomycin HCl (VANCOCIN HCL IV) Inject 1,500 mg into the vein daily   4/9/2018 at 1700     insulin aspart (NOVOLOG FLEXPEN) 100 UNIT/ML injection Inject 1-7 Units Subcutaneous 4 times daily (with meals and nightly) Sliding scale:  -189 mg/dl: 1 unit  -239 mg/dl: 2 units  -289 mg/dl: 3 units  -339 mg/dl: 4 units   4/9/2018 at Unknown time     insulin aspart (NOVOLOG FLEXPEN) 100 UNIT/ML injection Inject 1-5 Units Subcutaneous At Bedtime Sliding scale:  -249 mg/dl: 1 unit  -299 mg/dl: 2 units  -349 mg/dl: 3 units  -399 mg/dl: 4 units   mg/dl or greater: 5 units   4/9/2018 at Unknown time     furosemide (LASIX) 20 MG tablet Take 2 tablets (40 mg) by mouth daily 30 tablet 0 4/10/2018 at AM     spironolactone (ALDACTONE) 50 MG tablet Take 2 tablets (100 mg) by mouth daily 30 tablet 0 4/10/2018 at Unknown time      "Miconazole Nitrate (CRITIC-AID CLEAR AF) 2 % ointment Apply topically 2 times daily 71 g 11 4/10/2018 at AM     gauze pads & dressings 4\"X4\" PADS 10 pads 4 times daily as needed 200 each 11 Past Week at Unknown time     insulin pen needle 32G X 4 MM Use insulin pen needles daily or as directed. 100 each 11 4/10/2018 at Unknown time     blood glucose monitoring (NO BRAND SPECIFIED) test strip Use to test blood sugars 4 times daily or as directed. 100 strip 11 4/10/2018 at Unknown time     blood glucose monitoring (ONE TOUCH DELICA) lancets Use to test blood sugars 4 times daily or as directed. 100 each 11 4/10/2018 at Unknown time     BD SHARPS CONTAINER HOME MISC 1 each every 30 days 1 each 11 4/10/2018 at Unknown time     insulin glargine (LANTUS) 100 UNIT/ML injection Inject 1-5 Units Subcutaneous At Bedtime 9 mL 3 HAS NOT USED     fluconazole (DIFLUCAN) 200 MG tablet Take 2 tablets (400 mg) by mouth daily for 28 days 56 tablet 0 4/10/2018 at AM     sodium chloride, PF, 0.9% PF flush Irrigate with 10 mLs as directed every 24 hours 300 mL 1 4/9/2018 at 2000     multivitamin CF formula (DEKAS PLUS) CAPS per capsule Take 1 capsule by mouth daily 30 capsule 11 4/10/2018 at AM     pantoprazole (PROTONIX) 40 MG EC tablet Take 1 tablet (40 mg) by mouth 2 times daily 30 tablet 11 4/10/2018 at AM     Ascorbic Acid 250 MG CHEW Take 250 mg by mouth daily 90 tablet 0 4/10/2018 at AM     Ergocalciferol (DRISDOL) 64243 UNITS CAPS Take 50,000 Units by mouth every 7 days for 4 doses 4 capsule 0 4/6/2018 at Unknown time     amLODIPine (NORVASC) 5 MG tablet Take 1 tablet (5 mg) by mouth daily 30 tablet 0 4/10/2018 at AM     ertapenem (INVANZ) 1 GM vial Inject 1 g into the vein every 24 hours for 28 days 280 mL 0 4/9/2018 at 1700     ONDANSETRON PO Take 4-8 mg by mouth every 8 hours as needed for nausea   Past Week at Unknown time     alum & mag hydroxide-simethicone (MYLANTA) 200-200-20 MG/5ML SUSP suspension Take 15 mLs by mouth 2 " times daily as needed for indigestion   4/10/2018 at Unknown time     amylase-lipase-protease (CREON 24) 79066-12444 UNITS CPEP per EC capsule Take 2 caps with meals and 1 cap with snacks.   4/10/2018 at AM     methadone (DOLOPHINE) 5 MG tablet Take 5 mg by mouth 3 times daily   4/10/2018 at AM     simethicone (GAS-X) 80 MG chewable tablet Take 80 mg by mouth every 6 hours as needed for flatulence or cramping   Past Week at Unknown time     bisacodyl (DULCOLAX) 5 MG EC tablet Take 5 mg by mouth daily as needed for constipation   4/10/2018 at Unknown time     Alcohol Swabs PADS 4 each 4 times daily as needed 120 each 11 Taking     blood glucose monitoring (NO BRAND SPECIFIED) meter device kit Use to test blood sugar4 times daily or as directed. 1 kit 0      naloxone (NARCAN) 0.4 MG/ML injection Inject 0.25-1 mLs (0.1-0.4 mg) into the vein once for 1 dose 1 mL 0      polyethylene glycol (MIRALAX/GLYCOLAX) Packet Take 17 g by mouth daily as needed for constipation   Unknown at Unknown time               .

## 2018-04-18 NOTE — PROGRESS NOTES
04/18/18 1057   Quick Adds   Type of Visit Initial Occupational Therapy Evaluation   Living Environment   Lives With spouse   Living Arrangements house   Home Accessibility bed not on first floor;stairs to enter home;stairs within home   Number of Stairs to Enter Home 3   Number of Stairs Within Home 12   Stair Railings at Home inside, present at both sides;outside, present at both sides   Living Environment Comment Pt live with wife and 3 daughters ages 5-12. Wife states would like pt to go to TCU as was unable to provide care for pt due to his profound weakness   Self-Care   Dominant Hand right   Usual Activity Tolerance good   Current Activity Tolerance poor   Regular Exercise no   Equipment Currently Used at Home walker, rolling;shower chair   Activity/Exercise/Self-Care Comment Wife reports significant deline in strength, requiring A for all ADLs   Functional Level Prior   Ambulation 1-->assistive equipment   Transferring 1-->assistive equipment   Toileting 3-->assistive equipment and person   Bathing 3-->assistive equipment and person   Dressing 2-->assistive person   Eating 0-->independent   Communication 0-->understands/communicates without difficulty   Swallowing 0-->swallows foods/liquids without difficulty   Cognition 0 - no cognition issues reported   Fall history within last six months yes   Number of times patient has fallen within last six months 5   Which of the above functional risks had a recent onset or change? ambulation;toileting;transferring;bathing;dressing;fall history   General Information   Onset of Illness/Injury or Date of Surgery - Date 04/10/18   Referring Physician Karen Coronado MD   Patient/Family Goals Statement Pt and wife would like pt to discharge to TCU to progress strength prior to dc home   Additional Occupational Profile Info/Pertinent History of Current Problem 47 year old male with history of pancreatic cancer T3N0 status post neoadjuvant FOLFIRINOX, pylorus  sparing whipple in 5/2017 complicated by intraoperative portal vein injury, subsequently with hepatic abscess and polymicrobial bacteremia/fungemia.  Recently admitted for liver abscess and cholangitis s/p attempted retrograde ERCP for management of biliary stricture though not successful, ultimately treated with PTC 3/21/18, had biliary biopsy via PTC on 3/29/18 (biopsy negative for malignancy). Now admitted 4/10/18 for worsening fatigue with hematochezia   Precautions/Limitations fall precautions   General Observations Pt supine in bed   Cognitive Status Examination   Orientation orientation to person, place and time   Level of Consciousness alert   Able to Follow Commands WNL/WFL   Cognitive Comment Pt in significant pain, difficult to assess cognition. Will continue to monitor and assess cognition    Visual Perception   Visual Perception No deficits were identified   Sensory Examination   Sensory Quick Adds No deficits were identified   Pain Assessment   Patient Currently in Pain Yes, see Vital Sign flowsheet   Integumentary/Edema   Integumentary/Edema Comments césar LE edema in feet   Range of Motion (ROM)   ROM Quick Adds No deficits were identified   Strength   Strength Comments césar UE grossly 3+/5   Hand Strength   Hand Strength Comments WFL   Muscle Tone Assessment   Muscle Tone Quick Adds No deficits were identified   Coordination   Upper Extremity Coordination No deficits were identified   Transfer Skill: Bed to Chair/Chair to Bed   Level of [place]: Bed to Chair dependent (less than 25% patients effort)   Physical Assist/Nonphysical Assist: Bed to Chair 2 persons   Lower Body Dressing   Level of [place]: Dress Lower Body dependent (less than 25% patients effort)   Instrumental Activities of Daily Living (IADL)   IADL Comments wife assists with IADLs   Activities of Daily Living Analysis   Impairments Contributing to Impaired Activities of Daily Living balance impaired;cognition  "impaired;coordination impaired;fear and anxiety;pain;strength decreased;ROM decreased   General Therapy Interventions   Planned Therapy Interventions ADL retraining;IADL retraining;balance training;bed mobility training;cognition;risk factor education;progressive activity/exercise;home program guidelines;transfer training;strengthening;ROM   Clinical Impression   Criteria for Skilled Therapeutic Interventions Met yes, treatment indicated   OT Diagnosis decreased ADL I   Influenced by the following impairments medica status,  pain, strength   Assessment of Occupational Performance 5 or more Performance Deficits   Identified Performance Deficits dressing, bathing, toileting, transfers, grooming, home management   Clinical Decision Making (Complexity) Low complexity   Therapy Frequency 5 times/wk   Predicted Duration of Therapy Intervention (days/wks) 4 weeks   Anticipated Equipment Needs at Discharge (TBD)   Anticipated Discharge Disposition Transitional Care Facility   Risks and Benefits of Treatment have been explained. Yes   Patient, Family & other staff in agreement with plan of care Yes   [facility] TM \"6 Clicks\"   2016, Trustees of [facility], under license to [employer].  All rights reserved.   6 Clicks Short Forms Daily Activity Inpatient Short Form   [facility]  \"6 Clicks\" Daily Activity Inpatient Short Form   1. Putting on and taking off regular lower body clothing? 2 - A Lot   2. Bathing (including washing, rinsing, drying)? 2 - A Lot   3. Toileting, which includes using toilet, bedpan or urinal? 2 - A Lot   4. Putting on and taking off regular upper body clothing? 2 - A Lot   5. Taking care of personal grooming such as brushing teeth? 2 - A Lot   6. Eating meals? 2 - A Lot   Daily Activity Raw Score (Score out of 24.Lower scores equate to lower levels of function) 12   Total Evaluation Time   Total Evaluation Time (Minutes) 5     "

## 2018-04-18 NOTE — PLAN OF CARE
Problem: Patient Care Overview  Goal: Plan of Care/Patient Progress Review  Outcome: No Change  D- Patient with GI bleed with ERCP with stent placement done 4/17.  I/A- A&O overnight. On 5L NC with sats mid 90s. HR 90-100s sinus rhythm. Received 1 unit PRBCs and 1 unit platelets. No stools overnight. PICC line pulled d/t positive bcx, 3 PIVs in place. NG placed in OR, TF not started overnight per MD. Once time doses of dapto and fluconazole given since they were missed during procedure. BG dropped this AM to 41, asymptomatic, 1 amp D50 given.   P- Continue to monitor following plan of care.    Problem: Gastrointestinal Condition Comorbidity  Goal: Gastrointestinal Condition  Patient comorbidity will be monitored for signs and symptoms of Gastrointestinal condition.  Problems will be absent, minimized or managed by discharge/transition of care.   Outcome: No Change  Monitoring s/sx of GI bleed, no bloody stools overnight.

## 2018-04-18 NOTE — PROGRESS NOTES
GASTROENTEROLOGY PROGRESS NOTE    ASSESSMENT:  47 year old male with history of pancreatic cancer T3N0 status post neoadjuvant FOLFIRINOX, pylorus sparing whipple in 5/2017 complicated by intraoperative portal vein injury, subsequently with hepatic abscess and polymicrobial bacteremia/fungemia.  Recently admitted for liver abscess and cholangitis s/p attempted retrograde ERCP for management of biliary stricture though not successful, ultimately treated with PTC 3/21/18, had biliary biopsy via PTC on 3/29/18 (biopsy negative for malignancy). Now admitted 4/10/18 for worsening fatigue with hematochezia. Upper endoscopy 4/12 found to have hemobilia from biliary drain, no anastomotic ulcer or bleeding. Subsequent CTA negative for active bleeding.      Suspect bleeding from portal biliopathy/biliary varices in the setting of recent biliary stricture biopsy and brush.  Patient with known large esophageal varices and portal vein occlusion.  Pt was also noted to have pneumoperitoneum, differentials include infected ascites vs. Air related to biliary drain/manipulation. S/p Right sided paracentesis, and placement of 11x29 mm Milton Viabahn stent graft as internal biliary stent within viabil stent and replacement of internal/external biliary stent placement 4/16. Continue having bloody output since procedure.  ERCP 4/17 showed active slow bleeding from pig tail side hold of internal external drain, and cholangiography was without evidence of direct vascular communication suggesting that the source of bleeding is along the tract prior to entrance into the biliary system, and 12 Fr NJ placed in feeding limb.       RECOMMENDATIONS:  - Continue Octreotide gtt.   - Continue monitor for stool color/consistency/output   - Trend Hgb and transfuse PRN per primary team  - Trend LFts and monitor for fever curve  - Will plan EUS in the future to biopsy pancreatic bed. (will wait until bleeding episodes resolve)     GI team will continue to  follow, please page if any questions.     The patient was discussed and plan agreed upon with GI staff, Dr. Kwaku Lopez  GI Fellow  Pager   ______________________________________________________________  S: patient had 1 hematochezia this morning, bleeding seems to slow down. Received 2 u PRBC, Hgb increased from 7.4 to 9.6.     O:  Blood pressure 102/82, pulse 92, temperature 98.1  F (36.7  C), temperature source Axillary, resp. rate 20, height 6' (1.829 m), weight 164 lb 3.9 oz (74.5 kg), SpO2 96 %.    Gen: no acute distress, fatigue.   HEENT: atraumatic, mild sclera icterus   CV: RRR, no murmur   Lungs: CLA b/l, no wheezing or crackles   Abd: distended, soft, non tender, normal active BS  Skin: mild jaundice   MS: no skeletal pain   Neuro: no asterixis, A&Ox3, no focal neurological deficit  Psych: normal mood      LABS:  BMP    Recent Labs  Lab 04/18/18  0624 04/17/18  1553 04/17/18  0350 04/16/18  2146    134 139 141   POTASSIUM 4.0 5.1 3.6 3.5   CHLORIDE 106 98 101 102   CHIDI 7.3* 7.4* 7.9* 7.1*   CO2 25 24 25 30   BUN 42* 39* 36* 34*   CR 0.56* 0.62* 0.60* 0.65*   GLC 44* 694* 278* 172*     CBC  Recent Labs  Lab 04/18/18  1606 04/18/18  1011 04/18/18  0624 04/17/18  2146  04/17/18  0350  04/16/18  1813 04/16/18  1121 04/16/18  0325   WBC  --   --   --   --   --  12.4*  --  12.3* 13.6* 12.9*   RBC  --   --   --   --   --  2.70*  --  2.66* 3.04* 2.68*   HGB  --   --  9.6* 7.4*  < > 7.9*  < > 8.0* 9.0* 7.9*   HCT  --   --   --   --   --  23.6*  --  22.9* 26.4* 23.0*   MCV  --   --   --   --   --  87  --  86 87 86   MCH  --   --   --   --   --  29.3  --  30.1 29.6 29.5   MCHC  --   --   --   --   --  33.5  --  34.9 34.1 34.3   RDW  --   --   --   --   --  15.1*  --  14.9 15.0 14.9   PLT Canceled, Test credited 59* 71* 39*  < > 54*  --  70* 81* 63*   < > = values in this interval not displayed.  INR    Recent Labs  Lab 04/18/18  0624 04/17/18  2146 04/17/18  1553 04/17/18  1040    INR 1.87* 2.22* 2.43* 2.39*     LFTs    Recent Labs  Lab 04/18/18  0624 04/17/18  0350 04/16/18  0325 04/15/18  0455   ALKPHOS 358* 349* 370* 334*   * 74* 81* 79*   ALT 94* 74* 70 64   BILITOTAL 3.5* 3.3* 3.2* 3.2*   PROTTOTAL 4.9* 4.2* 4.9* 5.2*   ALBUMIN 2.7* 2.4* 2.8* 3.1*      PANCNo lab results found in last 7 days.

## 2018-04-18 NOTE — PLAN OF CARE
Problem: Patient Care Overview  Goal: Plan of Care/Patient Progress Review  Pt VSS throughout shift.  Sats difficult to obtain at times due to cool extremities and poor perfusion.  Wife at bedside throughout day.  Repeat Hgb 7.7 and Plts 54.  Bloody drainage from Bili drain.  Medium sized bloody stool x 1 at 0800.  Pt  Blood sugars low, D5NS gtt started and TF started.  High risk for refeeding syndrome so TF are going at slow progression rate.  Pt also advanced to a Full Liquid diet with very poor PO intake.  Pt up to chair via sling x 2 hours today.  Tolerated well.  PRN Dilaudid given.  Will continue to monitor/assess.

## 2018-04-18 NOTE — PROGRESS NOTES
[facility] Nurse Inpatient Pressure Injury Assessment     Follow-up assessment  Reason for consultation: Evaluate and treat acral Pressure injury      ASSESSMENT    Pressure Injury: on sacral , present on admission ,   Pressure Injury is Stage Unstageable   Contributing factor of the pressure injury: pressure, microclimate and moisture  Status: no change    TREATMENT PLAN    Plan of care for wound located on the sacral Cleanse with MicroKlenz moisten gauze   Pat dry. Apply no sting barrier film to the oz wound skin allow to dry   Cover with  4 x4  Mepilex dressing  Change every other day and as needed      Orders reviewed WO Nurse follow-up plan:weekly  Nursing to notify the Provider(s) and re-consult the WO Nurse if wound(s) deteriorates or new skin concern.    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     Of note, the patient was recently hospitalized her from 3/14-4/2 for AMS and weakness, found to have polymicrobial bacteremia (E coli, C perfringens, Citrobacter, and enterococcus) and multiple abscesses of the liver s/p perc bili drain. He was discharged on PO micafungin, vanc, and ertapenem w/ the plan to cont until seen by ID in 4wks. At home, the wife notes that he has fallen twice and over the past couple of days has been increasingly reliant on her for cares. Two days ago she noticed drain output increasing and yesterday noticed that the output look red (normally is brown), which prompted her to bring the patient to the ED. The patient stated that his epigastric and LUQ is improving since his last hospitalization. He had seen a palliative care doctor outside of our system who started him on methadone and hydromorphone (plans to  transfer palliative care services to the ). He denied dysuria, cough, fever/chills. He endorsed poor PO intake and appetite.   Objective Data   Containment of urine/stool: Diaper    Current Diet/ Nutrition:    Active Diet Order      Advance Diet as Tolerated: Full Liquid Diet    Output:   I/O last 3 completed shifts:  In: 2538.75 [I.V.:1257.92]  Out: 650 [Urine:650]    Risk Assessment:   Sensory Perception: 3-->slightly limited  Moisture: 3-->occasionally moist  Activity: 3-->walks occasionally  Mobility: 2-->very limited  Nutrition: 2-->probably inadequate  Friction and Shear: 1-->problem  Luis Score: 14      Labs:   Recent Labs  Lab 04/18/18  0624  04/17/18  1044 04/17/18  0350   HGB 9.6*  < > 7.7* 7.9*   INR 1.87*  < > 2.39* 2.34*   WBC  --   --   --  12.4*   A1C  --   --  5.2  --    < > = values in this interval not displayed.      Recent Labs  Lab 04/18/18  0622 04/17/18  2213 04/17/18  1554 04/16/18  1813 04/16/18  1435 04/16/18  1120 04/15/18  1112 04/15/18  1110 04/14/18  1300 04/14/18  0603 04/14/18  0558 04/13/18  1651 04/13/18  1355 04/13/18  1234 04/13/18  1228 04/13/18  0601 04/13/18  0545 04/12/18  0805 04/12/18  0800   CULT No growth after 6 hours No growth after 14 hours Cultured on the 1st day of incubation:Gram negative rods*  Critical Value/Significant Value, preliminary result only, called to and read back byHilary Boyce RN 4C at 1413 on 04.18.18 by JNIMCO/SHANELLEG Cultured on the 1st day of incubation:Acinetobacter speciesSpeciation in progress*  Critical Value/Significant Value, preliminary result only, called to and read back byAngelique Peralta RN/4C at 1557 on 4/17/18. EH. Light growthStenotrophomonas maltophiliaSusceptibility testing in progress*  Critical Value/Significant Value, preliminary result only, called to and read back byAngelique Peralta RN on UU4C at 1201 4/17/18 SRQ Cultured on the 1st day of incubation:Acinetobacter speciesSpeciation in progress*  Critical Value/Significant  Value, preliminary result only, called to and read back byAngelique Peralta RN (4C) on 4/17/2018 @1334, tk  Susceptibility testing in progress  (Note)POSITIVE for ACINETOBACTER SPECIES by LATTO multiplex nucleic acidtest. Final identification and antimicrobial susceptibility testingwill be verified by standard methods.Specimen tested with [employer] multiplex, gram-negative blood culturenucleic acid test for the following targets: Acinetobacter sp.,Citrobacter sp., Enterobacter sp., Proteus sp., E. coli, K.pneumoniae/oxytoca, P. aeruginosa, and the following resistancemarkers: CTXM, KPC, NDM, VIM, IMP and OXA.Critical Value/Significant Value called to and read back byAngelique Peralta RN/4C at 1557 on 4/17/18. EH. No growth after 3 days No growth after 3 days Culture negative monitoring continues  Culture negative monitoring continues No growth after 4 days No growth after 4 days Culture negative after 4 days  Light growthEnterococcus faecium (VRE)*  Critical Value/Significant Value, preliminary result only, called to and read back byTod Retana RN on UU6B at 1414 4/14/18 SRQ  Culture negative monitoring continues No growth Cultured on the 1st day of incubation:Enterococcus faecium (VRE)Susceptibility testing done on previous specimen*  Critical Value/Significant Value, preliminary result only, called to and read back byErvin Caro RN, @2241 04/14/18.DH. No growth after 5 days No growth after 5 days No growth after 5 days Cultured on the 1st day of incubation:Enterococcus faecium (VRE)Susceptibility testing done on previous specimen*  Critical Value/Significant Value, preliminary result only, called to and read back byCoretta Leon RN from U6B. 0206. GR. No growth       Physical Exam  Skin assessment:     Wound Location:  Sacrum   Date of last Photo 4/11/18           Sacrum   Wound History: patient admitted with an unstageable pressure injury on the sacrum  Measurements (length x width x depth, in cm) 5  cm x 3 cm  x  0.5 cm   Wound Base:  75 % slough and 25 % of clean wound base   Tunneling N/A  Undermining N/A  Palpation of the wound bed: normal   Periwound skin: hyperemia  Color: red  Temperature: normal   Drainage:, moderate  Description of drainage: serosanguinous  Odor: none  Pain: , aching    Interventions  Current support surface: Standard  Atmos Air mattress    Current off-loading measures: Foam padding and pillows for repositioning   Visual inspection of wound(s) completed   Wound Care: was done per plan of care.  Supplies: ordered: mepilex  Education provided to: patient   Discussed importance of:repositioning every 2 hours, off-loading pressure to wound, their role in pressure injury prevention, dressing change frequency, head elevation <30 degrees, nutrition on wound healing and moisture management    Discussed plan of care with Patient    Face to face time: 30 minutes  Nancy WALTERSN, BSN, RN, BA

## 2018-04-18 NOTE — PROGRESS NOTES
CLINICAL NUTRITION SERVICES - BRIEF NOTE (see RD note on 4/16 for full assessment)    Labs: K+ 4.0, Phos 2.2 (L) - not yet replaced.    Nutrition/GI: NJT placed in OR. TPN d/c'd as PICC pulled d/t postive bcx. Diet adv to clear liquids.     Implementation   Collaboration with other providers - MICU rounds. Plan for slow adv of TF to goal given high risk of refeeding syndrome.     Enteral Nutrition -  Peptamen 1.5 @ goal 65 ml/hr (1560 ml/day) to provide 2340 kcals (37 kcal/kg/day), 106 g PRO (1.7 g/kg/day), 1201 ml free H2O, 87 g Fat (70% from MCTs), 293 g CHO and no Fiber daily.    - Initiate @ 16 ml/hr and advance by 10 ml q24hr as tolerated  - Do not start or advance until lytes (Mg++,K+) WNL and phos>1.9   - 30 ml q4hr fluid flushes for tube patency  - Multivitamin/mineral ordered (15 ml/day via FT)   - Daily check of K+, Mg, Phos until TF adv to goal rate for eval of refeeding syndrome  - Ordered pancreatic enzyme regimen:   A) Sodium Bicarb tablet (325 mg), 1 tablet q 4 hrs via Jtube. Administration Instructions: Crush 1 tablet and mix into 15 ml of warm water and use this solution to mix with Creon pancreatic enzymes. DO NOT administer directly into Jtube (to be mixed into TF formula with Creon enzyme - see Creon enzyme order)   B) Creon 24, 1- 2 capsules q 4 hrs via Jtube. Administration Instructions:   --If TF rate is running @ 10-30 ml/hr, administer 1 capsule q 4 hrs;   --If TF rate is running @ 40-65 ml/hr, increase to 2 capsules q 4 hrs.    **Open capsule and empty contents into 15 ml sodium bicarb solution (see sodium bicarb order), let dissolve for about 20-30 minutes and then add this solution to the amount of TF formula hung in TF bag every 4 hrs (i.e., once TF @ goal infusion 65 ml/hr will mix 2 capsules into 260 ml of TF formula every 4 hrs).   *Note: this enzyme regimen with TF @ goal infusion will provide approx 3310 units of lipase/gram of total Fat daily and approp dosing initially for  pancreatic insufficiency with more elemental TF formula.        Eulalia Del Rosario RD, LD   4C Pager: [phone]

## 2018-04-18 NOTE — PROGRESS NOTES
PROGRESS NOTE    04/18/18    Naresh Poole ([id number]) admitted on 4/10/2018    Changes  -ADAT  -Drain to gravity - flush 10cc BID  -Hgb Q8hrs  -Levofloxacin 750mg Q24hrs per ID  -Start TFs  -D5 half NS for hypoglycemia and maintenance  -Cont octreotide per GI    Assessment & Plan:  Naresh Poole is a 47 year old male with a PMHx of pancreatic cancer (T3N1) s/p whipple 5/2017 along with FOLFIRINOX and Gemzar/Xeloda 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 who is presenting for FTT and as well as c/f bloody biliary drain output      #GI bleed   #Hemobilia in setting of percutaneous biliary drain s/p IR stent placement s/p stent-in-stent placement  #Acute blood loss anemia  #Elevated INR   Noted to have maroon liquid stool on admission; patient states he has had bloody stools since discharge but had not reported this previously. Noted to have down trending hgb despite transfusions. EGD 04/12 with evidence of bleeding from hepato-jejunostomy site. Due to ongoing bleeding underwent IR stent placement 04/13 to attempt to stop bleed. Noted to have ongoing bleeding and requiring multiple blood products. NM tagged RBC revealed uptake along biliary drain and at gastrojejunostomy site. Attempted stent-in-stent placement 04/16; continues to have ongoing bleeding.  Discussed possibility of TIPS with GI due t portal HTN and possible contribution; discussion ongoing, on octreotide. Limited interventions/options available; patient and family aware.   -Hgb, plts and fibrinogen Q8hrs - Transfuse to goal hgb >7-8, fibrinogen >150, platelets >50  -IR stent placed 04/13 & stent in stent 04/16  -GI following; appreciate recs - attempted ERCP 04/18  -IR following; appreciate recs  -Octreotide gtt - cont per GI  -PPI IV  -Plan to use Kcentra for non warfarin INR reversal in setting of life threatening bleed if hemodynamic instability as last option      #VRE bacteremia  #Secondary bacterial peritonitis  - VRE  #Recent cholangitis and polymicrobial bacteremia   #Hx enterococcal liver abscess  #Recent candidemia  Discharged (03/14-04/02) after admission for polymicrobial bacteremia (E coli, C perfringens, Citrobacter, and enterococcus) and candidemia due to cholangitis and liver abscess. Discharged on vancomycin, fluconazole, ertapenem with plan for ID follow up at 4 weeks. Presenting with evidence of hemobilia and GI bleed (see above). GI consulted. BC and ascites growing VRE; ID consulted and currently on daptomycin.   -BC 04/10, 04/11, 04/12, 04/13 - growing VRE  -BC 04/14, 04/15 NGTD   -BC 04/16 - Acinetobacter, stenortrophomonas  -Cont  ertapenem and fluconazole.  -D/c vancomycin 04/12  - linezolid (04/12-->04/13) --> chnaged to daptomycin (04/13-->) due to low plts  -Start levofloxacin 750mg (04/18-->)  -ID consult   -Diagnostic para 04/13 - culture growing VRE  -Therapeutic para 04/14 culture NGTD  -Daily BC until negative x48hrs      #FTT  #Severe Protein/caloric malnutrition  Patient cachectic in setting of multiple complications following whipple for pancreatic Ca. Per chart, no evidence of recurrance on recent imaging. Appears to have lost 5-10 lbs in past week (?). Poor PO intake per report, though patient reports feeling hungry.  Likely need TCU at discharge due to failure at home and increased needs exceeding family ability to care for him.   -PT, OT and nutrition once acute issues resolved.  -endoscopic NJ placed 04/17 - start TFs  -High risk re feeding sx - monitor labs and replace as needed      #Hx Pancreatic Cancer (T3N1) s/p Whipple (09/2017)  #Pancreatic insufficiency - chronic diarrhea  #Iatrogenic diabetes 2/2 panceratectomy  Initially diagnosed in 1/2017. He underwent EUS/ERCP with stent placement. Underwent chemotherapy with FOLFIRINOX from 1/2017 to 3/2017; noted to have a liver lesion on imaging in 3/2017 and underwent biopsy which showed abscess/necrosis but no evidence of cancer. He  underwent whipple in 5/2017 and repeat liver biopsy intraoperatively demonstrated necrotic areas and inflammations. He then had chemotherapy with gemcitabine and capecitabine between 10/2017 to 11/2017. Developed liver abscess and complicated by enterococcus bacteremia and completed antibiotics in 11/2017. Last seen in oncology clinic 04/02; no longer candidate for further chemo.   -Cont Creon  -cont PTA dilaudid and methadone       #Hypoglycemia  Due to poor liver function and NPO status. D5 pending TFs  -Hypoglycemia protocol      #HTN  Holding BP meds in setting of GI bleed diuresing PRN      #Depression  Cont sertraline  -Palliative care consulted - declined today      #Hypocalcemia  Likely due to poor PO intake, acute illness and repeat blood transfusions. Replacing aggressively.   -BID iCa      FEN: Regular diet ADAT  Prophylaxis: mechanical  Disposition: Pending management of blood loss; prognosis guarded.   Code Status: DNR/DNI      Patient was seen and plan of care discussed with MD Karen Morales MD   Internal Medicine PGY3    Subjective:  Feels well this AM. No new complaints. Denies pain or SOB. NJ bothering his throat, feels he has foreign object     Objective:  Most recent vital signs:  /82 (BP Location: Left arm)  Pulse 92  Temp 98.1  F (36.7  C) (Axillary)  Resp 20  Ht 1.829 m (6')  Wt 74.5 kg (164 lb 3.9 oz)  SpO2 96%  BMI 22.28 kg/m2  Temp:  [96.6  F (35.9  C)-98.2  F (36.8  C)] 98.1  F (36.7  C)  Heart Rate:  [] 94  Resp:  [12-27] 20  BP: ()/(66-98) 102/82  SpO2:  [94 %-100 %] 96 %  Wt Readings from Last 2 Encounters:   04/18/18 74.5 kg (164 lb 3.9 oz)   04/02/18 68.6 kg (151 lb 3 oz)       Intake/Output Summary (Last 24 hours) at 04/18/18 1638  Last data filed at 04/18/18 1600   Gross per 24 hour   Intake          2983.75 ml   Output              650 ml   Net          2333.75 ml       Physical exam:  General: Cachectic, Patient lying comfortably in  bed, NAD  HEENT: no scleral icterus or injection, MMM  Cardiac: RRR, no m/r/g appreciated.   Respiratory: Poor air entry R hemithorax  GI:RUQ drain . Mild discomfort to palpation  Extremities: 2+ edema  Skin: No acute lesions appreciated  Neuro: AOx3,moving all extremities    Labs (Past three days):  CBC  Recent Labs  Lab 04/18/18  1011 04/18/18  0624 04/17/18  2146 04/17/18  1553 04/17/18  1044 04/17/18  0350  04/16/18  1813 04/16/18  1121 04/16/18  0325   WBC  --   --   --   --   --  12.4*  --  12.3* 13.6* 12.9*   RBC  --   --   --   --   --  2.70*  --  2.66* 3.04* 2.68*   HGB  --  9.6* 7.4* 6.8* 7.7* 7.9*  < > 8.0* 9.0* 7.9*   HCT  --   --   --   --   --  23.6*  --  22.9* 26.4* 23.0*   MCV  --   --   --   --   --  87  --  86 87 86   MCH  --   --   --   --   --  29.3  --  30.1 29.6 29.5   MCHC  --   --   --   --   --  33.5  --  34.9 34.1 34.3   RDW  --   --   --   --   --  15.1*  --  14.9 15.0 14.9   PLT 59* 71* 39* 51* 62* 54*  --  70* 81* 63*   < > = values in this interval not displayed.  BMP  Recent Labs  Lab 04/18/18  0624 04/17/18  1553 04/17/18  0350 04/16/18 2146 04/16/18  1813    134 139 141 139   POTASSIUM 4.0 5.1 3.6 3.5 4.1   CHLORIDE 106 98 101 102 101   CO2 25 24 25 30 28   ANIONGAP 9 13 12 9 9   GLC 44* 694* 278* 172* 91   BUN 42* 39* 36* 34* 33*   CR 0.56* 0.62* 0.60* 0.65* 0.63*   GFRESTIMATED >90 >90 >90 >90 >90   GFRESTBLACK >90 >90 >90 >90 >90   CHIDI 7.3* 7.4* 7.9* 7.1* 7.4*   MAG 1.9 2.4* 2.3 2.0  --    PHOS 2.2* 4.7* 3.1  --  3.3     Troponins:     INR  Recent Labs  Lab 04/18/18  0624 04/17/18  2146 04/17/18  1553 04/17/18  1044   INR 1.87* 2.22* 2.43* 2.39*     Liver panel  Recent Labs  Lab 04/18/18  0624 04/17/18  0350 04/16/18  0325 04/15/18  0455   PROTTOTAL 4.9* 4.2* 4.9* 5.2*   ALBUMIN 2.7* 2.4* 2.8* 3.1*   BILITOTAL 3.5* 3.3* 3.2* 3.2*   ALKPHOS 358* 349* 370* 334*   * 74* 81* 79*   ALT 94* 74* 70 64       Imaging/procedure results:

## 2018-04-18 NOTE — PROGRESS NOTES
BL[facility] GENERAL INFECTIOUS DISEASES : PROGRESS NOTE  Naresh Poole : 1970 Sex: male:   Medical record number [id number] Attending Physician: Jayme Melchor*  Date of Service: 2018    ASSESSMENT :  Mr. Poole is a 46 y/o man with a history of pancreatic cancer (T3/N1), s/p neoadjuvant chemotherapy FOLFIRINOX (2017-3/2017), s/p pylorus-preserving whipple on 2017, and adjuvant chemotherapy gemcitabin/capecitabin (10/2017-2017), complicated by enterococcal liver abscess, non malignancy ascites, and large esophageal varices (no h/o bleeding). He was recently admitted on 3/14- with polymicrobial bacteremia and candidemia 2/2 to liver abscess and ascending cholangitis. He was discharged on Vancomycin, Ertapenem, and Fluconazole. He presents with failure to thrived and found to have VRE faecium bacteremia. EGD done on  demonstrated bleeding at the hepaticojejunostomy concerning of hemobilia and bleeding from the hepatic abscess    1. Hepatic abscesses   2. Polymicrobial bacteremia from previous admission (3/) with E coli, Citrobacter, Clostridium, and Enterococcus faecalis   3. Candidemia with Candida albicans from blood culture on 3/21 on Fluconazole   4. Biliary drain placed on 3/21  5. Enterococcus Faecium (VRE) bacteremia ,   6. Bacteremia -  Acinetobacter sp. 2/2 positive on 18, 1/2 positive on    7. PICC placed on  - removed 18   8.  right Pleural fluid - Stenotrophomonas maltophilia      RECOMMENDATION:  1)  Continue Daptomycin 6 mg/kg IV Q24. Will need weekly CPK monitoring while on therapy. If exceeds 10x normal level or having symptoms of myopathy then would need to discontinue   2)continue Meropenem , fluconazole and add Levofloxacin 750 mg IV daily . S.maltophilia  susceptibity pending  .     ID will continue to follow.     Jose Cam MD, M.Med.Sc.  Staff, Infectious Diseases  Pager: [phone]      SUBJECTIVE:   Feels better than yesterday. Denies pain     ROS:  A five-point review of systems was obtained and was negative with the exception of that which is described above.  Allergies   Allergen Reactions     Versed [Midazolam] Other (See Comments)     Patient had respiratory depression in PACU after getting one mg of versed in OR.  Patient required flumazenil for reversal.  Would use versed cautiously if absolutely needed.   CURRENT ANTI-INFECTIVES:   Daptomycin  Ertapenem   Fluconazole      EXAMINATION: (Recommend ? 5 systems)   Vital Signs: BP (!) 122/91  Pulse 92  Temp 97.6  F (36.4  C) (Oral)  Resp 20  Ht 1.829 m (6')  Wt 74.5 kg (164 lb 3.9 oz)  SpO2 98%  BMI 22.28 kg/m2   GENERAL:  More awake, alert, no acute distress, cachetic   HEENT:  Head is normocephalic, atraumatic   EYES:  Eyes have anicteric sclerae without conjunctival injection   ENT:  Oropharynx is moist without exudates or ulcers. Tongue is midline  NECK:  Supple. No  Cervical lymphadenopathy  LUNGS:  Decreased breath sounds in bases  CARDIOVASCULAR:  Regular rate and rhythm with no murmurs  ABDOMEN:  BS few, distended, non tender drain +   SKIN:  No acute rashes.  Line(s) are in place without any surrounding erythema or exudate.   NEUROLOGIC:  Alert, oriented    NEW DATA/RESULTS:  Culture Micro   Date Value Ref Range Status   04/18/2018 No growth after 6 hours  Preliminary   04/17/2018 No growth after 14 hours  Preliminary   04/17/2018 (A)  Preliminary    Cultured on the 1st day of incubation:  Gram negative rods     04/17/2018   Preliminary    Critical Value/Significant Value, preliminary result only, called to and read back by  Hilary Boyce RN 4C at 1413 on 04.18.18 by JOHNNY/TOBY     04/16/2018 (A)  Preliminary    Cultured on the 1st day of incubation:  Acinetobacter species  Speciation in progress     04/16/2018   Preliminary    Critical Value/Significant Value, preliminary result only, called to and read back by  Angelique  Kathryn RN/4C at 1557 on 4/17/18. EH.         Recent Labs   Lab Test  04/03/18   1705  03/14/18   1742  09/13/17   1810   CRP  85.9*  160.0*  5.8     Recent Labs   Lab Test  04/17/18   0350  04/16/18   1813  04/16/18   1121  04/16/18   0325  04/16/18   0014  04/15/18   1713   WBC  12.4*  12.3*  13.6*  12.9*  14.5*  11.3*     Recent Labs   Lab Test  04/18/18   0624  04/17/18   1553  04/17/18   0350  04/16/18   2146   CR  0.56*  0.62*  0.60*  0.65*   GFRESTIMATED  >90  >90  >90  >90     Hematology Studies  Recent Labs   Lab Test  04/18/18   1011  04/18/18   0624  04/17/18   2146  04/17/18   1553   04/17/18   0350  04/16/18   1813  04/16/18   1121  04/16/18   0325  04/16/18   0014  04/15/18   1713   04/14/18   0558   04/10/18   1623  04/03/18   1705   03/27/18   1153   03/15/18   0658  03/14/18   0642   WBC   --    --    --    --    --   12.4*  12.3*  13.6*  12.9*  14.5*  11.3*   < >  10.5   < >  11.0  8.7   < >  10.3   < >  10.4  13.9*   ANEU   --    --    --    --    --    --    --    --    --    --    --    --   9.2*   --   9.6*  7.8   --   9.2*   --   8.9*  12.4*   AEOS   --    --    --    --    --    --    --    --    --    --    --    --   0.0   --   0.0  0.0   --   0.0   --   0.0  0.0   HCT   --    --    --    --    --   23.6*  22.9*  26.4*  23.0*  25.2*  21.3*   < >  19.9*   < >  24.5*  25.9*   < >  22.0*   < >  26.8*  27.4*   PLT  59*  71*  39*  51*   < >  54*  70*  81*  63*  88*  54*   < >  52*   < >  82*  55*   < >  142*   < >  34*  48*    < > = values in this interval not displayed.     Metabolic  Recent Labs   Lab Test  04/18/18   0624  04/17/18   1553  04/17/18   0350   NA  140  134  139   BUN  42*  39*  36*   CO2  25  24  25   CR  0.56*  0.62*  0.60*   GFRESTIMATED  >90  >90  >90     Hepatic Studies  Recent Labs   Lab Test  04/18/18   0624  04/17/18   0350  04/16/18   0325   BILITOTAL  3.5*  3.3*  3.2*   ALKPHOS  358*  349*  370*   ALBUMIN  2.7*  2.4*  2.8*   AST  148*  74*  81*   ALT  94*  74*  70      Immunologlobulins  Recent Labs   Lab Test  04/03/18   1705   SED  6

## 2018-04-18 NOTE — PLAN OF CARE
Problem: Patient Care Overview  Goal: Plan of Care/Patient Progress Review  OT/4C:  Discharge Planner OT   Patient plan for discharge: TCU  Current status: Pt mod-max Ax2 supine to sit EOB. Pt tolerating less than 1 minute due to pain   Barriers to return to prior living situation: pain, strength, decreased ADL I  Recommendations for discharge: TCU  Rationale for recommendations: to progress ADL I       Entered by: Susi Girard 04/18/2018 11:33 AM

## 2018-04-18 NOTE — OP NOTE
ERCP 04/17/2018  4:26 PM [facility], [address] [place] [address] [phone]     Endoscopy Department   _______________________________________________________________________________   Patient Name: Naresh Poole       Procedure Date: 4/17/2018 4:26 PM   MRN: [id number]                       Account Number: [id number]   YOB: 1970              Admit Type: Inpatient   Age: 47                                Gender: Male   Note Status: Finalized                Attending MD: Jared Cooney MD   Pause for the Cause: pause for cause completed Total Sedation Time:   _______________________________________________________________________________       Procedure:           ERCP   Indications:         Active gastrointestinal bleeding   Providers:           Jared Cooney MD   Patient Profile:     Mr Poole is a 48yo with T3N1M? pancreatic cancer                        status post Whipple past May at an OSH and a more                        recentcourse complicated by enterococcal liver abscess                        and non-malignant ascites with recent imaging suggesting                        biliary stenosis(es). We attempted enteroscopy assisted                        ERCP however it was found that his HJ was involved with                        significant edema and stenosis of the PB limb. He then                        when to IR for percutaneous intenral external drain and                        course has been complicated by what is thought to be                        hemobilia. IR placed FCSEM stents at the distal duct and                        HJ to managed possible trauma in this location, however                        bleeding persists. He now proceeds to repeat                        interrogation by enteroscopy assisted ERCP to further                        localize bleeding and assess for endoscopic management.   Referring MD:         Maci Iglesias   Medicines:           General Anesthesia, Antibiotics as scheduled   Complications:       No immediate complications.   _______________________________________________________________________________   Procedure:           Pre-Anesthesia Assessment:                        - Prior to the procedure, a History and Physical was                        performed, and patient medications and allergies were                        reviewed. The patient is unable to give consent                        secondary to the patient's altered mental status. The                        risks and benefits of the procedure and the sedation                        options and risks were discussed with the patient's                        spouse. All questions were answered and informed consent                        was obtained. Patient identification and proposed                        procedure were verified by the physician in the                        pre-procedure area. Mental Status Examination: alert and                        oriented. Airway Examination: Mallampati Class II (the                        uvula but not tonsillar pillars visualized). Respiratory                        Examination: clear to auscultation. CV Examination:                        systolic murmur. ASA Grade Assessment: III - A patient                        with severe systemic disease. After reviewing the risks                        and benefits, the patient was deemed in satisfactory                        condition to undergo the procedure. The anesthesia plan                        was to use general anesthesia. Immediately prior to                        administration of medications, the patient was                        re-assessed for adequacy to receive sedatives. The heart                        rate, respiratory rate, oxygen saturations, blood                        pressure, adequacy of pulmonary ventilation, and                         response to care were monitored throughout the                        procedure. The physical status of the patient was                        re-assessed after the procedure. After obtaining                        informed consent, the scope was passed under direct                        vision. Throughout the procedure, the patient's blood                        pressure, pulse, and oxygen saturations were monitored                        continuously. The pediatric colonoscope was introduced                        through the mouth, and advanced to the jejunum. The                        pediatric gastroscope was introduced through the left                        nostril, and advanced to the jejunum. After obtaining                        informed consent, the scope was passed under direct                        vision. Throughout the procedure, the patient's blood                        pressure, pulse, and oxygen saturations were monitored                        continuously.The upper GI endoscopy was accomplished                        without difficulty. The patient tolerated the procedure                        well.                                                                                    Findings:        The patient was supine throughout.  films demonstrated surgical        clips, the internal external and at least one FCSEM biliary stent. A        pediatric colonoscope was passed per os across the esophagus which        revealed impressive esophageal varices without active bleeding, though        there were several signs of possible impending bleed. There was no        evidence of gastric varices. The stomach was without lesion. The pylorus        was intact and just beyon was a widely patent DJ where blood was seen.        The feeding limb was explored for 50cm and found to be healthy and        unremarkable. We then progressed across the PB limb. The internalized        " portion of the I/E drain was found with a small amount of active        bleeding at the tail's sideholes. The drain was across a tight area of        edematous stenosis with normal appearing overlying mucosa. With some         time, the endoscope was passed across this tight stenosis to the HJ,        which was also involved with and collapsed by the stenosis. Here a FCSEM        stent was found with drain within. There was no evidence of bleeding at        the HJ. The bile duct was deeply cannulated with the 12 mm balloon in        concert with a long 0.025\" Visiglide wire. Contrast was injected and I        personally interpreted the bile duct images. Ductal flow of contrast was        adequate, image quality was excellent and contrast extended throughout        the hepatic ducts. The entire biliary tree appeared grossly unremarkable        and healthy save for the shortened common. There was no evidence of        active vascular blush to suggest vascular fistula with a tertiary,        secondary, or primary intraheaptic. There was no evidence of filling        defect to suggest clot. We then removed the colonoscope and passed a        pediatric gastroscope across the left nare into the feeding limb. The        endoscope was exchanged with an 0.035\" Glide and a 12F Corpak was then        positioned with the tip in the feeding limb. The tip could not be        positioned very deep into this limb due to anatomy despite concerted        efforts.                                                                                     Impression:          - Pylorus sparing Whipple anatomy                        - Again, the PB limb was stenotic due to significant                        edema, and this edema involved the HJ, which itself was                        not bleeding                        - Active, slow bleeding was seen from the pig tail side                        holes of the internal external which was " masterfully                        positioned across the PB stenosis                        - Well positioned common duct/HJ FCSEM                        - Cholangiography was without evidence of direct                        vascular communication suggesting (in combination with                        blood at tail) that the source of bleeding is along the                        tract prior to entrance into the biliary system                        - Uncomplicated placement of a 12F NJ to the proximal                        portion of the feeding limb   Recommendation:      - The patient may return to the care of the ICU                        immediately                        - Case to be reviewed with my GI colleagues and with our                        colleagues in IR to consider alternative/future measures                        to manage bleeding                        - Continue aggressive ICU medical management of blood                        loss                        - NJ tube may be used immediately at a slow rate                        - The findings and recommendations were discussed with                        the patient and their family                                                                                       electronically signed by FLAVIA Cooney

## 2018-04-19 NOTE — PROGRESS NOTES
BL[facility] GENERAL INFECTIOUS DISEASES : PROGRESS NOTE  Naresh Poole : 1970 Sex: male:   Medical record number [id number] Attending Physician: Jayme Melchor*  Date of Service: 2018    ASSESSMENT :  Mr. Poole is a 48 y/o man with a history of pancreatic cancer (T3/N1), s/p neoadjuvant chemotherapy FOLFIRINOX (2017-3/2017), s/p pylorus-preserving whipple on 2017, and adjuvant chemotherapy gemcitabin/capecitabin (10/2017-2017), complicated by enterococcal liver abscess, non malignancy ascites, and large esophageal varices (no h/o bleeding). He was recently admitted on 3/14- with polymicrobial bacteremia and candidemia 2/2 to liver abscess and ascending cholangitis. He was discharged on Vancomycin, Ertapenem, and Fluconazole. He presents with failure to thrived and found to have VRE faecium bacteremia. EGD done on  demonstrated bleeding at the hepaticojejunostomy concerning of hemobilia and bleeding from the hepatic abscess    1. Hepatic abscesses   2. Polymicrobial bacteremia from previous admission (3/) with E coli, Citrobacter, Clostridium, and Enterococcus faecalis   3. Candidemia with Candida albicans from blood culture on 3/21 on Fluconazole   4. Biliary drain placed on 3/21  5. Enterococcus Faecium (VRE) bacteremia ,   6. Bacteremia -  Acinetobacter sp. 2/2 positive on 18, 1/2 positive on    - pancytopenia  7. PICC placed on  - removed 18   8.  right Pleural fluid - Stenotrophomonas maltophilia    - sensitive to levofloxacin     RECOMMENDATION:  1)  Continue Daptomycin 6 mg/kg IV Q24. Will need weekly CPK monitoring while on therapy. If exceeds 10x normal level or having symptoms of myopathy then would need to discontinue   2)continue Meropenem , fluconazole and Levofloxacin 750 mg IV daily . .     ID will continue to follow.     Jose Cam MD, M.Med.Sc.  Staff, Infectious Diseases  Pager: [phone]      SUBJECTIVE:   Feels better than yesterday. Denies pain     ROS:  A five-point review of systems was obtained and was negative with the exception of that which is described above.  Allergies   Allergen Reactions     Versed [Midazolam] Other (See Comments)     Patient had respiratory depression in PACU after getting one mg of versed in OR.  Patient required flumazenil for reversal.  Would use versed cautiously if absolutely needed.   CURRENT ANTI-INFECTIVES:   Daptomycin  Meropenem   Fluconazole   Levofloxacin      EXAMINATION:   Vital Signs: /84  Pulse 92  Temp 94.7  F (34.8  C)  Resp 17  Ht 1.829 m (6')  Wt 75 kg (165 lb 5.5 oz)  SpO2 96%  BMI 22.42 kg/m2   GENERAL:  More awake, alert, no acute distress, cachetic   HEENT:  Head is normocephalic, atraumatic   EYES:  Eyes have anicteric sclerae without conjunctival injection   ENT:  Oropharynx is moist without exudates or ulcers. Tongue is midline  NECK:  Supple. No  Cervical lymphadenopathy  LUNGS:  Decreased breath sounds in bases  CARDIOVASCULAR:  Regular rate and rhythm with no murmurs  ABDOMEN:  BS few, distended, non tender drain +   SKIN:  No acute rashes.  Line(s) are in place without any surrounding erythema or exudate.   NEUROLOGIC:  Alert, oriented    NEW DATA/RESULTS:  Culture Micro   Date Value Ref Range Status   04/19/2018 No growth after 8 hours  Preliminary   04/19/2018 No growth after 8 hours  Preliminary   04/18/2018 No growth after 17 hours  Preliminary   04/18/2018 (A)  Preliminary    Cultured on the 1st day of incubation:  Gram negative rods     04/18/2018   Preliminary    Critical Value/Significant Value, preliminary result only, called to and read back by  CINTHYA SARGENT RN (4C).  04.19.18 0516 GJS         Recent Labs   Lab Test  04/03/18   1705  03/14/18   1742  09/13/17   1810   CRP  85.9*  160.0*  5.8     Recent Labs   Lab Test  04/19/18   0503  04/18/18   2207  04/18/18   1700  04/17/18   0350  04/16/18   1813  04/16/18    1121   WBC  10.2  13.0*  12.7*  12.4*  12.3*  13.6*     Recent Labs   Lab Test  04/19/18   0503  04/18/18   0624  04/17/18   1553  04/17/18   0350   CR  0.57*  0.56*  0.62*  0.60*   GFRESTIMATED  >90  >90  >90  >90     Hematology Studies  Recent Labs   Lab Test  04/19/18   0503  04/18/18   2207  04/18/18   1700  04/18/18   1606   04/17/18   0350  04/16/18   1813  04/16/18   1121   04/14/18   0558   04/10/18   1623  04/03/18   1705   03/27/18   1153   03/15/18   0658  03/14/18   0642   WBC  10.2  13.0*  12.7*   --    --   12.4*  12.3*  13.6*   < >  10.5   < >  11.0  8.7   < >  10.3   < >  10.4  13.9*   ANEU   --    --    --    --    --    --    --    --    --   9.2*   --   9.6*  7.8   --   9.2*   --   8.9*  12.4*   AEOS   --    --    --    --    --    --    --    --    --   0.0   --   0.0  0.0   --   0.0   --   0.0  0.0   HCT  19.8*  22.3*  22.6*   --    --   23.6*  22.9*  26.4*   < >  19.9*   < >  24.5*  25.9*   < >  22.0*   < >  26.8*  27.4*   PLT  51*  40*  54*  Canceled, Test credited   < >  54*  70*  81*   < >  52*   < >  82*  55*   < >  142*   < >  34*  48*    < > = values in this interval not displayed.     Metabolic  Recent Labs   Lab Test  04/19/18   0503  04/18/18   0624  04/17/18   1553   NA  142  140  134   BUN  42*  42*  39*   CO2  25  25  24   CR  0.57*  0.56*  0.62*   GFRESTIMATED  >90  >90  >90     Hepatic Studies  Recent Labs   Lab Test  04/19/18   0503  04/18/18   0624  04/17/18   0350   BILITOTAL  2.5*  3.5*  3.3*   ALKPHOS  278*  358*  349*   ALBUMIN  2.1*  2.7*  2.4*   AST  162*  148*  74*   ALT  106*  94*  74*     Immunologlobulins  Recent Labs   Lab Test  04/03/18   1705   SED  6

## 2018-04-19 NOTE — PROGRESS NOTES
Lab in pt room to draw CBC and INR. D/t recent CBC results at 1700 and pt being very difficult stick, lab told to come back in a couple hours so timing of draws will more accurately reflect orders and so pt can avoid more sticks grouped so close together.

## 2018-04-19 NOTE — PLAN OF CARE
Problem: Patient Care Overview  Goal: Plan of Care/Patient Progress Review  Edema 4C: Pt with soft 2+/3+ pitting in B LEs, worst distally. Initiated light GCB (2/2 low platelets + elevated INR (2.16)) from MTP to knee crease to promote gentle fluid mobilization, skin integrity, and I/ease with ADLs. Plan to wear x 24 hours, however, please remove if wraps cause numbness, tingling, pain, or become soiled.

## 2018-04-19 NOTE — PROGRESS NOTES
GASTROENTEROLOGY PROGRESS NOTE    ASSESSMENT:  47 year old male with history of pancreatic cancer T3N0 status post neoadjuvant FOLFIRINOX, pylorus sparing whipple in 5/2017 complicated by intraoperative portal vein injury, subsequently with hepatic abscess and polymicrobial bacteremia/fungemia.  Recently admitted for liver abscess and cholangitis s/p attempted retrograde ERCP for management of biliary stricture though not successful, ultimately treated with PTC 3/21/18, had biliary biopsy via PTC on 3/29/18 (biopsy negative for malignancy). Now admitted 4/10/18 for worsening fatigue with hematochezia. Upper endoscopy 4/12 found to have hemobilia from biliary drain, no anastomotic ulcer or bleeding. Subsequent CTA negative for active bleeding.      Initially suspected bleeding from portal biliopathy/biliary varices in the setting of recent biliary stricture biopsy and brush. Patient with known large esophageal varices and portal vein occlusion.  However, bleeding continued after IR placement of 11x29 mm [place] Viabahn stent graft as internal biliary stent within then viabil stent and replacement of internal/external biliary drain (4/16). Patient underwent ERCP 4/17, noted active slow bleeding from pigtail side holes of internal external drain, but cholangiography (injected contrast to biliary system) did not show evidence of direct vascular communication suggesting that bleeding source is from the biliary system. So the current suspected bleeding site is at the transhepatic internal/external drain tract that prior to entering the biliary system.        RECOMMENDATIONS:    - Plan for ERCP to place a stent across the hepatojejunostomy site, and then to have IR pull out internal/external biliary drain and coil the hepatic tract for bleeding control. Plan is communicated with IR, family (patient and his wife). Primary team is updated   - Please given vitamin K and FFP. INR goal < 1.5, Plt goal > 50, and Hgb goal > 7 for  procedure tomorrow.    - Continue Octreotide gtt.   - Continue monitor for stool color/consistency/output   - Trend LFts and monitor for fever curve  - Will plan EUS in the future to biopsy pancreatic bed. (will wait until bleeding episodes resolve)     GI team will continue to follow, please page if any questions.     The patient was discussed and plan agreed upon with GI staff, Dr. Ames and Dr. Eros Lopez  GI Fellow  Pager   ______________________________________________________________  S: patient had multiple hematochezia overnight, Hgb dropped to 6.5.     O:  Blood pressure (!) 141/107, pulse 92, temperature 96.5  F (35.8  C), temperature source Tympanic, resp. rate 25, height 6' (1.829 m), weight 165 lb 5.5 oz (75 kg), SpO2 94 %.    Gen: no acute distress, fatigue.   HEENT: atraumatic, mild sclera icterus   CV: RRR, no murmur   Lungs: CLA b/l, no wheezing or crackles   Abd: distended, soft, non tender, normal active BS  Skin: mild jaundice   MS: no skeletal pain   Neuro: no asterixis, A&Ox3, no focal neurological deficite      LABS:  BMP    Recent Labs  Lab 04/19/18  0503 04/18/18  0624 04/17/18  1553 04/17/18  0350    140 134 139   POTASSIUM 3.5 4.0 5.1 3.6   CHLORIDE 108 106 98 101   CHIDI 6.5* 7.3* 7.4* 7.9*   CO2 25 25 24 25   BUN 42* 42* 39* 36*   CR 0.57* 0.56* 0.62* 0.60*   * 44* 694* 278*     CBC  Recent Labs  Lab 04/19/18  0503 04/18/18  2207 04/18/18  1700 04/18/18  1606  04/17/18  0350   WBC 10.2 13.0* 12.7*  --   --  12.4*   RBC 2.31* 2.62* 2.65*  --   --  2.70*   HGB 6.7* 7.6* 7.7*  --   < > 7.9*   HCT 19.8* 22.3* 22.6*  --   --  23.6*   MCV 86 85 85  --   --  87   MCH 29.0 29.0 29.1  --   --  29.3   MCHC 33.8 34.1 34.1  --   --  33.5   RDW 16.5* 16.5* 16.3*  --   --  15.1*   PLT 51* 40* 54* Canceled, Test credited  < > 54*   < > = values in this interval not displayed.  INR    Recent Labs  Lab 04/19/18  0503 04/18/18  2207 04/18/18  0624 04/17/18  214    INR 2.16* 1.95* 1.87* 2.22*     LFTs    Recent Labs  Lab 04/19/18  0503 04/18/18  0624 04/17/18  0350 04/16/18  0325   ALKPHOS 278* 358* 349* 370*   * 148* 74* 81*   * 94* 74* 70   BILITOTAL 2.5* 3.5* 3.3* 3.2*   PROTTOTAL 4.1* 4.9* 4.2* 4.9*   ALBUMIN 2.1* 2.7* 2.4* 2.8*      PANCNo lab results found in last 7 days.

## 2018-04-19 NOTE — PLAN OF CARE
Problem: Patient Care Overview  Goal: Plan of Care/Patient Progress Review  OT/4C - Cancel. Pt not medically appropriate for therapy on this date. Pt is actively bleeding with multiple bloody stools overnight and hgb down to 6.7 this AM from 7.6 last night requring transfusion. Will reschedule as appropriate.

## 2018-04-19 NOTE — PLAN OF CARE
"Problem: Patient Care Overview  Goal: Plan of Care/Patient Progress Review  Outcome: Declining  Critical PLT 40 requiring 1u PLT. Hgb 7.6 then 6.7 requiring 1u PRBCs. Multiple bloody stools overnight. Output from biliary drain continues to be bloody as well. Continuing to advance TF, now at 30 ml/hr. BG trending upwards, last check was 177. OOBTC in the evening per pt request. In am, pt verbalized desire to \"get in and out of the chair many times today.\" Replacing all e-lytes this am. BC continuing to grow gram negative rods, another set of BC sent this am. Pt remains on octreotide gtt.   Will continue POC and update team, goal to stabilize Hgb and stop bleeding.    Problem: Gastrointestinal Condition Comorbidity  Goal: Gastrointestinal Condition  Patient comorbidity will be monitored for signs and symptoms of Gastrointestinal condition.  Problems will be absent, minimized or managed by discharge/transition of care.   Outcome: Declining  Pt had 3 bloody stools overnight, each one slightly larger than the last. Hgb dropped to 6.7 requiring transfusion.      "

## 2018-04-19 NOTE — ADDENDUM NOTE
Addendum  created 04/19/18 1012 by Melita Banerjee APRN CRNA    Anesthesia Intra LDAs edited, LDA properties accepted, LDA removed

## 2018-04-19 NOTE — PROGRESS NOTES
"   04/19/18 0941   General Information   Discipline OT   Onset of Edema (\"a couple weeks ago\")   Affected Body Part(s) Right UE;Left LE;Right LE  (trunk/abdomen)   Etiology Comments low albumin, decreased muscle pump   Pertinent history of current problem (PT: include personal factors and/or comorbidities that impact the POC; OT: include additional occupational profile info) 47 year old male with history of pancreatic cancer T3N0 status post neoadjuvant FOLFIRINOX, pylorus sparing whipple in 5/2017 complicated by intraoperative portal vein injury, subsequently with hepatic abscess and polymicrobial bacteremia/fungemia.  Recently admitted for liver abscess and cholangitis s/p attempted retrograde ERCP for management of biliary stricture though not successful, ultimately treated with PTC 3/21/18, had biliary biopsy via PTC on 3/29/18 (biopsy negative for malignancy). Now admitted 4/10/18 for worsening fatigue with hematochezia. Upper endoscopy 4/12 found to have hemobilia from biliary drain, no anastomotic ulcer or bleeding. Subsequent CTA negative for active bleeding.     Edema Precaution Comments *BLEEDS, low platelets (51) and elevated INR (2.16), will monitor.    Pain   Patient currently in pain No   Edema Examination / Assessment   Skin Condition Pitting;Dryness   Skin Condition Comments Pt with 2+/3+ soft pitting in distal B LEs from MTP to knee crease, most significant distally. Pt with 2+ pitting in thighs, denies scrotal edema (area not visualized). Pt with small amount of healing scratches on L LE (primarily lateral side of R foot) and mild amount of petechiae on L ankle mortise. Pt with quarter sized healing scab on R knee and small scabs on L knee. Skin shiny with mild discoloration -- pink along shins. R UE with IV being used and 1+/2+ pitting present. Bulbous elbow and petechiae and bruising on arm.    Capillary Refill Symmetrical   Sensation   Sensation (WFL) (denies n/t)   Assessment/Plan   Patient " presents with Edema   Assessment Pt with generalized edema, causing increased heaviness and difficulty moving. Pt to benefit from skilled OT to address generalized edema to promote skin integrity and ADL I. See daily flowsheet for details regarding tx provided.    Assessment of Occupational Performance 5 or more Performance Deficits   Identified Performance Deficits dressing, toileting, bathing, functional transfers, functional mobility   Clinical Decision Making (Complexity) Low complexity   Planned Edema Interventions Gradient compression bandaging;Fit for compression garment;Exercises;Precautions to prevent infection/exacerbation;Education;ADL training   Treatment Frequency 5 times/wk  (BID OT/edema)   Treatment Duration 2 weeks   Patient, Family and/or Staff in agreement with plan of care. Yes   Risks and benefits of treatment have been explained. Yes   Total Evaluation Time   Total Evaluation Time (Minutes) 2

## 2018-04-20 NOTE — PROGRESS NOTES
SPIRITUAL HEALTH SERVICES  SPIRITUAL ASSESSMENT Progress Note (Palliative Focus)  [facility] ([place]) 4C    REFERRAL SOURCE: Palliative care spiritual assessment.    Visit with  colleagues of patient Naresh Poole at their request. They shared the ways they are coping and committed to supporting family and one another.     Plan: I am available for spiritual support as needed.    Lauren Lopez  Palliative   Pager [phone]  [facility] Inpatient Team Consult pager [phone] (M-F 8-4:30)  After-hours Answering Service [phone]

## 2018-04-20 NOTE — PROGRESS NOTES
Earlier today; asked to possibly perform thoracentesis (order entered).  Multiple discussions with primary team today, who have been carefully weighing risk/ benefit.  They wish to cancel procedure request-- order deleted.    Lev Alfaro MD  [facility] Hospitalist

## 2018-04-20 NOTE — PLAN OF CARE
Problem: Patient Care Overview  Goal: Plan of Care/Patient Progress Review  Pt on 7L oxymask at beginning of shift and progressed to Oxymizer pendant at 10L to HFNC eventually increased to 100%.  Pt Lung sounds became increasingly congested.  Initially sounding diminished on right side to crackles.  Right sided more diminished than left.  Unable to cough up secretions, having coarse rattle in back of throat. Strict NPO, but ok for Tube feeds.  Attempts to alleviate symptoms performed.  Got up to chair, turned to left side, and NT suctioned without improvement.  Pt received 40mg IV lasix x 2 this shift with good urine output.  At 1900 pt became more SOB with sats in high 70's to low 80's.  Bipap 100% initiated, ABG and CXR done and medicine team at bedside as well as wife.  Pt received a total of 2 units PRBC's and 5 cryo this shift and plan is to give 1 unit platelets.  Plan is to diurese and use Bipap overnight in hopes to stabilize pt so he can have his ERCP and IR procedure tomorrow at 1030 to stop bleeding.  Pt had 1 very large bloody BM and 3 moderate sized bloody BMs.  Drainage from Bili drain minimal, flushed x 1.  Wife at bedside and updated.  Pt normotensive throughout shift.  Report given to oncoming RN for continual care.

## 2018-04-20 NOTE — PROGRESS NOTES
PROGRESS NOTE    04/19/18    Naresh Poole ([id number]) admitted on 4/10/2018    Changes   -Try to transition onto high flow  -Diuresis for possible volume overload  -Will probably need to hold on IR/GI procedure given severity of hypoxic respiratory and goal of not ending up with period of ET intubation    Wife at bedside. Discussed tenuous respiratory status and need for BiPAP. CXR with R effusion which was is not significantly changed from prior and may not explain acute decompensation. Discussed concern for fluid overload vs ARDS/TRALI. Discussed thoracentesis which at this point would be high risk in setting of coagulopathy and Bipap. Patient continues to express he is DNR/DNI; we talked about needing intubation for procedure and high likelyhood that he would not be able to be extubated and may need mechanical ventilation. He expressed this would not be in line with wishes. Discussed ongoing infections. At this point has ongoing bleeding, polymicrobial bacteremia/secondary peritonitis/infected hepatic hydrothroax, underlying cirrhosis. Prognosis is poor. Sg expressed he would like to continue to try aggressive diuresis and ongoing transfusions to see if he can stabilize enough for procedures. In view of prognosis Yumi (spouse) would like to bring in family and friends; Sg agreed to this.     Assessment & Plan:  Naresh Poole is a 47 year old male with a PMHx of pancreatic cancer (T3N1) s/p whipple 5/2017 along with FOLFIRINOX and Gemzar/Xeloda 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 who is presenting for FTT and as well as c/f bloody biliary drain output    #Acute hypoxic respiratory failure  #Fluid overload   #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 Improved after IV lasix. Plan to re dose in PM. CXR with large R effusion; discussed thoracentesis for today or tomorrow pending stabilization of respiratory status and  bleeding. At this point concern for high risk of complications and poor tolerability of any hemodynamic change, any small bleed or possible pneumothorax would likely prove fatal at this time.   -Continue with intermittent diuresis, hopefully there is some component of reversibility with this hypoxic change  -may need thoracentesis, though unlikely to be main causative issue; hold off for nowdue to concern for decompensation and ongoing transfusion needs, at high risk of complications from procedures    # Goals of care:  He has been interested in pursuing all options but is DNR/DNI. Hopefully his bipap dependence can be reversed. If not able to reverse and no new treatable condition, we may need to consider transitioning to a comfort approach in the hospital.       #GI bleed   #Hemobilia in setting of percutaneous biliary drain s/p IR stent placement s/p stent-in-stent placement  #Acute blood loss anemia  #Elevated INR   Noted to have maroon liquid stool on admission; patient states he has had bloody stools since discharge but had not reported this previously. Noted to have down trending hgb despite transfusions. EGD 04/12 with evidence of bleeding from hepato-jejunostomy site. Due to ongoing bleeding underwent IR stent placement 04/13 to attempt to stop bleed. Noted to have ongoing bleeding and requiring multiple blood products. NM tagged RBC revealed uptake along biliary drain and at gastrojejunostomy site. Attempted stent-in-stent placement 04/16; continues to have ongoing bleeding.  Discussed possibility of TIPS with GI due t portal HTN and possible contribution; discussion ongoing, on octreotide. Limited interventions/options available; patient and family aware. IR and GI planning on procedure 4/20 to attempt hemostasis, unclear if any further options if this fails.    -Hgb, plts and fibrinogen Q6hrs - Transfuse to goal hgb >7-8, fibrinogen >150, platelets >50  -IR stent placed 04/13 & stent in stent 04/16  -GI  following; appreciate recs - attempted ERCP 04/18  -IR following; appreciate recs  -Octreotide gtt - cont per GI  -PPI IV  -Plan to use Kcentra for non warfarin INR reversal in setting of life threatening bleed if hemodynamic instability as last option  -IR and GI to re evaluate risk/benefit of procedure and need for sedation due to code status and goals of care    #POlymicrobial bacteremia  #Secondary bacterial peritonitis - VRE  #Infected R hepatic hydrothorax  #Recent cholangitis and polymicrobial bacteremia   #Hx enterococcal liver abscess  #Recent candidemia  Discharged (03/14-04/02) after admission for polymicrobial bacteremia (E coli, C perfringens, Citrobacter, and enterococcus) and candidemia due to cholangitis and liver abscess. Discharged on vancomycin, fluconazole, ertapenem with plan for ID follow up at 4 weeks. Presenting with evidence of hemobilia and GI bleed (see above). GI consulted.  -BC 04/10, 04/11, 04/12, 04/13 - growing VRE  -BC 04/14, 04/15 NGTD   -BC 04/16 - Acinetobacter,   -BC 04/17 - Stenotrophomonas  -Pleural fluid 4/17 growing stenortrophomonas  -Cont  meropenem and fluconazole.  -D/c vancomycin 04/12  - linezolid (04/12-->04/13) --> chnaged to daptomycin (04/13-->) due to low plts  -Start levofloxacin 750mg (04/18-->)  -ID consult   -Diagnostic para 04/13 - culture growing VRE  -Therapeutic para 04/14 culture NGTD  -Daily BC until negative x48hrs      #FTT  #Severe Protein/caloric malnutrition  Patient cachectic in setting of multiple complications following whipple for pancreatic Ca. Per chart, no evidence of recurrance on recent imaging. Appears to have lost 5-10 lbs in past week (?). Poor PO intake per report, though patient reports feeling hungry.     -PT, OT and nutrition once acute issues resolved.  -endoscopic NJ placed 04/17 - started TFs  -High risk re feeding sx - monitor labs and replace as needed      #Hx Pancreatic Cancer (T3N1) s/p Whipple (09/2017)  #Pancreatic  insufficiency - chronic diarrhea  #Iatrogenic diabetes 2/2 panceratectomy  Initially diagnosed in 1/2017. He underwent EUS/ERCP with stent placement. Underwent chemotherapy with FOLFIRINOX from 1/2017 to 3/2017; noted to have a liver lesion on imaging in 3/2017 and underwent biopsy which showed abscess/necrosis but no evidence of cancer. He underwent whipple in 5/2017 and repeat liver biopsy intraoperatively demonstrated necrotic areas and inflammations. He then had chemotherapy with gemcitabine and capecitabine between 10/2017 to 11/2017. Developed liver abscess and complicated by enterococcus bacteremia and completed antibiotics in 11/2017. Last seen in oncology clinic 04/02; no longer candidate for further chemo. Will need further work up with EUS and biopsies if survives above.   -Cont Creon  -cont PTA dilaudid and methadone       #Hypoglycemia  Due to poor liver function and NPO status. Stable on TFs  -Hypoglycemia protocol  -D/5 when TF held      #HTN  Holding BP meds in setting of GI bleed diuresing PRN      #Depression  Cont sertraline  -Palliative care consulted - declined today      #Hypocalcemia  Likely due to poor PO intake, acute illness and repeat blood transfusions. Replacing aggressively.   -BID iCa      FEN: Regular diet Holding TF for possible procedure  Prophylaxis: mechanical  Disposition: Pending management of blood loss; prognosis guarded. Remains critically ill; poor prognisis  Code Status: DNR/DNI      Patient was seen and plan of care discussed with MD Karen Morales MD   Internal Medicine PGY3    Subjective:  Feels tired this AM, breathing is still difficult. Denies pain but is feeling tired of work of breathing.     Objective:  Most recent vital signs:  /79  Pulse 92  Temp 97.6  F (36.4  C) (Tympanic)  Resp 28  Ht 1.829 m (6')  Wt 76.3 kg (168 lb 3.4 oz)  SpO2 93%  BMI 22.81 kg/m2  Temp:  [94.7  F (34.8  C)-97.6  F (36.4  C)] 97.6  F (36.4  C)  Heart  Rate:  [77-94] 81  Resp:  [13-32] 28  BP: ()/() 108/79  FiO2 (%):  [80 %-100 %] 100 %  SpO2:  [89 %-100 %] 93 %  Wt Readings from Last 2 Encounters:   04/20/18 76.3 kg (168 lb 3.4 oz)   04/02/18 68.6 kg (151 lb 3 oz)       Intake/Output Summary (Last 24 hours) at 04/19/18 2115  Last data filed at 04/19/18 2044   Gross per 24 hour   Intake             4841 ml   Output             2200 ml   Net             2641 ml       Physical exam:  General: Cachectic, Patient lying in bed, appears dyspneic  HEENT: no scleral icterus or injection, dry MM  Cardiac: RRR, no m/r/g appreciated.   Respiratory: Poor air entry R hemithorax, coarse sounds left hemithorax. Speaking in 2 word phrases. On high flow O2  GI:RUQ drain . Mild discomfort to palpation  Extremities: 2+ edema  Skin: Purprura and petechiae  Neuro: AOx3,    Labs (Past three days):  CBC    Recent Labs  Lab 04/20/18  0749 04/20/18  0458 04/19/18  2203 04/19/18  1720   WBC 12.5* 12.5* 11.2* 12.1*   RBC 2.76* 2.76* 2.80* 3.03*   HGB 8.2* 8.2* 8.4* 8.8*   HCT 24.0* 24.5* 24.8* 26.6*   MCV 87 89 89 88   MCH 29.7 29.7 30.0 29.0   MCHC 34.2 33.5 33.9 33.1   RDW 15.6* 15.9* 15.8* 16.0*   PLT 52* 62* 54* 47*     BMP    Recent Labs  Lab 04/20/18  0458 04/19/18  1719 04/19/18  0503 04/18/18  0624 04/17/18  1553   *  --  142 140 134   POTASSIUM 3.0*  --  3.5 4.0 5.1   CHLORIDE 110*  --  108 106 98   CO2 27  --  25 25 24   ANIONGAP 8  --  8 9 13   *  --  169* 44* 694*   BUN 41*  --  42* 42* 39*   CR 0.58*  --  0.57* 0.56* 0.62*   GFRESTIMATED >90  --  >90 >90 >90   GFRESTBLACK >90  --  >90 >90 >90   CHIDI 6.8*  --  6.5* 7.3* 7.4*   MAG 2.1 1.8 1.6 1.9 2.4*   PHOS 2.7  --  2.3* 2.2* 4.7*        INR    Recent Labs  Lab 04/20/18  0458 04/19/18  2202 04/19/18  1720 04/19/18  0503   INR 1.98* 1.98* 1.94* 2.16*     Liver panel    Recent Labs  Lab 04/20/18  0458 04/19/18  0503 04/18/18  0624 04/17/18  0350   PROTTOTAL 4.5* 4.1* 4.9* 4.2*   ALBUMIN 2.2* 2.1* 2.7* 2.4*    BILITOTAL 2.5* 2.5* 3.5* 3.3*   ALKPHOS 274* 278* 358* 349*   * 162* 148* 74*   * 106* 94* 74*

## 2018-04-20 NOTE — PLAN OF CARE
Problem: Patient Care Overview  Goal: Plan of Care/Patient Progress Review  Outcome: No Change  Pt with worsening respiratory status r/t fluid overload. Was on 100% HFNC, sats low to mid 80s with rattling breaths, not improved with NT suctioning. RT and MDs called to room and ABG and CXR were obtained. Pt placed on bipap, 10/5, 100%. CXR still showing edema and large pleural effusion, but per MD, no thoracentesis overnight; use bipap and diurese. 40 Lasix x 2 with good results, LS improving. TF advanced to 50 ml/hr, turned off at 0400 in anticipation of procedures today, D10 gtt initiated. 1u PLT (47), PLT in am 62. One large bloody stool, bloody drainage from biliary drain. Hgb 8.2. K 3- will replace IV d/t bloody drainage from small bore NG (see GI portion below). VSS. Pt did not appear to sleep or rest t/o night, never seen with eyes closed and would not close even with wife's encouragement. Remains oriented but may be on the verge of delirium. Temps remained low overnight (95-96), warm blankets and gabriel hugger applied. Pt to go down for ERCP/coiling this morning. Pt's wife, Yumi, at bedside for first half of the night and updated on pt status. Will continue to monitor and follow POC and make adjustments as necessary.       Problem: Gastrointestinal Condition Comorbidity  Goal: Gastrointestinal Condition  Patient comorbidity will be monitored for signs and symptoms of Gastrointestinal condition.  Problems will be absent, minimized or managed by discharge/transition of care.   Outcome: Improving  One large bloody stool at start of shift. Noticed bloody drainage backing up from small bore NG tube- MD notified. Pt on octreotide gtt and receives IVP Protonix. Hgb 8.2

## 2018-04-20 NOTE — PROGRESS NOTES
Brief GI note:     Chart reviewed and discussed with primary team.  Patient was scheduled for ERCP for stent placement across hepatojejunostomy, followed by IR pulling out internal/external biliary drain and coiling the hepatic tract to control bleeding.     Overnight, patient developed acute on chronic hypoxic respiratory failure required BIPAP.  Primary team discussed needing intubation for procedure and high chance that he would not be able to be extubated after ERCP given worsening respiratory status.  Patient expressed that he would not want to be on prolonged mechanical ventilation after procedure and wants to try aggressive diuresis to see if he can be stabilized enough for procedure to avoid prolonged mechanical ventilation.  Palliative care and SW on board dicussed with family and patient regarding goal of care. Currently family are considering transition to comfort care, but patient appears to have difficult time accepting a comfort approach. Code status is in discussion.    ERCP and IR procedures are on hold.     At this time, GI team will continue follow patient peripherally. Please page us if patient's respiratory status improves and decides to proceed with ERCP.     Patient is discussed with Dr. Mir.     Nalini Lopez  GI fellow  p [phone]

## 2018-04-20 NOTE — PLAN OF CARE
Problem: Patient Care Overview  Goal: Plan of Care/Patient Progress Review  PT 4C: Defer- Patient medically not appropriate for therapy due to high O2 needs and critical illness, OT following for mobility as appropriate, PT will complete orders. Please place new orders if new needs arise.

## 2018-04-20 NOTE — PLAN OF CARE
Problem: Patient Care Overview  Goal: Plan of Care/Patient Progress Review  OT/4C - Cancel. Pt on bipap 100% fio2 and not medically appropriate for therapy at this time. Plan for possible ERCP/coiling procedure on this date. Will check back and or reschedule as appropriate.

## 2018-04-20 NOTE — PROGRESS NOTES
"Focus:  Palliative Care  Data:  Naresh \"Sg\" is a 47 year old man with a history of pancreatic cancer status post whipple in 5/2017. He presents for failure to thrive as well as concern for bloody biliary drain output.     Interventions:  I, along with Palliative NP (Marycarmen Kwan) and medical student, met with Sg's wife (Yumi) in a consult room on 4E. She expressed her concerns regarding his current state and shared that she does not wish to see him suffer anymore. She inquired about the process to make him comfort care and Marycarmen answered her questions.  We also discussed how their 3 daughters (Debora age 5, Devan age 6, and Myriam age 13) are doing after seeing Sg today. Yumi was interested in facilitation of hand impressions and transition objects for the girls.     Prior to facilitating these activities, I met with the 3 girls with their Uncle Dada and mother. We processed their thoughts and feelings about seeing their father today. They wished to complete hand impressions. I returned to complete hand impressions and transition objects with the family.  Sg's eldest daughter (Rich upper 20s) wanted to join as well.  I provided Yumi with my contact information and a \"[facility] [place]\" booklet.     Assessment: Many family members and Sg's  friends are at the hospital in the [facility]. He and family appear to be well supported. Yumi appreciated time to talk alone in the consult room.  She has been direct with Sg and has shared her concerns about the amount of suffering he is experiencing.  He appears to be having a difficult time accepting a comfort approach to his care ,however,wife and staff have concerns about his ability to make decisions.     Plan:  Yumi has my contact information and I will plan to follow up with family if they are still here next week.     Fe Hernandez, SERAFIN, [facility]  Palliative Care Clinical Social Work Fellow  Pager: [phone]    "

## 2018-04-20 NOTE — PLAN OF CARE
Problem: Patient Care Overview  Goal: Plan of Care/Patient Progress Review  Edema/4C: Wraps removed and skin assessed. Pt with 2+ pitting distally, 1+ from ankle to knee crease. Per pt request, wanting to continue use of wraps for comfort and protection of skin integrity. Skin cleaned and lotioned. GCB reapplied from MTPS to knee creases with light compression for edema management. Okay to wear until therapist returns. Please remove if causing pain, tingling, numbness, or soiling occurs.

## 2018-04-20 NOTE — PROGRESS NOTES
PROGRESS NOTE    04/19/18    Naresh Poole ([id number]) admitted on 4/10/2018    Changes   -Lasix 40mg in AM and PM - additional doses to be given if ongoing transfusions  -Discussed thoracentesis for tomorrow (hold off today due to concern for decompensation and ongoing transfusion needs, at high risk of complications from procedures)  -NPO and hold TF MN for procedure in AM  -Transfuse RBCs, plts and Cryo for procedure in AM  -Family updated at bedside  -Labs q6hrs       Assessment & Plan:  Naresh Poole is a 47 year old male with a PMHx of pancreatic cancer (T3N1) s/p whipple 5/2017 along with FOLFIRINOX and Gemzar/Xeloda 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 who is presenting for FTT and as well as c/f bloody biliary drain output    #Acute hypoxic respiratory failure  #Fluid overload   #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 Improved after IV lasix. Plan to re dose in PM. CXR with large R effusion; discussed thoracentesis for today or tomorrow pending stabilization of respiratory status and bleeding.   -Lasix 40mg in AM and PM - additional doses to be given if ongoing transfusions  -Discussed thoracentesis for tomorrow (hold off today due to concern for decompensation and ongoing transfusion needs, at high risk of complications from procedures)      #GI bleed   #Hemobilia in setting of percutaneous biliary drain s/p IR stent placement s/p stent-in-stent placement  #Acute blood loss anemia  #Elevated INR   Noted to have maroon liquid stool on admission; patient states he has had bloody stools since discharge but had not reported this previously. Noted to have down trending hgb despite transfusions. EGD 04/12 with evidence of bleeding from hepato-jejunostomy site. Due to ongoing bleeding underwent IR stent placement 04/13 to attempt to stop bleed. Noted to have ongoing bleeding and requiring multiple blood products. NM tagged RBC  revealed uptake along biliary drain and at gastrojejunostomy site. Attempted stent-in-stent placement 04/16; continues to have ongoing bleeding.  Discussed possibility of TIPS with GI due t portal HTN and possible contribution; discussion ongoing, on octreotide. Limited interventions/options available; patient and family aware. IR and GI planning on procedure 4/20 to attempt hemostasis, unclear if any further options if this fails.    -Hgb, plts and fibrinogen Q6hrs - Transfuse to goal hgb >7-8, fibrinogen >150, platelets >50  -IR stent placed 04/13 & stent in stent 04/16  -GI following; appreciate recs - attempted ERCP 04/18  -IR following; appreciate recs  -Octreotide gtt - cont per GI  -PPI IV  -Plan to use Kcentra for non warfarin INR reversal in setting of life threatening bleed if hemodynamic instability as last option      #VRE bacteremia  #Secondary bacterial peritonitis - VRE  #Recent cholangitis and polymicrobial bacteremia   #Hx enterococcal liver abscess  #Recent candidemia  Discharged (03/14-04/02) after admission for polymicrobial bacteremia (E coli, C perfringens, Citrobacter, and enterococcus) and candidemia due to cholangitis and liver abscess. Discharged on vancomycin, fluconazole, ertapenem with plan for ID follow up at 4 weeks. Presenting with evidence of hemobilia and GI bleed (see above). GI consulted. BC and ascites growing VRE; ID consulted and currently on daptomycin.   -BC 04/10, 04/11, 04/12, 04/13 - growing VRE  -BC 04/14, 04/15 NGTD   -BC 04/16 - Acinetobacter,   -Pleural fluid 4/17 growing stenortrophomonas  -Cont  ertapenem and fluconazole.  -D/c vancomycin 04/12  - linezolid (04/12-->04/13) --> chnaged to daptomycin (04/13-->) due to low plts  -Start levofloxacin 750mg (04/18-->)  -ID consult   -Diagnostic para 04/13 - culture growing VRE  -Therapeutic para 04/14 culture NGTD  -Daily BC until negative x48hrs      #FTT  #Severe Protein/caloric malnutrition  Patient cachectic in setting  of multiple complications following whipple for pancreatic Ca. Per chart, no evidence of recurrance on recent imaging. Appears to have lost 5-10 lbs in past week (?). Poor PO intake per report, though patient reports feeling hungry.  Likely need TCU at discharge due to failure at home and increased needs exceeding family ability to care for him.   -PT, OT and nutrition once acute issues resolved.  -endoscopic NJ placed 04/17 - started TFs  -High risk re feeding sx - monitor labs and replace as needed      #Hx Pancreatic Cancer (T3N1) s/p Whipple (09/2017)  #Pancreatic insufficiency - chronic diarrhea  #Iatrogenic diabetes 2/2 panceratectomy  Initially diagnosed in 1/2017. He underwent EUS/ERCP with stent placement. Underwent chemotherapy with FOLFIRINOX from 1/2017 to 3/2017; noted to have a liver lesion on imaging in 3/2017 and underwent biopsy which showed abscess/necrosis but no evidence of cancer. He underwent whipple in 5/2017 and repeat liver biopsy intraoperatively demonstrated necrotic areas and inflammations. He then had chemotherapy with gemcitabine and capecitabine between 10/2017 to 11/2017. Developed liver abscess and complicated by enterococcus bacteremia and completed antibiotics in 11/2017. Last seen in oncology clinic 04/02; no longer candidate for further chemo. Will need further work up with EUS and biopsies if survives above.   -Cont Creon  -cont PTA dilaudid and methadone       #Hypoglycemia  Due to poor liver function and NPO status. Stable on TFs  -Hypoglycemia protocol      #HTN  Holding BP meds in setting of GI bleed diuresing PRN      #Depression  Cont sertraline  -Palliative care consulted - declined today      #Hypocalcemia  Likely due to poor PO intake, acute illness and repeat blood transfusions. Replacing aggressively.   -BID iCa      FEN: Regular diet ADAT  Prophylaxis: mechanical  Disposition: Pending management of blood loss; prognosis guarded. Remains critically ill  Code  Status: DNR/DNI      Patient was seen and plan of care discussed with MD Karen Morales MD   Internal Medicine PGY3    Subjective:      Objective:  Most recent vital signs:  BP 99/86  Pulse 92  Temp 95.9  F (35.5  C) (Tympanic)  Resp 24  Ht 1.829 m (6')  Wt 75 kg (165 lb 5.5 oz)  SpO2 93%  BMI 22.42 kg/m2  Temp:  [94.7  F (34.8  C)-96.6  F (35.9  C)] 95.9  F (35.5  C)  Heart Rate:  [77-92] 83  Resp:  [13-31] 24  BP: ()/() 99/86  FiO2 (%):  [80 %-100 %] 100 %  SpO2:  [89 %-97 %] 93 %  Wt Readings from Last 2 Encounters:   04/19/18 75 kg (165 lb 5.5 oz)   04/02/18 68.6 kg (151 lb 3 oz)       Intake/Output Summary (Last 24 hours) at 04/19/18 2115  Last data filed at 04/19/18 2044   Gross per 24 hour   Intake             4841 ml   Output             2200 ml   Net             2641 ml       Physical exam:  General: Cachectic, Patient lying in bed, speaking in 2-3 word phrases, appears dyspneic  HEENT: no scleral icterus or injection, MMM  Cardiac: RRR, no m/r/g appreciated.   Respiratory: Poor air entry R hemithorax, coarse sounds and crackles left  GI:RUQ drain . Mild discomfort to palpation  Extremities: 2+ edema  Skin: petechiae and hematomas noted  Neuro: AOx3,moving all extremities    Labs (Past three days):  CBC  Recent Labs  Lab 04/19/18  1720 04/19/18  0503 04/18/18  2207 04/18/18  1700   WBC 12.1* 10.2 13.0* 12.7*   RBC 3.03* 2.31* 2.62* 2.65*   HGB 8.8* 6.7* 7.6* 7.7*   HCT 26.6* 19.8* 22.3* 22.6*   MCV 88 86 85 85   MCH 29.0 29.0 29.0 29.1   MCHC 33.1 33.8 34.1 34.1   RDW 16.0* 16.5* 16.5* 16.3*   PLT 47* 51* 40* 54*     BMP  Recent Labs  Lab 04/19/18  1719 04/19/18  0503 04/18/18  0624 04/17/18  1553 04/17/18  0350   NA  --  142 140 134 139   POTASSIUM  --  3.5 4.0 5.1 3.6   CHLORIDE  --  108 106 98 101   CO2  --  25 25 24 25   ANIONGAP  --  8 9 13 12   GLC  --  169* 44* 694* 278*   BUN  --  42* 42* 39* 36*   CR  --  0.57* 0.56* 0.62* 0.60*   GFRESTIMATED  --  >90 >90  >90 >90   GFRESTBLACK  --  >90 >90 >90 >90   CHIDI  --  6.5* 7.3* 7.4* 7.9*   MAG 1.8 1.6 1.9 2.4* 2.3   PHOS  --  2.3* 2.2* 4.7* 3.1     Troponins:     INR  Recent Labs  Lab 04/19/18  1720 04/19/18  0503 04/18/18  2207 04/18/18  0624   INR 1.94* 2.16* 1.95* 1.87*     Liver panel  Recent Labs  Lab 04/19/18  0503 04/18/18  0624 04/17/18  0350 04/16/18  0325   PROTTOTAL 4.1* 4.9* 4.2* 4.9*   ALBUMIN 2.1* 2.7* 2.4* 2.8*   BILITOTAL 2.5* 3.5* 3.3* 3.2*   ALKPHOS 278* 358* 349* 370*   * 148* 74* 81*   * 94* 74* 70

## 2018-04-21 NOTE — PLAN OF CARE
Problem: Patient Care Overview  Goal: Plan of Care/Patient Progress Review  Outcome: Declining   Pt on comfort cares, increasingly lethargic, nonverbal.  Nods yes/no to simple questions intermittently.  Stopped IVF D 10, octreotide gtt, antibiotics and medications.  Changed HFNC to 4L NC for comfort.  Breathing now more gasping with pauses.   Pt turned q2-3 hrs, stool occurrence X 3, maroon colored.  No output in condom catheter since AM, removed.  Dilaudid 0.2 mg given X 4 for pain and air hunger. Family and friends at bedside all day.  Educated on end of life processes.  Continue to turn pt and oral cares as needed for comfort.  Support family with end of life changes.      Problem: Gastrointestinal Condition Comorbidity  Goal: Gastrointestinal Condition  Patient comorbidity will be monitored for signs and symptoms of Gastrointestinal condition.  Problems will be absent, minimized or managed by discharge/transition of care.   Outcome: Declining  Pt continues with bloody stool, not monitoring vital signs or labs.  No interventions, pt at end of life, on comfort cares.

## 2018-04-21 NOTE — PLAN OF CARE
"Problem: Patient Care Overview  Goal: Plan of Care/Patient Progress Review  Outcome: Declining    Patient with respiratory failure overnight, on 100% Bipap this am. Able to tolerate High flow nasal cannula at 100% for majority of the day, but unable to go for planned procedures due to his declining respiratory status. The patient refused Bipap, pulling off the mask and stating that it makes him uncomfortable and does not help his breathing. He is intermittently confused, oriented to place and time but makes inappropriate comments intermittently and is forgetful/repeats questions. This afternoon after multiple discussions with the medical team, and Palliative care, the patient was transitioned to modified comfort cares (see MD orders). The patient will remain on high flow nasal cannula and receive medications for air hunger as needed. He will remain on Octreotide gtt and receive abx per wife request. A full liquid diet was ordered as the patient wishes to eat and drink. No labs or conditional blood transfusion orders. The patient's wife is at peace and supportive of comfort cares given the patient's condition, but the patient has struggled with the transition. Today he was in agreement with above plan of care, and requested that his feeding tube be removed (removed by writer this afternoon per MD order). He states that it is important to him to eat/drink/ and be off Bipap to interact with family friends. However, this evening he has expressed some ambivalence/hesitation regarding the plan, that may be partly due to his confusion. He and his wife met and spoke with Medicine attendings Dr. Ramirez and Dr. Roy at length regarding the plan of care, and they discussed the plan for \"modified comfort cares\" overnight with writer and the oncoming ICU RN. Plan will continue as above, but Dr. Roy and medicine team will be available to discuss additional interventions if needed/requested.       "

## 2018-04-21 NOTE — PROGRESS NOTES
Infectious diseases brief note    Patient developed worsening respiratory status over the past 24 hrs. I was told by Primary team and RN that patient and family have decided on comfort care. I did not see patient today, patient was with family members.     ID will sign off. Please call with questions

## 2018-04-21 NOTE — PROGRESS NOTES
[facility], [place]    Internal Medicine Progress Note - [facility] Service    Main Plans for Today   - Full comfort care measures     Assessment & Plan   Naresh Poole is a 47 year old male admitted on 4/10/2018. He has a history of pancreatic adenocarcinoma and underwent whipple about one year ago. Sadly, following this procedure, he has struggled with the development of sig cirrhosis of the liver and was admitted here for management of failure to thrive, refractory hemobilia, and persistent/recurrent polymicrobial sepsis. Has essentially been transfusion dependent throughout admission and we could not get control of the bleeding despite heroic efforts from GI/IR. Also noted to have bacteremia from multiple gram positive and gram negative organisms and was on very broad-spectrum abx. About 48 hours ago, we suspect that he had an aspiration event and developed rapid and progressively worsening hypoxic respiratory failure. Given poor prognosis and DNR/DNI wishes, we transitioned to comfort cares on Friday 4/20/18 after a very long and in-depth conversation with Sg and his wife Yumi. Multiple family members have now been visiting, including his three young children and palliative care SW has been helpful in making this a therapeutic experience as much as could be expected in this sad situation.    Over the last 12 hours he has become more unresponsive but appears comfortable. On exam today he is mildly tachypnic and tachycardic but not working hard to breath. He has crackles on the left, diminished on the right. No m/r/g. His abn is distended but soft. Biliary drain with red output.     Has large blood BM this AM.     We have now discontinued all abx and IV therapy not tailored towards the treatment of pain/dyspnea. Based on clincal experience and disease severity, I anticipate he will die over the weekend. Discussed with his wife and will continue to support family and Sg as  we are able.     Jayme Melchor

## 2018-04-21 NOTE — PLAN OF CARE
Problem: Patient Care Overview  Goal: Plan of Care/Patient Progress Review  Outcome: Declining   04/21/18 0527   OTHER   Plan Of Care Reviewed With patient;spouse;family   Plan of Care Review   Progress declining     Pt continues with 'modified' comfort cares. Pt more alert last evening with some confusion at times, but has become more obtunded throughout the night. Pt received 0.5 mg ativan and 0.4 mg dilaudid before bed for anxiety and generalized pain. Family and Patient refused several turns overnight, their wishes were to let him sleep and keep him comfortable. Family updated throughout the night of pt declining mental status. [employer] notified this morning and referral # [id number], Coordinator name: Victorina. Death Packet printed and is in pt chart. Will continue POC.

## 2018-04-22 NOTE — PROGRESS NOTES
MD DEATH PRONOUNCEMENT    Called to pronounce Naresh Poloe dead.    Physical Exam: Spontaneous respirations absent, Heart sounds absent and Pupillary light reflex absent    Patient was pronounced dead at 01:04 AM, 2018.    Active Problems:    Failure to thrive (0-17)       Infectious disease present?: YES    Communicable disease present? (examples: HIV, chicken pox, TB, Ebola, CJD) :  NO    Multi-drug resistant organism present? (example: MRSA): YES    Please consider an autopsy if any of the following exist:  NO Unexpected or unexplained death during or following any dental, medical, or surgical diagnostic treatment procedures.   NO Death of mother at or up to seven days after delivery.     NO All  and pediatric deaths.     NO Death where the cause is sufficiently obscure to delay completion of the death certificate.   NO Deaths in which autopsy would confirm a suspected illness/condition that would affect surviving family members or recipients of transplanted organs.     The following deaths must be reported to the 's Office:  NO A death that may be due entirely or in part to any factors other than natural disease (recent surgery, recent trauma, suspected abuse/neglect).   NO A death that may be an accident, suicide, or homicide.     NO Any sudden, unexpected death in which there is no prior history of significant heart disease or any other condition associated with sudden death.   NO A death under suspicious, unusual, or unexpected circumstances.    NO Any death which is apparently due to natural causes but in which the  does not have a personal physician familiar with the patient s medical history, social, or environmental situation or the circumstances of the terminal event.   NO Any death apparently due to Sudden Infant Death Syndrome.     NO Deaths that occur during, in association with, or as consequences of a diagnostic, therapeutic, or anesthetic procedure.   NO Any  death in which a fracture of a major bone has occurred within the past (6) six months.   NO A death of persons note seen by their physician within 120 days of demise.     NO Any death in which the  was an inmate of a public institution or was in the custody of Law Enforcement personnel.   NO  All unexpected deaths of children   NO Solid organ donors   NO Unidentified bodies   NO Deaths of persons whose bodies are to be cremated or otherwise disposed of so that the bodies will later be unavailable for examination;   NO Deaths unattended by a physician outside of a licensed healthcare facility or licensed residential hospice program   NO Deaths occurring within 24 hours of arrival to a health care facility if death is unexpected.    NO Deaths associated with the decedent s employment.   NO Deaths attributed to acts of terrorism.   NO Any death in which there is uncertainty as to whether it is a medical examiner s care should be discussed with the medical investigator.        Body disposition: Autopsy was discussed with family member:  Spouse in person.  Permission for autopsy was declined.    Danielle Farias MD  PGY-1 Internal Medicine  Pager [phone]

## 2018-04-22 NOTE — DISCHARGE SUMMARY
Medicine Discharge Summary  Naresh Poole MRN: [id number]  1970    Primary care provider: Maci Iglesias  ___________________________________          Date of Admission:  4/10/2018  Date of Discharge:  4/22/2018  Admitting Physician:  Carlos Larsen MD  Discharge Physician:  MD Jill  Discharging Service:  Internal Medicine, [place] [address]     Primary Provider: Maci Iglesias         Reason for Admission:   Failure to thrive          Discharge Diagnosis:   #FTT  #Severe Protein/caloric malnutrition  #Acute hypoxic respiratory failure  #Fluid overload   #Large R pleural effusion 2/2 hepatic hydrothorax  #GI/ hepatic bleed    #Hemobilia in setting of percutaneous biliary drain s/p IR stent placement s/p stent-in-stent placement  #Acute blood loss anemia  #Elevated INR   #Polymicrobial bacteremia  #Secondary bacterial peritonitis - VRE  #Infected R hepatic hydrothorax  #Recent cholangitis and polymicrobial bacteremia   #Hx enterococcal liver abscess  #Recent candidemia  #Hx Pancreatic Cancer (T3N1) s/p Whipple (09/2017)  #Pancreatic insufficiency - chronic diarrhea  #Iatrogenic diabetes 2/2 panceratectomy           Procedures & Significant Findings:     (see procedure note for details)    EGD 04/12 with evidence of bleeding from hepato-jejunostomy site.  IR stent placement 04/13  IR stent in stent 04/16  ERCP 04/17         Consultations:   Gastroenterology  IR  Infectious disease   Palliative care  Surgery         Hospital Course by Problem:      #Goals of care  #FTT  #Severe Protein/caloric malnutrition  Patient with poor prognosis due to ongoing bleeding with limited intervention options and ongoing polymicrobial bacteremia, severe malnutrition, underlying cirrhosis. Developed acute hypoxic respiratory failure. Discussed tenuous respiratory status and need for BiPAP.  Patient expressed wishes ot be DNR/DNI; we talked about needing intubation  for procedure and high likelyhood that he would not be able to be extubated and may need mechanical ventilation. He expressed this would not be in line with wishes. Discussed ongoing infections. After multiple discussions patient and spouse opted for comfort cares.       #Acute hypoxic respiratory failure  #Fluid overload   #Large R pleural effusion 2/2 hepatic hydrothorax  Developed worsening respiratory status in setting of known R effusion, mildly improved with diuresis. Subsequently developed worsening decompensation Goals of care addressed (see above).      #GI/ hepatic bleed    #Hemobilia in setting of percutaneous biliary drain s/p IR stent placement s/p stent-in-stent placement  #Acute blood loss anemia  #Elevated INR   Noted to have maroon liquid stool on admission; patient states he has had bloody stools since discharge but had not reported this previously. Noted to have down trending hgb despite multiple/aggressive blood products transfusions. EGD 04/12 with evidence of bleeding from hepato-jejunostomy site. Due to ongoing bleeding underwent IR stent placement 04/13 to attempt to stop bleed. Noted to have ongoing bleeding and requiring multiple blood products. NM tagged RBC revealed uptake along biliary drain and at gastrojejunostomy site. Attempted stent-in-stent placement 04/16; continued to have ongoing bleeding.   IR and GI planning on procedure to attempt hemostasis, however progressive sepsis and worsening respiratory status would make procedure too high risk. Goals of care addressed (see above).     #Polymicrobial bacteremia  #Secondary bacterial peritonitis - VRE  #Infected R hepatic hydrothorax  #Recent cholangitis and polymicrobial bacteremia   #Hx enterococcal liver abscess  #Recent candidemia  Discharged (03/14-04/02) after admission for polymicrobial bacteremia (E coli, C perfringens, Citrobacter, and enterococcus) and candidemia due to cholangitis and liver abscess. Discharged on vancomycin,  fluconazole, ertapenem with plan for ID follow up at 4 weeks. Presenting with evidence of hemobilia and GI bleed (see above). Patient developed new polymicrobial bacteremia, as well as secondary peritonitis and infected hepatic hydrothorax despite broad abx coverage. Goals of care addressed (see above)  -BC 04/10, , ,  - growing VRE  -BC , 04/15 NGTD   -BC  - Acinetobacter,   -BC  - Stenotrophomonas  -Pleural fluid  growing stenortrophomonas  -Diagnostic para  - culture growing VRE  -Therapeutic para  culture NGTD          #Hx Pancreatic Cancer (T3N1) s/p Whipple (2017)  #Pancreatic insufficiency - chronic diarrhea  #Iatrogenic diabetes 2/2 panceratectomy  Initially diagnosed in 2017. He underwent EUS/ERCP with stent placement. Underwent chemotherapy with FOLFIRINOX from 2017 to 3/2017; noted to have a liver lesion on imaging in 3/2017 and underwent biopsy which showed abscess/necrosis but no evidence of cancer. He underwent whipple in 2017 and repeat liver biopsy intraoperatively demonstrated necrotic areas and inflammations. He then had chemotherapy with gemcitabine and capecitabine between 10/2017 to 2017. Developed liver abscess and complicated by enterococcus bacteremia and completed antibiotics in 2017. Last seen in oncology clinic ; no longer candidate for further chemo. Goals of care as above.                 Discharge Disposition:             Condition on Discharge:   Discharge condition:    Code status on discharge: Comfort Care        Date of service: 2018    The patient was discussed with Dr. Melchor.    Karen Coronado

## 2018-04-23 LAB
BACTERIA SPEC CULT: ABNORMAL
BACTERIA SPEC CULT: ABNORMAL
FUNGUS SPEC CULT: NORMAL
Lab: ABNORMAL
SPECIMEN SOURCE: ABNORMAL
SPECIMEN SOURCE: NORMAL

## 2018-04-24 LAB
BACTERIA SPEC CULT: ABNORMAL
Lab: ABNORMAL
SPECIMEN SOURCE: ABNORMAL
SPECIMEN SOURCE: ABNORMAL

## 2018-04-25 LAB
BACTERIA SPEC CULT: ABNORMAL
BACTERIA SPEC CULT: NO GROWTH
Lab: ABNORMAL
Lab: ABNORMAL
Lab: NORMAL
SPECIMEN SOURCE: ABNORMAL
SPECIMEN SOURCE: NORMAL

## 2018-04-26 LAB
FUNGUS SPEC CULT: NORMAL
SPECIMEN SOURCE: NORMAL

## 2018-05-03 ENCOUNTER — CARE COORDINATION (OUTPATIENT)
Dept: ONCOLOGY | Facility: CLINIC | Age: 48
End: 2018-05-03

## 2018-05-03 NOTE — PROGRESS NOTES
Pt  at [facility] on 18    All future orders, appointments, treatment/therapy plans, and episodes of care have been discontinued.    Dr. Cuevas and care team also notified.

## 2018-05-11 LAB
FUNGUS SPEC CULT: NORMAL
SPECIMEN SOURCE: NORMAL

## 2022-02-17 PROBLEM — R62.51 FAILURE TO THRIVE IN PEDIATRIC PATIENT: Status: ACTIVE | Noted: 2018-01-01

## 2022-06-19 NOTE — PROGRESS NOTES
OT: Eval complete. RN ok'd session. Wife prsesent and assisted during session. Wife expresed frustration with lack of therapy since admission. Educated on medical status limiting. Pt in significant pain, RN administering IV pain meds. Pt mod-max Ax2 supine to sit EOB x2 <1 minute with mod to max A to sit EOB due to pain. Pt requiring significant rest breaks and slow transfers due to pain. Educated on ROM prior and increasing HOB prior to therapy. Pt left supine in bed with wife present   Patient used  Auto CPAP last night and this morning for a total of  9  hrs. 42 min.

## 2024-08-28 NOTE — ANESTHESIA POSTPROCEDURE EVALUATION
Patient Name: Albert Salguero   Visit Date: 08/28/2024   Patient ID: [id number]  Provider: Cesario Wiley MD    Sex: male  Location: [facility]   YOB: 1988  Location Address: [address] [place] [address], [address] [place] [address],?[place]?[id number]    Primary Care Provider Rojas Choi MD  Location Phone: [phone]    Referring Provider: No ref. provider found        Chief Complaint  swallow study results    History of Present Illness  Albert Salguero is a 36 y.o. male who presents to [facility] for swallow study results. Patient has been evaluated by Dr. Choi in January of this year for complaints of eustachian tube dysfunction. Patient was referred here for further evaluation and management. Otherwise patient has been seen at [facility] clinic here by Ms. Kay Oliva for evaluation of gagging and dysphagia as well as vomiting and referred subsequently to GI clinic for further evaluation and management. Upper GI endoscopy was performed by Dr. Kumar on 6/14/2023 showing small hiatal hernia, mild stenosis at the GE junction which was dilated and biopsied. Otherwise rest of the exam was unremarkable. Biopsy indicated from the GE junction showed mild changes suggestive of reflux esophagitis. Gastric biopsy at the antrum also showed mild chronic inactive gastritis. Otherwise there were no other findings. Patient did very well for about 7-month and now he is starting to have problems again hacking and coughing with food. Patient is constantly clearing his throat as if something is in his throat blocking it. Mom denies any difficulty with airway. Patient has history of Down syndrome and has required open heart surgery as a child. Otherwise patient has been noted to be very tired easily. Patient makes more of silence instead of words.   Last visit, bilateral ear canal impacted with cerumen was cleaned under microscope.  Otherwise tympanic membrane's are  Patient: Naresh Poole    Procedure(s):  Enteroscopy with Endoscopic Retrograde Cholangiopancreatogram and Nasojejunostomy Tube Placement - Wound Class: II-Clean Contaminated   - Wound Class: I-Clean    Diagnosis:Hemobilia   Diagnosis Additional Information: No value filed.    Anesthesia Type:  No value filed.    Note:  Anesthesia Post Evaluation    Patient location during evaluation: PACU  Patient participation: Able to fully participate in evaluation  Level of consciousness: awake and alert  Pain management: adequate  Airway patency: patent  Cardiovascular status: acceptable and hemodynamically stable  Respiratory status: acceptable  Hydration status: acceptable  PONV: none     Anesthetic complications: None          Last vitals:  Vitals:    04/17/18 2000 04/17/18 2015 04/17/18 2026   BP: 100/75 98/73 103/74   Pulse:      Resp: 16 20 18   Temp:   36.4  C (97.6  F)   SpO2: 100% 96% 96%         Electronically Signed By: Jabari Peguero MD  April 17, 2018  8:30 PM   unremarkable.  Audiogram obtained shows SRT of 30 on the right and 35 on the left.  Word recognition scores were not tested.  Patient has Down syndrome and pure tones show a mild hearing loss but it was inconsistent.  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  Tympanogram was type a on the right and type As on the left.  At this time I would recommend obtaining audiogram annually.  According to mom patient definitely has trouble in the upper esophagus with swallow and sounds like mostly solid.  I am going to obtain modified barium swallow and see if he has any issues with the swallowing mechanism.  Patient is here for follow-up after the modified barium swallow and also thyroid function test.  Past Medical History:   Diagnosis Date    CHD (congenital heart disease)     Dental disease 2022    Down syndrome     Eczema     GERD (gastroesophageal reflux disease)     Hx of heart surgery        Past Surgical History:   Procedure Laterality Date    CARDIAC SURGERY      DENTAL PROCEDURE      ENDOSCOPY N/A 06/14/2023    Procedure: ESOPHAGOGASTRODUODENOSCOPY with biopsies, diloation with 15- 18 mm balloon;  Surgeon: Garfield Kumar MD;  Location: [facility];  Service: Gastroenterology;  Laterality: N/A;  hiatal hernia, stricture         Current Outpatient Medications:     carbamide peroxide (Debrox) 6.5 % otic solution, Every 12 (Twelve) Hours., Disp: , Rfl:     famotidine (PEPCID) 40 mg/5 mL suspension, SHAKE LIQUID AND TAKE 2.5 ML BY MOUTH TWICE DAILY, Disp: 50 mL, Rfl: 3    hydrOXYzine (ATARAX) 10 MG/5ML syrup, Every 6 (Six) Hours., Disp: , Rfl:      No Known Allergies    Social History     Tobacco Use    Smoking status: Never     Passive exposure: Current    Smokeless tobacco: Never    Tobacco comments:     Elizabet smoke outside   Vaping Use    Vaping status: Never Used   Substance Use Topics    Alcohol use: Never    Drug use: Never        Objective     Vital Signs:   Vitals:     "08/28/24 1109   Temp: 97.5 °F (36.4 °C)   TempSrc: Temporal   Weight: 49.4 kg (109 lb)   Height: 160 cm (63\")       Tobacco Use: Medium Risk (8/28/2024)    Patient History     Smoking Tobacco Use: Never     Smokeless Tobacco Use: Never     Passive Exposure: Current         Physical Exam    Constitutional   Appearance  well developed, well-nourished, alert and in no acute distress, voice clear and strong    Head   Inspection  no deformities or lesions      Face   Inspection  no facial lesions; House-Brackmann I/VI bilaterally   Palpation  no TMJ crepitus nor  muscle tenderness bilaterally     Eyes/Vision   Visual Fields  extraocular movements are intact, no spontaneous or gaze-induced nystagmus  Conjunctivae  clear   Sclerae  clear   Pupils and Irises  pupils equal, round, and reactive to light.   Nystagmus  not present     Ears, Nose, Mouth and Throat  Ears  External Ears  appearance within normal limits, no lesions present   Otoscopic Examination  tympanic membrane appearance within normal limits bilaterally without perforations, well-aerated middle ears   Hearing  intact to conversational voice both ears   Tunning fork testing    Rinne:  Saldana:    Nose  External Nose  appearance normal   Intranasal Exam  mucosa within normal limits, vestibules normal, no intranasal lesions present, septum midline, sinuses non tender to percussion   Modified Tri Test:    Oral Cavity  Oral Mucosa  oral mucosa normal without pallor or cyanosis   Lips  lip appearance normal   Teeth  normal dentition for age   Gums  gums pink, non-swollen, no bleeding present   Tongue  tongue appearance normal; normal mobility   Palate  hard palate normal, soft palate appearance normal with symmetric mobility     Throat  Oropharynx  no inflammation or lesions present, tonsils within normal limits   Hypopharynx  appearance within normal limits   Larynx  voice normal     Neck  Inspection/Palpation  normal appearance, no masses or tenderness, " trachea midline; thyroid size normal, nontender, no nodules or masses present on palpation     Lymphatic  Neck  no lymphadenopathy present   Supraclavicular Nodes  no lymphadenopathy present   Preauricular Nodes  no lymphadenopathy present     Respiratory  Respiratory Effort  breathing unlabored   Inspection of Chest  normal appearance, no retractions     Musculoskeletal   Cervical back: Normal range of motion and neck supple.      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  Mood and Affect  mood normal, affect appropriate       RESULTS REVIEWED    I have reviewed the following information:   [x]  Previous Internal Note  []  Previous External Note:   [x]  Ordered Tests & Results:      Pathology:   Lab Results   Component Value Date    CASEREPORT  06/14/2023     Surgical Pathology Report                         Case: [id number]                                  Authorizing Provider:  Garfield Kumar MD  Collected:           06/14/2023 09:41 AM          Ordering Location:     [facility] Received:            06/14/2023 11:06 AM                                 SUITES                                                                       Pathologist:           Asha Wahl MD                                                     Specimens:   1) - Gastric, Antrum, antrum biopsies                                                               2) - GE Junction, ge junction biopsies                                                     CLININFO  06/14/2023     Oropharyngeal dysphagia  Nausea  Episode of gagging  Nausea and vomiting, unspecified vomiting type  Down's  "syndrome  Dysphagia, unspecified type      FINALDX  06/14/2023     1. Stomach, antrum, biopsy:   - Gastric antrum mucosa with mild chronic inactive gastritis   - Negative for Helicobacter pylori on routine H&E stain   - Negative for intestinal metaplasia, dysplasia and malignancy      2. Gastroesophageal junction, biopsy:   - Squamous mucosa with mild changes suggestive of reflux esophagitis   - Negative for intestinal metaplasia, dysplasia and malignancy            GROSSDES  06/14/2023     1. Gastric, Antrum.  Received in formalin and labeled \" antrum\" are two fragments of tan soft tissue measuring 0.2-0.3 cm in greatest dimension. The specimen is entirely submitted in one cassette.    2. GE Junction.  Received in formalin and labeled \" GE junction\" are two fragments of tan soft tissue measuring 0.2-0.3 cm in greatest dimension. The specimen is entirely submitted in one cassette.   ANALY      MICRO  06/14/2023     Microscopic examination performed.         TSH   Date Value Ref Range Status   06/20/2024 1.850 0.270 - 4.200 uIU/mL Final     T3, Free   Date Value Ref Range Status   06/20/2024 3.62 2.00 - 4.40 pg/mL Final     Free T4   Date Value Ref Range Status   06/20/2024 0.99 0.92 - 1.68 ng/dL Final       FL Video Swallow With Speech Single Contrast    Result Date: 8/22/2024  Impression: 1. No tracheal aspiration or laryngeal penetration 2. Prominent cricopharyngeus muscle Please consult speech pathology report for further details regarding the exam and for dietary recommendations. Report dictated by: Patricia Jarquin  I have personally reviewed this case and agree with the findings above: Electronically Signed: Goran Shelley MD  8/22/2024 4:29 PM EDT  Workstation ID: [id number]      I have discussed the interpretation of the above results with the patient.    Procedures          Assessment and Plan   Diagnoses and all orders for this visit:    1. Oropharyngeal dysphagia (Primary)  -     Case Request; Standing  -     " Follow Anesthesia Guidelines / Protocol; Standing  -     Obtain Informed Consent; Standing  -     Case Request    2. Hiatal hernia    3. Dysarthria    4. Tiredness    5. Down's syndrome    6. Eustachian tube dysfunction, bilateral    7. Status post dilatation of esophageal stricture    8. Benign esophageal stricture  -     Case Request; Standing  -     Follow Anesthesia Guidelines / Protocol; Standing  -     Obtain Informed Consent; Standing  -     Case Request    9. 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 GERD without esophagitis  -     Case Request; Standing  -     Follow Anesthesia Guidelines / Protocol; Standing  -     Obtain Informed Consent; Standing  -     Case Request        (R13.12) Oropharyngeal dysphagia - Plan: Case Request, Follow Anesthesia Guidelines / Protocol, Obtain Informed Consent, Case Request    (K44.9) Hiatal hernia    (R47.1) Dysarthria    (R53.83) Tiredness    (Q90.9) Down's syndrome    (H69.93) Eustachian tube dysfunction, bilateral    (Z98.890,  Z87.19) Status post dilatation of esophageal stricture    (K22.2) Benign esophageal stricture - Plan: Case Request, Follow Anesthesia Guidelines / Protocol, Obtain Informed Consent, Case Request    (K44.9) Hernia, hiatal - Plan: Case Request, Follow Anesthesia Guidelines / Protocol, Obtain Informed Consent, Case Request    (K22.4) Cricopharyngeus muscle dysfunction - Plan: Case Request, Follow Anesthesia Guidelines / Protocol, Obtain Informed Consent, Case Request    (K21.9) GERD without esophagitis - Plan: Case Request, Follow Anesthesia Guidelines / Protocol, Obtain Informed Consent, Case Request     Albert Salguero  reports that he  has never smoked. He has been exposed to tobacco smoke. He has never used smokeless tobacco.       Plan:  Patient Instructions   1.  Thyroid function test shows TSH normal at 1.85, free T3 normal at 3.62, and normal at 0.994 Free T4.  2.  Modified barium swallow revealed no tracheal aspiration or laryngeal penetration.  However does show prominent cricopharyngeus muscle but bolus clears without hesitation.  3.  It is apparent that patient has history of small hiatal hernia and GERD with lower esophageal stricture which have been biopsied and dilated approximately a year ago by Dr. Kumar.  Patient has symptoms again and modified barium swallow confirms that patient has a very prominent cricopharyngeus muscle as a result.  Therefore I recommend proceeding with suspension microlaryngoscopy, and flexible esophagogastroduodenoscopy with biopsy and balloon dilatation.    SML/EGD,BIOPSY AND DILATE (N/A)    The risks and benefits have been re-discussed and questions answered  DIRECT LARYNGOSCOPY WITH BIOPSY: The risks and benefits were explained including but not limited to pain, bleeding, infection, (including possible mediastinitis), the risks of the general anesthesia, pain, temporary or permanent hoarseness, airway loss, and/or tooth injury. No guarantees were made or implied.   ESOPHAGOSCOPY WITH BIOPSY AND DILATATION: The risks and benefits of esophagoscopy were explained including but not limited to bleeding, infection, (including possible mediastinitis), the risks of the general anesthesia, pain, temporary or permanent hoarseness, persistent/ recurrent disease, possible need for further treatment, airway loss, viscus perforation and/or tooth injury.         Follow Up   Return Postop follow-up in 1 to 2 month.  Patient was given instructions and counseling regarding his condition or for health maintenance advice. Please see specific information pulled into the AVS if appropriate.

## (undated) DEVICE — PACK ENDOSCOPY GI CUSTOM UMMC

## (undated) DEVICE — WIRE GUIDE 0.025"X450CM ANG VISIGLIDE G-240-2545A

## (undated) DEVICE — KIT CONNECTOR FOR OLYMPUS ENDOSCOPES DEFENDO 100310

## (undated) DEVICE — ENDO FORCEP ENDOJAW BIOPSY 2.8MMX230CM FB-220U

## (undated) DEVICE — ENDO TUBING CO2 SMARTCAP STERILE DISP 100145CO2EXT

## (undated) DEVICE — KIT ENDO FIRST STEP DISINFECTANT 200ML W/POUCH EP-4

## (undated) DEVICE — TAPE CLOTH 3" CARDINAL 3TRCL03

## (undated) DEVICE — BIOPSY VALVE BIOSHIELD 00711135

## (undated) DEVICE — ENDO DEVICE LOCKING AND BIOPSY CAP M00545261

## (undated) DEVICE — ENDO TUBE SPLINTING ST-SB1

## (undated) DEVICE — GUIDEWIRE TERUMO .035X260 3CM STR TIP GS3504

## (undated) DEVICE — WIRE GUIDE TRACER METRO DIRECT .021"X480CM STR TIP G26862

## (undated) DEVICE — CATH RETRIEVAL BALLOON EXTRACTOR PRO RX-S INJ ABOVE 9-12MM

## (undated) DEVICE — ENDO EXTRACTOR BALLOON TRI-EX 6.6FRX275CM W/MULTI SIZING

## (undated) DEVICE — DEVICE SECURE BRIDLE 12FR FOR FEEDING AND NG TUBES 4-4112

## (undated) DEVICE — SOL WATER IRRIG 1000ML BOTTLE 2F7114

## (undated) DEVICE — ENDO BITE BLOCK ADULT OMNI-BLOC

## (undated) DEVICE — ENDO CATH BALLOON EXTRACTOR PRO RX 09-12MM 4700

## (undated) DEVICE — WIRE GUIDE 0.025"X450CM STR VISIGLIDE G-240-2545S

## (undated) DEVICE — TUBE NASOGASTRIC FEEDING OVER WIRE 12FRX22" CORFLO 30-4602

## (undated) DEVICE — ENDO TANK RESERVOIR FOR SPLINTING TUBE MAJ-1727

## (undated) DEVICE — ENDO CAP AND TUBING STERILE FOR ENDOGATOR  100130

## (undated) DEVICE — ENDO ADPT CATH ROTATABLE SIDE ARM G22677

## (undated) DEVICE — ENDO IRRIGATOR VALVE BIOSHIELD 12"

## (undated) RX ORDER — ALBUMIN, HUMAN INJ 5% 5 %
SOLUTION INTRAVENOUS
Status: DISPENSED
Start: 2018-01-01

## (undated) RX ORDER — LIDOCAINE HYDROCHLORIDE 10 MG/ML
INJECTION, SOLUTION EPIDURAL; INFILTRATION; INTRACAUDAL; PERINEURAL
Status: DISPENSED
Start: 2018-01-01

## (undated) RX ORDER — LIDOCAINE HYDROCHLORIDE 20 MG/ML
INJECTION, SOLUTION EPIDURAL; INFILTRATION; INTRACAUDAL; PERINEURAL
Status: DISPENSED
Start: 2018-01-01

## (undated) RX ORDER — DEXAMETHASONE SODIUM PHOSPHATE 4 MG/ML
INJECTION, SOLUTION INTRA-ARTICULAR; INTRALESIONAL; INTRAMUSCULAR; INTRAVENOUS; SOFT TISSUE
Status: DISPENSED
Start: 2018-01-01

## (undated) RX ORDER — LIDOCAINE HYDROCHLORIDE 10 MG/ML
INJECTION, SOLUTION INFILTRATION; PERINEURAL
Status: DISPENSED
Start: 2018-01-01

## (undated) RX ORDER — ONDANSETRON 2 MG/ML
INJECTION INTRAMUSCULAR; INTRAVENOUS
Status: DISPENSED
Start: 2018-01-01

## (undated) RX ORDER — EPHEDRINE SULFATE 50 MG/ML
INJECTION, SOLUTION INTRAMUSCULAR; INTRAVENOUS; SUBCUTANEOUS
Status: DISPENSED
Start: 2018-01-01

## (undated) RX ORDER — FENTANYL CITRATE 50 UG/ML
INJECTION, SOLUTION INTRAMUSCULAR; INTRAVENOUS
Status: DISPENSED
Start: 2018-01-01

## (undated) RX ORDER — PHENYLEPHRINE HCL IN 0.9% NACL 1 MG/10 ML
SYRINGE (ML) INTRAVENOUS
Status: DISPENSED
Start: 2018-01-01

## (undated) RX ORDER — ROCURONIUM BROMIDE 50 MG/5 ML
SYRINGE (ML) INTRAVENOUS
Status: DISPENSED
Start: 2018-01-01

## (undated) RX ORDER — PROPOFOL 10 MG/ML
INJECTION, EMULSION INTRAVENOUS
Status: DISPENSED
Start: 2018-01-01

## (undated) RX ORDER — SODIUM CHLORIDE 9 MG/ML
INJECTION, SOLUTION INTRAVENOUS
Status: DISPENSED
Start: 2018-01-01

## (undated) RX ORDER — DEXTROSE MONOHYDRATE 50 MG/ML
INJECTION, SOLUTION INTRAVENOUS
Status: DISPENSED
Start: 2018-01-01